# Patient Record
Sex: FEMALE | Race: WHITE | NOT HISPANIC OR LATINO | Employment: OTHER | URBAN - NONMETROPOLITAN AREA
[De-identification: names, ages, dates, MRNs, and addresses within clinical notes are randomized per-mention and may not be internally consistent; named-entity substitution may affect disease eponyms.]

---

## 2017-01-09 ENCOUNTER — HOSPITAL ENCOUNTER (OUTPATIENT)
Dept: OTHER | Facility: HOSPITAL | Age: 77
Setting detail: RADIATION/ONCOLOGY SERIES
Discharge: HOME | End: 2017-01-20
Attending: INTERNAL MEDICINE | Admitting: INTERNAL MEDICINE

## 2017-01-09 LAB
ALBUMIN SERPL-MCNC: 4 GM/DL (ref 3.4–4.8)
ALP SERPL-CCNC: 85 U/L (ref 38–126)
ALT SERPL-CCNC: 41 U/L (ref 9–52)
ANION GAP SERPL CALCULATED.3IONS-SCNC: 10 MMOL/L (ref 5–15)
AST SERPL-CCNC: 29 U/L (ref 14–36)
BASOPHILS # BLD AUTO: 0.02 X1000/UL (ref 0–0.2)
BASOPHILS NFR BLD AUTO: 0.3 % (ref 0–2)
BILIRUB SERPL-MCNC: 0.4 MG/DL (ref 0.2–1.3)
BUN SERPL-MCNC: 17 MG/DL (ref 7–21)
CALCIUM SERPL-MCNC: 9.4 MG/DL (ref 8.4–10.2)
CHLORIDE SERPL-SCNC: 99 MMOL/L (ref 95–110)
CO2 SERPL-SCNC: 26 MMOL/L (ref 22–31)
CONV AUTO IG#: 0.02 X1000/UL (ref 0.01–0.02)
CONV AUTO IG%: 0.3 % (ref 0–0.5)
CONV RETICULOCYTE PRODUCTION INDEX: 1.4 % (ref 0.63–2.13)
CREAT SERPL-MCNC: 0.8 MG/DL (ref 0.5–1)
EOSINOPHIL # BLD AUTO: 0.14 X1000/UL (ref 0–0.7)
EOSINOPHIL NFR BLD AUTO: 2.1 % (ref 0–7)
ERYTHROCYTE [DISTWIDTH] IN BLOOD: 14.3 % (ref 11.5–14.5)
FERRITIN SERPL IA-MCNC: 78.2 NG/ML (ref 11.1–264)
GLUCOSE SERPL-MCNC: 186 MG/DL (ref 60–100)
GRANULOCYTES # BLD AUTO: 4.47 X1000/UL (ref 2–8.6)
GRANULOCYTES NFR BLD AUTO: 67.4 % (ref 37–80)
HCT VFR BLD CALC: 35.7 % (ref 35–45)
HCT VFR BLD CALC: 35.7 % (ref 35–45)
HGB BLD-MCNC: 11.6 GM/DL (ref 12–15.5)
IMM RETICS NFR: 18.2 % (ref 3–15.9)
IRON SATN MFR SERPL: 13.6 % (ref 15–50)
IRON SERPL-MCNC: 43 UG/DL (ref 37–170)
LYMPHOCYTES # BLD AUTO: 1.59 X1000/UL (ref 0.6–4.2)
LYMPHOCYTES NFR BLD AUTO: 23.9 % (ref 10–50)
MCH RBC QN: 29.4 PG (ref 26–34)
MCHC RBC-ENTMCNC: 32.5 GM/DL (ref 31.4–36)
MCV RBC: 90.6 FL (ref 80–98)
MONOCYTES # BLD AUTO: 0.4 X1000/UL (ref 0–0.9)
MONOCYTES NFR BLD AUTO: 6 % (ref 0–12)
PLATELET # BLD: 292 X1000/MM3 (ref 150–450)
PMV BLD: 9.4 FL (ref 8–12)
POTASSIUM SERPL-SCNC: 4 MMOL/L (ref 3.5–5.1)
PROT SERPL-MCNC: 6.7 GM/DL (ref 6.3–8.6)
RBC # BLD: 3.94 MEGA/MM3 (ref 3.77–5.16)
RETICS/RBC NFR AUTO: 2.58 % (ref 0.63–2.13)
RETICS/RBC NFR MANUAL: 102 X1000/MM3 (ref 25–125)
SODIUM SERPL-SCNC: 135 MMOL/L (ref 137–145)
TIBC SERPL-MCNC: 317 UG/DL (ref 265–497)
WBC # BLD: 6.6 X1000/UL (ref 3.2–9.8)

## 2017-01-17 LAB — CEA SERPL-MCNC: 1.9 NG/ML (ref 0–5)

## 2017-02-22 ENCOUNTER — TELEPHONE (OUTPATIENT)
Dept: NUTRITION | Facility: HOSPITAL | Age: 77
End: 2017-02-22

## 2017-02-22 NOTE — PROGRESS NOTES
Adult Outpatient Nutrition  Assessment    Patient Name:  Mary Gutierrez  YOB: 1940  MRN: [id number]        Assessment Date:  2/22/2017                        Comments:   Received voice mail from Mr. Gutierrez stating that Mary is  out of [employer] discount coupons. Mailed x5  Coupons to pt's   Home.        Electronically signed by:  Love Black RD  02/22/17 8:18 AM

## 2017-04-11 ENCOUNTER — TELEPHONE (OUTPATIENT)
Dept: NUTRITION | Facility: HOSPITAL | Age: 77
End: 2017-04-11

## 2017-04-11 NOTE — PROGRESS NOTES
Adult Outpatient Nutrition  Assessment    Patient Name:  Mary Gutierrez  YOB: 1940  MRN: [id number]        Assessment Date:  4/11/2017                        Comments: Received phone message from Mr. Gutierrez stating that Mary needs   [employer] discount coupons--mailed several this am.        Electronically signed by:  Love Black RD  04/11/17 8:32 AM

## 2017-04-17 ENCOUNTER — APPOINTMENT (OUTPATIENT)
Dept: ONCOLOGY | Facility: HOSPITAL | Age: 77
End: 2017-04-17

## 2017-04-19 ENCOUNTER — LAB (OUTPATIENT)
Dept: ONCOLOGY | Facility: HOSPITAL | Age: 77
End: 2017-04-19

## 2017-04-19 ENCOUNTER — OFFICE VISIT (OUTPATIENT)
Dept: ONCOLOGY | Facility: CLINIC | Age: 77
End: 2017-04-19

## 2017-04-19 ENCOUNTER — INFUSION (OUTPATIENT)
Dept: ONCOLOGY | Facility: HOSPITAL | Age: 77
End: 2017-04-19

## 2017-04-19 VITALS
HEART RATE: 80 BPM | WEIGHT: 124.3 LBS | RESPIRATION RATE: 18 BRPM | TEMPERATURE: 98.6 F | SYSTOLIC BLOOD PRESSURE: 166 MMHG | DIASTOLIC BLOOD PRESSURE: 96 MMHG | BODY MASS INDEX: 21.34 KG/M2

## 2017-04-19 DIAGNOSIS — Z45.2 ENCOUNTER FOR VENOUS ACCESS DEVICE CARE: Primary | ICD-10-CM

## 2017-04-19 DIAGNOSIS — C18.9 COLON CANCER WITHOUT DISTANT METASTASIS (HCC): Primary | ICD-10-CM

## 2017-04-19 DIAGNOSIS — C18.2 MALIGNANT NEOPLASM OF ASCENDING COLON (HCC): Primary | ICD-10-CM

## 2017-04-19 DIAGNOSIS — D64.89 ANEMIA DUE TO OTHER CAUSE: ICD-10-CM

## 2017-04-19 PROBLEM — D64.9 ANEMIA: Status: ACTIVE | Noted: 2017-04-19

## 2017-04-19 LAB
ALBUMIN SERPL-MCNC: 4.4 G/DL (ref 3.4–4.8)
ALBUMIN/GLOB SERPL: 2 G/DL (ref 1.1–1.8)
ALP SERPL-CCNC: 49 U/L (ref 38–126)
ALT SERPL W P-5'-P-CCNC: 24 U/L (ref 9–52)
ANION GAP SERPL CALCULATED.3IONS-SCNC: 12 MMOL/L (ref 5–15)
AST SERPL-CCNC: 29 U/L (ref 14–36)
BASOPHILS # BLD AUTO: 0.03 10*3/MM3 (ref 0–0.2)
BASOPHILS NFR BLD AUTO: 0.5 % (ref 0–2)
BILIRUB SERPL-MCNC: 0.3 MG/DL (ref 0.2–1.3)
BUN BLD-MCNC: 18 MG/DL (ref 7–21)
BUN/CREAT SERPL: 19.4 (ref 7–25)
CALCIUM SPEC-SCNC: 9.4 MG/DL (ref 8.4–10.2)
CHLORIDE SERPL-SCNC: 103 MMOL/L (ref 95–110)
CO2 SERPL-SCNC: 23 MMOL/L (ref 22–31)
CREAT BLD-MCNC: 0.93 MG/DL (ref 0.5–1)
DEPRECATED RDW RBC AUTO: 42.8 FL (ref 36.4–46.3)
EOSINOPHIL # BLD AUTO: 0.17 10*3/MM3 (ref 0–0.7)
EOSINOPHIL NFR BLD AUTO: 2.8 % (ref 0–7)
ERYTHROCYTE [DISTWIDTH] IN BLOOD BY AUTOMATED COUNT: 14.3 % (ref 11.5–14.5)
GFR SERPL CREATININE-BSD FRML MDRD: 59 ML/MIN/1.73 (ref 39–90)
GLOBULIN UR ELPH-MCNC: 2.2 GM/DL (ref 2.3–3.5)
GLUCOSE BLD-MCNC: 160 MG/DL (ref 60–100)
HCT VFR BLD AUTO: 28.1 % (ref 35–45)
HGB BLD-MCNC: 9 G/DL (ref 12–15.5)
IMM GRANULOCYTES # BLD: 0.02 10*3/MM3 (ref 0–0.02)
IMM GRANULOCYTES NFR BLD: 0.3 % (ref 0–0.5)
LYMPHOCYTES # BLD AUTO: 1.75 10*3/MM3 (ref 0.6–4.2)
LYMPHOCYTES NFR BLD AUTO: 29.1 % (ref 10–50)
MCH RBC QN AUTO: 26.1 PG (ref 26.5–34)
MCHC RBC AUTO-ENTMCNC: 32 G/DL (ref 31.4–36)
MCV RBC AUTO: 81.4 FL (ref 80–98)
MONOCYTES # BLD AUTO: 0.33 10*3/MM3 (ref 0–0.9)
MONOCYTES NFR BLD AUTO: 5.5 % (ref 0–12)
NEUTROPHILS # BLD AUTO: 3.72 10*3/MM3 (ref 2–8.6)
NEUTROPHILS NFR BLD AUTO: 61.8 % (ref 37–80)
PLATELET # BLD AUTO: 408 10*3/MM3 (ref 150–450)
PMV BLD AUTO: 9.5 FL (ref 8–12)
POTASSIUM BLD-SCNC: 3.9 MMOL/L (ref 3.5–5.1)
PROT SERPL-MCNC: 6.6 G/DL (ref 6.3–8.6)
RBC # BLD AUTO: 3.45 10*6/MM3 (ref 3.77–5.16)
SODIUM BLD-SCNC: 138 MMOL/L (ref 137–145)
WBC NRBC COR # BLD: 6.02 10*3/MM3 (ref 3.2–9.8)

## 2017-04-19 PROCEDURE — 82378 CARCINOEMBRYONIC ANTIGEN: CPT | Performed by: INTERNAL MEDICINE

## 2017-04-19 PROCEDURE — 85025 COMPLETE CBC W/AUTO DIFF WBC: CPT | Performed by: INTERNAL MEDICINE

## 2017-04-19 PROCEDURE — 99214 OFFICE O/P EST MOD 30 MIN: CPT | Performed by: INTERNAL MEDICINE

## 2017-04-19 PROCEDURE — G0463 HOSPITAL OUTPT CLINIC VISIT: HCPCS | Performed by: INTERNAL MEDICINE

## 2017-04-19 PROCEDURE — 80053 COMPREHEN METABOLIC PANEL: CPT | Performed by: INTERNAL MEDICINE

## 2017-04-19 RX ORDER — SODIUM CHLORIDE 0.9 % (FLUSH) 0.9 %
10 SYRINGE (ML) INJECTION AS NEEDED
Status: DISCONTINUED | OUTPATIENT
Start: 2017-04-19 | End: 2017-04-19 | Stop reason: HOSPADM

## 2017-04-19 RX ORDER — MULTIVIT WITH MINERALS/LUTEIN
2 TABLET ORAL 2 TIMES DAILY
COMMUNITY
End: 2017-06-15 | Stop reason: SDUPTHER

## 2017-04-19 RX ORDER — SODIUM CHLORIDE 0.9 % (FLUSH) 0.9 %
10 SYRINGE (ML) INJECTION AS NEEDED
Status: CANCELLED | OUTPATIENT
Start: 2017-04-19

## 2017-04-19 RX ADMIN — SODIUM CHLORIDE, PRESERVATIVE FREE 500 UNITS: 5 INJECTION INTRAVENOUS at 10:30

## 2017-04-19 RX ADMIN — Medication 10 ML: at 10:30

## 2017-04-19 NOTE — PROGRESS NOTES
Subjective patient is here for follow-up of her colon cancer.         History of Present Illness This is 76year-old female who was diagnosed with colon cancer in 2015.  She underwent right hemicolectomy followed by 8 cycles of chemotherapy.  Patient could not complete the chemotherapy secondary to intolerance. She has had anemia and peripheral neuropathy. Patient is still anemic.  She has no new complaints.    Past Medical History, Past Surgical History, Social History, Family History have been reviewed and are without significant changes except as mentioned.    Review of Systems   Constitutional: Negative.    HENT: Negative.    Eyes: Negative.    Respiratory: Negative.    Cardiovascular: Negative.    Gastrointestinal: Negative.    Endocrine: Negative.    Musculoskeletal: Negative.    Skin: Negative.    Allergic/Immunologic: Negative.    Neurological: Negative.    Hematological: Negative.    Psychiatric/Behavioral: Negative.        A comprehensive 14 point review of systems was performed and was negative except as mentioned.    Medications:  The current medication list was reviewed in the EMR    ALLERGIES:    Allergies   Allergen Reactions   • Other Rash     Oral Chemo Meds       Objective      Vitals:    04/19/17 1052   BP: 166/96   Pulse: 80   Resp: 18   Temp: 98.6 °F (37 °C)   Weight: 124 lb 4.8 oz (56.4 kg)   PainSc: 0-No pain     Current Status 4/19/2017   ECOG score 1       Physical Exam   Constitutional: She is oriented to person, place, and time. She appears well-developed and well-nourished.   HENT:   Head: Normocephalic and atraumatic.   Eyes: Conjunctivae and EOM are normal. Pupils are equal, round, and reactive to light.   Neck: Normal range of motion. Neck supple.   Cardiovascular: Normal rate, regular rhythm, normal heart sounds and intact distal pulses.    Pulmonary/Chest: Effort normal and breath sounds normal.   Abdominal: Soft. Bowel sounds are normal.   Musculoskeletal: Normal range of motion.    Neurological: She is alert and oriented to person, place, and time. She has normal reflexes.   Psychiatric: She has a normal mood and affect. Her behavior is normal. Thought content normal.       RECENT LABS:  Hematology WBC   Date Value Ref Range Status   04/19/2017 6.02 3.20 - 9.80 10*3/mm3 Final     RBC   Date Value Ref Range Status   04/19/2017 3.45 (L) 3.77 - 5.16 10*6/mm3 Final     Hemoglobin   Date Value Ref Range Status   04/19/2017 9.0 (L) 12.0 - 15.5 g/dL Final     Hematocrit   Date Value Ref Range Status   04/19/2017 28.1 (L) 35.0 - 45.0 % Final     Platelets   Date Value Ref Range Status   04/19/2017 408 150 - 450 10*3/mm3 Final              Assessment/Plan     1.  Colon cancer.  2. Anemia    Patient with stage III colon cancer October 2015  Currently no evidence of recurrence. Her colonoscopy in 2016 was normal. CEA levels are still pending. Currently observation is indicated.  Patient will be followed up periodically.    The etiology of anemia is unclear. She will need a complete workup.  We will proceed with testing including iron studies and thyroid functions and other parameters.  Patient may need a full workupifshe continues to be anemic.    Her neuropathy is stable.    Thanks for allowing us to participate in the care of this patient.  She will be seen in the office in 3 months time.                  4/19/2017      CC:

## 2017-04-21 LAB — CEA SERPL-MCNC: 1.7 NG/ML (ref 0–5)

## 2017-05-24 DIAGNOSIS — D64.89 ANEMIA DUE TO OTHER CAUSE: Primary | ICD-10-CM

## 2017-05-24 DIAGNOSIS — C18.2 MALIGNANT NEOPLASM OF ASCENDING COLON (HCC): ICD-10-CM

## 2017-05-25 ENCOUNTER — INFUSION (OUTPATIENT)
Dept: ONCOLOGY | Facility: HOSPITAL | Age: 77
End: 2017-05-25

## 2017-05-25 ENCOUNTER — OFFICE VISIT (OUTPATIENT)
Dept: ONCOLOGY | Facility: CLINIC | Age: 77
End: 2017-05-25

## 2017-05-25 VITALS
RESPIRATION RATE: 18 BRPM | BODY MASS INDEX: 21.13 KG/M2 | DIASTOLIC BLOOD PRESSURE: 79 MMHG | SYSTOLIC BLOOD PRESSURE: 151 MMHG | WEIGHT: 123.1 LBS | TEMPERATURE: 98.4 F | HEART RATE: 72 BPM

## 2017-05-25 DIAGNOSIS — D64.89 ANEMIA DUE TO OTHER CAUSE: Primary | ICD-10-CM

## 2017-05-25 DIAGNOSIS — C18.9 COLON CANCER WITHOUT DISTANT METASTASIS (HCC): ICD-10-CM

## 2017-05-25 DIAGNOSIS — C18.2 MALIGNANT NEOPLASM OF ASCENDING COLON (HCC): ICD-10-CM

## 2017-05-25 DIAGNOSIS — Z45.2 ENCOUNTER FOR VENOUS ACCESS DEVICE CARE: ICD-10-CM

## 2017-05-25 DIAGNOSIS — D50.9 IRON DEFICIENCY ANEMIA, UNSPECIFIED IRON DEFICIENCY ANEMIA TYPE: ICD-10-CM

## 2017-05-25 LAB
ALBUMIN SERPL-MCNC: 4.5 G/DL (ref 3.4–4.8)
ALBUMIN/GLOB SERPL: 1.8 G/DL (ref 1.1–1.8)
ALP SERPL-CCNC: 44 U/L (ref 38–126)
ALT SERPL W P-5'-P-CCNC: 37 U/L (ref 9–52)
ANION GAP SERPL CALCULATED.3IONS-SCNC: 13 MMOL/L (ref 5–15)
AST SERPL-CCNC: 35 U/L (ref 14–36)
BASOPHILS # BLD AUTO: 0.02 10*3/MM3 (ref 0–0.2)
BASOPHILS NFR BLD AUTO: 0.4 % (ref 0–2)
BILIRUB SERPL-MCNC: 0.3 MG/DL (ref 0.2–1.3)
BUN BLD-MCNC: 16 MG/DL (ref 7–21)
BUN/CREAT SERPL: 15.8 (ref 7–25)
CALCIUM SPEC-SCNC: 9.4 MG/DL (ref 8.4–10.2)
CHLORIDE SERPL-SCNC: 101 MMOL/L (ref 95–110)
CO2 SERPL-SCNC: 23 MMOL/L (ref 22–31)
CREAT BLD-MCNC: 1.01 MG/DL (ref 0.5–1)
DAT POLY-SP REAG RBC QL: NEGATIVE
DEPRECATED RDW RBC AUTO: 50.7 FL (ref 36.4–46.3)
EOSINOPHIL # BLD AUTO: 0.21 10*3/MM3 (ref 0–0.7)
EOSINOPHIL NFR BLD AUTO: 4.6 % (ref 0–7)
ERYTHROCYTE [DISTWIDTH] IN BLOOD BY AUTOMATED COUNT: 16.7 % (ref 11.5–14.5)
FERRITIN SERPL-MCNC: 11.1 NG/ML (ref 11.1–264)
FOLATE SERPL-MCNC: >20 NG/ML (ref 2.76–21)
GFR SERPL CREATININE-BSD FRML MDRD: 53 ML/MIN/1.73 (ref 39–90)
GLOBULIN UR ELPH-MCNC: 2.5 GM/DL (ref 2.3–3.5)
GLUCOSE BLD-MCNC: 171 MG/DL (ref 60–100)
HCT VFR BLD AUTO: 32.9 % (ref 35–45)
HGB BLD-MCNC: 10.1 G/DL (ref 12–15.5)
IMM GRANULOCYTES # BLD: 0.01 10*3/MM3 (ref 0–0.02)
IMM GRANULOCYTES NFR BLD: 0.2 % (ref 0–0.5)
IRON 24H UR-MRATE: 10 MCG/DL (ref 37–170)
IRON SATN MFR SERPL: 2 % (ref 15–50)
LDH SERPL-CCNC: 319 U/L (ref 313–618)
LYMPHOCYTES # BLD AUTO: 1.5 10*3/MM3 (ref 0.6–4.2)
LYMPHOCYTES NFR BLD AUTO: 32.7 % (ref 10–50)
MCH RBC QN AUTO: 25.8 PG (ref 26.5–34)
MCHC RBC AUTO-ENTMCNC: 30.7 G/DL (ref 31.4–36)
MCV RBC AUTO: 84.1 FL (ref 80–98)
MONOCYTES # BLD AUTO: 0.36 10*3/MM3 (ref 0–0.9)
MONOCYTES NFR BLD AUTO: 7.8 % (ref 0–12)
NEUTROPHILS # BLD AUTO: 2.49 10*3/MM3 (ref 2–8.6)
NEUTROPHILS NFR BLD AUTO: 54.3 % (ref 37–80)
NRBC BLD MANUAL-RTO: 0 /100 WBC (ref 0–0)
PLATELET # BLD AUTO: 348 10*3/MM3 (ref 150–450)
PMV BLD AUTO: 10 FL (ref 8–12)
POTASSIUM BLD-SCNC: 4 MMOL/L (ref 3.5–5.1)
PROT SERPL-MCNC: 7 G/DL (ref 6.3–8.6)
RBC # BLD AUTO: 3.91 10*6/MM3 (ref 3.77–5.16)
SODIUM BLD-SCNC: 137 MMOL/L (ref 137–145)
T4 FREE SERPL-MCNC: 0.82 NG/DL (ref 0.78–2.19)
TIBC SERPL-MCNC: 416 MCG/DL (ref 265–497)
TSH SERPL DL<=0.05 MIU/L-ACNC: 3.75 MIU/ML (ref 0.46–4.68)
VIT B12 BLD-MCNC: >1000 PG/ML (ref 239–931)
WBC NRBC COR # BLD: 4.59 10*3/MM3 (ref 3.2–9.8)

## 2017-05-25 PROCEDURE — 83615 LACTATE (LD) (LDH) ENZYME: CPT | Performed by: INTERNAL MEDICINE

## 2017-05-25 PROCEDURE — 83540 ASSAY OF IRON: CPT | Performed by: INTERNAL MEDICINE

## 2017-05-25 PROCEDURE — 80053 COMPREHEN METABOLIC PANEL: CPT | Performed by: INTERNAL MEDICINE

## 2017-05-25 PROCEDURE — G0463 HOSPITAL OUTPT CLINIC VISIT: HCPCS | Performed by: INTERNAL MEDICINE

## 2017-05-25 PROCEDURE — 82728 ASSAY OF FERRITIN: CPT | Performed by: INTERNAL MEDICINE

## 2017-05-25 PROCEDURE — 83550 IRON BINDING TEST: CPT | Performed by: INTERNAL MEDICINE

## 2017-05-25 PROCEDURE — 99214 OFFICE O/P EST MOD 30 MIN: CPT | Performed by: INTERNAL MEDICINE

## 2017-05-25 PROCEDURE — 82607 VITAMIN B-12: CPT | Performed by: INTERNAL MEDICINE

## 2017-05-25 PROCEDURE — 36415 COLL VENOUS BLD VENIPUNCTURE: CPT | Performed by: INTERNAL MEDICINE

## 2017-05-25 PROCEDURE — 82746 ASSAY OF FOLIC ACID SERUM: CPT | Performed by: INTERNAL MEDICINE

## 2017-05-25 PROCEDURE — 25010000002 HEPARIN FLUSH (PORCINE) 100 UNIT/ML SOLUTION: Performed by: INTERNAL MEDICINE

## 2017-05-25 PROCEDURE — 84443 ASSAY THYROID STIM HORMONE: CPT | Performed by: INTERNAL MEDICINE

## 2017-05-25 PROCEDURE — 36591 DRAW BLOOD OFF VENOUS DEVICE: CPT | Performed by: INTERNAL MEDICINE

## 2017-05-25 PROCEDURE — 82668 ASSAY OF ERYTHROPOIETIN: CPT | Performed by: INTERNAL MEDICINE

## 2017-05-25 PROCEDURE — 84439 ASSAY OF FREE THYROXINE: CPT | Performed by: INTERNAL MEDICINE

## 2017-05-25 PROCEDURE — 86880 COOMBS TEST DIRECT: CPT | Performed by: INTERNAL MEDICINE

## 2017-05-25 PROCEDURE — 85025 COMPLETE CBC W/AUTO DIFF WBC: CPT | Performed by: INTERNAL MEDICINE

## 2017-05-25 RX ORDER — SODIUM CHLORIDE 9 MG/ML
250 INJECTION, SOLUTION INTRAVENOUS ONCE
Status: CANCELLED | OUTPATIENT
Start: 2017-06-01

## 2017-05-25 RX ORDER — SODIUM CHLORIDE 0.9 % (FLUSH) 0.9 %
10 SYRINGE (ML) INJECTION AS NEEDED
Status: DISCONTINUED | OUTPATIENT
Start: 2017-05-25 | End: 2017-05-25 | Stop reason: HOSPADM

## 2017-05-25 RX ORDER — FAMOTIDINE 20 MG/1
20 TABLET, FILM COATED ORAL ONCE
Status: CANCELLED | OUTPATIENT
Start: 2017-06-01

## 2017-05-25 RX ORDER — SODIUM CHLORIDE 0.9 % (FLUSH) 0.9 %
10 SYRINGE (ML) INJECTION AS NEEDED
Status: CANCELLED | OUTPATIENT
Start: 2017-05-25

## 2017-05-25 RX ORDER — DORZOLAMIDE HYDROCHLORIDE AND TIMOLOL MALEATE 20; 5 MG/ML; MG/ML
SOLUTION/ DROPS OPHTHALMIC
Refills: 2 | COMMUNITY
Start: 2017-03-27 | End: 2017-06-15 | Stop reason: SDUPTHER

## 2017-05-25 RX ORDER — DIPHENHYDRAMINE HCL 25 MG
25 CAPSULE ORAL ONCE
Status: CANCELLED | OUTPATIENT
Start: 2017-06-01

## 2017-05-25 RX ORDER — FLUOROURACIL 50 MG/G
1 CREAM TOPICAL AS NEEDED
COMMUNITY
End: 2021-04-16

## 2017-05-25 RX ORDER — OMEGA-3-ACID ETHYL ESTERS 1 G/1
CAPSULE, LIQUID FILLED ORAL
Refills: 0 | COMMUNITY
Start: 2017-05-03 | End: 2017-06-15 | Stop reason: SDUPTHER

## 2017-05-25 RX ADMIN — SODIUM CHLORIDE, PRESERVATIVE FREE 500 UNITS: 5 INJECTION INTRAVENOUS at 08:50

## 2017-05-25 RX ADMIN — Medication 10 ML: at 08:50

## 2017-05-26 LAB — ETHNIC BACKGROUND STATED: 17.9 MIU/ML (ref 2.6–18.5)

## 2017-06-01 ENCOUNTER — APPOINTMENT (OUTPATIENT)
Dept: ONCOLOGY | Facility: HOSPITAL | Age: 77
End: 2017-06-01

## 2017-06-02 ENCOUNTER — INFUSION (OUTPATIENT)
Dept: ONCOLOGY | Facility: HOSPITAL | Age: 77
End: 2017-06-02

## 2017-06-02 VITALS
DIASTOLIC BLOOD PRESSURE: 57 MMHG | TEMPERATURE: 97.7 F | SYSTOLIC BLOOD PRESSURE: 163 MMHG | HEART RATE: 71 BPM | RESPIRATION RATE: 18 BRPM

## 2017-06-02 DIAGNOSIS — D50.0 IRON DEFICIENCY ANEMIA DUE TO CHRONIC BLOOD LOSS: ICD-10-CM

## 2017-06-02 DIAGNOSIS — Z45.2 ENCOUNTER FOR VENOUS ACCESS DEVICE CARE: Primary | ICD-10-CM

## 2017-06-02 PROCEDURE — 63710000001 DIPHENHYDRAMINE PER 50 MG: Performed by: INTERNAL MEDICINE

## 2017-06-02 PROCEDURE — 96365 THER/PROPH/DIAG IV INF INIT: CPT | Performed by: INTERNAL MEDICINE

## 2017-06-02 PROCEDURE — 25010000002 IRON SUCROSE PER 1 MG: Performed by: INTERNAL MEDICINE

## 2017-06-02 PROCEDURE — 25010000002 HEPARIN FLUSH (PORCINE) 100 UNIT/ML SOLUTION: Performed by: INTERNAL MEDICINE

## 2017-06-02 RX ORDER — SODIUM CHLORIDE 9 MG/ML
250 INJECTION, SOLUTION INTRAVENOUS ONCE
Status: COMPLETED | OUTPATIENT
Start: 2017-06-02 | End: 2017-06-02

## 2017-06-02 RX ORDER — SODIUM CHLORIDE 0.9 % (FLUSH) 0.9 %
10 SYRINGE (ML) INJECTION AS NEEDED
Status: DISCONTINUED | OUTPATIENT
Start: 2017-06-02 | End: 2017-06-02 | Stop reason: HOSPADM

## 2017-06-02 RX ORDER — SODIUM CHLORIDE 0.9 % (FLUSH) 0.9 %
10 SYRINGE (ML) INJECTION AS NEEDED
Status: CANCELLED | OUTPATIENT
Start: 2017-06-02

## 2017-06-02 RX ORDER — FAMOTIDINE 20 MG/1
20 TABLET, FILM COATED ORAL ONCE
Status: CANCELLED | OUTPATIENT
Start: 2017-06-02

## 2017-06-02 RX ORDER — DIPHENHYDRAMINE HCL 25 MG
25 CAPSULE ORAL ONCE
Status: COMPLETED | OUTPATIENT
Start: 2017-06-02 | End: 2017-06-02

## 2017-06-02 RX ORDER — DIPHENHYDRAMINE HCL 25 MG
25 CAPSULE ORAL ONCE
Status: CANCELLED | OUTPATIENT
Start: 2017-06-02

## 2017-06-02 RX ORDER — SODIUM CHLORIDE 9 MG/ML
250 INJECTION, SOLUTION INTRAVENOUS ONCE
Status: CANCELLED | OUTPATIENT
Start: 2017-06-02

## 2017-06-02 RX ORDER — FAMOTIDINE 20 MG/1
20 TABLET, FILM COATED ORAL ONCE
Status: COMPLETED | OUTPATIENT
Start: 2017-06-02 | End: 2017-06-02

## 2017-06-02 RX ADMIN — IRON SUCROSE 200 MG: 20 INJECTION, SOLUTION INTRAVENOUS at 10:13

## 2017-06-02 RX ADMIN — DIPHENHYDRAMINE HYDROCHLORIDE 25 MG: 25 CAPSULE ORAL at 10:07

## 2017-06-02 RX ADMIN — SODIUM CHLORIDE, PRESERVATIVE FREE 500 UNITS: 5 INJECTION INTRAVENOUS at 11:46

## 2017-06-02 RX ADMIN — SODIUM CHLORIDE 250 ML: 9 INJECTION, SOLUTION INTRAVENOUS at 10:03

## 2017-06-02 RX ADMIN — FAMOTIDINE 20 MG: 20 TABLET ORAL at 10:07

## 2017-06-02 RX ADMIN — Medication 10 ML: at 11:46

## 2017-06-05 ENCOUNTER — DOCUMENTATION (OUTPATIENT)
Dept: NUTRITION | Facility: HOSPITAL | Age: 77
End: 2017-06-05

## 2017-06-05 ENCOUNTER — INFUSION (OUTPATIENT)
Dept: ONCOLOGY | Facility: HOSPITAL | Age: 77
End: 2017-06-05

## 2017-06-05 VITALS — DIASTOLIC BLOOD PRESSURE: 71 MMHG | TEMPERATURE: 97.5 F | SYSTOLIC BLOOD PRESSURE: 141 MMHG | HEART RATE: 77 BPM

## 2017-06-05 DIAGNOSIS — C18.2 MALIGNANT NEOPLASM OF ASCENDING COLON (HCC): ICD-10-CM

## 2017-06-05 DIAGNOSIS — Z45.2 ENCOUNTER FOR VENOUS ACCESS DEVICE CARE: Primary | ICD-10-CM

## 2017-06-05 DIAGNOSIS — D50.0 IRON DEFICIENCY ANEMIA DUE TO CHRONIC BLOOD LOSS: ICD-10-CM

## 2017-06-05 PROCEDURE — 63710000001 DIPHENHYDRAMINE PER 50 MG: Performed by: INTERNAL MEDICINE

## 2017-06-05 PROCEDURE — 25010000002 IRON SUCROSE PER 1 MG: Performed by: INTERNAL MEDICINE

## 2017-06-05 PROCEDURE — 25010000002 HEPARIN FLUSH (PORCINE) 100 UNIT/ML SOLUTION: Performed by: INTERNAL MEDICINE

## 2017-06-05 PROCEDURE — 96374 THER/PROPH/DIAG INJ IV PUSH: CPT | Performed by: INTERNAL MEDICINE

## 2017-06-05 RX ORDER — SODIUM CHLORIDE 0.9 % (FLUSH) 0.9 %
10 SYRINGE (ML) INJECTION AS NEEDED
Status: DISCONTINUED | OUTPATIENT
Start: 2017-06-05 | End: 2017-06-05 | Stop reason: HOSPADM

## 2017-06-05 RX ORDER — SODIUM CHLORIDE 9 MG/ML
250 INJECTION, SOLUTION INTRAVENOUS ONCE
Status: COMPLETED | OUTPATIENT
Start: 2017-06-05 | End: 2017-06-05

## 2017-06-05 RX ORDER — SODIUM CHLORIDE 0.9 % (FLUSH) 0.9 %
10 SYRINGE (ML) INJECTION AS NEEDED
Status: CANCELLED | OUTPATIENT
Start: 2017-06-05

## 2017-06-05 RX ORDER — DIPHENHYDRAMINE HCL 25 MG
25 CAPSULE ORAL ONCE
Status: COMPLETED | OUTPATIENT
Start: 2017-06-05 | End: 2017-06-05

## 2017-06-05 RX ORDER — FAMOTIDINE 20 MG/1
20 TABLET, FILM COATED ORAL ONCE
Status: COMPLETED | OUTPATIENT
Start: 2017-06-05 | End: 2017-06-05

## 2017-06-05 RX ADMIN — IRON SUCROSE 200 MG: 20 INJECTION, SOLUTION INTRAVENOUS at 14:09

## 2017-06-05 RX ADMIN — SODIUM CHLORIDE 250 ML: 9 INJECTION, SOLUTION INTRAVENOUS at 13:46

## 2017-06-05 RX ADMIN — FAMOTIDINE 20 MG: 20 TABLET ORAL at 13:46

## 2017-06-05 RX ADMIN — SODIUM CHLORIDE, PRESERVATIVE FREE 500 UNITS: 5 INJECTION INTRAVENOUS at 15:42

## 2017-06-05 RX ADMIN — DIPHENHYDRAMINE HYDROCHLORIDE 25 MG: 25 CAPSULE ORAL at 13:46

## 2017-06-05 RX ADMIN — Medication 10 ML: at 15:43

## 2017-06-05 NOTE — PROGRESS NOTES
Adult Outpatient Nutrition  Assessment    Patient Name:  Mary Gutierrez  YOB: 1940  MRN: [id number]        Assessment Date:  6/5/2017                        Comments: Provided additional supplement samples/coupons. Wt 123 lb.        Electronically signed by:  Love Black RD  06/05/17 1:32 PM

## 2017-06-07 ENCOUNTER — INFUSION (OUTPATIENT)
Dept: ONCOLOGY | Facility: HOSPITAL | Age: 77
End: 2017-06-07

## 2017-06-07 VITALS
RESPIRATION RATE: 18 BRPM | DIASTOLIC BLOOD PRESSURE: 72 MMHG | SYSTOLIC BLOOD PRESSURE: 161 MMHG | HEART RATE: 76 BPM | TEMPERATURE: 95.8 F

## 2017-06-07 DIAGNOSIS — Z45.2 ENCOUNTER FOR VENOUS ACCESS DEVICE CARE: Primary | ICD-10-CM

## 2017-06-07 DIAGNOSIS — D50.0 IRON DEFICIENCY ANEMIA DUE TO CHRONIC BLOOD LOSS: ICD-10-CM

## 2017-06-07 PROCEDURE — 96365 THER/PROPH/DIAG IV INF INIT: CPT | Performed by: INTERNAL MEDICINE

## 2017-06-07 PROCEDURE — 25010000002 HEPARIN FLUSH (PORCINE) 100 UNIT/ML SOLUTION: Performed by: INTERNAL MEDICINE

## 2017-06-07 PROCEDURE — 63710000001 DIPHENHYDRAMINE PER 50 MG: Performed by: INTERNAL MEDICINE

## 2017-06-07 PROCEDURE — 25010000002 IRON SUCROSE PER 1 MG: Performed by: INTERNAL MEDICINE

## 2017-06-07 PROCEDURE — 96375 TX/PRO/DX INJ NEW DRUG ADDON: CPT | Performed by: INTERNAL MEDICINE

## 2017-06-07 RX ORDER — SODIUM CHLORIDE 0.9 % (FLUSH) 0.9 %
10 SYRINGE (ML) INJECTION AS NEEDED
Status: DISCONTINUED | OUTPATIENT
Start: 2017-06-07 | End: 2017-06-07 | Stop reason: HOSPADM

## 2017-06-07 RX ORDER — SODIUM CHLORIDE 0.9 % (FLUSH) 0.9 %
10 SYRINGE (ML) INJECTION AS NEEDED
Status: CANCELLED | OUTPATIENT
Start: 2017-06-07

## 2017-06-07 RX ORDER — SODIUM CHLORIDE 9 MG/ML
250 INJECTION, SOLUTION INTRAVENOUS ONCE
Status: COMPLETED | OUTPATIENT
Start: 2017-06-07 | End: 2017-06-07

## 2017-06-07 RX ORDER — FAMOTIDINE 20 MG/1
20 TABLET, FILM COATED ORAL ONCE
Status: COMPLETED | OUTPATIENT
Start: 2017-06-07 | End: 2017-06-07

## 2017-06-07 RX ORDER — DIPHENHYDRAMINE HCL 25 MG
25 CAPSULE ORAL ONCE
Status: COMPLETED | OUTPATIENT
Start: 2017-06-07 | End: 2017-06-07

## 2017-06-07 RX ADMIN — IRON SUCROSE 200 MG: 20 INJECTION, SOLUTION INTRAVENOUS at 15:23

## 2017-06-07 RX ADMIN — SODIUM CHLORIDE 250 ML: 9 INJECTION, SOLUTION INTRAVENOUS at 15:01

## 2017-06-07 RX ADMIN — FAMOTIDINE 20 MG: 20 TABLET ORAL at 15:02

## 2017-06-07 RX ADMIN — DIPHENHYDRAMINE HYDROCHLORIDE 25 MG: 25 CAPSULE ORAL at 15:02

## 2017-06-07 RX ADMIN — Medication 10 ML: at 16:35

## 2017-06-07 RX ADMIN — SODIUM CHLORIDE, PRESERVATIVE FREE 500 UNITS: 5 INJECTION INTRAVENOUS at 16:35

## 2017-06-09 ENCOUNTER — INFUSION (OUTPATIENT)
Dept: ONCOLOGY | Facility: HOSPITAL | Age: 77
End: 2017-06-09

## 2017-06-09 VITALS — SYSTOLIC BLOOD PRESSURE: 170 MMHG | HEART RATE: 77 BPM | TEMPERATURE: 97.8 F | DIASTOLIC BLOOD PRESSURE: 88 MMHG

## 2017-06-09 DIAGNOSIS — D50.0 IRON DEFICIENCY ANEMIA DUE TO CHRONIC BLOOD LOSS: Primary | ICD-10-CM

## 2017-06-09 DIAGNOSIS — Z45.2 ENCOUNTER FOR VENOUS ACCESS DEVICE CARE: ICD-10-CM

## 2017-06-09 PROCEDURE — 63710000001 DIPHENHYDRAMINE PER 50 MG: Performed by: INTERNAL MEDICINE

## 2017-06-09 PROCEDURE — 25010000002 HEPARIN FLUSH (PORCINE) 100 UNIT/ML SOLUTION: Performed by: INTERNAL MEDICINE

## 2017-06-09 PROCEDURE — 25010000002 IRON SUCROSE PER 1 MG: Performed by: INTERNAL MEDICINE

## 2017-06-09 PROCEDURE — 96365 THER/PROPH/DIAG IV INF INIT: CPT | Performed by: INTERNAL MEDICINE

## 2017-06-09 RX ORDER — DIPHENHYDRAMINE HCL 25 MG
25 CAPSULE ORAL ONCE
Status: COMPLETED | OUTPATIENT
Start: 2017-06-09 | End: 2017-06-09

## 2017-06-09 RX ORDER — SODIUM CHLORIDE 0.9 % (FLUSH) 0.9 %
10 SYRINGE (ML) INJECTION AS NEEDED
Status: DISCONTINUED | OUTPATIENT
Start: 2017-06-09 | End: 2017-06-09 | Stop reason: HOSPADM

## 2017-06-09 RX ORDER — SODIUM CHLORIDE 9 MG/ML
250 INJECTION, SOLUTION INTRAVENOUS ONCE
Status: COMPLETED | OUTPATIENT
Start: 2017-06-09 | End: 2017-06-09

## 2017-06-09 RX ORDER — FAMOTIDINE 20 MG/1
20 TABLET, FILM COATED ORAL ONCE
Status: COMPLETED | OUTPATIENT
Start: 2017-06-09 | End: 2017-06-09

## 2017-06-09 RX ORDER — SODIUM CHLORIDE 0.9 % (FLUSH) 0.9 %
10 SYRINGE (ML) INJECTION AS NEEDED
Status: CANCELLED | OUTPATIENT
Start: 2017-06-09

## 2017-06-09 RX ADMIN — DIPHENHYDRAMINE HYDROCHLORIDE 25 MG: 25 CAPSULE ORAL at 11:20

## 2017-06-09 RX ADMIN — Medication 10 ML: at 13:35

## 2017-06-09 RX ADMIN — SODIUM CHLORIDE 250 ML: 9 INJECTION, SOLUTION INTRAVENOUS at 11:21

## 2017-06-09 RX ADMIN — FAMOTIDINE 20 MG: 20 TABLET ORAL at 11:20

## 2017-06-09 RX ADMIN — SODIUM CHLORIDE, PRESERVATIVE FREE 500 UNITS: 5 INJECTION INTRAVENOUS at 13:35

## 2017-06-09 RX ADMIN — IRON SUCROSE 200 MG: 20 INJECTION, SOLUTION INTRAVENOUS at 11:49

## 2017-06-12 ENCOUNTER — INFUSION (OUTPATIENT)
Dept: ONCOLOGY | Facility: HOSPITAL | Age: 77
End: 2017-06-12

## 2017-06-12 VITALS
SYSTOLIC BLOOD PRESSURE: 154 MMHG | HEART RATE: 83 BPM | TEMPERATURE: 97.3 F | DIASTOLIC BLOOD PRESSURE: 75 MMHG | RESPIRATION RATE: 16 BRPM

## 2017-06-12 DIAGNOSIS — Z45.2 ENCOUNTER FOR VENOUS ACCESS DEVICE CARE: Primary | ICD-10-CM

## 2017-06-12 DIAGNOSIS — D50.0 IRON DEFICIENCY ANEMIA DUE TO CHRONIC BLOOD LOSS: ICD-10-CM

## 2017-06-12 PROCEDURE — 25010000002 IRON SUCROSE PER 1 MG: Performed by: INTERNAL MEDICINE

## 2017-06-12 PROCEDURE — 63710000001 DIPHENHYDRAMINE PER 50 MG: Performed by: INTERNAL MEDICINE

## 2017-06-12 PROCEDURE — 96374 THER/PROPH/DIAG INJ IV PUSH: CPT | Performed by: INTERNAL MEDICINE

## 2017-06-12 RX ORDER — SODIUM CHLORIDE 0.9 % (FLUSH) 0.9 %
10 SYRINGE (ML) INJECTION AS NEEDED
Status: CANCELLED | OUTPATIENT
Start: 2017-06-12

## 2017-06-12 RX ORDER — SODIUM CHLORIDE 0.9 % (FLUSH) 0.9 %
10 SYRINGE (ML) INJECTION AS NEEDED
Status: DISCONTINUED | OUTPATIENT
Start: 2017-06-12 | End: 2017-06-12 | Stop reason: HOSPADM

## 2017-06-12 RX ORDER — SODIUM CHLORIDE 9 MG/ML
250 INJECTION, SOLUTION INTRAVENOUS ONCE
Status: COMPLETED | OUTPATIENT
Start: 2017-06-12 | End: 2017-06-12

## 2017-06-12 RX ORDER — DIPHENHYDRAMINE HCL 25 MG
25 CAPSULE ORAL ONCE
Status: COMPLETED | OUTPATIENT
Start: 2017-06-12 | End: 2017-06-12

## 2017-06-12 RX ORDER — FAMOTIDINE 20 MG/1
20 TABLET, FILM COATED ORAL ONCE
Status: COMPLETED | OUTPATIENT
Start: 2017-06-12 | End: 2017-06-12

## 2017-06-12 RX ADMIN — IRON SUCROSE 200 MG: 20 INJECTION, SOLUTION INTRAVENOUS at 14:09

## 2017-06-12 RX ADMIN — FAMOTIDINE 20 MG: 20 TABLET ORAL at 13:53

## 2017-06-12 RX ADMIN — SODIUM CHLORIDE 250 ML: 9 INJECTION, SOLUTION INTRAVENOUS at 14:09

## 2017-06-12 RX ADMIN — Medication 10 ML: at 15:38

## 2017-06-12 RX ADMIN — DIPHENHYDRAMINE HYDROCHLORIDE 25 MG: 25 CAPSULE ORAL at 13:53

## 2017-06-15 RX ORDER — OMEGA-3-ACID ETHYL ESTERS 1 G/1
2 CAPSULE, LIQUID FILLED ORAL DAILY
COMMUNITY

## 2017-06-15 RX ORDER — LANOLIN ALCOHOL/MO/W.PET/CERES
1000 CREAM (GRAM) TOPICAL DAILY
COMMUNITY
End: 2019-06-05 | Stop reason: SDUPTHER

## 2017-06-22 ENCOUNTER — HOSPITAL ENCOUNTER (OUTPATIENT)
Facility: HOSPITAL | Age: 77
Setting detail: HOSPITAL OUTPATIENT SURGERY
Discharge: HOME | End: 2017-06-22
Attending: INTERNAL MEDICINE | Admitting: INTERNAL MEDICINE

## 2017-06-22 ENCOUNTER — ANESTHESIA (OUTPATIENT)
Dept: GASTROENTEROLOGY | Facility: HOSPITAL | Age: 77
End: 2017-06-22

## 2017-06-22 ENCOUNTER — ANESTHESIA EVENT (OUTPATIENT)
Dept: GASTROENTEROLOGY | Facility: HOSPITAL | Age: 77
End: 2017-06-22

## 2017-06-22 VITALS
HEART RATE: 70 BPM | HEIGHT: 64 IN | SYSTOLIC BLOOD PRESSURE: 113 MMHG | RESPIRATION RATE: 18 BRPM | BODY MASS INDEX: 20.66 KG/M2 | DIASTOLIC BLOOD PRESSURE: 53 MMHG | OXYGEN SATURATION: 96 % | WEIGHT: 121.03 LBS | TEMPERATURE: 97.7 F

## 2017-06-22 DIAGNOSIS — D50.9 IDA (IRON DEFICIENCY ANEMIA): ICD-10-CM

## 2017-06-22 DIAGNOSIS — K92.2 LOWER GASTROINTESTINAL BLEEDING: ICD-10-CM

## 2017-06-22 LAB — GLUCOSE BLDC GLUCOMTR-MCNC: 147 MG/DL (ref 70–130)

## 2017-06-22 PROCEDURE — 91110 GI TRC IMG INTRAL ESOPH-ILE: CPT | Performed by: INTERNAL MEDICINE

## 2017-06-22 PROCEDURE — 25010000002 HEPARIN (PORCINE) PER 1000 UNITS: Performed by: INTERNAL MEDICINE

## 2017-06-22 PROCEDURE — 88305 TISSUE EXAM BY PATHOLOGIST: CPT | Performed by: PATHOLOGY

## 2017-06-22 PROCEDURE — 82962 GLUCOSE BLOOD TEST: CPT

## 2017-06-22 PROCEDURE — 88305 TISSUE EXAM BY PATHOLOGIST: CPT | Performed by: INTERNAL MEDICINE

## 2017-06-22 PROCEDURE — 25010000002 PROPOFOL 10 MG/ML EMULSION: Performed by: NURSE ANESTHETIST, CERTIFIED REGISTERED

## 2017-06-22 RX ORDER — PROMETHAZINE HYDROCHLORIDE 25 MG/ML
12.5 INJECTION, SOLUTION INTRAMUSCULAR; INTRAVENOUS ONCE AS NEEDED
Status: DISCONTINUED | OUTPATIENT
Start: 2017-06-22 | End: 2017-06-22 | Stop reason: HOSPADM

## 2017-06-22 RX ORDER — PROPOFOL 10 MG/ML
VIAL (ML) INTRAVENOUS AS NEEDED
Status: DISCONTINUED | OUTPATIENT
Start: 2017-06-22 | End: 2017-06-22 | Stop reason: SURG

## 2017-06-22 RX ORDER — PROMETHAZINE HYDROCHLORIDE 25 MG/1
25 TABLET ORAL ONCE AS NEEDED
Status: DISCONTINUED | OUTPATIENT
Start: 2017-06-22 | End: 2017-06-22 | Stop reason: HOSPADM

## 2017-06-22 RX ORDER — LIDOCAINE HYDROCHLORIDE 10 MG/ML
INJECTION, SOLUTION INFILTRATION; PERINEURAL AS NEEDED
Status: DISCONTINUED | OUTPATIENT
Start: 2017-06-22 | End: 2017-06-22 | Stop reason: SURG

## 2017-06-22 RX ORDER — ONDANSETRON 2 MG/ML
4 INJECTION INTRAMUSCULAR; INTRAVENOUS ONCE AS NEEDED
Status: DISCONTINUED | OUTPATIENT
Start: 2017-06-22 | End: 2017-06-22 | Stop reason: HOSPADM

## 2017-06-22 RX ORDER — DEXTROSE AND SODIUM CHLORIDE 5; .45 G/100ML; G/100ML
20 INJECTION, SOLUTION INTRAVENOUS CONTINUOUS
Status: DISCONTINUED | OUTPATIENT
Start: 2017-06-22 | End: 2017-06-22 | Stop reason: HOSPADM

## 2017-06-22 RX ORDER — HEPARIN SODIUM 1000 [USP'U]/ML
500 INJECTION, SOLUTION INTRAVENOUS; SUBCUTANEOUS ONCE
Status: DISCONTINUED | OUTPATIENT
Start: 2017-06-22 | End: 2017-06-22 | Stop reason: HOSPADM

## 2017-06-22 RX ORDER — PROMETHAZINE HYDROCHLORIDE 25 MG/1
25 SUPPOSITORY RECTAL ONCE AS NEEDED
Status: DISCONTINUED | OUTPATIENT
Start: 2017-06-22 | End: 2017-06-22 | Stop reason: HOSPADM

## 2017-06-22 RX ORDER — 0.9 % SODIUM CHLORIDE 0.9 %
10 VIAL (ML) INJECTION EVERY 12 HOURS SCHEDULED
Status: DISCONTINUED | OUTPATIENT
Start: 2017-06-22 | End: 2017-06-22 | Stop reason: HOSPADM

## 2017-06-22 RX ADMIN — PROPOFOL 50 MG: 10 INJECTION, EMULSION INTRAVENOUS at 08:03

## 2017-06-22 RX ADMIN — DEXTROSE AND SODIUM CHLORIDE 20 ML/HR: 5; 450 INJECTION, SOLUTION INTRAVENOUS at 07:52

## 2017-06-22 RX ADMIN — HEPARIN SODIUM 500 UNITS: 1000 INJECTION, SOLUTION INTRAVENOUS; SUBCUTANEOUS at 08:15

## 2017-06-22 RX ADMIN — LIDOCAINE HYDROCHLORIDE 50 MG: 10 INJECTION, SOLUTION INFILTRATION; PERINEURAL at 08:02

## 2017-06-22 RX ADMIN — SODIUM CHLORIDE 10 ML: 9 INJECTION, SOLUTION INTRAMUSCULAR; INTRAVENOUS; SUBCUTANEOUS at 08:15

## 2017-06-22 NOTE — NURSING NOTE
B/P: see EGD vitals    Heart Rate:    O2 Sat %:    Temp:      Pain:            If yes, location and VAS _____________________________    Allergies:  NPO status: NPO for EGD  Pillcam Lot# : [id number]  Pillcam expiration date:09/2017      Patient arrived to Endoscopy department ambulatory, alert and oriented.  Procedure explained to patient, patient verbalized understanding.  Consent obtained.  Patient swallowed capsule without difficulty.  Dietary instructions given and patient instructed on time to return to the Endoscopy department.  Pt here for EGD. Post EGD SB capsule swallowed. Pt garrick well.

## 2017-06-22 NOTE — ANESTHESIA PREPROCEDURE EVALUATION
Anesthesia Evaluation     Patient summary reviewed and Nursing notes reviewed   NPO Solid Status: > 8 hours  NPO Liquid Status: > 8 hours     Airway   Mallampati: II  TM distance: >3 FB  Neck ROM: full  no difficulty expected  Dental - normal exam     Pulmonary - normal exam   Cardiovascular - normal exam    (+) hypertension well controlled,       Neuro/Psych  GI/Hepatic/Renal/Endo    (+)  GERD well controlled, diabetes mellitus,     ROS Comment: Hx colon cancer    Musculoskeletal     Abdominal  - normal exam   Substance History      OB/GYN          Other          Other Comment: anemia                                  Anesthesia Plan    ASA 3     MAC     Anesthetic plan and risks discussed with patient.

## 2017-06-22 NOTE — PLAN OF CARE
Problem: Patient Care Overview (Adult)  Goal: Plan of Care Review    06/22/17 0810   Coping/Psychosocial Response Interventions   Plan Of Care Reviewed With patient   Patient Care Overview   Progress no change   Outcome Evaluation   Outcome Summary/Follow up Plan vss         Problem: GI Endoscopy (Adult)  Goal: Signs and Symptoms of Listed Potential Problems Will be Absent or Manageable (GI Endoscopy)    06/22/17 0810   GI Endoscopy   Problems Assessed (GI Endoscopy) all   Problems Present (GI Endoscopy) none

## 2017-06-22 NOTE — PLAN OF CARE
Problem: Patient Care Overview (Adult)  Goal: Plan of Care Review  Outcome: Outcome(s) achieved Date Met:  06/22/17 06/22/17 0847   Coping/Psychosocial Response Interventions   Plan Of Care Reviewed With patient   Patient Care Overview   Progress no change         Problem: GI Endoscopy (Adult)  Goal: Signs and Symptoms of Listed Potential Problems Will be Absent or Manageable (GI Endoscopy)  Outcome: Outcome(s) achieved Date Met:  06/22/17 06/22/17 0847   GI Endoscopy   Problems Assessed (GI Endoscopy) all   Problems Present (GI Endoscopy) none

## 2017-06-22 NOTE — ANESTHESIA POSTPROCEDURE EVALUATION
Patient: Mary Gutierrez    Procedure Summary     Date Anesthesia Start Anesthesia Stop Room / Location    06/22/17 0801 0810 [facility] ENDOSCOPY 2 / [facility] ENDOSCOPY       Procedure Diagnosis Surgeon Provider    ESOPHAGOGASTRODUODENOSCOPY (N/A Esophagus); CAPSULE ENDOSCOPY M2A (N/A ) Lower gastrointestinal bleeding; ANNIKA (iron deficiency anemia)  (Lower gastrointestinal bleeding [K92.2]) DO Roseline Alvarado, RODOLFO          Anesthesia Type: MAC  Last vitals  BP (!) 182/94 (06/22/17 0735)    Temp 97.8 °F (36.6 °C) (06/22/17 0735)    Pulse 78 (06/22/17 0735)   Resp 18 (06/22/17 0735)    SpO2 100 % (06/22/17 0735)      Post Anesthesia Care and Evaluation    Patient location during evaluation: bedside  Patient participation: complete - patient participated  Level of consciousness: awake  Pain management: adequate  Airway patency: patent  Anesthetic complications: No anesthetic complications  PONV Status: none  Cardiovascular status: acceptable  Respiratory status: acceptable  Hydration status: acceptable

## 2017-06-22 NOTE — H&P
Quan Montalvo DO,[facility]  Gastroenterology  Hepatology  Endoscopy  Board Certified in Internal Medicine and gastroenterology  [address], [address]  [place]. [address]  - [phone]   F - [phone]     GASTROENTEROLOGY HISTORY AND PHYSICAL  NOTE   QUNA MONTALVO DO.         SUBJECTIVE:   6/22/2017    Name: Mary Gutierrez  DOD: 1940        Chief Complaint:       Subjective : Occult blood within the stools with a personal history of colon cancer with recent normal colonoscopy.  Evaluate small bowel and upper intestinal system    Patient is 76 y.o. female presents with occult blood within the stools with anemia.      ROS/HISTORY/ CURRENT MEDICATIONS/OBJECTIVE/VS/PE:   Review of Systems:   Review of Systems    History:     Past Medical History:   Diagnosis Date   • Acid reflux    • Diabetes mellitus    • Hypertension    • Malignant neoplasm of ascending colon 11/1/2016   • Malignant tumor of cecum      Past Surgical History:   Procedure Laterality Date   • CENTRAL VENOUS LINE INSERTION  10/30/2015    Ultrasound localization, left basilic vein.Placement of left upper extremity PICC line   • FRACTURE SURGERY      right femur    • HYSTERECTOMY     • LAPAROSCOPIC ASSISSTED TOTAL COLECTOMY W/ J-POUCH  10/15/2015    Laparoscopic right hemicolectomy with ileotransverse colostomy   • VENOUS ACCESS DEVICE (PORT) INSERTION  01/12/2016    Right internal jugular Mediport     Family History   Problem Relation Age of Onset   • Family history unknown: Yes     Social History   Substance Use Topics   • Smoking status: Never Smoker   • Smokeless tobacco: None   • Alcohol use No     Prescriptions Prior to Admission   Medication Sig Dispense Refill Last Dose   • Acetaminophen (TYLENOL EXTRA STRENGTH PO) Take 1 tablet by mouth 4 (Four) Times a Day As Needed.   Past Week at Unknown time   • Dorzolamide HCl-Timolol Mal PF 22.3-6.8 MG/ML solution Apply 1 drop to eye 2 (Two) Times a Day.   6/21/2017 at Unknown  time   • FENOFIBRATE PO Take 1 tablet by mouth Daily.   6/21/2017 at Unknown time   • fluorouracil (EFUDEX) 5 % cream Apply 1 application topically Daily As Needed.   6/21/2017 at Unknown time   • FREESTYLE LITE test strip USE TO TEST BLOOD SUGAR ONCE DAILY AS DIRECTED  2 Past Week at Unknown time   • metFORMIN (GLUCOPHAGE) 1000 MG tablet Take 1,000 mg by mouth 2 (Two) Times a Day With Meals.   6/21/2017 at Unknown time   • omega-3 acid ethyl esters (LOVAZA) 1 G capsule Take 2 g by mouth Daily.   6/19/2017   • vitamin B-12 (CYANOCOBALAMIN) 1000 MCG tablet Take 1,000 mcg by mouth Daily.   6/20/2017   • lisinopril (PRINIVIL,ZESTRIL) 20 MG tablet Take 20 mg by mouth Daily.   6/20/2017   • Omeprazole (PRILOSEC PO) Take 1 tablet by mouth Daily As Needed.   6/20/2017   • simvastatin (ZOCOR) 20 MG tablet Take 20 mg by mouth Every Night.   6/20/2017     Allergies:  Other    I have reviewed the patients medical history, surgical history and family history in the available medical record system.     Current Medications:     Current Facility-Administered Medications   Medication Dose Route Frequency Provider Last Rate Last Dose   • dexamethasone sodium phosphate 8 mg in sodium chloride 0.9 % 50 mL IVPB  8 mg Intravenous Once PRN Roseline Salazar CRNA       • dextrose 5 % and sodium chloride 0.45 % infusion  20 mL/hr Intravenous Continuous Stuart Rico DO 20 mL/hr at 06/22/17 0752 20 mL/hr at 06/22/17 0752   • heparin (porcine) injection 500 Units  500 Units Intravenous Once Stuart Rico DO       • meperidine (DEMEROL) injection 12.5 mg  12.5 mg Intravenous Q5 Min PRN Roseline Salazar CRNA       • ondansetron (ZOFRAN) injection 4 mg  4 mg Intravenous Once PRN Roseline Salazar CRNA       • promethazine (PHENERGAN) injection 12.5 mg  12.5 mg Intravenous Once PRN Roseline Salazar CRNA        Or   • promethazine (PHENERGAN) injection 12.5 mg  12.5 mg Intramuscular Once PRN Roseline Salazar CRNA        Or    • promethazine (PHENERGAN) suppository 25 mg  25 mg Rectal Once PRN Roseline Salazar CRNA        Or   • promethazine (PHENERGAN) tablet 25 mg  25 mg Oral Once PRN Roseline Salazar CRNA       • sodium chloride flush 10 mL  10 mL Intravenous Q12H Stuart Rico DO           Objective     Physical Exam:   Temp:  [97.8 °F (36.6 °C)] 97.8 °F (36.6 °C)  Heart Rate:  [78] 78  Resp:  [18] 18  BP: (182)/(94) 182/94    Physical Exam:  General Appearance:    Alert, cooperative, in no acute distress   Head:    Normocephalic, without obvious abnormality, atraumatic   Eyes:            Lids and lashes normal, conjunctivae and sclerae normal, no   icterus, no pallor, corneas clear, PERRLA   Ears:    Ears appear intact with no abnormalities noted   Throat:   No oral lesions, no thrush, oral mucosa moist   Neck:   No adenopathy, supple, trachea midline, no thyromegaly, no     carotid bruit, no JVD   Back:     No kyphosis present, no scoliosis present, no skin lesions,       erythema or scars, no tenderness to percussion or                   palpation,   range of motion normal   Lungs:     Clear to auscultation,respirations regular, even and                   unlabored    Heart:    Regular rhythm and normal rate, normal S1 and S2, no            murmur, no gallop, no rub, no click   Breast Exam:    Deferred   Abdomen:     Normal bowel sounds, no masses, no organomegaly, soft        non-tender, non-distended, no guarding, no rebound                 tenderness   Genitalia:    Deferred   Extremities:   Moves all extremities well, no edema, no cyanosis, no              redness   Pulses:   Pulses palpable and equal bilaterally   Skin:   No bleeding, bruising or rash   Lymph nodes:   No palpable adenopathy   Neurologic:   Cranial nerves 2 - 12 grossly intact, sensation intact, DTR        present and equal bilaterally      Results Review:     Lab Results   Component Value Date    WBC 4.59 05/25/2017    WBC 6.02 04/19/2017    WBC  6.6 01/09/2017    HGB 10.1 (L) 05/25/2017    HGB 9.0 (L) 04/19/2017    HGB 11.6 (L) 01/09/2017    HCT 32.9 (L) 05/25/2017    HCT 28.1 (L) 04/19/2017    HCT 35.7 01/09/2017    HCT 35.7 01/09/2017     05/25/2017     04/19/2017     01/09/2017             No results found for: LIPASE  Lab Results   Component Value Date    INR 0.9 10/12/2015          Radiology Review:  Imaging Results (last 72 hours)     ** No results found for the last 72 hours. **           I reviewed the patient's new clinical results.  I reviewed the patient's new imaging results and agree with the interpretation.     ASSESSMENT/PLAN:   ASSESSMENT:   1.  Anemia of chronic blood loss    PLAN:   1.  Esophagogastroduodenoscopy and small bowel capsule    Risk and benefits associated with the procedure are reviewed with the patient.  She wishes to proceed      Stuart Rico DO  06/22/17  7:54 AM

## 2017-06-23 LAB
LAB AP CASE REPORT: NORMAL
Lab: NORMAL
PATH REPORT.FINAL DX SPEC: NORMAL
PATH REPORT.GROSS SPEC: NORMAL

## 2017-07-11 ENCOUNTER — TELEPHONE (OUTPATIENT)
Dept: NUTRITION | Facility: HOSPITAL | Age: 77
End: 2017-07-11

## 2017-07-11 NOTE — PROGRESS NOTES
Adult Outpatient Nutrition  Assessment    Patient Name:  Mary Gutierrez  YOB: 1940  MRN: [id number]        Assessment Date:  7/11/2017                        Comments:  Mailed additional [employer] discount coupons to home per husbands   phone request.        Electronically signed by:  Love Black RD  07/11/17 2:55 PM

## 2017-07-24 ENCOUNTER — INFUSION (OUTPATIENT)
Dept: ONCOLOGY | Facility: HOSPITAL | Age: 77
End: 2017-07-24

## 2017-07-24 DIAGNOSIS — Z45.2 ENCOUNTER FOR VENOUS ACCESS DEVICE CARE: Primary | ICD-10-CM

## 2017-07-24 PROCEDURE — 25010000002 HEPARIN FLUSH (PORCINE) 100 UNIT/ML SOLUTION: Performed by: INTERNAL MEDICINE

## 2017-07-24 PROCEDURE — 96523 IRRIG DRUG DELIVERY DEVICE: CPT | Performed by: INTERNAL MEDICINE

## 2017-07-24 RX ORDER — SODIUM CHLORIDE 0.9 % (FLUSH) 0.9 %
10 SYRINGE (ML) INJECTION AS NEEDED
Status: DISCONTINUED | OUTPATIENT
Start: 2017-07-24 | End: 2017-07-24 | Stop reason: HOSPADM

## 2017-07-24 RX ORDER — SODIUM CHLORIDE 0.9 % (FLUSH) 0.9 %
10 SYRINGE (ML) INJECTION AS NEEDED
Status: CANCELLED | OUTPATIENT
Start: 2017-07-24

## 2017-07-24 RX ADMIN — SODIUM CHLORIDE, PRESERVATIVE FREE 500 UNITS: 5 INJECTION INTRAVENOUS at 09:51

## 2017-07-24 RX ADMIN — Medication 10 ML: at 09:51

## 2017-08-21 ENCOUNTER — APPOINTMENT (OUTPATIENT)
Dept: ONCOLOGY | Facility: HOSPITAL | Age: 77
End: 2017-08-21

## 2017-08-23 ENCOUNTER — APPOINTMENT (OUTPATIENT)
Dept: ONCOLOGY | Facility: HOSPITAL | Age: 77
End: 2017-08-23

## 2017-08-25 ENCOUNTER — DOCUMENTATION (OUTPATIENT)
Dept: NUTRITION | Facility: HOSPITAL | Age: 77
End: 2017-08-25

## 2017-08-25 ENCOUNTER — INFUSION (OUTPATIENT)
Dept: ONCOLOGY | Facility: HOSPITAL | Age: 77
End: 2017-08-25

## 2017-08-25 ENCOUNTER — OFFICE VISIT (OUTPATIENT)
Dept: ONCOLOGY | Facility: CLINIC | Age: 77
End: 2017-08-25

## 2017-08-25 VITALS
RESPIRATION RATE: 16 BRPM | TEMPERATURE: 98 F | WEIGHT: 120.3 LBS | SYSTOLIC BLOOD PRESSURE: 155 MMHG | HEIGHT: 64 IN | DIASTOLIC BLOOD PRESSURE: 71 MMHG | BODY MASS INDEX: 20.54 KG/M2 | HEART RATE: 75 BPM

## 2017-08-25 DIAGNOSIS — Z23 FLU VACCINE NEED: ICD-10-CM

## 2017-08-25 DIAGNOSIS — Z45.2 ENCOUNTER FOR VENOUS ACCESS DEVICE CARE: Primary | ICD-10-CM

## 2017-08-25 DIAGNOSIS — D50.0 IRON DEFICIENCY ANEMIA DUE TO CHRONIC BLOOD LOSS: ICD-10-CM

## 2017-08-25 DIAGNOSIS — K90.9 MALABSORPTION OF IRON: ICD-10-CM

## 2017-08-25 DIAGNOSIS — C18.2 MALIGNANT NEOPLASM OF ASCENDING COLON (HCC): Primary | ICD-10-CM

## 2017-08-25 DIAGNOSIS — D64.89 ANEMIA DUE TO OTHER CAUSE: ICD-10-CM

## 2017-08-25 DIAGNOSIS — C18.2 MALIGNANT NEOPLASM OF ASCENDING COLON (HCC): ICD-10-CM

## 2017-08-25 LAB
ALBUMIN SERPL-MCNC: 4.1 G/DL (ref 3.4–4.8)
ALBUMIN/GLOB SERPL: 2 G/DL (ref 1.1–1.8)
ALP SERPL-CCNC: 38 U/L (ref 38–126)
ALT SERPL W P-5'-P-CCNC: 30 U/L (ref 9–52)
ANION GAP SERPL CALCULATED.3IONS-SCNC: 12 MMOL/L (ref 5–15)
AST SERPL-CCNC: 22 U/L (ref 14–36)
BASOPHILS # BLD AUTO: 0.02 10*3/MM3 (ref 0–0.2)
BASOPHILS NFR BLD AUTO: 0.4 % (ref 0–2)
BILIRUB SERPL-MCNC: 0.4 MG/DL (ref 0.2–1.3)
BUN BLD-MCNC: 17 MG/DL (ref 7–21)
BUN/CREAT SERPL: 18.5 (ref 7–25)
CALCIUM SPEC-SCNC: 9.2 MG/DL (ref 8.4–10.2)
CHLORIDE SERPL-SCNC: 102 MMOL/L (ref 95–110)
CO2 SERPL-SCNC: 22 MMOL/L (ref 22–31)
CREAT BLD-MCNC: 0.92 MG/DL (ref 0.5–1)
DEPRECATED RDW RBC AUTO: 52.4 FL (ref 36.4–46.3)
EOSINOPHIL # BLD AUTO: 0.19 10*3/MM3 (ref 0–0.7)
EOSINOPHIL NFR BLD AUTO: 4.3 % (ref 0–7)
ERYTHROCYTE [DISTWIDTH] IN BLOOD BY AUTOMATED COUNT: 15.8 % (ref 11.5–14.5)
FERRITIN SERPL-MCNC: 37.8 NG/ML (ref 11.1–264)
FOLATE SERPL-MCNC: >20 NG/ML (ref 2.76–21)
GFR SERPL CREATININE-BSD FRML MDRD: 60 ML/MIN/1.73 (ref 39–90)
GLOBULIN UR ELPH-MCNC: 2.1 GM/DL (ref 2.3–3.5)
GLUCOSE BLD-MCNC: 183 MG/DL (ref 60–100)
HCT VFR BLD AUTO: 27.7 % (ref 35–45)
HGB BLD-MCNC: 9 G/DL (ref 12–15.5)
IMM GRANULOCYTES # BLD: 0.01 10*3/MM3 (ref 0–0.02)
IMM GRANULOCYTES NFR BLD: 0.2 % (ref 0–0.5)
IRON 24H UR-MRATE: 24 MCG/DL (ref 37–170)
IRON SATN MFR SERPL: 6 % (ref 15–50)
LYMPHOCYTES # BLD AUTO: 1.27 10*3/MM3 (ref 0.6–4.2)
LYMPHOCYTES NFR BLD AUTO: 28.5 % (ref 10–50)
MCH RBC QN AUTO: 29.5 PG (ref 26.5–34)
MCHC RBC AUTO-ENTMCNC: 32.5 G/DL (ref 31.4–36)
MCV RBC AUTO: 90.8 FL (ref 80–98)
MONOCYTES # BLD AUTO: 0.35 10*3/MM3 (ref 0–0.9)
MONOCYTES NFR BLD AUTO: 7.8 % (ref 0–12)
NEUTROPHILS # BLD AUTO: 2.62 10*3/MM3 (ref 2–8.6)
NEUTROPHILS NFR BLD AUTO: 58.8 % (ref 37–80)
PLATELET # BLD AUTO: 386 10*3/MM3 (ref 150–450)
PMV BLD AUTO: 9.1 FL (ref 8–12)
POTASSIUM BLD-SCNC: 4.1 MMOL/L (ref 3.5–5.1)
PROT SERPL-MCNC: 6.2 G/DL (ref 6.3–8.6)
RBC # BLD AUTO: 3.05 10*6/MM3 (ref 3.77–5.16)
SODIUM BLD-SCNC: 136 MMOL/L (ref 137–145)
TIBC SERPL-MCNC: 382 MCG/DL (ref 265–497)
VIT B12 BLD-MCNC: >1000 PG/ML (ref 239–931)
WBC NRBC COR # BLD: 4.46 10*3/MM3 (ref 3.2–9.8)

## 2017-08-25 PROCEDURE — 82607 VITAMIN B-12: CPT

## 2017-08-25 PROCEDURE — G0009 ADMIN PNEUMOCOCCAL VACCINE: HCPCS | Performed by: INTERNAL MEDICINE

## 2017-08-25 PROCEDURE — 25010000002 HEPARIN FLUSH (PORCINE) 100 UNIT/ML SOLUTION: Performed by: INTERNAL MEDICINE

## 2017-08-25 PROCEDURE — 96372 THER/PROPH/DIAG INJ SC/IM: CPT | Performed by: INTERNAL MEDICINE

## 2017-08-25 PROCEDURE — 99214 OFFICE O/P EST MOD 30 MIN: CPT | Performed by: INTERNAL MEDICINE

## 2017-08-25 PROCEDURE — 82728 ASSAY OF FERRITIN: CPT

## 2017-08-25 PROCEDURE — 83540 ASSAY OF IRON: CPT

## 2017-08-25 PROCEDURE — 82746 ASSAY OF FOLIC ACID SERUM: CPT

## 2017-08-25 PROCEDURE — 90732 PPSV23 VACC 2 YRS+ SUBQ/IM: CPT | Performed by: INTERNAL MEDICINE

## 2017-08-25 PROCEDURE — 36591 DRAW BLOOD OFF VENOUS DEVICE: CPT | Performed by: INTERNAL MEDICINE

## 2017-08-25 PROCEDURE — 80053 COMPREHEN METABOLIC PANEL: CPT

## 2017-08-25 PROCEDURE — 83550 IRON BINDING TEST: CPT

## 2017-08-25 PROCEDURE — 25010000002 PNEUMOCOCCAL VAC POLYVALENT PER 0.5 ML: Performed by: INTERNAL MEDICINE

## 2017-08-25 PROCEDURE — 85025 COMPLETE CBC W/AUTO DIFF WBC: CPT

## 2017-08-25 RX ORDER — ACETAMINOPHEN 325 MG/1
650 TABLET ORAL ONCE
Status: CANCELLED | OUTPATIENT
Start: 2017-08-31

## 2017-08-25 RX ORDER — SODIUM CHLORIDE 0.9 % (FLUSH) 0.9 %
10 SYRINGE (ML) INJECTION AS NEEDED
Status: DISCONTINUED | OUTPATIENT
Start: 2017-08-25 | End: 2017-08-25 | Stop reason: HOSPADM

## 2017-08-25 RX ORDER — SODIUM CHLORIDE 0.9 % (FLUSH) 0.9 %
10 SYRINGE (ML) INJECTION AS NEEDED
Status: CANCELLED | OUTPATIENT
Start: 2017-08-25

## 2017-08-25 RX ORDER — FAMOTIDINE 20 MG/1
20 TABLET, FILM COATED ORAL ONCE
Status: CANCELLED | OUTPATIENT
Start: 2017-08-31

## 2017-08-25 RX ORDER — DIPHENHYDRAMINE HCL 25 MG
25 CAPSULE ORAL ONCE
Status: CANCELLED | OUTPATIENT
Start: 2017-08-31

## 2017-08-25 RX ORDER — SODIUM CHLORIDE 9 MG/ML
250 INJECTION, SOLUTION INTRAVENOUS ONCE
Status: CANCELLED | OUTPATIENT
Start: 2017-08-31

## 2017-08-25 RX ADMIN — PNEUMOCOCCAL VACCINE POLYVALENT 0.5 ML
25; 25; 25; 25; 25; 25; 25; 25; 25; 25; 25; 25; 25; 25; 25; 25; 25; 25; 25; 25; 25; 25; 25 INJECTION, SOLUTION INTRAMUSCULAR; SUBCUTANEOUS at 10:04

## 2017-08-25 RX ADMIN — SODIUM CHLORIDE, PRESERVATIVE FREE 500 UNITS: 5 INJECTION INTRAVENOUS at 08:48

## 2017-08-25 RX ADMIN — Medication 10 ML: at 08:48

## 2017-08-25 NOTE — PROGRESS NOTES
DATE OF VISIT: 8/25/2017    REASON FOR VISIT:  History of colon cancer and recurrent iron deficiency anemia    HISTORY OF PRESENT ILLNESS:    76-year-old female with a past medical history significant for stage III colon cancer, status post surgery followed by chemotherapy last of which was in July 2016.  In view of recurrent iron deficiency since October 2016 patient has required intravenous when 04.  His last round of intravenous when 04 was in June 2017.  Patient is here for follow-up visit today.  Denies any excessive fatigue.  Denies any blood in the stool or urine.  He underwent a EGD as well as capsule endoscopy in June 2017 by Dr. Rico which did not find any source of bleeding.      PAST MEDICAL HISTORY:    Past Medical History:   Diagnosis Date   • Acid reflux    • Diabetes mellitus    • Hypertension    • Malignant neoplasm of ascending colon 11/1/2016   • Malignant tumor of cecum        SOCIAL HISTORY:    Social History   Substance Use Topics   • Smoking status: Never Smoker   • Smokeless tobacco: None   • Alcohol use No       Surgical History :  Past Surgical History:   Procedure Laterality Date   • CAPSULE ENDOSCOPY N/A 6/22/2017    Procedure: CAPSULE ENDOSCOPY M2A;  Surgeon: Stuart Rico DO;  Location: [facility] ENDOSCOPY;  Service:    • CENTRAL VENOUS LINE INSERTION  10/30/2015    Ultrasound localization, left basilic vein.Placement of left upper extremity PICC line   • ENDOSCOPY N/A 6/22/2017    Procedure: ESOPHAGOGASTRODUODENOSCOPY;  Surgeon: Stuart Rico DO;  Location: [facility] ENDOSCOPY;  Service:    • FRACTURE SURGERY      right femur    • HYSTERECTOMY     • LAPAROSCOPIC ASSISSTED TOTAL COLECTOMY W/ J-POUCH  10/15/2015    Laparoscopic right hemicolectomy with ileotransverse colostomy   • VENOUS ACCESS DEVICE (PORT) INSERTION  01/12/2016    Right internal jugular Mediport       ALLERGIES:    Allergies   Allergen Reactions   • Other Rash     Oral Chemo Meds       REVIEW OF SYSTEMS:   "    CONSTITUTIONAL: Denies any excessive fatigue.  No fever, chills, or night sweats.     HEENT:  No epistaxis, mouth sores, or difficulty swallowing.    RESPIRATORY:  No new shortness of breath or cough at present.    CARDIOVASCULAR:  No chest pain or palpitations.    GASTROINTESTINAL:  No abdominal pain, nausea, vomiting, or blood in the stool.    GENITOURINARY:  No dysuria or hematuria.    MUSCULOSKELETAL:  No any new back pain or arthralgias.     NEUROLOGICAL:  Neuropathy in upper and lower extremities resolved completely.. No new headache or dizziness.     LYMPHATICS:  Denies any abnormal swollen and anywhere in the body.    SKIN:  Denies any new skin rash.          PHYSICAL EXAMINATION:      VITAL SIGNS:  /71  Pulse 75  Temp 98 °F (36.7 °C) (Temporal Artery )   Resp 16  Ht 64\" (162.6 cm)  Wt 120 lb 4.8 oz (54.6 kg)  BMI 20.65 kg/m2    GENERAL:  Not in any distress.    HEENT:  Normocephalic, Atraumatic.Mild Conjunctival pallor. No icterus. Extraocular Movements Intact. No Facial Asymmetry noted.    NECK:  No adenopathy. No JVD.    RESPIRATORY:  Fair air entry bilateral. No rhonchi or wheezing.    CARDIOVASCULAR:  S1, S2. Regular rate and rhythm. No murmur or gallop appreciated.    ABDOMEN:  Soft,  nontender. Bowel sounds present in all four quadrants.  No organomegaly appreciated.    EXTREMITIES:  No edema.No Calf Tenderness.    NEUROLOGIC:  Alert, awake and oriented ×3.  No  Motor or sensory deficit appreciated. Cranial Nerves 2-12 grossly intact.        DIAGNOSTIC DATA:    Glucose   Date Value Ref Range Status   08/25/2017 183 (H) 60 - 100 mg/dL Final     Sodium   Date Value Ref Range Status   08/25/2017 136 (L) 137 - 145 mmol/L Final     Potassium   Date Value Ref Range Status   08/25/2017 4.1 3.5 - 5.1 mmol/L Final     CO2   Date Value Ref Range Status   08/25/2017 22.0 22.0 - 31.0 mmol/L Final     Chloride   Date Value Ref Range Status   08/25/2017 102 95 - 110 mmol/L Final     Anion Gap   Date " Value Ref Range Status   08/25/2017 12.0 5.0 - 15.0 mmol/L Final     Creatinine   Date Value Ref Range Status   08/25/2017 0.92 0.50 - 1.00 mg/dL Final     BUN   Date Value Ref Range Status   08/25/2017 17 7 - 21 mg/dL Final     BUN/Creatinine Ratio   Date Value Ref Range Status   08/25/2017 18.5 7.0 - 25.0 Final     Calcium   Date Value Ref Range Status   08/25/2017 9.2 8.4 - 10.2 mg/dL Final     eGFR Non  Amer   Date Value Ref Range Status   08/25/2017 60 39 - 90 mL/min/1.73 Final     Alkaline Phosphatase   Date Value Ref Range Status   08/25/2017 38 38 - 126 U/L Final     Total Protein   Date Value Ref Range Status   08/25/2017 6.2 (L) 6.3 - 8.6 g/dL Final     ALT (SGPT)   Date Value Ref Range Status   08/25/2017 30 9 - 52 U/L Final     AST (SGOT)   Date Value Ref Range Status   08/25/2017 22 14 - 36 U/L Final     Total Bilirubin   Date Value Ref Range Status   08/25/2017 0.4 0.2 - 1.3 mg/dL Final     Albumin   Date Value Ref Range Status   08/25/2017 4.10 3.40 - 4.80 g/dL Final     Globulin   Date Value Ref Range Status   08/25/2017 2.1 (L) 2.3 - 3.5 gm/dL Final     A/G Ratio   Date Value Ref Range Status   08/25/2017 2.0 (H) 1.1 - 1.8 g/dL Final     Lab Results   Component Value Date    WBC 4.46 08/25/2017    HGB 9.0 (L) 08/25/2017    HCT 27.7 (L) 08/25/2017    MCV 90.8 08/25/2017     08/25/2017     Lab Results   Component Value Date    NEUTROABS 2.62 08/25/2017    IRON 24 (L) 08/25/2017    TIBC 382 08/25/2017    LABIRON 6 (L) 08/25/2017    FERRITIN 37.80 08/25/2017    KAWLXSYC04 >1000 (H) 08/25/2017    FOLATE >20.00 08/25/2017     Lab Results   Component Value Date    CEA 1.70 04/19/2017    REFLABREPO SEE NOTE: 12/22/2015     Component      Latest Ref Rng & Units 5/25/2017 8/25/2017   Iron      37 - 170 mcg/dL 10 (L) 24 (L)   TIBC      265 - 497 mcg/dL 416 382   Iron Saturation      15 - 50 % 2 (L) 6 (L)           RADIOLOGY DATA :  CT of abdomen and pelvis done on November 9, 2016 showed:  LOWER  CHEST- Scarring and calcification noted in the anterior right  middle lobe, possibly due to old granulomatous disease.      HEPATOBILIARY- Unremarkable.  SPLEEN- Unremarkable.  PANCREAS- Unremarkable  ADRENAL GLANDS- Unremarkable.  KIDNEYS/URETERS- No evidence of hydronephrosis or suspicious mass.  There is a stable right renal cyst.      GASTROINTESTINAL- There is a moderate amount of stool in the colon.  REPRODUCTIVE ORGANS- The uterus has been resected  URINARY BLADDER- Unremarkable      VASCULAR- Unremarkable  LYMPH NODES- No pathologically enlarged nodes by size criteria.  PERITONEUM/RETROPERITONEUM- Fat stranding in the right lower quadrant  anterior abdominal wall has decreased in size, possibly postsurgical  or due to previous inflammation. There is a linear focus of nodularity  in the right lower quadrant mesentery, which appears unchanged,  possibly due to a thrombosed vessel or posttreatment changes,  unchanged in comparison to the exam dated July 7, 2016.      OSSEOUS STRUCTURES- There are degenerative changes of the right  greater trochanter.  There is a levoconvex curvature of the spine.      CONCLUSION-   Stable exam in comparison to the previous study of July 7, 2016.  Postsurgical/posttreatment changes in the right lower abdomen as  described above.            ASSESSMENT AND PLAN:      1.  Recurrent iron deficiency: Positive recurrence of iron deficiency since October 2016.  Patient had a side effect with Feraheme so was started on Venofer.  Last dose of Venofer was given on June 12, 2017.  Anemia workup done earlier today again shows iron deficiency with iron saturation of only 6%.  Her ferritin is also on the lower end of normal at 37.  Patient has been taking one tablet every day but.  Most likely she is not absorbing iron.  In view of anemia and recurrence of iron deficiency will put her back on intravenous venofer starting next week.  We will see her back in about 2 months with a repeat CBC and  CMP and iron studies to be done prior to that.    2.  Colon cancer, stage III, diagnosed in October 2015.  Patient had a hemicolectomy followed by chemotherapy.  Patient had a very complicated course with chemotherapy.  Initially patient was started on Xelox but Xeloda gave her skin rash.  Subsequently patient was changed over to FOLFOX for which she took 3 cycles and had a significant neuropathy.  Subsequently patient was changed to 5-FU and leucovorin which patient could not tolerate either subsequently after 8 cycles of chemotherapy in total chemotherapy was discontinued in July 2016.  Last colonoscopy done in November 2016 did not show any evidence of recurrence.  Her last CT scan was done in November 2016 which was negative for recurrence either.  We will order a surveillance CT scan upon next clinic visit in 2 months.    3.  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  History of dementia    5.  Health maintenance: Patient does not smoke.  Had a colonoscopy done in November 2016.  She remains full code.  Patient is requesting her pneumonia and flu vaccine today.  Which we will provide today.           Duong Hanley MD  8/25/2017  11:46 AM

## 2017-08-25 NOTE — PROGRESS NOTES
Adult Outpatient Nutrition  Assessment    Patient Name:  Mary Gutierrez  YOB: 1940  MRN: [id number]        Assessment Date:  8/25/2017                        Comments: Wt 120.5 lb--trending down. Pt picked up 6 pack of Boost Plus and discount coupons today.        Electronically signed by:  Love Black RD  08/25/17 3:00 PM

## 2017-09-01 ENCOUNTER — APPOINTMENT (OUTPATIENT)
Dept: ONCOLOGY | Facility: HOSPITAL | Age: 77
End: 2017-09-01

## 2017-09-05 ENCOUNTER — APPOINTMENT (OUTPATIENT)
Dept: ONCOLOGY | Facility: HOSPITAL | Age: 77
End: 2017-09-05

## 2017-09-06 ENCOUNTER — INFUSION (OUTPATIENT)
Dept: ONCOLOGY | Facility: HOSPITAL | Age: 77
End: 2017-09-06

## 2017-09-06 VITALS
SYSTOLIC BLOOD PRESSURE: 143 MMHG | RESPIRATION RATE: 18 BRPM | HEART RATE: 79 BPM | DIASTOLIC BLOOD PRESSURE: 71 MMHG | TEMPERATURE: 96.9 F

## 2017-09-06 DIAGNOSIS — K90.9 MALABSORPTION OF IRON: ICD-10-CM

## 2017-09-06 DIAGNOSIS — Z45.2 ENCOUNTER FOR VENOUS ACCESS DEVICE CARE: Primary | ICD-10-CM

## 2017-09-06 DIAGNOSIS — D50.0 IRON DEFICIENCY ANEMIA DUE TO CHRONIC BLOOD LOSS: ICD-10-CM

## 2017-09-06 PROCEDURE — 96365 THER/PROPH/DIAG IV INF INIT: CPT | Performed by: INTERNAL MEDICINE

## 2017-09-06 PROCEDURE — 63710000001 DIPHENHYDRAMINE PER 50 MG: Performed by: INTERNAL MEDICINE

## 2017-09-06 PROCEDURE — 25010000002 IRON SUCROSE PER 1 MG: Performed by: INTERNAL MEDICINE

## 2017-09-06 PROCEDURE — 96375 TX/PRO/DX INJ NEW DRUG ADDON: CPT | Performed by: INTERNAL MEDICINE

## 2017-09-06 PROCEDURE — 25010000002 HEPARIN FLUSH (PORCINE) 100 UNIT/ML SOLUTION: Performed by: INTERNAL MEDICINE

## 2017-09-06 RX ORDER — ACETAMINOPHEN 325 MG/1
650 TABLET ORAL ONCE
Status: COMPLETED | OUTPATIENT
Start: 2017-09-06 | End: 2017-09-06

## 2017-09-06 RX ORDER — DIPHENHYDRAMINE HCL 25 MG
25 CAPSULE ORAL ONCE
Status: COMPLETED | OUTPATIENT
Start: 2017-09-06 | End: 2017-09-06

## 2017-09-06 RX ORDER — FAMOTIDINE 20 MG/1
20 TABLET, FILM COATED ORAL ONCE
Status: CANCELLED | OUTPATIENT
Start: 2017-09-06

## 2017-09-06 RX ORDER — SODIUM CHLORIDE 0.9 % (FLUSH) 0.9 %
10 SYRINGE (ML) INJECTION AS NEEDED
Status: CANCELLED | OUTPATIENT
Start: 2017-09-06

## 2017-09-06 RX ORDER — ACETAMINOPHEN 325 MG/1
650 TABLET ORAL ONCE
Status: CANCELLED | OUTPATIENT
Start: 2017-09-06

## 2017-09-06 RX ORDER — FAMOTIDINE 20 MG/1
20 TABLET, FILM COATED ORAL ONCE
Status: COMPLETED | OUTPATIENT
Start: 2017-09-06 | End: 2017-09-06

## 2017-09-06 RX ORDER — SODIUM CHLORIDE 0.9 % (FLUSH) 0.9 %
10 SYRINGE (ML) INJECTION AS NEEDED
Status: DISCONTINUED | OUTPATIENT
Start: 2017-09-06 | End: 2017-09-06 | Stop reason: HOSPADM

## 2017-09-06 RX ORDER — SODIUM CHLORIDE 9 MG/ML
250 INJECTION, SOLUTION INTRAVENOUS ONCE
Status: COMPLETED | OUTPATIENT
Start: 2017-09-06 | End: 2017-09-06

## 2017-09-06 RX ORDER — SODIUM CHLORIDE 9 MG/ML
250 INJECTION, SOLUTION INTRAVENOUS ONCE
Status: CANCELLED | OUTPATIENT
Start: 2017-09-06

## 2017-09-06 RX ORDER — DIPHENHYDRAMINE HCL 25 MG
25 CAPSULE ORAL ONCE
Status: CANCELLED | OUTPATIENT
Start: 2017-09-06

## 2017-09-06 RX ADMIN — SODIUM CHLORIDE, PRESERVATIVE FREE 500 UNITS: 5 INJECTION INTRAVENOUS at 14:26

## 2017-09-06 RX ADMIN — Medication 10 ML: at 14:26

## 2017-09-06 RX ADMIN — ACETAMINOPHEN 650 MG: 325 TABLET ORAL at 13:29

## 2017-09-06 RX ADMIN — FAMOTIDINE 20 MG: 20 TABLET ORAL at 13:29

## 2017-09-06 RX ADMIN — SODIUM CHLORIDE 250 ML: 9 INJECTION, SOLUTION INTRAVENOUS at 13:24

## 2017-09-06 RX ADMIN — IRON SUCROSE 200 MG: 20 INJECTION, SOLUTION INTRAVENOUS at 13:42

## 2017-09-06 RX ADMIN — DIPHENHYDRAMINE HYDROCHLORIDE 25 MG: 25 CAPSULE ORAL at 13:29

## 2017-09-08 ENCOUNTER — INFUSION (OUTPATIENT)
Dept: ONCOLOGY | Facility: HOSPITAL | Age: 77
End: 2017-09-08

## 2017-09-08 VITALS — SYSTOLIC BLOOD PRESSURE: 155 MMHG | HEART RATE: 68 BPM | TEMPERATURE: 96.6 F | DIASTOLIC BLOOD PRESSURE: 75 MMHG

## 2017-09-08 DIAGNOSIS — Z45.2 ENCOUNTER FOR VENOUS ACCESS DEVICE CARE: Primary | ICD-10-CM

## 2017-09-08 DIAGNOSIS — D50.0 IRON DEFICIENCY ANEMIA DUE TO CHRONIC BLOOD LOSS: ICD-10-CM

## 2017-09-08 DIAGNOSIS — K90.9 MALABSORPTION OF IRON: ICD-10-CM

## 2017-09-08 PROCEDURE — 63710000001 DIPHENHYDRAMINE PER 50 MG: Performed by: INTERNAL MEDICINE

## 2017-09-08 PROCEDURE — 96365 THER/PROPH/DIAG IV INF INIT: CPT | Performed by: INTERNAL MEDICINE

## 2017-09-08 PROCEDURE — 25010000002 HEPARIN FLUSH (PORCINE) 100 UNIT/ML SOLUTION: Performed by: INTERNAL MEDICINE

## 2017-09-08 PROCEDURE — 25010000002 IRON SUCROSE PER 1 MG: Performed by: INTERNAL MEDICINE

## 2017-09-08 RX ORDER — SODIUM CHLORIDE 0.9 % (FLUSH) 0.9 %
10 SYRINGE (ML) INJECTION AS NEEDED
Status: CANCELLED | OUTPATIENT
Start: 2017-09-08

## 2017-09-08 RX ORDER — ACETAMINOPHEN 325 MG/1
650 TABLET ORAL ONCE
Status: COMPLETED | OUTPATIENT
Start: 2017-09-08 | End: 2017-09-08

## 2017-09-08 RX ORDER — SODIUM CHLORIDE 9 MG/ML
250 INJECTION, SOLUTION INTRAVENOUS ONCE
Status: CANCELLED | OUTPATIENT
Start: 2017-09-08

## 2017-09-08 RX ORDER — SODIUM CHLORIDE 0.9 % (FLUSH) 0.9 %
10 SYRINGE (ML) INJECTION AS NEEDED
Status: DISCONTINUED | OUTPATIENT
Start: 2017-09-08 | End: 2017-09-08 | Stop reason: HOSPADM

## 2017-09-08 RX ORDER — SODIUM CHLORIDE 9 MG/ML
250 INJECTION, SOLUTION INTRAVENOUS ONCE
Status: COMPLETED | OUTPATIENT
Start: 2017-09-08 | End: 2017-09-08

## 2017-09-08 RX ORDER — ACETAMINOPHEN 325 MG/1
650 TABLET ORAL ONCE
Status: CANCELLED | OUTPATIENT
Start: 2017-09-08

## 2017-09-08 RX ORDER — FAMOTIDINE 20 MG/1
20 TABLET, FILM COATED ORAL ONCE
Status: CANCELLED | OUTPATIENT
Start: 2017-09-08

## 2017-09-08 RX ORDER — DIPHENHYDRAMINE HCL 25 MG
25 CAPSULE ORAL ONCE
Status: COMPLETED | OUTPATIENT
Start: 2017-09-08 | End: 2017-09-08

## 2017-09-08 RX ORDER — DIPHENHYDRAMINE HCL 25 MG
25 CAPSULE ORAL ONCE
Status: CANCELLED | OUTPATIENT
Start: 2017-09-08

## 2017-09-08 RX ORDER — FAMOTIDINE 20 MG/1
20 TABLET, FILM COATED ORAL ONCE
Status: COMPLETED | OUTPATIENT
Start: 2017-09-08 | End: 2017-09-08

## 2017-09-08 RX ADMIN — IRON SUCROSE 200 MG: 20 INJECTION, SOLUTION INTRAVENOUS at 10:12

## 2017-09-08 RX ADMIN — SODIUM CHLORIDE, PRESERVATIVE FREE 500 UNITS: 5 INJECTION INTRAVENOUS at 10:55

## 2017-09-08 RX ADMIN — FAMOTIDINE 20 MG: 20 TABLET ORAL at 09:33

## 2017-09-08 RX ADMIN — ACETAMINOPHEN 650 MG: 325 TABLET ORAL at 09:33

## 2017-09-08 RX ADMIN — DIPHENHYDRAMINE HYDROCHLORIDE 25 MG: 25 CAPSULE ORAL at 09:33

## 2017-09-08 RX ADMIN — Medication 10 ML: at 10:55

## 2017-09-08 RX ADMIN — SODIUM CHLORIDE 250 ML: 9 INJECTION, SOLUTION INTRAVENOUS at 09:32

## 2017-09-12 ENCOUNTER — INFUSION (OUTPATIENT)
Dept: ONCOLOGY | Facility: HOSPITAL | Age: 77
End: 2017-09-12

## 2017-09-12 VITALS
DIASTOLIC BLOOD PRESSURE: 82 MMHG | RESPIRATION RATE: 16 BRPM | SYSTOLIC BLOOD PRESSURE: 157 MMHG | HEART RATE: 74 BPM | TEMPERATURE: 97.6 F

## 2017-09-12 DIAGNOSIS — K90.9 MALABSORPTION OF IRON: ICD-10-CM

## 2017-09-12 DIAGNOSIS — Z45.2 ENCOUNTER FOR VENOUS ACCESS DEVICE CARE: Primary | ICD-10-CM

## 2017-09-12 DIAGNOSIS — D50.0 IRON DEFICIENCY ANEMIA DUE TO CHRONIC BLOOD LOSS: ICD-10-CM

## 2017-09-12 PROCEDURE — 63710000001 DIPHENHYDRAMINE PER 50 MG: Performed by: INTERNAL MEDICINE

## 2017-09-12 PROCEDURE — 25010000002 HEPARIN FLUSH (PORCINE) 100 UNIT/ML SOLUTION: Performed by: INTERNAL MEDICINE

## 2017-09-12 PROCEDURE — 96365 THER/PROPH/DIAG IV INF INIT: CPT | Performed by: INTERNAL MEDICINE

## 2017-09-12 PROCEDURE — 25010000002 IRON SUCROSE PER 1 MG: Performed by: INTERNAL MEDICINE

## 2017-09-12 RX ORDER — FAMOTIDINE 20 MG/1
20 TABLET, FILM COATED ORAL ONCE
Status: CANCELLED | OUTPATIENT
Start: 2017-09-12

## 2017-09-12 RX ORDER — ACETAMINOPHEN 325 MG/1
650 TABLET ORAL ONCE
Status: COMPLETED | OUTPATIENT
Start: 2017-09-12 | End: 2017-09-12

## 2017-09-12 RX ORDER — SODIUM CHLORIDE 0.9 % (FLUSH) 0.9 %
10 SYRINGE (ML) INJECTION AS NEEDED
Status: CANCELLED | OUTPATIENT
Start: 2017-09-12

## 2017-09-12 RX ORDER — FAMOTIDINE 20 MG/1
20 TABLET, FILM COATED ORAL ONCE
Status: COMPLETED | OUTPATIENT
Start: 2017-09-12 | End: 2017-09-12

## 2017-09-12 RX ORDER — SODIUM CHLORIDE 9 MG/ML
250 INJECTION, SOLUTION INTRAVENOUS ONCE
Status: COMPLETED | OUTPATIENT
Start: 2017-09-12 | End: 2017-09-12

## 2017-09-12 RX ORDER — DIPHENHYDRAMINE HCL 25 MG
25 CAPSULE ORAL ONCE
Status: CANCELLED | OUTPATIENT
Start: 2017-09-12

## 2017-09-12 RX ORDER — ACETAMINOPHEN 325 MG/1
650 TABLET ORAL ONCE
Status: CANCELLED | OUTPATIENT
Start: 2017-09-12

## 2017-09-12 RX ORDER — SODIUM CHLORIDE 0.9 % (FLUSH) 0.9 %
10 SYRINGE (ML) INJECTION AS NEEDED
Status: DISCONTINUED | OUTPATIENT
Start: 2017-09-12 | End: 2017-09-12 | Stop reason: HOSPADM

## 2017-09-12 RX ORDER — DIPHENHYDRAMINE HCL 25 MG
25 CAPSULE ORAL ONCE
Status: COMPLETED | OUTPATIENT
Start: 2017-09-12 | End: 2017-09-12

## 2017-09-12 RX ORDER — SODIUM CHLORIDE 9 MG/ML
250 INJECTION, SOLUTION INTRAVENOUS ONCE
Status: CANCELLED | OUTPATIENT
Start: 2017-09-12

## 2017-09-12 RX ADMIN — Medication 10 ML: at 10:45

## 2017-09-12 RX ADMIN — ACETAMINOPHEN 650 MG: 325 TABLET ORAL at 09:31

## 2017-09-12 RX ADMIN — SODIUM CHLORIDE, PRESERVATIVE FREE 500 UNITS: 5 INJECTION INTRAVENOUS at 10:45

## 2017-09-12 RX ADMIN — IRON SUCROSE 200 MG: 20 INJECTION, SOLUTION INTRAVENOUS at 09:56

## 2017-09-12 RX ADMIN — DIPHENHYDRAMINE HYDROCHLORIDE 25 MG: 25 CAPSULE ORAL at 09:31

## 2017-09-12 RX ADMIN — FAMOTIDINE 20 MG: 20 TABLET ORAL at 09:41

## 2017-09-12 RX ADMIN — SODIUM CHLORIDE 250 ML: 9 INJECTION, SOLUTION INTRAVENOUS at 09:30

## 2017-09-15 ENCOUNTER — INFUSION (OUTPATIENT)
Dept: ONCOLOGY | Facility: HOSPITAL | Age: 77
End: 2017-09-15

## 2017-09-15 VITALS
HEART RATE: 76 BPM | TEMPERATURE: 97.5 F | SYSTOLIC BLOOD PRESSURE: 151 MMHG | DIASTOLIC BLOOD PRESSURE: 74 MMHG | RESPIRATION RATE: 16 BRPM

## 2017-09-15 DIAGNOSIS — K90.9 MALABSORPTION OF IRON: ICD-10-CM

## 2017-09-15 DIAGNOSIS — D50.0 IRON DEFICIENCY ANEMIA DUE TO CHRONIC BLOOD LOSS: ICD-10-CM

## 2017-09-15 DIAGNOSIS — Z45.2 ENCOUNTER FOR VENOUS ACCESS DEVICE CARE: Primary | ICD-10-CM

## 2017-09-15 PROCEDURE — 96365 THER/PROPH/DIAG IV INF INIT: CPT | Performed by: INTERNAL MEDICINE

## 2017-09-15 PROCEDURE — 63710000001 DIPHENHYDRAMINE PER 50 MG: Performed by: INTERNAL MEDICINE

## 2017-09-15 PROCEDURE — 25010000002 IRON SUCROSE PER 1 MG: Performed by: INTERNAL MEDICINE

## 2017-09-15 PROCEDURE — 25010000002 HEPARIN FLUSH (PORCINE) 100 UNIT/ML SOLUTION: Performed by: INTERNAL MEDICINE

## 2017-09-15 RX ORDER — ACETAMINOPHEN 325 MG/1
650 TABLET ORAL ONCE
Status: COMPLETED | OUTPATIENT
Start: 2017-09-15 | End: 2017-09-15

## 2017-09-15 RX ORDER — DIPHENHYDRAMINE HCL 25 MG
25 CAPSULE ORAL ONCE
Status: COMPLETED | OUTPATIENT
Start: 2017-09-15 | End: 2017-09-15

## 2017-09-15 RX ORDER — SODIUM CHLORIDE 9 MG/ML
250 INJECTION, SOLUTION INTRAVENOUS ONCE
Status: CANCELLED | OUTPATIENT
Start: 2017-09-15

## 2017-09-15 RX ORDER — SODIUM CHLORIDE 0.9 % (FLUSH) 0.9 %
10 SYRINGE (ML) INJECTION AS NEEDED
Status: CANCELLED | OUTPATIENT
Start: 2017-09-15

## 2017-09-15 RX ORDER — DIPHENHYDRAMINE HCL 25 MG
25 CAPSULE ORAL ONCE
Status: CANCELLED | OUTPATIENT
Start: 2017-09-15

## 2017-09-15 RX ORDER — FAMOTIDINE 20 MG/1
20 TABLET, FILM COATED ORAL ONCE
Status: CANCELLED | OUTPATIENT
Start: 2017-09-15

## 2017-09-15 RX ORDER — FAMOTIDINE 20 MG/1
20 TABLET, FILM COATED ORAL ONCE
Status: COMPLETED | OUTPATIENT
Start: 2017-09-15 | End: 2017-09-15

## 2017-09-15 RX ORDER — SODIUM CHLORIDE 9 MG/ML
250 INJECTION, SOLUTION INTRAVENOUS ONCE
Status: COMPLETED | OUTPATIENT
Start: 2017-09-15 | End: 2017-09-15

## 2017-09-15 RX ORDER — SODIUM CHLORIDE 0.9 % (FLUSH) 0.9 %
10 SYRINGE (ML) INJECTION AS NEEDED
Status: DISCONTINUED | OUTPATIENT
Start: 2017-09-15 | End: 2017-09-15 | Stop reason: HOSPADM

## 2017-09-15 RX ORDER — ACETAMINOPHEN 325 MG/1
650 TABLET ORAL ONCE
Status: CANCELLED | OUTPATIENT
Start: 2017-09-15

## 2017-09-15 RX ADMIN — SODIUM CHLORIDE, PRESERVATIVE FREE 500 UNITS: 5 INJECTION INTRAVENOUS at 10:27

## 2017-09-15 RX ADMIN — ACETAMINOPHEN 650 MG: 325 TABLET ORAL at 08:59

## 2017-09-15 RX ADMIN — Medication 10 ML: at 10:27

## 2017-09-15 RX ADMIN — FAMOTIDINE 20 MG: 20 TABLET ORAL at 09:20

## 2017-09-15 RX ADMIN — IRON SUCROSE 200 MG: 20 INJECTION, SOLUTION INTRAVENOUS at 09:39

## 2017-09-15 RX ADMIN — DIPHENHYDRAMINE HYDROCHLORIDE 25 MG: 25 CAPSULE ORAL at 09:00

## 2017-09-15 RX ADMIN — SODIUM CHLORIDE 250 ML: 9 INJECTION, SOLUTION INTRAVENOUS at 08:59

## 2017-09-18 ENCOUNTER — INFUSION (OUTPATIENT)
Dept: ONCOLOGY | Facility: HOSPITAL | Age: 77
End: 2017-09-18

## 2017-09-18 VITALS — HEART RATE: 73 BPM | DIASTOLIC BLOOD PRESSURE: 80 MMHG | TEMPERATURE: 96.7 F | SYSTOLIC BLOOD PRESSURE: 144 MMHG

## 2017-09-18 DIAGNOSIS — Z45.2 ENCOUNTER FOR VENOUS ACCESS DEVICE CARE: ICD-10-CM

## 2017-09-18 DIAGNOSIS — D50.0 IRON DEFICIENCY ANEMIA DUE TO CHRONIC BLOOD LOSS: ICD-10-CM

## 2017-09-18 DIAGNOSIS — K90.9 MALABSORPTION OF IRON: Primary | ICD-10-CM

## 2017-09-18 PROCEDURE — 63710000001 DIPHENHYDRAMINE PER 50 MG: Performed by: INTERNAL MEDICINE

## 2017-09-18 PROCEDURE — 96365 THER/PROPH/DIAG IV INF INIT: CPT | Performed by: INTERNAL MEDICINE

## 2017-09-18 PROCEDURE — 25010000002 IRON SUCROSE PER 1 MG: Performed by: INTERNAL MEDICINE

## 2017-09-18 PROCEDURE — 25010000002 HEPARIN FLUSH (PORCINE) 100 UNIT/ML SOLUTION: Performed by: INTERNAL MEDICINE

## 2017-09-18 RX ORDER — SODIUM CHLORIDE 9 MG/ML
250 INJECTION, SOLUTION INTRAVENOUS ONCE
Status: COMPLETED | OUTPATIENT
Start: 2017-09-18 | End: 2017-09-18

## 2017-09-18 RX ORDER — SODIUM CHLORIDE 0.9 % (FLUSH) 0.9 %
10 SYRINGE (ML) INJECTION AS NEEDED
Status: CANCELLED | OUTPATIENT
Start: 2017-09-18

## 2017-09-18 RX ORDER — ACETAMINOPHEN 325 MG/1
650 TABLET ORAL ONCE
Status: CANCELLED | OUTPATIENT
Start: 2017-09-18

## 2017-09-18 RX ORDER — DIPHENHYDRAMINE HCL 25 MG
25 CAPSULE ORAL ONCE
Status: COMPLETED | OUTPATIENT
Start: 2017-09-18 | End: 2017-09-18

## 2017-09-18 RX ORDER — ACETAMINOPHEN 325 MG/1
650 TABLET ORAL ONCE
Status: COMPLETED | OUTPATIENT
Start: 2017-09-18 | End: 2017-09-18

## 2017-09-18 RX ORDER — DIPHENHYDRAMINE HCL 25 MG
25 CAPSULE ORAL ONCE
Status: CANCELLED | OUTPATIENT
Start: 2017-09-18

## 2017-09-18 RX ORDER — FAMOTIDINE 20 MG/1
20 TABLET, FILM COATED ORAL ONCE
Status: CANCELLED | OUTPATIENT
Start: 2017-09-18

## 2017-09-18 RX ORDER — SODIUM CHLORIDE 0.9 % (FLUSH) 0.9 %
10 SYRINGE (ML) INJECTION AS NEEDED
Status: DISCONTINUED | OUTPATIENT
Start: 2017-09-18 | End: 2017-09-18 | Stop reason: HOSPADM

## 2017-09-18 RX ORDER — SODIUM CHLORIDE 9 MG/ML
250 INJECTION, SOLUTION INTRAVENOUS ONCE
Status: CANCELLED | OUTPATIENT
Start: 2017-09-18

## 2017-09-18 RX ORDER — FAMOTIDINE 20 MG/1
20 TABLET, FILM COATED ORAL ONCE
Status: COMPLETED | OUTPATIENT
Start: 2017-09-18 | End: 2017-09-18

## 2017-09-18 RX ADMIN — FAMOTIDINE 20 MG: 20 TABLET ORAL at 08:24

## 2017-09-18 RX ADMIN — SODIUM CHLORIDE, PRESERVATIVE FREE 500 UNITS: 5 INJECTION INTRAVENOUS at 10:15

## 2017-09-18 RX ADMIN — Medication 10 ML: at 10:15

## 2017-09-18 RX ADMIN — SODIUM CHLORIDE 250 ML: 9 INJECTION, SOLUTION INTRAVENOUS at 08:24

## 2017-09-18 RX ADMIN — IRON SUCROSE 200 MG: 20 INJECTION, SOLUTION INTRAVENOUS at 08:59

## 2017-09-18 RX ADMIN — DIPHENHYDRAMINE HYDROCHLORIDE 25 MG: 25 CAPSULE ORAL at 08:24

## 2017-09-18 RX ADMIN — ACETAMINOPHEN 650 MG: 325 TABLET ORAL at 08:24

## 2017-09-27 ENCOUNTER — TELEPHONE (OUTPATIENT)
Dept: NUTRITION | Facility: HOSPITAL | Age: 77
End: 2017-09-27

## 2017-10-17 ENCOUNTER — INFUSION (OUTPATIENT)
Dept: ONCOLOGY | Facility: HOSPITAL | Age: 77
End: 2017-10-17

## 2017-10-17 DIAGNOSIS — Z45.2 ENCOUNTER FOR VENOUS ACCESS DEVICE CARE: ICD-10-CM

## 2017-10-17 DIAGNOSIS — D50.0 IRON DEFICIENCY ANEMIA DUE TO CHRONIC BLOOD LOSS: ICD-10-CM

## 2017-10-17 DIAGNOSIS — C18.2 MALIGNANT NEOPLASM OF ASCENDING COLON (HCC): Primary | ICD-10-CM

## 2017-10-17 LAB
BASOPHILS # BLD AUTO: 0.02 10*3/MM3 (ref 0–0.2)
BASOPHILS NFR BLD AUTO: 0.4 % (ref 0–2)
DEPRECATED RDW RBC AUTO: 46.2 FL (ref 36.4–46.3)
EOSINOPHIL # BLD AUTO: 0.17 10*3/MM3 (ref 0–0.7)
EOSINOPHIL NFR BLD AUTO: 3.3 % (ref 0–7)
ERYTHROCYTE [DISTWIDTH] IN BLOOD BY AUTOMATED COUNT: 13.8 % (ref 11.5–14.5)
FERRITIN SERPL-MCNC: 115 NG/ML (ref 11.1–264)
FOLATE SERPL-MCNC: >20 NG/ML (ref 2.76–21)
HCT VFR BLD AUTO: 31.4 % (ref 35–45)
HGB BLD-MCNC: 10.1 G/DL (ref 12–15.5)
IMM GRANULOCYTES # BLD: 0.02 10*3/MM3 (ref 0–0.02)
IMM GRANULOCYTES NFR BLD: 0.4 % (ref 0–0.5)
IRON 24H UR-MRATE: 36 MCG/DL (ref 37–170)
IRON SATN MFR SERPL: 11 % (ref 15–50)
LYMPHOCYTES # BLD AUTO: 1.18 10*3/MM3 (ref 0.6–4.2)
LYMPHOCYTES NFR BLD AUTO: 22.7 % (ref 10–50)
MCH RBC QN AUTO: 29.5 PG (ref 26.5–34)
MCHC RBC AUTO-ENTMCNC: 32.2 G/DL (ref 31.4–36)
MCV RBC AUTO: 91.8 FL (ref 80–98)
MONOCYTES # BLD AUTO: 0.3 10*3/MM3 (ref 0–0.9)
MONOCYTES NFR BLD AUTO: 5.8 % (ref 0–12)
NEUTROPHILS # BLD AUTO: 3.51 10*3/MM3 (ref 2–8.6)
NEUTROPHILS NFR BLD AUTO: 67.4 % (ref 37–80)
PLATELET # BLD AUTO: 339 10*3/MM3 (ref 150–450)
PMV BLD AUTO: 9.6 FL (ref 8–12)
RBC # BLD AUTO: 3.42 10*6/MM3 (ref 3.77–5.16)
TIBC SERPL-MCNC: 334 MCG/DL (ref 265–497)
VIT B12 BLD-MCNC: 883 PG/ML (ref 239–931)
WBC NRBC COR # BLD: 5.2 10*3/MM3 (ref 3.2–9.8)

## 2017-10-17 PROCEDURE — 82728 ASSAY OF FERRITIN: CPT

## 2017-10-17 PROCEDURE — 85025 COMPLETE CBC W/AUTO DIFF WBC: CPT

## 2017-10-17 PROCEDURE — 36591 DRAW BLOOD OFF VENOUS DEVICE: CPT | Performed by: INTERNAL MEDICINE

## 2017-10-17 PROCEDURE — 82746 ASSAY OF FOLIC ACID SERUM: CPT

## 2017-10-17 PROCEDURE — 25010000002 HEPARIN FLUSH (PORCINE) 100 UNIT/ML SOLUTION: Performed by: INTERNAL MEDICINE

## 2017-10-17 PROCEDURE — 82607 VITAMIN B-12: CPT

## 2017-10-17 PROCEDURE — 83550 IRON BINDING TEST: CPT

## 2017-10-17 PROCEDURE — 83540 ASSAY OF IRON: CPT

## 2017-10-17 RX ORDER — SODIUM CHLORIDE 0.9 % (FLUSH) 0.9 %
10 SYRINGE (ML) INJECTION AS NEEDED
Status: CANCELLED | OUTPATIENT
Start: 2017-10-17

## 2017-10-17 RX ORDER — SODIUM CHLORIDE 0.9 % (FLUSH) 0.9 %
10 SYRINGE (ML) INJECTION AS NEEDED
Status: DISCONTINUED | OUTPATIENT
Start: 2017-10-17 | End: 2017-10-17 | Stop reason: HOSPADM

## 2017-10-17 RX ADMIN — SODIUM CHLORIDE, PRESERVATIVE FREE 500 UNITS: 5 INJECTION INTRAVENOUS at 07:47

## 2017-10-17 RX ADMIN — Medication 10 ML: at 07:47

## 2017-10-19 ENCOUNTER — OFFICE VISIT (OUTPATIENT)
Dept: ONCOLOGY | Facility: CLINIC | Age: 77
End: 2017-10-19

## 2017-10-19 VITALS
RESPIRATION RATE: 16 BRPM | HEART RATE: 84 BPM | DIASTOLIC BLOOD PRESSURE: 72 MMHG | HEIGHT: 64 IN | SYSTOLIC BLOOD PRESSURE: 141 MMHG | BODY MASS INDEX: 20.32 KG/M2 | WEIGHT: 119 LBS | TEMPERATURE: 97.9 F

## 2017-10-19 DIAGNOSIS — C18.2 MALIGNANT NEOPLASM OF ASCENDING COLON (HCC): Primary | ICD-10-CM

## 2017-10-19 DIAGNOSIS — K90.9 MALABSORPTION OF IRON: ICD-10-CM

## 2017-10-19 DIAGNOSIS — D50.0 IRON DEFICIENCY ANEMIA DUE TO CHRONIC BLOOD LOSS: ICD-10-CM

## 2017-10-19 PROCEDURE — 99214 OFFICE O/P EST MOD 30 MIN: CPT | Performed by: INTERNAL MEDICINE

## 2017-10-19 PROCEDURE — G0463 HOSPITAL OUTPT CLINIC VISIT: HCPCS | Performed by: INTERNAL MEDICINE

## 2017-10-19 RX ORDER — SODIUM CHLORIDE 9 MG/ML
250 INJECTION, SOLUTION INTRAVENOUS ONCE
Status: CANCELLED | OUTPATIENT
Start: 2017-10-24

## 2017-10-19 RX ORDER — ACETAMINOPHEN 325 MG/1
650 TABLET ORAL ONCE
Status: CANCELLED | OUTPATIENT
Start: 2017-10-24

## 2017-10-19 RX ORDER — FAMOTIDINE 20 MG/1
20 TABLET, FILM COATED ORAL ONCE
Status: CANCELLED | OUTPATIENT
Start: 2017-10-24

## 2017-10-19 RX ORDER — DIPHENHYDRAMINE HCL 25 MG
25 CAPSULE ORAL ONCE
Status: CANCELLED | OUTPATIENT
Start: 2017-10-24

## 2017-10-19 NOTE — PATIENT INSTRUCTIONS
Iron Sucrose injection  What is this medicine?  IRON SUCROSE (SOFIA cote) is an iron complex. Iron is used to make healthy red blood cells, which carry oxygen and nutrients throughout the body. This medicine is used to treat iron deficiency anemia in people with chronic kidney disease.  This medicine may be used for other purposes; ask your health care provider or pharmacist if you have questions.  COMMON BRAND NAME(S): Venofer  What should I tell my health care provider before I take this medicine?  They need to know if you have any of these conditions:  -anemia not caused by low iron levels  -heart disease  -high levels of iron in the blood  -kidney disease  -liver disease  -an unusual or allergic reaction to iron, other medicines, foods, dyes, or preservatives  -pregnant or trying to get pregnant  -breast-feeding  How should I use this medicine?  This medicine is for infusion into a vein. It is given by a health care professional in a hospital or clinic setting.  Talk to your pediatrician regarding the use of this medicine in children. While this drug may be prescribed for children as young as 2 years for selected conditions, precautions do apply.  Overdosage: If you think you have taken too much of this medicine contact a poison control center or emergency room at once.  NOTE: This medicine is only for you. Do not share this medicine with others.  What if I miss a dose?  It is important not to miss your dose. Call your doctor or health care professional if you are unable to keep an appointment.  What may interact with this medicine?  Do not take this medicine with any of the following medications:  -deferoxamine  -dimercaprol  -other iron products  This medicine may also interact with the following medications:  -chloramphenicol  -deferasirox  This list may not describe all possible interactions. Give your health care provider a list of all the medicines, herbs, non-prescription drugs, or dietary  supplements you use. Also tell them if you smoke, drink alcohol, or use illegal drugs. Some items may interact with your medicine.  What should I watch for while using this medicine?  Visit your doctor or healthcare professional regularly. Tell your doctor or healthcare professional if your symptoms do not start to get better or if they get worse. You may need blood work done while you are taking this medicine.  You may need to follow a special diet. Talk to your doctor. Foods that contain iron include: whole grains/cereals, dried fruits, beans, or peas, leafy green vegetables, and organ meats (liver, kidney).  What side effects may I notice from receiving this medicine?  Side effects that you should report to your doctor or health care professional as soon as possible:  -allergic reactions like skin rash, itching or hives, swelling of the face, lips, or tongue  -breathing problems  -changes in blood pressure  -cough  -fast, irregular heartbeat  -feeling faint or lightheaded, falls  -fever or chills  -flushing, sweating, or hot feelings  -joint or muscle aches/pains  -seizures  -swelling of the ankles or feet  -unusually weak or tired  Side effects that usually do not require medical attention (report to your doctor or health care professional if they continue or are bothersome):  -diarrhea  -feeling achy  -headache  -irritation at site where injected  -nausea, vomiting  -stomach upset  -tiredness  This list may not describe all possible side effects. Call your doctor for medical advice about side effects. You may report side effects to FDA at 9-611-FDA-7658.  Where should I keep my medicine?  This drug is given in a hospital or clinic and will not be stored at home.  NOTE: This sheet is a summary. It may not cover all possible information. If you have questions about this medicine, talk to your doctor, pharmacist, or health care provider.     © 2017, [employer]vier/Gold Standard. (2012-09-27 17:14:35)

## 2017-10-23 ENCOUNTER — INFUSION (OUTPATIENT)
Dept: ONCOLOGY | Facility: HOSPITAL | Age: 77
End: 2017-10-23

## 2017-10-23 VITALS — TEMPERATURE: 98.9 F | SYSTOLIC BLOOD PRESSURE: 139 MMHG | DIASTOLIC BLOOD PRESSURE: 69 MMHG | HEART RATE: 77 BPM

## 2017-10-23 DIAGNOSIS — Z23 FLU VACCINE NEED: ICD-10-CM

## 2017-10-23 DIAGNOSIS — K90.9 MALABSORPTION OF IRON: Primary | ICD-10-CM

## 2017-10-23 DIAGNOSIS — D50.0 IRON DEFICIENCY ANEMIA DUE TO CHRONIC BLOOD LOSS: ICD-10-CM

## 2017-10-23 PROCEDURE — 25010000002 INFLUENZA VAC SPLIT QUAD SUSPENSION: Performed by: INTERNAL MEDICINE

## 2017-10-23 PROCEDURE — 25010000002 HEPARIN FLUSH (PORCINE) 100 UNIT/ML SOLUTION

## 2017-10-23 PROCEDURE — G0008 ADMIN INFLUENZA VIRUS VAC: HCPCS | Performed by: INTERNAL MEDICINE

## 2017-10-23 PROCEDURE — 96365 THER/PROPH/DIAG IV INF INIT: CPT | Performed by: INTERNAL MEDICINE

## 2017-10-23 PROCEDURE — 90682 RIV4 VACC RECOMBINANT DNA IM: CPT | Performed by: INTERNAL MEDICINE

## 2017-10-23 PROCEDURE — 25010000002 IRON SUCROSE PER 1 MG: Performed by: INTERNAL MEDICINE

## 2017-10-23 PROCEDURE — 63710000001 DIPHENHYDRAMINE PER 50 MG: Performed by: INTERNAL MEDICINE

## 2017-10-23 RX ORDER — FAMOTIDINE 20 MG/1
20 TABLET, FILM COATED ORAL ONCE
Status: COMPLETED | OUTPATIENT
Start: 2017-10-23 | End: 2017-10-23

## 2017-10-23 RX ORDER — ACETAMINOPHEN 325 MG/1
650 TABLET ORAL ONCE
Status: CANCELLED | OUTPATIENT
Start: 2017-10-24

## 2017-10-23 RX ORDER — DIPHENHYDRAMINE HCL 25 MG
25 CAPSULE ORAL ONCE
Status: CANCELLED | OUTPATIENT
Start: 2017-10-24

## 2017-10-23 RX ORDER — SODIUM CHLORIDE 9 MG/ML
250 INJECTION, SOLUTION INTRAVENOUS ONCE
Status: COMPLETED | OUTPATIENT
Start: 2017-10-23 | End: 2017-10-23

## 2017-10-23 RX ORDER — DIPHENHYDRAMINE HCL 25 MG
25 CAPSULE ORAL ONCE
Status: COMPLETED | OUTPATIENT
Start: 2017-10-23 | End: 2017-10-23

## 2017-10-23 RX ORDER — ACETAMINOPHEN 325 MG/1
650 TABLET ORAL ONCE
Status: COMPLETED | OUTPATIENT
Start: 2017-10-23 | End: 2017-10-23

## 2017-10-23 RX ORDER — FAMOTIDINE 20 MG/1
20 TABLET, FILM COATED ORAL ONCE
Status: CANCELLED | OUTPATIENT
Start: 2017-10-24

## 2017-10-23 RX ORDER — SODIUM CHLORIDE 0.9 % (FLUSH) 0.9 %
SYRINGE (ML) INJECTION
Status: COMPLETED
Start: 2017-10-23 | End: 2017-10-23

## 2017-10-23 RX ORDER — SODIUM CHLORIDE 9 MG/ML
250 INJECTION, SOLUTION INTRAVENOUS ONCE
Status: CANCELLED | OUTPATIENT
Start: 2017-10-24

## 2017-10-23 RX ADMIN — IRON SUCROSE 200 MG: 20 INJECTION, SOLUTION INTRAVENOUS at 09:00

## 2017-10-23 RX ADMIN — SODIUM CHLORIDE, PRESERVATIVE FREE 500 UNITS: 5 INJECTION INTRAVENOUS at 09:57

## 2017-10-23 RX ADMIN — FAMOTIDINE 20 MG: 20 TABLET ORAL at 08:29

## 2017-10-23 RX ADMIN — SODIUM CHLORIDE 250 ML: 9 INJECTION, SOLUTION INTRAVENOUS at 08:28

## 2017-10-23 RX ADMIN — Medication 10 ML: at 09:57

## 2017-10-23 RX ADMIN — INFLUENZA A VIRUS A/MICHIGAN/45/2015 X-275 (H1N1) ANTIGEN (FORMALDEHYDE INACTIVATED), INFLUENZA A VIRUS A/HONG KONG/4801/2014 X-263B (H3N2) ANTIGEN (FORMALDEHYDE INACTIVATED), INFLUENZA B VIRUS B/PHUKET/3073/2013 ANTIGEN (FORMALDEHYDE INACTIVATED), AND INFLUENZA B VIRUS B/BRISBANE/60/2008 ANTIGEN (FORMALDEHYDE INACTIVATED) 0.5 ML: 15; 15; 15; 15 INJECTION, SUSPENSION INTRAMUSCULAR at 09:02

## 2017-10-23 RX ADMIN — DIPHENHYDRAMINE HYDROCHLORIDE 25 MG: 25 CAPSULE ORAL at 08:28

## 2017-10-23 RX ADMIN — ACETAMINOPHEN 650 MG: 325 TABLET ORAL at 08:29

## 2017-10-24 ENCOUNTER — INFUSION (OUTPATIENT)
Dept: ONCOLOGY | Facility: HOSPITAL | Age: 77
End: 2017-10-24

## 2017-10-24 VITALS
HEART RATE: 74 BPM | DIASTOLIC BLOOD PRESSURE: 72 MMHG | SYSTOLIC BLOOD PRESSURE: 144 MMHG | RESPIRATION RATE: 16 BRPM | TEMPERATURE: 98 F

## 2017-10-24 DIAGNOSIS — D50.0 IRON DEFICIENCY ANEMIA DUE TO CHRONIC BLOOD LOSS: ICD-10-CM

## 2017-10-24 DIAGNOSIS — K90.9 MALABSORPTION OF IRON: Primary | ICD-10-CM

## 2017-10-24 DIAGNOSIS — Z45.2 ENCOUNTER FOR CARE RELATED TO PORT-A-CATH: ICD-10-CM

## 2017-10-24 PROCEDURE — 96365 THER/PROPH/DIAG IV INF INIT: CPT | Performed by: INTERNAL MEDICINE

## 2017-10-24 PROCEDURE — 25010000002 HEPARIN FLUSH (PORCINE) 100 UNIT/ML SOLUTION: Performed by: INTERNAL MEDICINE

## 2017-10-24 PROCEDURE — 63710000001 DIPHENHYDRAMINE PER 50 MG: Performed by: INTERNAL MEDICINE

## 2017-10-24 PROCEDURE — 25010000002 IRON SUCROSE PER 1 MG: Performed by: INTERNAL MEDICINE

## 2017-10-24 RX ORDER — SODIUM CHLORIDE 9 MG/ML
250 INJECTION, SOLUTION INTRAVENOUS ONCE
Status: CANCELLED | OUTPATIENT
Start: 2017-10-24

## 2017-10-24 RX ORDER — SODIUM CHLORIDE 0.9 % (FLUSH) 0.9 %
10 SYRINGE (ML) INJECTION AS NEEDED
Status: CANCELLED | OUTPATIENT
Start: 2017-10-24

## 2017-10-24 RX ORDER — ACETAMINOPHEN 325 MG/1
650 TABLET ORAL ONCE
Status: CANCELLED | OUTPATIENT
Start: 2017-10-24

## 2017-10-24 RX ORDER — SODIUM CHLORIDE 9 MG/ML
250 INJECTION, SOLUTION INTRAVENOUS ONCE
Status: COMPLETED | OUTPATIENT
Start: 2017-10-24 | End: 2017-10-24

## 2017-10-24 RX ORDER — DIPHENHYDRAMINE HCL 25 MG
25 CAPSULE ORAL ONCE
Status: CANCELLED | OUTPATIENT
Start: 2017-10-24

## 2017-10-24 RX ORDER — DIPHENHYDRAMINE HCL 25 MG
25 CAPSULE ORAL ONCE
Status: COMPLETED | OUTPATIENT
Start: 2017-10-24 | End: 2017-10-24

## 2017-10-24 RX ORDER — FAMOTIDINE 20 MG/1
20 TABLET, FILM COATED ORAL ONCE
Status: COMPLETED | OUTPATIENT
Start: 2017-10-24 | End: 2017-10-24

## 2017-10-24 RX ORDER — SODIUM CHLORIDE 0.9 % (FLUSH) 0.9 %
10 SYRINGE (ML) INJECTION AS NEEDED
Status: DISCONTINUED | OUTPATIENT
Start: 2017-10-24 | End: 2017-10-24 | Stop reason: HOSPADM

## 2017-10-24 RX ORDER — FAMOTIDINE 20 MG/1
20 TABLET, FILM COATED ORAL ONCE
Status: CANCELLED | OUTPATIENT
Start: 2017-10-24

## 2017-10-24 RX ORDER — ACETAMINOPHEN 325 MG/1
650 TABLET ORAL ONCE
Status: COMPLETED | OUTPATIENT
Start: 2017-10-24 | End: 2017-10-24

## 2017-10-24 RX ADMIN — SODIUM CHLORIDE 250 ML: 9 INJECTION, SOLUTION INTRAVENOUS at 08:41

## 2017-10-24 RX ADMIN — DIPHENHYDRAMINE HYDROCHLORIDE 25 MG: 25 CAPSULE ORAL at 08:40

## 2017-10-24 RX ADMIN — Medication 10 ML: at 09:58

## 2017-10-24 RX ADMIN — SODIUM CHLORIDE, PRESERVATIVE FREE 500 UNITS: 5 INJECTION INTRAVENOUS at 09:58

## 2017-10-24 RX ADMIN — ACETAMINOPHEN 650 MG: 325 TABLET ORAL at 08:40

## 2017-10-24 RX ADMIN — IRON SUCROSE 200 MG: 20 INJECTION, SOLUTION INTRAVENOUS at 09:13

## 2017-10-24 RX ADMIN — FAMOTIDINE 20 MG: 20 TABLET ORAL at 08:41

## 2017-10-25 ENCOUNTER — INFUSION (OUTPATIENT)
Dept: ONCOLOGY | Facility: HOSPITAL | Age: 77
End: 2017-10-25

## 2017-10-25 VITALS
RESPIRATION RATE: 18 BRPM | DIASTOLIC BLOOD PRESSURE: 71 MMHG | SYSTOLIC BLOOD PRESSURE: 140 MMHG | HEART RATE: 76 BPM | TEMPERATURE: 97.9 F

## 2017-10-25 DIAGNOSIS — D50.0 IRON DEFICIENCY ANEMIA DUE TO CHRONIC BLOOD LOSS: ICD-10-CM

## 2017-10-25 DIAGNOSIS — K90.9 MALABSORPTION OF IRON: Primary | ICD-10-CM

## 2017-10-25 DIAGNOSIS — Z45.2 ENCOUNTER FOR CARE RELATED TO PORT-A-CATH: ICD-10-CM

## 2017-10-25 PROCEDURE — 25010000002 HEPARIN FLUSH (PORCINE) 100 UNIT/ML SOLUTION: Performed by: INTERNAL MEDICINE

## 2017-10-25 PROCEDURE — 25010000002 IRON SUCROSE PER 1 MG: Performed by: INTERNAL MEDICINE

## 2017-10-25 PROCEDURE — 63710000001 DIPHENHYDRAMINE PER 50 MG: Performed by: INTERNAL MEDICINE

## 2017-10-25 PROCEDURE — 96365 THER/PROPH/DIAG IV INF INIT: CPT | Performed by: INTERNAL MEDICINE

## 2017-10-25 RX ORDER — FAMOTIDINE 20 MG/1
20 TABLET, FILM COATED ORAL ONCE
Status: COMPLETED | OUTPATIENT
Start: 2017-10-25 | End: 2017-10-25

## 2017-10-25 RX ORDER — FAMOTIDINE 20 MG/1
20 TABLET, FILM COATED ORAL ONCE
Status: CANCELLED | OUTPATIENT
Start: 2017-10-25

## 2017-10-25 RX ORDER — DIPHENHYDRAMINE HCL 25 MG
25 CAPSULE ORAL ONCE
Status: COMPLETED | OUTPATIENT
Start: 2017-10-25 | End: 2017-10-25

## 2017-10-25 RX ORDER — SODIUM CHLORIDE 9 MG/ML
250 INJECTION, SOLUTION INTRAVENOUS ONCE
Status: COMPLETED | OUTPATIENT
Start: 2017-10-25 | End: 2017-10-25

## 2017-10-25 RX ORDER — ACETAMINOPHEN 325 MG/1
650 TABLET ORAL ONCE
Status: COMPLETED | OUTPATIENT
Start: 2017-10-25 | End: 2017-10-25

## 2017-10-25 RX ORDER — SODIUM CHLORIDE 0.9 % (FLUSH) 0.9 %
10 SYRINGE (ML) INJECTION AS NEEDED
Status: CANCELLED | OUTPATIENT
Start: 2017-10-25

## 2017-10-25 RX ORDER — ACETAMINOPHEN 325 MG/1
650 TABLET ORAL ONCE
Status: CANCELLED | OUTPATIENT
Start: 2017-10-25

## 2017-10-25 RX ORDER — SODIUM CHLORIDE 0.9 % (FLUSH) 0.9 %
10 SYRINGE (ML) INJECTION AS NEEDED
Status: DISCONTINUED | OUTPATIENT
Start: 2017-10-25 | End: 2017-10-25 | Stop reason: HOSPADM

## 2017-10-25 RX ORDER — DIPHENHYDRAMINE HCL 25 MG
25 CAPSULE ORAL ONCE
Status: CANCELLED | OUTPATIENT
Start: 2017-10-25

## 2017-10-25 RX ORDER — SODIUM CHLORIDE 9 MG/ML
250 INJECTION, SOLUTION INTRAVENOUS ONCE
Status: CANCELLED | OUTPATIENT
Start: 2017-10-25

## 2017-10-25 RX ADMIN — SODIUM CHLORIDE 250 ML: 9 INJECTION, SOLUTION INTRAVENOUS at 08:19

## 2017-10-25 RX ADMIN — ACETAMINOPHEN 650 MG: 325 TABLET ORAL at 08:19

## 2017-10-25 RX ADMIN — DIPHENHYDRAMINE HYDROCHLORIDE 25 MG: 25 CAPSULE ORAL at 08:19

## 2017-10-25 RX ADMIN — FAMOTIDINE 20 MG: 20 TABLET ORAL at 08:19

## 2017-10-25 RX ADMIN — Medication 10 ML: at 09:29

## 2017-10-25 RX ADMIN — IRON SUCROSE 200 MG: 20 INJECTION, SOLUTION INTRAVENOUS at 08:45

## 2017-10-25 RX ADMIN — SODIUM CHLORIDE, PRESERVATIVE FREE 500 UNITS: 5 INJECTION INTRAVENOUS at 09:29

## 2017-10-26 ENCOUNTER — APPOINTMENT (OUTPATIENT)
Dept: ONCOLOGY | Facility: HOSPITAL | Age: 77
End: 2017-10-26

## 2017-10-26 ENCOUNTER — INFUSION (OUTPATIENT)
Dept: ONCOLOGY | Facility: HOSPITAL | Age: 77
End: 2017-10-26

## 2017-10-26 VITALS
HEART RATE: 72 BPM | RESPIRATION RATE: 18 BRPM | DIASTOLIC BLOOD PRESSURE: 72 MMHG | SYSTOLIC BLOOD PRESSURE: 150 MMHG | TEMPERATURE: 97.9 F

## 2017-10-26 DIAGNOSIS — C18.2 MALIGNANT NEOPLASM OF ASCENDING COLON (HCC): ICD-10-CM

## 2017-10-26 DIAGNOSIS — K90.9 MALABSORPTION OF IRON: Primary | ICD-10-CM

## 2017-10-26 DIAGNOSIS — Z45.2 ENCOUNTER FOR CARE RELATED TO PORT-A-CATH: ICD-10-CM

## 2017-10-26 DIAGNOSIS — D50.0 IRON DEFICIENCY ANEMIA DUE TO CHRONIC BLOOD LOSS: ICD-10-CM

## 2017-10-26 PROCEDURE — 96365 THER/PROPH/DIAG IV INF INIT: CPT | Performed by: INTERNAL MEDICINE

## 2017-10-26 PROCEDURE — 25010000002 HEPARIN FLUSH (PORCINE) 100 UNIT/ML SOLUTION: Performed by: INTERNAL MEDICINE

## 2017-10-26 PROCEDURE — 63710000001 DIPHENHYDRAMINE PER 50 MG: Performed by: INTERNAL MEDICINE

## 2017-10-26 PROCEDURE — 25010000002 IRON SUCROSE PER 1 MG: Performed by: INTERNAL MEDICINE

## 2017-10-26 RX ORDER — SODIUM CHLORIDE 0.9 % (FLUSH) 0.9 %
10 SYRINGE (ML) INJECTION AS NEEDED
Status: CANCELLED | OUTPATIENT
Start: 2017-10-26

## 2017-10-26 RX ORDER — FAMOTIDINE 20 MG/1
20 TABLET, FILM COATED ORAL ONCE
Status: COMPLETED | OUTPATIENT
Start: 2017-10-26 | End: 2017-10-26

## 2017-10-26 RX ORDER — SODIUM CHLORIDE 9 MG/ML
250 INJECTION, SOLUTION INTRAVENOUS ONCE
Status: CANCELLED | OUTPATIENT
Start: 2017-10-26

## 2017-10-26 RX ORDER — FAMOTIDINE 20 MG/1
20 TABLET, FILM COATED ORAL ONCE
Status: CANCELLED | OUTPATIENT
Start: 2017-10-26

## 2017-10-26 RX ORDER — ACETAMINOPHEN 325 MG/1
650 TABLET ORAL ONCE
Status: COMPLETED | OUTPATIENT
Start: 2017-10-26 | End: 2017-10-26

## 2017-10-26 RX ORDER — DIPHENHYDRAMINE HCL 25 MG
25 CAPSULE ORAL ONCE
Status: COMPLETED | OUTPATIENT
Start: 2017-10-26 | End: 2017-10-26

## 2017-10-26 RX ORDER — ACETAMINOPHEN 325 MG/1
650 TABLET ORAL ONCE
Status: CANCELLED | OUTPATIENT
Start: 2017-10-26

## 2017-10-26 RX ORDER — DIPHENHYDRAMINE HCL 25 MG
25 CAPSULE ORAL ONCE
Status: CANCELLED | OUTPATIENT
Start: 2017-10-26

## 2017-10-26 RX ORDER — SODIUM CHLORIDE 9 MG/ML
250 INJECTION, SOLUTION INTRAVENOUS ONCE
Status: COMPLETED | OUTPATIENT
Start: 2017-10-26 | End: 2017-10-26

## 2017-10-26 RX ORDER — SODIUM CHLORIDE 0.9 % (FLUSH) 0.9 %
10 SYRINGE (ML) INJECTION AS NEEDED
Status: DISCONTINUED | OUTPATIENT
Start: 2017-10-26 | End: 2017-10-26 | Stop reason: HOSPADM

## 2017-10-26 RX ADMIN — IRON SUCROSE 200 MG: 20 INJECTION, SOLUTION INTRAVENOUS at 08:28

## 2017-10-26 RX ADMIN — SODIUM CHLORIDE 250 ML: 9 INJECTION, SOLUTION INTRAVENOUS at 08:01

## 2017-10-26 RX ADMIN — Medication 10 ML: at 09:13

## 2017-10-26 RX ADMIN — SODIUM CHLORIDE, PRESERVATIVE FREE 500 UNITS: 5 INJECTION INTRAVENOUS at 09:13

## 2017-10-26 RX ADMIN — DIPHENHYDRAMINE HYDROCHLORIDE 25 MG: 25 CAPSULE ORAL at 08:03

## 2017-10-26 RX ADMIN — FAMOTIDINE 20 MG: 20 TABLET ORAL at 08:03

## 2017-10-26 RX ADMIN — ACETAMINOPHEN 650 MG: 325 TABLET ORAL at 08:03

## 2017-10-27 ENCOUNTER — INFUSION (OUTPATIENT)
Dept: ONCOLOGY | Facility: HOSPITAL | Age: 77
End: 2017-10-27

## 2017-10-27 VITALS — DIASTOLIC BLOOD PRESSURE: 72 MMHG | TEMPERATURE: 97.8 F | SYSTOLIC BLOOD PRESSURE: 146 MMHG | HEART RATE: 68 BPM

## 2017-10-27 DIAGNOSIS — Z45.2 ENCOUNTER FOR CARE RELATED TO PORT-A-CATH: ICD-10-CM

## 2017-10-27 DIAGNOSIS — K90.9 MALABSORPTION OF IRON: Primary | ICD-10-CM

## 2017-10-27 DIAGNOSIS — D50.0 IRON DEFICIENCY ANEMIA DUE TO CHRONIC BLOOD LOSS: ICD-10-CM

## 2017-10-27 PROCEDURE — 96365 THER/PROPH/DIAG IV INF INIT: CPT | Performed by: INTERNAL MEDICINE

## 2017-10-27 PROCEDURE — 25010000002 IRON SUCROSE PER 1 MG: Performed by: INTERNAL MEDICINE

## 2017-10-27 PROCEDURE — 25010000002 HEPARIN FLUSH (PORCINE) 100 UNIT/ML SOLUTION: Performed by: INTERNAL MEDICINE

## 2017-10-27 PROCEDURE — 63710000001 DIPHENHYDRAMINE PER 50 MG: Performed by: INTERNAL MEDICINE

## 2017-10-27 RX ORDER — ACETAMINOPHEN 325 MG/1
650 TABLET ORAL ONCE
Status: CANCELLED | OUTPATIENT
Start: 2017-10-27

## 2017-10-27 RX ORDER — SODIUM CHLORIDE 9 MG/ML
250 INJECTION, SOLUTION INTRAVENOUS ONCE
Status: COMPLETED | OUTPATIENT
Start: 2017-10-27 | End: 2017-10-27

## 2017-10-27 RX ORDER — FAMOTIDINE 20 MG/1
20 TABLET, FILM COATED ORAL ONCE
Status: COMPLETED | OUTPATIENT
Start: 2017-10-27 | End: 2017-10-27

## 2017-10-27 RX ORDER — SODIUM CHLORIDE 9 MG/ML
250 INJECTION, SOLUTION INTRAVENOUS ONCE
Status: CANCELLED | OUTPATIENT
Start: 2017-10-27

## 2017-10-27 RX ORDER — DIPHENHYDRAMINE HCL 25 MG
25 CAPSULE ORAL ONCE
Status: CANCELLED | OUTPATIENT
Start: 2017-10-27

## 2017-10-27 RX ORDER — ACETAMINOPHEN 325 MG/1
650 TABLET ORAL ONCE
Status: COMPLETED | OUTPATIENT
Start: 2017-10-27 | End: 2017-10-27

## 2017-10-27 RX ORDER — FAMOTIDINE 20 MG/1
20 TABLET, FILM COATED ORAL ONCE
Status: CANCELLED | OUTPATIENT
Start: 2017-10-27

## 2017-10-27 RX ORDER — DIPHENHYDRAMINE HCL 25 MG
25 CAPSULE ORAL ONCE
Status: COMPLETED | OUTPATIENT
Start: 2017-10-27 | End: 2017-10-27

## 2017-10-27 RX ORDER — SODIUM CHLORIDE 0.9 % (FLUSH) 0.9 %
10 SYRINGE (ML) INJECTION AS NEEDED
Status: DISCONTINUED | OUTPATIENT
Start: 2017-10-27 | End: 2017-10-27 | Stop reason: HOSPADM

## 2017-10-27 RX ORDER — SODIUM CHLORIDE 0.9 % (FLUSH) 0.9 %
10 SYRINGE (ML) INJECTION AS NEEDED
Status: CANCELLED | OUTPATIENT
Start: 2017-10-27

## 2017-10-27 RX ADMIN — SODIUM CHLORIDE, PRESERVATIVE FREE 500 UNITS: 5 INJECTION INTRAVENOUS at 09:25

## 2017-10-27 RX ADMIN — IRON SUCROSE 200 MG: 20 INJECTION, SOLUTION INTRAVENOUS at 08:28

## 2017-10-27 RX ADMIN — DIPHENHYDRAMINE HYDROCHLORIDE 25 MG: 25 CAPSULE ORAL at 08:07

## 2017-10-27 RX ADMIN — SODIUM CHLORIDE 250 ML: 9 INJECTION, SOLUTION INTRAVENOUS at 08:06

## 2017-10-27 RX ADMIN — FAMOTIDINE 20 MG: 20 TABLET ORAL at 08:07

## 2017-10-27 RX ADMIN — Medication 10 ML: at 09:25

## 2017-10-27 RX ADMIN — ACETAMINOPHEN 650 MG: 325 TABLET ORAL at 08:07

## 2017-11-06 ENCOUNTER — TELEPHONE (OUTPATIENT)
Dept: NUTRITION | Facility: HOSPITAL | Age: 77
End: 2017-11-06

## 2017-11-06 NOTE — PROGRESS NOTES
Adult Outpatient Nutrition  Assessment    Patient Name:  Mary Gutierrez  YOB: 1940  MRN: [id number]    Assessment Date:  11/6/2017    Comments:  Received phone message from from  requesting additional Boost discount coupons. Mailed x6 discount coupons.                        Electronically signed by:  Love Black RD  11/06/17 1:16 PM

## 2017-11-27 ENCOUNTER — RESEARCH ENCOUNTER (OUTPATIENT)
Dept: ONCOLOGY | Facility: HOSPITAL | Age: 77
End: 2017-11-27

## 2017-11-30 ENCOUNTER — LAB (OUTPATIENT)
Dept: LAB | Facility: HOSPITAL | Age: 77
End: 2017-11-30
Attending: INTERNAL MEDICINE

## 2017-11-30 ENCOUNTER — HOSPITAL ENCOUNTER (OUTPATIENT)
Dept: CT IMAGING | Facility: HOSPITAL | Age: 77
Discharge: HOME | End: 2017-11-30
Attending: INTERNAL MEDICINE | Admitting: INTERNAL MEDICINE

## 2017-11-30 DIAGNOSIS — K90.9 MALABSORPTION OF IRON: ICD-10-CM

## 2017-11-30 DIAGNOSIS — C18.2 MALIGNANT NEOPLASM OF ASCENDING COLON (HCC): ICD-10-CM

## 2017-11-30 DIAGNOSIS — D50.0 IRON DEFICIENCY ANEMIA DUE TO CHRONIC BLOOD LOSS: ICD-10-CM

## 2017-11-30 LAB
ALBUMIN SERPL-MCNC: 5 G/DL (ref 3.4–4.8)
ALBUMIN/GLOB SERPL: 1.9 G/DL (ref 1.1–1.8)
ALP SERPL-CCNC: 68 U/L (ref 38–126)
ALT SERPL W P-5'-P-CCNC: 25 U/L (ref 9–52)
ANION GAP SERPL CALCULATED.3IONS-SCNC: 12 MMOL/L (ref 5–15)
AST SERPL-CCNC: 34 U/L (ref 14–36)
BASOPHILS # BLD AUTO: 0.02 10*3/MM3 (ref 0–0.2)
BASOPHILS NFR BLD AUTO: 0.4 % (ref 0–2)
BILIRUB SERPL-MCNC: 0.2 MG/DL (ref 0.2–1.3)
BUN BLD-MCNC: 21 MG/DL (ref 7–21)
BUN/CREAT SERPL: 22.1 (ref 7–25)
CALCIUM SPEC-SCNC: 10 MG/DL (ref 8.4–10.2)
CHLORIDE SERPL-SCNC: 98 MMOL/L (ref 95–110)
CO2 SERPL-SCNC: 27 MMOL/L (ref 22–31)
CREAT BLD-MCNC: 0.95 MG/DL (ref 0.5–1)
DEPRECATED RDW RBC AUTO: 50.5 FL (ref 36.4–46.3)
EOSINOPHIL # BLD AUTO: 0.17 10*3/MM3 (ref 0–0.7)
EOSINOPHIL NFR BLD AUTO: 3.4 % (ref 0–7)
ERYTHROCYTE [DISTWIDTH] IN BLOOD BY AUTOMATED COUNT: 14.5 % (ref 11.5–14.5)
GFR SERPL CREATININE-BSD FRML MDRD: 57 ML/MIN/1.73 (ref 39–90)
GLOBULIN UR ELPH-MCNC: 2.7 GM/DL (ref 2.3–3.5)
GLUCOSE BLD-MCNC: 142 MG/DL (ref 60–100)
HCT VFR BLD AUTO: 38.2 % (ref 35–45)
HGB BLD-MCNC: 12 G/DL (ref 12–15.5)
IMM GRANULOCYTES # BLD: 0.02 10*3/MM3 (ref 0–0.02)
IMM GRANULOCYTES NFR BLD: 0.4 % (ref 0–0.5)
IRON 24H UR-MRATE: 52 MCG/DL (ref 37–170)
IRON SATN MFR SERPL: 14 % (ref 15–50)
LYMPHOCYTES # BLD AUTO: 1.44 10*3/MM3 (ref 0.6–4.2)
LYMPHOCYTES NFR BLD AUTO: 28.7 % (ref 10–50)
MCH RBC QN AUTO: 29.9 PG (ref 26.5–34)
MCHC RBC AUTO-ENTMCNC: 31.4 G/DL (ref 31.4–36)
MCV RBC AUTO: 95.3 FL (ref 80–98)
MONOCYTES # BLD AUTO: 0.37 10*3/MM3 (ref 0–0.9)
MONOCYTES NFR BLD AUTO: 7.4 % (ref 0–12)
NEUTROPHILS # BLD AUTO: 2.99 10*3/MM3 (ref 2–8.6)
NEUTROPHILS NFR BLD AUTO: 59.7 % (ref 37–80)
PLATELET # BLD AUTO: 362 10*3/MM3 (ref 150–450)
PMV BLD AUTO: 9.9 FL (ref 8–12)
POTASSIUM BLD-SCNC: 3.6 MMOL/L (ref 3.5–5.1)
PROT SERPL-MCNC: 7.7 G/DL (ref 6.3–8.6)
RBC # BLD AUTO: 4.01 10*6/MM3 (ref 3.77–5.16)
SODIUM BLD-SCNC: 137 MMOL/L (ref 137–145)
TIBC SERPL-MCNC: 372 MCG/DL (ref 265–497)
WBC NRBC COR # BLD: 5.01 10*3/MM3 (ref 3.2–9.8)

## 2017-11-30 PROCEDURE — 80053 COMPREHEN METABOLIC PANEL: CPT

## 2017-11-30 PROCEDURE — 83550 IRON BINDING TEST: CPT

## 2017-11-30 PROCEDURE — 85025 COMPLETE CBC W/AUTO DIFF WBC: CPT

## 2017-11-30 PROCEDURE — 0 IOPAMIDOL 61 % SOLUTION: Performed by: INTERNAL MEDICINE

## 2017-11-30 PROCEDURE — 74177 CT ABD & PELVIS W/CONTRAST: CPT

## 2017-11-30 PROCEDURE — 83540 ASSAY OF IRON: CPT

## 2017-11-30 RX ADMIN — IOPAMIDOL 80 ML: 612 INJECTION, SOLUTION INTRAVENOUS at 16:30

## 2017-12-11 ENCOUNTER — APPOINTMENT (OUTPATIENT)
Dept: ONCOLOGY | Facility: HOSPITAL | Age: 77
End: 2017-12-11

## 2017-12-14 ENCOUNTER — LAB (OUTPATIENT)
Dept: ONCOLOGY | Facility: HOSPITAL | Age: 77
End: 2017-12-14

## 2017-12-14 ENCOUNTER — OFFICE VISIT (OUTPATIENT)
Dept: ONCOLOGY | Facility: CLINIC | Age: 77
End: 2017-12-14

## 2017-12-14 VITALS
SYSTOLIC BLOOD PRESSURE: 141 MMHG | TEMPERATURE: 98.6 F | RESPIRATION RATE: 16 BRPM | HEIGHT: 64 IN | BODY MASS INDEX: 20.06 KG/M2 | WEIGHT: 117.5 LBS | DIASTOLIC BLOOD PRESSURE: 76 MMHG | HEART RATE: 74 BPM

## 2017-12-14 DIAGNOSIS — K90.9 MALABSORPTION OF IRON: ICD-10-CM

## 2017-12-14 DIAGNOSIS — D50.0 IRON DEFICIENCY ANEMIA DUE TO CHRONIC BLOOD LOSS: ICD-10-CM

## 2017-12-14 DIAGNOSIS — D50.0 IRON DEFICIENCY ANEMIA DUE TO CHRONIC BLOOD LOSS: Primary | ICD-10-CM

## 2017-12-14 DIAGNOSIS — C18.2 MALIGNANT NEOPLASM OF ASCENDING COLON (HCC): ICD-10-CM

## 2017-12-14 DIAGNOSIS — Z45.2 ENCOUNTER FOR CARE RELATED TO PORT-A-CATH: Primary | ICD-10-CM

## 2017-12-14 LAB — FERRITIN SERPL-MCNC: 160 NG/ML (ref 11.1–264)

## 2017-12-14 PROCEDURE — 36591 DRAW BLOOD OFF VENOUS DEVICE: CPT | Performed by: INTERNAL MEDICINE

## 2017-12-14 PROCEDURE — 99214 OFFICE O/P EST MOD 30 MIN: CPT | Performed by: INTERNAL MEDICINE

## 2017-12-14 PROCEDURE — 25010000002 HEPARIN FLUSH (PORCINE) 100 UNIT/ML SOLUTION: Performed by: INTERNAL MEDICINE

## 2017-12-14 PROCEDURE — 82728 ASSAY OF FERRITIN: CPT

## 2017-12-14 PROCEDURE — G0463 HOSPITAL OUTPT CLINIC VISIT: HCPCS | Performed by: INTERNAL MEDICINE

## 2017-12-14 RX ORDER — SODIUM CHLORIDE 0.9 % (FLUSH) 0.9 %
10 SYRINGE (ML) INJECTION AS NEEDED
Status: DISCONTINUED | OUTPATIENT
Start: 2017-12-14 | End: 2017-12-14 | Stop reason: HOSPADM

## 2017-12-14 RX ORDER — SODIUM CHLORIDE 0.9 % (FLUSH) 0.9 %
10 SYRINGE (ML) INJECTION AS NEEDED
Status: CANCELLED | OUTPATIENT
Start: 2018-02-12

## 2017-12-14 RX ADMIN — Medication 10 ML: at 07:59

## 2017-12-14 RX ADMIN — SODIUM CHLORIDE, PRESERVATIVE FREE 500 UNITS: 5 INJECTION INTRAVENOUS at 08:36

## 2017-12-14 NOTE — PROGRESS NOTES
DATE OF VISIT: 12/14/2017    REASON FOR VISIT:  History of colon cancer and recurrent iron deficiency anemia    HISTORY OF PRESENT ILLNESS:    77-year-old female with a past medical history significant for stage III colon cancer, status post surgery followed by chemotherapy last of which was in July 2016.  In view of recurrent iron deficiency since October 2016 patient has required intravenous venofer.  His last round of intravenous venofer was in 10/27/17.  Patient is here for follow-up visit today.  Denies any excessive fatigue.  Complains of chronic diarrhea.  Denies any blood in the stool or urine.  He underwent a EGD as well as capsule endoscopy in June 2017 by Dr. Rico which did not find any source of bleeding.      PAST MEDICAL HISTORY:    Past Medical History:   Diagnosis Date   • Acid reflux    • Diabetes mellitus    • Hypertension    • Malignant neoplasm of ascending colon 11/1/2016   • Malignant tumor of cecum        SOCIAL HISTORY:    Social History   Substance Use Topics   • Smoking status: Never Smoker   • Smokeless tobacco: None   • Alcohol use No       Surgical History :  Past Surgical History:   Procedure Laterality Date   • CAPSULE ENDOSCOPY N/A 6/22/2017    Procedure: CAPSULE ENDOSCOPY M2A;  Surgeon: Stuart Rico DO;  Location: [facility] ENDOSCOPY;  Service:    • CENTRAL VENOUS LINE INSERTION  10/30/2015    Ultrasound localization, left basilic vein.Placement of left upper extremity PICC line   • ENDOSCOPY N/A 6/22/2017    Procedure: ESOPHAGOGASTRODUODENOSCOPY;  Surgeon: Stuart Rico DO;  Location: [facility] ENDOSCOPY;  Service:    • FRACTURE SURGERY      right femur    • HYSTERECTOMY     • LAPAROSCOPIC ASSISSTED TOTAL COLECTOMY W/ J-POUCH  10/15/2015    Laparoscopic right hemicolectomy with ileotransverse colostomy   • VENOUS ACCESS DEVICE (PORT) INSERTION  01/12/2016    Right internal jugular Mediport       ALLERGIES:    Allergies   Allergen Reactions   • Other Rash     Oral Chemo Meds  "      REVIEW OF SYSTEMS:      CONSTITUTIONAL: Denies any excessive fatigue.  No fever, chills, or night sweats.     HEENT:  No epistaxis, mouth sores, or difficulty swallowing.    RESPIRATORY:  No new shortness of breath or cough at present.    CARDIOVASCULAR:  No chest pain or palpitations.    GASTROINTESTINAL: Complains of chronic diarrhea secondary to iron.  No abdominal pain, nausea, vomiting, or blood in the stool.    GENITOURINARY:  No dysuria or hematuria.    MUSCULOSKELETAL:  No any new back pain or arthralgias.     NEUROLOGICAL:  Neuropathy in upper and lower extremities resolved completely.. No new headache or dizziness.     LYMPHATICS:  Denies any abnormal swollen and anywhere in the body.    SKIN:  Denies any new skin rash.          PHYSICAL EXAMINATION:      VITAL SIGNS:  /76  Pulse 74  Temp 98.6 °F (37 °C) (Temporal Artery )   Resp 16  Ht 162.6 cm (64\")  Wt 53.3 kg (117 lb 8.1 oz)  BMI 20.17 kg/m2    GENERAL:  Not in any distress.    HEENT:  Normocephalic, Atraumatic.Mild Conjunctival pallor. No icterus. Extraocular Movements Intact. No Facial Asymmetry noted.    NECK:  No adenopathy. No JVD.    RESPIRATORY:  Fair air entry bilateral. No rhonchi or wheezing.    CARDIOVASCULAR:  S1, S2. Regular rate and rhythm. No murmur or gallop appreciated.    ABDOMEN:  Soft,  nontender. Bowel sounds present in all four quadrants.  No organomegaly appreciated.    EXTREMITIES:  No edema.No Calf Tenderness.    NEUROLOGIC:  Alert, awake and oriented ×3.  No  Motor or sensory deficit appreciated. Cranial Nerves 2-12 grossly intact.        DIAGNOSTIC DATA:    Glucose   Date Value Ref Range Status   11/30/2017 142 (H) 60 - 100 mg/dL Final     Sodium   Date Value Ref Range Status   11/30/2017 137 137 - 145 mmol/L Final     Potassium   Date Value Ref Range Status   11/30/2017 3.6 3.5 - 5.1 mmol/L Final     CO2   Date Value Ref Range Status   11/30/2017 27.0 22.0 - 31.0 mmol/L Final     Chloride   Date Value Ref " Range Status   11/30/2017 98 95 - 110 mmol/L Final     Anion Gap   Date Value Ref Range Status   11/30/2017 12.0 5.0 - 15.0 mmol/L Final     Creatinine   Date Value Ref Range Status   11/30/2017 0.95 0.50 - 1.00 mg/dL Final     BUN   Date Value Ref Range Status   11/30/2017 21 7 - 21 mg/dL Final     BUN/Creatinine Ratio   Date Value Ref Range Status   11/30/2017 22.1 7.0 - 25.0 Final     Calcium   Date Value Ref Range Status   11/30/2017 10.0 8.4 - 10.2 mg/dL Final     eGFR Non  Amer   Date Value Ref Range Status   11/30/2017 57 39 - 90 mL/min/1.73 Final     Alkaline Phosphatase   Date Value Ref Range Status   11/30/2017 68 38 - 126 U/L Final     Total Protein   Date Value Ref Range Status   11/30/2017 7.7 6.3 - 8.6 g/dL Final     ALT (SGPT)   Date Value Ref Range Status   11/30/2017 25 9 - 52 U/L Final     AST (SGOT)   Date Value Ref Range Status   11/30/2017 34 14 - 36 U/L Final     Total Bilirubin   Date Value Ref Range Status   11/30/2017 0.2 0.2 - 1.3 mg/dL Final     Albumin   Date Value Ref Range Status   11/30/2017 5.00 (H) 3.40 - 4.80 g/dL Final     Globulin   Date Value Ref Range Status   11/30/2017 2.7 2.3 - 3.5 gm/dL Final     A/G Ratio   Date Value Ref Range Status   11/30/2017 1.9 (H) 1.1 - 1.8 g/dL Final     Lab Results   Component Value Date    WBC 5.01 11/30/2017    HGB 12.0 11/30/2017    HCT 38.2 11/30/2017    MCV 95.3 11/30/2017     11/30/2017     Lab Results   Component Value Date    NEUTROABS 2.99 11/30/2017    IRON 52 11/30/2017    TIBC 372 11/30/2017    LABIRON 14 (L) 11/30/2017    FERRITIN 160.00 12/14/2017    [id number] 883 10/17/2017    FOLATE >20.00 10/17/2017     Lab Results   Component Value Date    CEA 1.70 04/19/2017    REFLABREPO SEE NOTE: 12/22/2015             RADIOLOGY DATA :  Ct of abdomen and Pelvis with contrast done on November 30, 2017 showed:  ABDOMEN:     - LIVER:    normal size / contour, no ductal dilatation , no  focal lesion   - GB:      grossly negative     - CBD:      grossly negative   - SPLEEN:    normal size and contour   - PANCREAS:    normal in size, contour, no focal mass    - VISCERA:    normal caliber, no wall thickening     - MESENTERY:  no mesenteric mass   - CAVITY:    no free abdominal fluid, no free intraperitoneal  air   - BODY WALL:  wnl   - OSSEOUS:  Mild leftward deviating lumbar scoliosis, unchanged.  - MISC:                                       RETROPERITONEUM:   - KIDNEYS:    normal size / contour, no collecting system  dilation        no evidence of an enhancing mass   - URETERS:    normal course, caliber   - ADRENALS:    normal size, contour   - MISC:      no sig retroperitoneal adenopathy or mass   - VASCULAR:    aorta / iliacs: wnl for age      - - - CT PELVIS - - -       - VISCERA:    normal caliber small/large bowel, no focal  thickening/mass - MESENTERY:  no mass   - VASCULAR:    wnl for age   - CAVITY:    no free fluid / air   - BLADDER:    unremarkable   - OSSEOUS:    wnl    - MISC:   - UTERUS/OVARY:  Status post hysterectomy   .     IMPRESSION:   CONCLUSION:  1. Stable examination.              ASSESSMENT AND PLAN:      1.  Recurrent iron deficiency: Positive recurrence of iron deficiency since October 2016.  Patient had a side effect with Feraheme so was started on Venofer.  Last dose of Venofer was given on October 27 , 2017.  Anemia workup done on November 30, 2017 shows her iron saturation is improved to 14% iron level is normalized to 52.  Ferritin is still pending.  Hemoglobin is improved to 12.  In view of recurrent iron deficiency we will refer patient back to Dr. Gutierrez to get evaluated again.  Patient had extensive workup in June 2017 include capsule endoscopy which did not reveal an etiology.  But patient develops recurrent iron deficiency with anemia which is concerning.  If she drops her hemoglobin again upon next clinic visit in 2 months we'll also do bone marrow biopsy and GI workup is negative.  This recommendation were  discussed with patient and her .    2.  Colon cancer, stage III, diagnosed in October 2015.  Patient had a hemicolectomy followed by chemotherapy.  Patient had a very complicated course with chemotherapy.  Initially patient was started on Xelox but Xeloda gave her skin rash.  Subsequently patient was changed over to FOLFOX for which she took 3 cycles and had a significant neuropathy.  Subsequently patient was changed to 5-FU and leucovorin which patient could not tolerate either subsequently after 8 cycles of chemotherapy in total chemotherapy was discontinued in July 2016.  Last colonoscopy done in November 2016 did not show any evidence of recurrence.  CT scan done in November 2017 does not show any evidence of recurrence.  Her next surveillance CT scan will be due in November 2018.    3.  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    4.  History of dementia    5.  Health maintenance: Patient does not smoke.  Had a colonoscopy done in November 2016.  She remains full code.             Duong Hanley MD  12/14/2017  9:14 AM

## 2017-12-15 ENCOUNTER — TELEPHONE (OUTPATIENT)
Dept: NUTRITION | Facility: HOSPITAL | Age: 77
End: 2017-12-15

## 2017-12-15 NOTE — PROGRESS NOTES
Adult Outpatient Nutrition  Assessment    Patient Name:  Mary Gutierrez  YOB: 1940  MRN: [id number]    Assessment Date:  12/15/2017    Comments: Received voice mail from  requesting additional [employer] discount coupons. Mailed coupons today.                        Electronically signed by:  Love Black RD  12/15/17 2:14 PM

## 2018-01-23 ENCOUNTER — ANESTHESIA (OUTPATIENT)
Dept: GASTROENTEROLOGY | Facility: HOSPITAL | Age: 78
End: 2018-01-23

## 2018-01-23 ENCOUNTER — ANESTHESIA EVENT (OUTPATIENT)
Dept: GASTROENTEROLOGY | Facility: HOSPITAL | Age: 78
End: 2018-01-23

## 2018-01-23 ENCOUNTER — HOSPITAL ENCOUNTER (OUTPATIENT)
Facility: HOSPITAL | Age: 78
Setting detail: HOSPITAL OUTPATIENT SURGERY
Discharge: HOME | End: 2018-01-23
Attending: INTERNAL MEDICINE | Admitting: INTERNAL MEDICINE

## 2018-01-23 ENCOUNTER — APPOINTMENT (OUTPATIENT)
Dept: GENERAL RADIOLOGY | Facility: HOSPITAL | Age: 78
End: 2018-01-23

## 2018-01-23 VITALS
SYSTOLIC BLOOD PRESSURE: 126 MMHG | RESPIRATION RATE: 18 BRPM | DIASTOLIC BLOOD PRESSURE: 74 MMHG | HEART RATE: 76 BPM | BODY MASS INDEX: 19.84 KG/M2 | HEIGHT: 64 IN | OXYGEN SATURATION: 99 % | WEIGHT: 116.18 LBS | TEMPERATURE: 97.3 F

## 2018-01-23 DIAGNOSIS — R19.5 ABNORMAL STOOLS: ICD-10-CM

## 2018-01-23 DIAGNOSIS — D64.9 ANEMIA: ICD-10-CM

## 2018-01-23 DIAGNOSIS — Z85.038 HISTORY OF COLON CANCER: ICD-10-CM

## 2018-01-23 LAB — GLUCOSE BLDC GLUCOMTR-MCNC: 160 MG/DL (ref 70–130)

## 2018-01-23 PROCEDURE — C1726 CATH, BAL DIL, NON-VASCULAR: HCPCS | Performed by: INTERNAL MEDICINE

## 2018-01-23 PROCEDURE — 74018 RADEX ABDOMEN 1 VIEW: CPT

## 2018-01-23 PROCEDURE — 82962 GLUCOSE BLOOD TEST: CPT

## 2018-01-23 PROCEDURE — 88305 TISSUE EXAM BY PATHOLOGIST: CPT | Performed by: INTERNAL MEDICINE

## 2018-01-23 PROCEDURE — 25010000002 HEPARIN FLUSH (PORCINE) 100 UNIT/ML SOLUTION: Performed by: INTERNAL MEDICINE

## 2018-01-23 PROCEDURE — 88305 TISSUE EXAM BY PATHOLOGIST: CPT | Performed by: PATHOLOGY

## 2018-01-23 PROCEDURE — 25010000002 PROPOFOL 10 MG/ML EMULSION: Performed by: NURSE ANESTHETIST, CERTIFIED REGISTERED

## 2018-01-23 PROCEDURE — 91110 GI TRC IMG INTRAL ESOPH-ILE: CPT | Performed by: INTERNAL MEDICINE

## 2018-01-23 RX ORDER — 0.9 % SODIUM CHLORIDE 0.9 %
10 VIAL (ML) INJECTION ONCE
Status: COMPLETED | OUTPATIENT
Start: 2018-01-23 | End: 2018-01-23

## 2018-01-23 RX ORDER — LIDOCAINE HYDROCHLORIDE 10 MG/ML
INJECTION, SOLUTION INFILTRATION; PERINEURAL AS NEEDED
Status: DISCONTINUED | OUTPATIENT
Start: 2018-01-23 | End: 2018-01-23 | Stop reason: SURG

## 2018-01-23 RX ORDER — PROMETHAZINE HYDROCHLORIDE 25 MG/ML
12.5 INJECTION, SOLUTION INTRAMUSCULAR; INTRAVENOUS ONCE AS NEEDED
Status: DISCONTINUED | OUTPATIENT
Start: 2018-01-23 | End: 2018-01-23 | Stop reason: HOSPADM

## 2018-01-23 RX ORDER — ONDANSETRON 2 MG/ML
4 INJECTION INTRAMUSCULAR; INTRAVENOUS ONCE AS NEEDED
Status: DISCONTINUED | OUTPATIENT
Start: 2018-01-23 | End: 2018-01-23 | Stop reason: HOSPADM

## 2018-01-23 RX ORDER — PROPOFOL 10 MG/ML
VIAL (ML) INTRAVENOUS AS NEEDED
Status: DISCONTINUED | OUTPATIENT
Start: 2018-01-23 | End: 2018-01-23 | Stop reason: SURG

## 2018-01-23 RX ORDER — PROMETHAZINE HYDROCHLORIDE 25 MG/1
25 TABLET ORAL ONCE AS NEEDED
Status: DISCONTINUED | OUTPATIENT
Start: 2018-01-23 | End: 2018-01-23 | Stop reason: HOSPADM

## 2018-01-23 RX ORDER — DEXTROSE AND SODIUM CHLORIDE 5; .45 G/100ML; G/100ML
20 INJECTION, SOLUTION INTRAVENOUS CONTINUOUS
Status: DISCONTINUED | OUTPATIENT
Start: 2018-01-23 | End: 2018-01-23 | Stop reason: HOSPADM

## 2018-01-23 RX ORDER — PROMETHAZINE HYDROCHLORIDE 25 MG/1
25 SUPPOSITORY RECTAL ONCE AS NEEDED
Status: DISCONTINUED | OUTPATIENT
Start: 2018-01-23 | End: 2018-01-23 | Stop reason: HOSPADM

## 2018-01-23 RX ORDER — DEXAMETHASONE SODIUM PHOSPHATE 4 MG/ML
8 INJECTION, SOLUTION INTRA-ARTICULAR; INTRALESIONAL; INTRAMUSCULAR; INTRAVENOUS; SOFT TISSUE ONCE AS NEEDED
Status: DISCONTINUED | OUTPATIENT
Start: 2018-01-23 | End: 2018-01-23 | Stop reason: HOSPADM

## 2018-01-23 RX ADMIN — PROPOFOL 40 MG: 10 INJECTION, EMULSION INTRAVENOUS at 08:38

## 2018-01-23 RX ADMIN — PROPOFOL 20 MG: 10 INJECTION, EMULSION INTRAVENOUS at 08:22

## 2018-01-23 RX ADMIN — PROPOFOL 20 MG: 10 INJECTION, EMULSION INTRAVENOUS at 08:16

## 2018-01-23 RX ADMIN — PROPOFOL 40 MG: 10 INJECTION, EMULSION INTRAVENOUS at 08:42

## 2018-01-23 RX ADMIN — PROPOFOL 20 MG: 10 INJECTION, EMULSION INTRAVENOUS at 08:10

## 2018-01-23 RX ADMIN — PROPOFOL 60 MG: 10 INJECTION, EMULSION INTRAVENOUS at 08:19

## 2018-01-23 RX ADMIN — LIDOCAINE HYDROCHLORIDE 60 MG: 10 INJECTION, SOLUTION INFILTRATION; PERINEURAL at 08:06

## 2018-01-23 RX ADMIN — PROPOFOL 40 MG: 10 INJECTION, EMULSION INTRAVENOUS at 09:04

## 2018-01-23 RX ADMIN — PROPOFOL 20 MG: 10 INJECTION, EMULSION INTRAVENOUS at 08:14

## 2018-01-23 RX ADMIN — PROPOFOL 60 MG: 10 INJECTION, EMULSION INTRAVENOUS at 08:06

## 2018-01-23 RX ADMIN — SODIUM CHLORIDE, PRESERVATIVE FREE 500 UNITS: 5 INJECTION INTRAVENOUS at 10:55

## 2018-01-23 RX ADMIN — PROPOFOL 40 MG: 10 INJECTION, EMULSION INTRAVENOUS at 08:34

## 2018-01-23 RX ADMIN — PROPOFOL 40 MG: 10 INJECTION, EMULSION INTRAVENOUS at 08:25

## 2018-01-23 RX ADMIN — PROPOFOL 40 MG: 10 INJECTION, EMULSION INTRAVENOUS at 08:46

## 2018-01-23 RX ADMIN — DEXTROSE AND SODIUM CHLORIDE: 5; 450 INJECTION, SOLUTION INTRAVENOUS at 07:48

## 2018-01-23 RX ADMIN — SODIUM CHLORIDE 10 ML: 9 INJECTION, SOLUTION INTRAMUSCULAR; INTRAVENOUS; SUBCUTANEOUS at 10:55

## 2018-01-23 RX ADMIN — DEXTROSE AND SODIUM CHLORIDE 20 ML/HR: 5; 450 INJECTION, SOLUTION INTRAVENOUS at 07:37

## 2018-01-23 RX ADMIN — DEXTROSE AND SODIUM CHLORIDE 20 ML/HR: 5; 450 INJECTION, SOLUTION INTRAVENOUS at 09:35

## 2018-01-23 NOTE — ANESTHESIA PREPROCEDURE EVALUATION
Anesthesia Evaluation     Patient summary reviewed and Nursing notes reviewed     NPO Liquid Status: > 8 hours     Airway   Mallampati: II  TM distance: >3 FB  Neck ROM: full  no difficulty expected  Dental - normal exam     Pulmonary - normal exam   Cardiovascular - normal exam    (+) hypertension well controlled,       Neuro/Psych  GI/Hepatic/Renal/Endo    (+)  GERD well controlled, diabetes mellitus,     Musculoskeletal     Abdominal  - normal exam   Substance History      OB/GYN          Other      history of cancer remission      Other Comment: Anemia secondary to colon cancer                                        Anesthesia Plan    ASA 3     MAC     Anesthetic plan and risks discussed with patient.

## 2018-01-23 NOTE — PLAN OF CARE
Problem: Patient Care Overview (Adult)  Goal: Plan of Care Review  Outcome: Ongoing (interventions implemented as appropriate)   01/23/18 0911   Coping/Psychosocial Response Interventions   Plan Of Care Reviewed With patient   Patient Care Overview   Progress no change       Problem: GI Endoscopy (Adult)  Goal: Signs and Symptoms of Listed Potential Problems Will be Absent or Manageable (GI Endoscopy)  Outcome: Ongoing (interventions implemented as appropriate)   01/23/18 0911   GI Endoscopy   Problems Assessed (GI Endoscopy) all   Problems Present (GI Endoscopy) none

## 2018-01-23 NOTE — H&P
Quan Montalvo DO,[facility]  Gastroenterology  Hepatology  Endoscopy  Board Certified in Internal Medicine and gastroenterology  [address], [address]  [place]. [address]  - [phone]   F - [phone]     GASTROENTEROLOGY HISTORY AND PHYSICAL  NOTE   QUAN MONTALVO DO.         SUBJECTIVE:   1/23/2018    Name: Mary Gutierrez  DOD: 1940        Chief Complaint:       Subjective : Anemia.  Incomplete small bowel capsule.  This is being done to place a small bowel capsule into the small intestines by endoscopy     Patient is 77 y.o. female presents with desire for elective EGD with small bowel capsule.      ROS/HISTORY/ CURRENT MEDICATIONS/OBJECTIVE/VS/PE:   Review of Systems:   Review of Systems    History:     Past Medical History:   Diagnosis Date   • Acid reflux    • Diabetes mellitus    • Hypertension    • Malignant neoplasm of ascending colon 11/1/2016   • Malignant tumor of cecum      Past Surgical History:   Procedure Laterality Date   • CAPSULE ENDOSCOPY N/A 6/22/2017    Procedure: CAPSULE ENDOSCOPY M2A;  Surgeon: Quan Montalvo DO;  Location: [facility] ENDOSCOPY;  Service:    • CENTRAL VENOUS LINE INSERTION  10/30/2015    Ultrasound localization, left basilic vein.Placement of left upper extremity PICC line   • ENDOSCOPY N/A 6/22/2017    Procedure: ESOPHAGOGASTRODUODENOSCOPY;  Surgeon: Quan Montalvo DO;  Location: [facility] ENDOSCOPY;  Service:    • FRACTURE SURGERY      right femur    • HYSTERECTOMY     • LAPAROSCOPIC ASSISSTED TOTAL COLECTOMY W/ J-POUCH  10/15/2015    Laparoscopic right hemicolectomy with ileotransverse colostomy   • VENOUS ACCESS DEVICE (PORT) INSERTION  01/12/2016    Right internal jugular Mediport     Family History   Problem Relation Age of Onset   • Family history unknown: Yes     Social History   Substance Use Topics   • Smoking status: Never Smoker   • Smokeless tobacco: None   • Alcohol use No     Prescriptions Prior to Admission   Medication  Sig Dispense Refill Last Dose   • Dorzolamide HCl-Timolol Mal PF 22.3-6.8 MG/ML solution Apply 1 drop to eye 2 (Two) Times a Day.   1/22/2018 at Unknown time   • FENOFIBRATE PO Take 1 tablet by mouth Daily.   1/22/2018 at Unknown time   • fluorouracil (EFUDEX) 5 % cream Apply 1 application topically As Needed.   1/22/2018 at Unknown time   • FREESTYLE LITE test strip USE TO TEST BLOOD SUGAR ONCE DAILY AS DIRECTED  2 1/22/2018 at Unknown time   • lisinopril (PRINIVIL,ZESTRIL) 20 MG tablet Take 20 mg by mouth Daily.   1/22/2018 at Unknown time   • metFORMIN (GLUCOPHAGE) 1000 MG tablet Take 1,000 mg by mouth 2 (Two) Times a Day With Meals.   1/22/2018 at Unknown time   • omega-3 acid ethyl esters (LOVAZA) 1 G capsule Take 2 g by mouth Daily.   1/22/2018 at Unknown time   • Omeprazole (PRILOSEC PO) Take 1 tablet by mouth As Needed.   1/22/2018 at Unknown time   • simvastatin (ZOCOR) 20 MG tablet Take 20 mg by mouth Every Night.   1/22/2018 at Unknown time   • vitamin B-12 (CYANOCOBALAMIN) 1000 MCG tablet Take 1,000 mcg by mouth Daily.   1/22/2018 at Unknown time     Allergies:  Other    I have reviewed the patients medical history, surgical history and family history in the available medical record system.     Current Medications:     Current Facility-Administered Medications   Medication Dose Route Frequency Provider Last Rate Last Dose   • dexamethasone sodium phosphate 8 mg in sodium chloride 0.9 % 50 mL IVPB  8 mg Intravenous Once PRN Roseline Salazar CRNA       • dextrose 5 % and sodium chloride 0.45 % infusion  20 mL/hr Intravenous Continuous Stuart Rico DO 20 mL/hr at 01/23/18 0737 20 mL/hr at 01/23/18 0737   • heparin flush (porcine) 100 UNIT/ML injection 500 Units  500 Units Intracatheter Once Stuart Rico DO       • meperidine (DEMEROL) injection 12.5 mg  12.5 mg Intravenous Q5 Min PRN Roseline Salazar CRNA       • ondansetron (ZOFRAN) injection 4 mg  4 mg Intravenous Once PRN Roseline Fernandez  RODOLFO Salazar       • promethazine (PHENERGAN) injection 12.5 mg  12.5 mg Intravenous Once PRN Roseline Salazar CRNA        Or   • promethazine (PHENERGAN) injection 12.5 mg  12.5 mg Intramuscular Once PRN Roseline Salazar CRNA        Or   • promethazine (PHENERGAN) suppository 25 mg  25 mg Rectal Once PRN Roseline Salazar CRNA        Or   • promethazine (PHENERGAN) tablet 25 mg  25 mg Oral Once PRN Roseline Salazar CRNA       • sodium chloride 0.9 % flush 10 mL  10 mL Intravenous Once Stuart Rico DO           Objective     Physical Exam:   Temp:  [97.5 °F (36.4 °C)] 97.5 °F (36.4 °C)  Heart Rate:  [84] 84  Resp:  [18] 18  BP: (185)/(96) 185/96    Physical Exam:  General Appearance:    Alert, cooperative, in no acute distress   Head:    Normocephalic, without obvious abnormality, atraumatic   Eyes:            Lids and lashes normal, conjunctivae and sclerae normal, no   icterus, no pallor, corneas clear, PERRLA   Ears:    Ears appear intact with no abnormalities noted   Throat:   No oral lesions, no thrush, oral mucosa moist   Neck:   No adenopathy, supple, trachea midline, no thyromegaly, no     carotid bruit, no JVD   Back:     No kyphosis present, no scoliosis present, no skin lesions,       erythema or scars, no tenderness to percussion or                   palpation,   range of motion normal   Lungs:     Clear to auscultation,respirations regular, even and                   unlabored    Heart:    Regular rhythm and normal rate, normal S1 and S2, no            murmur, no gallop, no rub, no click   Breast Exam:    Deferred   Abdomen:     Normal bowel sounds, no masses, no organomegaly, soft        non-tender, non-distended, no guarding, no rebound                 tenderness   Genitalia:    Deferred   Extremities:   Moves all extremities well, no edema, no cyanosis, no              redness   Pulses:   Pulses palpable and equal bilaterally   Skin:   No bleeding, bruising or rash   Lymph nodes:    No palpable adenopathy   Neurologic:   Cranial nerves 2 - 12 grossly intact, sensation intact, DTR        present and equal bilaterally      Results Review:     Lab Results   Component Value Date    WBC 5.01 11/30/2017    WBC 5.20 10/17/2017    WBC 4.46 08/25/2017    HGB 12.0 11/30/2017    HGB 10.1 (L) 10/17/2017    HGB 9.0 (L) 08/25/2017    HCT 38.2 11/30/2017    HCT 31.4 (L) 10/17/2017    HCT 27.7 (L) 08/25/2017     11/30/2017     10/17/2017     08/25/2017             No results found for: LIPASE  Lab Results   Component Value Date    INR 0.9 10/12/2015          Radiology Review:  Imaging Results (last 72 hours)     ** No results found for the last 72 hours. **           I reviewed the patient's new clinical results.  I reviewed the patient's new imaging results and agree with the interpretation.     ASSESSMENT/PLAN:   ASSESSMENT:   1.  Anemia of chronic blood loss    PLAN:   1.  Esophagogastroduodenoscopy with placement of small bowel capsule    Risk and benefits associated with the procedure are reviewed with the patient.  The patient wishes to proceed      Stuart Rico DO  01/23/18  7:56 AM

## 2018-01-23 NOTE — PLAN OF CARE
Problem: Patient Care Overview (Adult)  Goal: Plan of Care Review  Outcome: Outcome(s) achieved Date Met: 01/23/18 01/23/18 1057   Coping/Psychosocial Response Interventions   Plan Of Care Reviewed With patient   Patient Care Overview   Progress no change   Outcome Evaluation   Outcome Summary/Follow up Plan ready for d/c       Problem: GI Endoscopy (Adult)  Goal: Signs and Symptoms of Listed Potential Problems Will be Absent or Manageable (GI Endoscopy)  Outcome: Outcome(s) achieved Date Met: 01/23/18 01/23/18 1057   GI Endoscopy   Problems Assessed (GI Endoscopy) all   Problems Present (GI Endoscopy) none

## 2018-01-23 NOTE — ANESTHESIA POSTPROCEDURE EVALUATION
Patient: Mary Gutierrez    Procedure Summary     Date Anesthesia Start Anesthesia Stop Room / Location    01/23/18 0803 0912 [facility] ENDOSCOPY 2 / [facility] ENDOSCOPY       Procedure Diagnosis Surgeon Provider    ESOPHAGOGASTRODUODENOSCOPY (N/A Esophagus); CAPSULE ENDOSCOPY M2A (N/A ); COLONOSCOPY (N/A ) Anemia; Abnormal stools; History of colon cancer  (Anemia [D64.9]; Abnormal stools [R19.5]; History of colon cancer [Z85.038]) DO Roseline Alvarado CRNA          Anesthesia Type: MAC  Last vitals  BP   (!) 185/96 (01/23/18 0722)   Temp   97.5 °F (36.4 °C) (01/23/18 0722)   Pulse   84 (01/23/18 0722)   Resp   18 (01/23/18 0722)     SpO2   100 % (01/23/18 0722)     Post Anesthesia Care and Evaluation    Patient location during evaluation: bedside  Patient participation: complete - patient participated  Level of consciousness: sleepy but conscious  Pain score: 0  Pain management: adequate  Airway patency: patent  Anesthetic complications: No anesthetic complications  PONV Status: none  Cardiovascular status: acceptable  Respiratory status: acceptable  Hydration status: acceptable

## 2018-01-26 ENCOUNTER — HOSPITAL ENCOUNTER (OUTPATIENT)
Dept: GENERAL RADIOLOGY | Facility: HOSPITAL | Age: 78
Discharge: HOME | End: 2018-01-26
Admitting: INTERNAL MEDICINE

## 2018-01-26 ENCOUNTER — HOSPITAL ENCOUNTER (OUTPATIENT)
Dept: GENERAL RADIOLOGY | Facility: HOSPITAL | Age: 78
Discharge: HOME | End: 2018-01-26

## 2018-01-26 ENCOUNTER — TELEPHONE (OUTPATIENT)
Dept: GASTROENTEROLOGY | Facility: HOSPITAL | Age: 78
End: 2018-01-26

## 2018-01-26 DIAGNOSIS — K52.9: ICD-10-CM

## 2018-01-26 DIAGNOSIS — T18.3XXA: ICD-10-CM

## 2018-01-26 PROCEDURE — 74249: CPT

## 2018-01-26 RX ADMIN — BARIUM SULFATE 183 ML: 960 POWDER, FOR SUSPENSION ORAL at 09:30

## 2018-02-12 ENCOUNTER — INFUSION (OUTPATIENT)
Dept: ONCOLOGY | Facility: HOSPITAL | Age: 78
End: 2018-02-12

## 2018-02-12 DIAGNOSIS — D50.0 IRON DEFICIENCY ANEMIA DUE TO CHRONIC BLOOD LOSS: ICD-10-CM

## 2018-02-12 DIAGNOSIS — C18.2 MALIGNANT NEOPLASM OF ASCENDING COLON (HCC): ICD-10-CM

## 2018-02-12 DIAGNOSIS — Z45.2 ENCOUNTER FOR CARE RELATED TO PORT-A-CATH: Primary | ICD-10-CM

## 2018-02-12 LAB
ALBUMIN SERPL-MCNC: 4.2 G/DL (ref 3.4–4.8)
ALBUMIN/GLOB SERPL: 1.6 G/DL (ref 1.1–1.8)
ALP SERPL-CCNC: 46 U/L (ref 38–126)
ALT SERPL W P-5'-P-CCNC: 27 U/L (ref 9–52)
ANION GAP SERPL CALCULATED.3IONS-SCNC: 12 MMOL/L (ref 5–15)
AST SERPL-CCNC: 33 U/L (ref 14–36)
BASOPHILS # BLD AUTO: 0.03 10*3/MM3 (ref 0–0.2)
BASOPHILS NFR BLD AUTO: 0.7 % (ref 0–2)
BILIRUB SERPL-MCNC: 0.2 MG/DL (ref 0.2–1.3)
BUN BLD-MCNC: 22 MG/DL (ref 7–21)
BUN/CREAT SERPL: 23.9 (ref 7–25)
CALCIUM SPEC-SCNC: 9.6 MG/DL (ref 8.4–10.2)
CHLORIDE SERPL-SCNC: 102 MMOL/L (ref 95–110)
CO2 SERPL-SCNC: 24 MMOL/L (ref 22–31)
CREAT BLD-MCNC: 0.92 MG/DL (ref 0.5–1)
DEPRECATED RDW RBC AUTO: 45 FL (ref 36.4–46.3)
EOSINOPHIL # BLD AUTO: 0.08 10*3/MM3 (ref 0–0.7)
EOSINOPHIL NFR BLD AUTO: 1.8 % (ref 0–7)
ERYTHROCYTE [DISTWIDTH] IN BLOOD BY AUTOMATED COUNT: 13.7 % (ref 11.5–14.5)
FERRITIN SERPL-MCNC: 41.9 NG/ML (ref 11.1–264)
FOLATE SERPL-MCNC: >20 NG/ML (ref 2.76–21)
GFR SERPL CREATININE-BSD FRML MDRD: 59 ML/MIN/1.73 (ref 39–90)
GLOBULIN UR ELPH-MCNC: 2.6 GM/DL (ref 2.3–3.5)
GLUCOSE BLD-MCNC: 189 MG/DL (ref 60–100)
HCT VFR BLD AUTO: 35.5 % (ref 35–45)
HCT VFR BLD AUTO: 35.5 % (ref 35–45)
HGB BLD-MCNC: 11.3 G/DL (ref 12–15.5)
HGB RETIC QN: 29.8 PG (ref 30–38)
IMM GRANULOCYTES # BLD: 0.01 10*3/MM3 (ref 0–0.02)
IMM GRANULOCYTES NFR BLD: 0.2 % (ref 0–0.5)
IMM RETICS NFR: 11.1 % (ref 3–15.9)
IRON 24H UR-MRATE: 42 MCG/DL (ref 37–170)
IRON SATN MFR SERPL: 10 % (ref 15–50)
LYMPHOCYTES # BLD AUTO: 1.18 10*3/MM3 (ref 0.6–4.2)
LYMPHOCYTES NFR BLD AUTO: 26.7 % (ref 10–50)
MCH RBC QN AUTO: 28.3 PG (ref 26.5–34)
MCHC RBC AUTO-ENTMCNC: 31.8 G/DL (ref 31.4–36)
MCV RBC AUTO: 88.8 FL (ref 80–98)
MONOCYTES # BLD AUTO: 0.36 10*3/MM3 (ref 0–0.9)
MONOCYTES NFR BLD AUTO: 8.1 % (ref 0–12)
NEUTROPHILS # BLD AUTO: 2.76 10*3/MM3 (ref 2–8.6)
NEUTROPHILS NFR BLD AUTO: 62.5 % (ref 37–80)
PLATELET # BLD AUTO: 358 10*3/MM3 (ref 150–450)
PMV BLD AUTO: 9.7 FL (ref 8–12)
POTASSIUM BLD-SCNC: 3.9 MMOL/L (ref 3.5–5.1)
PROT SERPL-MCNC: 6.8 G/DL (ref 6.3–8.6)
RBC # BLD AUTO: 4 10*6/MM3 (ref 3.77–5.16)
RETICS #: 0.08 10*6/MM3 (ref 0.03–0.12)
RETICS/RBC NFR AUTO: 1.92 % (ref 0.63–2.13)
RETICULOCYTE PRODUCTION INDEX: 1.03 % (ref 0.63–2.13)
SODIUM BLD-SCNC: 138 MMOL/L (ref 137–145)
TIBC SERPL-MCNC: 405 MCG/DL (ref 265–497)
VIT B12 BLD-MCNC: >1000 PG/ML (ref 239–931)
WBC NRBC COR # BLD: 4.42 10*3/MM3 (ref 3.2–9.8)

## 2018-02-12 PROCEDURE — 82728 ASSAY OF FERRITIN: CPT

## 2018-02-12 PROCEDURE — 82607 VITAMIN B-12: CPT

## 2018-02-12 PROCEDURE — 85025 COMPLETE CBC W/AUTO DIFF WBC: CPT

## 2018-02-12 PROCEDURE — 83540 ASSAY OF IRON: CPT

## 2018-02-12 PROCEDURE — 85046 RETICYTE/HGB CONCENTRATE: CPT

## 2018-02-12 PROCEDURE — 80053 COMPREHEN METABOLIC PANEL: CPT

## 2018-02-12 PROCEDURE — 83550 IRON BINDING TEST: CPT

## 2018-02-12 PROCEDURE — 25010000002 HEPARIN FLUSH (PORCINE) 100 UNIT/ML SOLUTION: Performed by: INTERNAL MEDICINE

## 2018-02-12 PROCEDURE — 36591 DRAW BLOOD OFF VENOUS DEVICE: CPT | Performed by: INTERNAL MEDICINE

## 2018-02-12 PROCEDURE — 82746 ASSAY OF FOLIC ACID SERUM: CPT

## 2018-02-12 RX ORDER — SODIUM CHLORIDE 0.9 % (FLUSH) 0.9 %
10 SYRINGE (ML) INJECTION AS NEEDED
Status: DISCONTINUED | OUTPATIENT
Start: 2018-02-12 | End: 2018-02-12 | Stop reason: HOSPADM

## 2018-02-12 RX ORDER — SODIUM CHLORIDE 0.9 % (FLUSH) 0.9 %
10 SYRINGE (ML) INJECTION AS NEEDED
Status: CANCELLED | OUTPATIENT
Start: 2018-02-19

## 2018-02-12 RX ADMIN — SODIUM CHLORIDE, PRESERVATIVE FREE 500 UNITS: 5 INJECTION INTRAVENOUS at 08:06

## 2018-02-12 RX ADMIN — Medication 10 ML: at 08:06

## 2018-02-14 ENCOUNTER — OFFICE VISIT (OUTPATIENT)
Dept: ONCOLOGY | Facility: CLINIC | Age: 78
End: 2018-02-14

## 2018-02-14 VITALS
DIASTOLIC BLOOD PRESSURE: 80 MMHG | SYSTOLIC BLOOD PRESSURE: 166 MMHG | TEMPERATURE: 98.5 F | RESPIRATION RATE: 16 BRPM | WEIGHT: 115.96 LBS | BODY MASS INDEX: 19.8 KG/M2 | HEIGHT: 64 IN | HEART RATE: 83 BPM

## 2018-02-14 DIAGNOSIS — K90.9 MALABSORPTION OF IRON: Primary | ICD-10-CM

## 2018-02-14 DIAGNOSIS — C18.2 MALIGNANT NEOPLASM OF ASCENDING COLON (HCC): ICD-10-CM

## 2018-02-14 DIAGNOSIS — D50.0 IRON DEFICIENCY ANEMIA DUE TO CHRONIC BLOOD LOSS: ICD-10-CM

## 2018-02-14 PROCEDURE — G0463 HOSPITAL OUTPT CLINIC VISIT: HCPCS | Performed by: INTERNAL MEDICINE

## 2018-02-14 PROCEDURE — 99214 OFFICE O/P EST MOD 30 MIN: CPT | Performed by: INTERNAL MEDICINE

## 2018-02-14 RX ORDER — FAMOTIDINE 20 MG/1
20 TABLET, FILM COATED ORAL ONCE
Status: CANCELLED | OUTPATIENT
Start: 2018-02-19 | End: 2018-02-19

## 2018-02-14 RX ORDER — SODIUM CHLORIDE 9 MG/ML
250 INJECTION, SOLUTION INTRAVENOUS ONCE
Status: CANCELLED | OUTPATIENT
Start: 2018-02-19 | End: 2018-02-19

## 2018-02-14 RX ORDER — ACETAMINOPHEN 325 MG/1
650 TABLET ORAL ONCE
Status: CANCELLED | OUTPATIENT
Start: 2018-02-19 | End: 2018-02-19

## 2018-02-14 RX ORDER — DIPHENHYDRAMINE HCL 25 MG
25 CAPSULE ORAL ONCE
Status: CANCELLED | OUTPATIENT
Start: 2018-02-19 | End: 2018-02-19

## 2018-02-14 NOTE — PROGRESS NOTES
DATE OF VISIT: 2/14/2018    REASON FOR VISIT:  History of colon cancer and recurrent iron deficiency anemia    HISTORY OF PRESENT ILLNESS:    77-year-old female with a past medical history significant for stage III colon cancer, status post surgery followed by chemotherapy last of which was in July 2016.  In view of recurrent iron deficiency since October 2016 patient has required intravenous venofer.  His last round of intravenous venofer was in 10/27/17.  Patient is here for follow-up visit today.  EGD, colonoscopy and capsule endoscopy done by Dr. Gutierrez on January 23, 2018.  Patient is here for follow-up visit today.  Denies any excessive fatigue.   Denies any blood in the stool or urine.        PAST MEDICAL HISTORY:    Past Medical History:   Diagnosis Date   • Acid reflux    • Diabetes mellitus    • Hypertension    • Malignant neoplasm of ascending colon 11/1/2016   • Malignant tumor of cecum        SOCIAL HISTORY:    Social History   Substance Use Topics   • Smoking status: Never Smoker   • Smokeless tobacco: None   • Alcohol use No       Surgical History :  Past Surgical History:   Procedure Laterality Date   • CAPSULE ENDOSCOPY N/A 6/22/2017    Procedure: CAPSULE ENDOSCOPY M2A;  Surgeon: Stuart Rico DO;  Location: [facility];  Service:    • CAPSULE ENDOSCOPY N/A 1/23/2018    Procedure: CAPSULE ENDOSCOPY M2A;  Surgeon: Stuart Rico DO;  Location: [facility] ENDOSCOPY;  Service:    • CENTRAL VENOUS LINE INSERTION  10/30/2015    Ultrasound localization, left basilic vein.Placement of left upper extremity PICC line   • COLONOSCOPY N/A 1/23/2018    Procedure: COLONOSCOPY;  Surgeon: Stuart Rico DO;  Location: [facility] ENDOSCOPY;  Service:    • ENDOSCOPY N/A 6/22/2017    Procedure: ESOPHAGOGASTRODUODENOSCOPY;  Surgeon: Stuart Rico DO;  Location: [facility] ENDOSCOPY;  Service:    • ENDOSCOPY N/A 1/23/2018    Procedure: ESOPHAGOGASTRODUODENOSCOPY;  Surgeon: Stuart Rico DO;  Location:   "[facility] ENDOSCOPY;  Service:    • FRACTURE SURGERY      right femur    • HYSTERECTOMY     • LAPAROSCOPIC ASSISSTED TOTAL COLECTOMY W/ J-POUCH  10/15/2015    Laparoscopic right hemicolectomy with ileotransverse colostomy   • VENOUS ACCESS DEVICE (PORT) INSERTION  01/12/2016    Right internal jugular Mediport       ALLERGIES:    Allergies   Allergen Reactions   • Other Rash     Oral Chemo Meds       REVIEW OF SYSTEMS:      CONSTITUTIONAL: Denies any excessive fatigue.  No fever, chills, or night sweats.     HEENT:  No epistaxis, mouth sores, or difficulty swallowing.    RESPIRATORY:  No new shortness of breath or cough at present.    CARDIOVASCULAR:  No chest pain or palpitations.    GASTROINTESTINAL:   No abdominal pain, nausea, vomiting, or blood in the stool.    GENITOURINARY:  No dysuria or hematuria.    MUSCULOSKELETAL:  No any new back pain or arthralgias.     NEUROLOGICAL:  Neuropathy in upper and lower extremities resolved completely.. No new headache or dizziness.     LYMPHATICS:  Denies any abnormal swollen and anywhere in the body.    SKIN:  Denies any new skin rash.          PHYSICAL EXAMINATION:      VITAL SIGNS:  /80  Pulse 83  Temp 98.5 °F (36.9 °C) (Temporal Artery )   Resp 16  Ht 162.6 cm (64\")  Wt 52.6 kg (115 lb 15.4 oz)  BMI 19.9 kg/m2    GENERAL:  Not in any distress.    HEENT:  Normocephalic, Atraumatic.Mild Conjunctival pallor. No icterus. Extraocular Movements Intact. No Facial Asymmetry noted.    NECK:  No adenopathy. No JVD.    RESPIRATORY:  Fair air entry bilateral. No rhonchi or wheezing.    CARDIOVASCULAR:  S1, S2. Regular rate and rhythm. No murmur or gallop appreciated.    ABDOMEN:  Soft,  nontender. Bowel sounds present in all four quadrants.  No organomegaly appreciated.    EXTREMITIES:  No edema.No Calf Tenderness.    NEUROLOGIC:  Alert, awake and oriented ×3.  No  Motor or sensory deficit appreciated. Cranial Nerves 2-12 grossly intact.        DIAGNOSTIC DATA:    Glucose "   Date Value Ref Range Status   02/12/2018 189 (H) 60 - 100 mg/dL Final     Sodium   Date Value Ref Range Status   02/12/2018 138 137 - 145 mmol/L Final     Potassium   Date Value Ref Range Status   02/12/2018 3.9 3.5 - 5.1 mmol/L Final     CO2   Date Value Ref Range Status   02/12/2018 24.0 22.0 - 31.0 mmol/L Final     Chloride   Date Value Ref Range Status   02/12/2018 102 95 - 110 mmol/L Final     Anion Gap   Date Value Ref Range Status   02/12/2018 12.0 5.0 - 15.0 mmol/L Final     Creatinine   Date Value Ref Range Status   02/12/2018 0.92 0.50 - 1.00 mg/dL Final     BUN   Date Value Ref Range Status   02/12/2018 22 (H) 7 - 21 mg/dL Final     BUN/Creatinine Ratio   Date Value Ref Range Status   02/12/2018 23.9 7.0 - 25.0 Final     Calcium   Date Value Ref Range Status   02/12/2018 9.6 8.4 - 10.2 mg/dL Final     eGFR Non  Amer   Date Value Ref Range Status   02/12/2018 59 39 - 90 mL/min/1.73 Final     Alkaline Phosphatase   Date Value Ref Range Status   02/12/2018 46 38 - 126 U/L Final     Total Protein   Date Value Ref Range Status   02/12/2018 6.8 6.3 - 8.6 g/dL Final     ALT (SGPT)   Date Value Ref Range Status   02/12/2018 27 9 - 52 U/L Final     AST (SGOT)   Date Value Ref Range Status   02/12/2018 33 14 - 36 U/L Final     Total Bilirubin   Date Value Ref Range Status   02/12/2018 0.2 0.2 - 1.3 mg/dL Final     Albumin   Date Value Ref Range Status   02/12/2018 4.20 3.40 - 4.80 g/dL Final     Globulin   Date Value Ref Range Status   02/12/2018 2.6 2.3 - 3.5 gm/dL Final     A/G Ratio   Date Value Ref Range Status   02/12/2018 1.6 1.1 - 1.8 g/dL Final     Lab Results   Component Value Date    WBC 4.42 02/12/2018    HGB 11.3 (L) 02/12/2018    HCT 35.5 02/12/2018    HCT 35.5 02/12/2018    MCV 88.8 02/12/2018     02/12/2018     Lab Results   Component Value Date    NEUTROABS 2.76 02/12/2018    IRON 42 02/12/2018    TIBC 405 02/12/2018    LABIRON 10 (L) 02/12/2018    FERRITIN 41.90 02/12/2018     EMELFJFK30 >1000 (H) 02/12/2018    FOLATE >20.00 02/12/2018     Lab Results   Component Value Date    CEA 1.70 04/19/2017    REFLABREPO SEE NOTE: 12/22/2015             RADIOLOGY DATA :  Ct of abdomen and Pelvis with contrast done on November 30, 2017 showed:  ABDOMEN:     - LIVER:    normal size / contour, no ductal dilatation , no  focal lesion   - GB:      grossly negative    - CBD:      grossly negative   - SPLEEN:    normal size and contour   - PANCREAS:    normal in size, contour, no focal mass    - VISCERA:    normal caliber, no wall thickening     - MESENTERY:  no mesenteric mass   - CAVITY:    no free abdominal fluid, no free intraperitoneal  air   - BODY WALL:  wnl   - OSSEOUS:  Mild leftward deviating lumbar scoliosis, unchanged.  - MISC:                                       RETROPERITONEUM:   - KIDNEYS:    normal size / contour, no collecting system  dilation        no evidence of an enhancing mass   - URETERS:    normal course, caliber   - ADRENALS:    normal size, contour   - MISC:      no sig retroperitoneal adenopathy or mass   - VASCULAR:    aorta / iliacs: wnl for age      - - - CT PELVIS - - -       - VISCERA:    normal caliber small/large bowel, no focal  thickening/mass - MESENTERY:  no mass   - VASCULAR:    wnl for age   - CAVITY:    no free fluid / air   - BLADDER:    unremarkable   - OSSEOUS:    wnl    - MISC:   - UTERUS/OVARY:  Status post hysterectomy   .     IMPRESSION:   CONCLUSION:  1. Stable examination.              ASSESSMENT AND PLAN:      1.  Recurrent iron deficiency: Positive recurrence of iron deficiency since October 2016.  Patient had a side effect with Feraheme so was started on Venofer.  Last dose of Venofer was given on October 27 , 2017.  Patient had a EGD, colonoscopy and capsule endoscopy done on January 23, 2018.  EGD showed some gastritis, colonoscopy showed a single tubular adenoma without any cancer.  Patient had a very narrow pylorus after dilating pylorus by   Jacky, capsule was passed into the small intestine but Endoscopy report is not helpful since there was so much food as per report in the small intestine.  Patient has again develop iron deficiency and ferritin has dropped from 160 241 in last 6 months, iron saturation is again dropped down to 10% and TIBC has gone up to 405.  We will start patient back on intravenous Venofer next week.  Patient has an appointment with Dr. Rico on March 3, 2018, encouraged to keep that appointment.  We will also discuss with Dr. Rico if any other intervention can be done.  Encouraged to consume iron rich food.      2.  Colon cancer, stage III, diagnosed in October 2015.  Patient had a hemicolectomy followed by chemotherapy.  Patient had a very complicated course with chemotherapy.  Initially patient was started on Xelox but Xeloda gave her skin rash.  Subsequently patient was changed over to FOLFOX for which she took 3 cycles and had a significant neuropathy.  Subsequently patient was changed to 5-FU and leucovorin which patient could not tolerate either subsequently after 8 cycles of chemotherapy in total chemotherapy was discontinued in July 2016.  Last colonoscopy done in January 2018 did not show any evidence of recurrence.  CT scan done in November 2017 does not show any evidence of recurrence.  Her next surveillance CT scan will be due in November 2018.    3.  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    4.  History of dementia    5.  Health maintenance: Patient does not smoke.  Had a colonoscopy done in January 2018.  She remains full code.             Duong Hanley MD  2/14/2018  8:20 AM

## 2018-02-14 NOTE — PATIENT INSTRUCTIONS
Iron Sucrose injection  What is this medicine?  IRON SUCROSE (SOFIA cote) is an iron complex. Iron is used to make healthy red blood cells, which carry oxygen and nutrients throughout the body. This medicine is used to treat iron deficiency anemia in people with chronic kidney disease.  This medicine may be used for other purposes; ask your health care provider or pharmacist if you have questions.  COMMON BRAND NAME(S): Venofer  What should I tell my health care provider before I take this medicine?  They need to know if you have any of these conditions:  -anemia not caused by low iron levels  -heart disease  -high levels of iron in the blood  -kidney disease  -liver disease  -an unusual or allergic reaction to iron, other medicines, foods, dyes, or preservatives  -pregnant or trying to get pregnant  -breast-feeding  How should I use this medicine?  This medicine is for infusion into a vein. It is given by a health care professional in a hospital or clinic setting.  Talk to your pediatrician regarding the use of this medicine in children. While this drug may be prescribed for children as young as 2 years for selected conditions, precautions do apply.  Overdosage: If you think you have taken too much of this medicine contact a poison control center or emergency room at once.  NOTE: This medicine is only for you. Do not share this medicine with others.  What if I miss a dose?  It is important not to miss your dose. Call your doctor or health care professional if you are unable to keep an appointment.  What may interact with this medicine?  Do not take this medicine with any of the following medications:  -deferoxamine  -dimercaprol  -other iron products  This medicine may also interact with the following medications:  -chloramphenicol  -deferasirox  This list may not describe all possible interactions. Give your health care provider a list of all the medicines, herbs, non-prescription drugs, or dietary  supplements you use. Also tell them if you smoke, drink alcohol, or use illegal drugs. Some items may interact with your medicine.  What should I watch for while using this medicine?  Visit your doctor or healthcare professional regularly. Tell your doctor or healthcare professional if your symptoms do not start to get better or if they get worse. You may need blood work done while you are taking this medicine.  You may need to follow a special diet. Talk to your doctor. Foods that contain iron include: whole grains/cereals, dried fruits, beans, or peas, leafy green vegetables, and organ meats (liver, kidney).  What side effects may I notice from receiving this medicine?  Side effects that you should report to your doctor or health care professional as soon as possible:  -allergic reactions like skin rash, itching or hives, swelling of the face, lips, or tongue  -breathing problems  -changes in blood pressure  -cough  -fast, irregular heartbeat  -feeling faint or lightheaded, falls  -fever or chills  -flushing, sweating, or hot feelings  -joint or muscle aches/pains  -seizures  -swelling of the ankles or feet  -unusually weak or tired  Side effects that usually do not require medical attention (report to your doctor or health care professional if they continue or are bothersome):  -diarrhea  -feeling achy  -headache  -irritation at site where injected  -nausea, vomiting  -stomach upset  -tiredness  This list may not describe all possible side effects. Call your doctor for medical advice about side effects. You may report side effects to FDA at 8-819-FDA-9145.  Where should I keep my medicine?  This drug is given in a hospital or clinic and will not be stored at home.  NOTE: This sheet is a summary. It may not cover all possible information. If you have questions about this medicine, talk to your doctor, pharmacist, or health care provider.  © 2018 [employer]/Gold Standard (2012-09-27 17:14:35)

## 2018-02-19 ENCOUNTER — INFUSION (OUTPATIENT)
Dept: ONCOLOGY | Facility: HOSPITAL | Age: 78
End: 2018-02-19

## 2018-02-19 VITALS — SYSTOLIC BLOOD PRESSURE: 173 MMHG | TEMPERATURE: 97.4 F | HEART RATE: 74 BPM | DIASTOLIC BLOOD PRESSURE: 77 MMHG

## 2018-02-19 DIAGNOSIS — Z45.2 ENCOUNTER FOR CARE RELATED TO PORT-A-CATH: Primary | ICD-10-CM

## 2018-02-19 DIAGNOSIS — K90.9 MALABSORPTION OF IRON: ICD-10-CM

## 2018-02-19 DIAGNOSIS — D50.0 IRON DEFICIENCY ANEMIA DUE TO CHRONIC BLOOD LOSS: ICD-10-CM

## 2018-02-19 PROCEDURE — 25010000002 IRON SUCROSE PER 1 MG: Performed by: INTERNAL MEDICINE

## 2018-02-19 PROCEDURE — 63710000001 DIPHENHYDRAMINE PER 50 MG: Performed by: INTERNAL MEDICINE

## 2018-02-19 PROCEDURE — 96365 THER/PROPH/DIAG IV INF INIT: CPT | Performed by: INTERNAL MEDICINE

## 2018-02-19 PROCEDURE — 25010000002 HEPARIN FLUSH (PORCINE) 100 UNIT/ML SOLUTION: Performed by: INTERNAL MEDICINE

## 2018-02-19 RX ORDER — SODIUM CHLORIDE 9 MG/ML
250 INJECTION, SOLUTION INTRAVENOUS ONCE
Status: COMPLETED | OUTPATIENT
Start: 2018-02-19 | End: 2018-02-19

## 2018-02-19 RX ORDER — DIPHENHYDRAMINE HCL 25 MG
25 CAPSULE ORAL ONCE
Status: COMPLETED | OUTPATIENT
Start: 2018-02-19 | End: 2018-02-19

## 2018-02-19 RX ORDER — FAMOTIDINE 20 MG/1
20 TABLET, FILM COATED ORAL ONCE
Status: COMPLETED | OUTPATIENT
Start: 2018-02-19 | End: 2018-02-19

## 2018-02-19 RX ORDER — FAMOTIDINE 20 MG/1
20 TABLET, FILM COATED ORAL ONCE
Status: CANCELLED | OUTPATIENT
Start: 2018-02-20 | End: 2018-02-20

## 2018-02-19 RX ORDER — SODIUM CHLORIDE 9 MG/ML
250 INJECTION, SOLUTION INTRAVENOUS ONCE
Status: CANCELLED | OUTPATIENT
Start: 2018-02-20 | End: 2018-02-20

## 2018-02-19 RX ORDER — DIPHENHYDRAMINE HCL 25 MG
25 CAPSULE ORAL ONCE
Status: CANCELLED | OUTPATIENT
Start: 2018-02-20 | End: 2018-02-20

## 2018-02-19 RX ORDER — SODIUM CHLORIDE 0.9 % (FLUSH) 0.9 %
10 SYRINGE (ML) INJECTION AS NEEDED
Status: CANCELLED | OUTPATIENT
Start: 2018-02-20

## 2018-02-19 RX ORDER — ACETAMINOPHEN 325 MG/1
650 TABLET ORAL ONCE
Status: COMPLETED | OUTPATIENT
Start: 2018-02-19 | End: 2018-02-19

## 2018-02-19 RX ORDER — SODIUM CHLORIDE 0.9 % (FLUSH) 0.9 %
10 SYRINGE (ML) INJECTION AS NEEDED
Status: DISCONTINUED | OUTPATIENT
Start: 2018-02-19 | End: 2018-02-19 | Stop reason: HOSPADM

## 2018-02-19 RX ORDER — ACETAMINOPHEN 325 MG/1
650 TABLET ORAL ONCE
Status: CANCELLED | OUTPATIENT
Start: 2018-02-20 | End: 2018-02-20

## 2018-02-19 RX ADMIN — Medication 10 ML: at 10:18

## 2018-02-19 RX ADMIN — IRON SUCROSE 200 MG: 20 INJECTION, SOLUTION INTRAVENOUS at 09:03

## 2018-02-19 RX ADMIN — SODIUM CHLORIDE, PRESERVATIVE FREE 500 UNITS: 5 INJECTION INTRAVENOUS at 10:18

## 2018-02-19 RX ADMIN — SODIUM CHLORIDE 250 ML: 9 INJECTION, SOLUTION INTRAVENOUS at 08:26

## 2018-02-19 RX ADMIN — ACETAMINOPHEN 650 MG: 325 TABLET ORAL at 08:28

## 2018-02-19 RX ADMIN — FAMOTIDINE 20 MG: 20 TABLET, FILM COATED ORAL at 08:28

## 2018-02-19 RX ADMIN — DIPHENHYDRAMINE HYDROCHLORIDE 25 MG: 25 CAPSULE ORAL at 08:28

## 2018-02-20 ENCOUNTER — INFUSION (OUTPATIENT)
Dept: ONCOLOGY | Facility: HOSPITAL | Age: 78
End: 2018-02-20

## 2018-02-20 VITALS
RESPIRATION RATE: 18 BRPM | HEART RATE: 84 BPM | SYSTOLIC BLOOD PRESSURE: 151 MMHG | DIASTOLIC BLOOD PRESSURE: 83 MMHG | TEMPERATURE: 97.9 F

## 2018-02-20 DIAGNOSIS — K90.9 MALABSORPTION OF IRON: ICD-10-CM

## 2018-02-20 DIAGNOSIS — D50.0 IRON DEFICIENCY ANEMIA DUE TO CHRONIC BLOOD LOSS: ICD-10-CM

## 2018-02-20 DIAGNOSIS — Z45.2 ENCOUNTER FOR CARE RELATED TO PORT-A-CATH: Primary | ICD-10-CM

## 2018-02-20 PROCEDURE — 63710000001 DIPHENHYDRAMINE PER 50 MG: Performed by: INTERNAL MEDICINE

## 2018-02-20 PROCEDURE — 25010000002 IRON SUCROSE PER 1 MG: Performed by: INTERNAL MEDICINE

## 2018-02-20 PROCEDURE — 25010000002 HEPARIN FLUSH (PORCINE) 100 UNIT/ML SOLUTION: Performed by: INTERNAL MEDICINE

## 2018-02-20 PROCEDURE — 96365 THER/PROPH/DIAG IV INF INIT: CPT | Performed by: INTERNAL MEDICINE

## 2018-02-20 RX ORDER — DIPHENHYDRAMINE HCL 25 MG
25 CAPSULE ORAL ONCE
Status: COMPLETED | OUTPATIENT
Start: 2018-02-20 | End: 2018-02-20

## 2018-02-20 RX ORDER — SODIUM CHLORIDE 0.9 % (FLUSH) 0.9 %
10 SYRINGE (ML) INJECTION AS NEEDED
Status: DISCONTINUED | OUTPATIENT
Start: 2018-02-20 | End: 2018-02-20 | Stop reason: HOSPADM

## 2018-02-20 RX ORDER — DIPHENHYDRAMINE HCL 25 MG
25 CAPSULE ORAL ONCE
Status: CANCELLED | OUTPATIENT
Start: 2018-02-21 | End: 2018-02-21

## 2018-02-20 RX ORDER — SODIUM CHLORIDE 9 MG/ML
250 INJECTION, SOLUTION INTRAVENOUS ONCE
Status: CANCELLED | OUTPATIENT
Start: 2018-02-21 | End: 2018-02-21

## 2018-02-20 RX ORDER — SODIUM CHLORIDE 9 MG/ML
250 INJECTION, SOLUTION INTRAVENOUS ONCE
Status: COMPLETED | OUTPATIENT
Start: 2018-02-20 | End: 2018-02-20

## 2018-02-20 RX ORDER — FAMOTIDINE 20 MG/1
20 TABLET, FILM COATED ORAL ONCE
Status: COMPLETED | OUTPATIENT
Start: 2018-02-20 | End: 2018-02-20

## 2018-02-20 RX ORDER — ACETAMINOPHEN 325 MG/1
650 TABLET ORAL ONCE
Status: COMPLETED | OUTPATIENT
Start: 2018-02-20 | End: 2018-02-20

## 2018-02-20 RX ORDER — ACETAMINOPHEN 325 MG/1
650 TABLET ORAL ONCE
Status: CANCELLED | OUTPATIENT
Start: 2018-02-21 | End: 2018-02-21

## 2018-02-20 RX ORDER — FAMOTIDINE 20 MG/1
20 TABLET, FILM COATED ORAL ONCE
Status: CANCELLED | OUTPATIENT
Start: 2018-02-21 | End: 2018-02-21

## 2018-02-20 RX ORDER — SODIUM CHLORIDE 0.9 % (FLUSH) 0.9 %
10 SYRINGE (ML) INJECTION AS NEEDED
Status: CANCELLED | OUTPATIENT
Start: 2018-02-21

## 2018-02-20 RX ADMIN — FAMOTIDINE 20 MG: 20 TABLET, FILM COATED ORAL at 09:08

## 2018-02-20 RX ADMIN — ACETAMINOPHEN 650 MG: 325 TABLET ORAL at 09:03

## 2018-02-20 RX ADMIN — IRON SUCROSE 200 MG: 20 INJECTION, SOLUTION INTRAVENOUS at 09:25

## 2018-02-20 RX ADMIN — SODIUM CHLORIDE 250 ML: 9 INJECTION, SOLUTION INTRAVENOUS at 09:01

## 2018-02-20 RX ADMIN — Medication 10 ML: at 10:07

## 2018-02-20 RX ADMIN — DIPHENHYDRAMINE HYDROCHLORIDE 25 MG: 25 CAPSULE ORAL at 09:04

## 2018-02-20 RX ADMIN — SODIUM CHLORIDE, PRESERVATIVE FREE 500 UNITS: 5 INJECTION INTRAVENOUS at 10:07

## 2018-02-21 ENCOUNTER — INFUSION (OUTPATIENT)
Dept: ONCOLOGY | Facility: HOSPITAL | Age: 78
End: 2018-02-21

## 2018-02-21 VITALS — HEART RATE: 70 BPM | DIASTOLIC BLOOD PRESSURE: 83 MMHG | SYSTOLIC BLOOD PRESSURE: 159 MMHG | TEMPERATURE: 97.6 F

## 2018-02-21 DIAGNOSIS — D50.0 IRON DEFICIENCY ANEMIA DUE TO CHRONIC BLOOD LOSS: ICD-10-CM

## 2018-02-21 DIAGNOSIS — K90.9 MALABSORPTION OF IRON: Primary | ICD-10-CM

## 2018-02-21 DIAGNOSIS — Z45.2 ENCOUNTER FOR CARE RELATED TO PORT-A-CATH: ICD-10-CM

## 2018-02-21 PROCEDURE — 25010000002 IRON SUCROSE PER 1 MG: Performed by: INTERNAL MEDICINE

## 2018-02-21 PROCEDURE — 25010000002 HEPARIN FLUSH (PORCINE) 100 UNIT/ML SOLUTION: Performed by: INTERNAL MEDICINE

## 2018-02-21 PROCEDURE — 96365 THER/PROPH/DIAG IV INF INIT: CPT | Performed by: INTERNAL MEDICINE

## 2018-02-21 PROCEDURE — 63710000001 DIPHENHYDRAMINE PER 50 MG: Performed by: INTERNAL MEDICINE

## 2018-02-21 RX ORDER — DIPHENHYDRAMINE HCL 25 MG
25 CAPSULE ORAL ONCE
Status: COMPLETED | OUTPATIENT
Start: 2018-02-21 | End: 2018-02-21

## 2018-02-21 RX ORDER — FAMOTIDINE 20 MG/1
20 TABLET, FILM COATED ORAL ONCE
Status: CANCELLED | OUTPATIENT
Start: 2018-02-21

## 2018-02-21 RX ORDER — FAMOTIDINE 20 MG/1
20 TABLET, FILM COATED ORAL ONCE
Status: COMPLETED | OUTPATIENT
Start: 2018-02-21 | End: 2018-02-21

## 2018-02-21 RX ORDER — ACETAMINOPHEN 325 MG/1
650 TABLET ORAL ONCE
Status: COMPLETED | OUTPATIENT
Start: 2018-02-21 | End: 2018-02-21

## 2018-02-21 RX ORDER — SODIUM CHLORIDE 0.9 % (FLUSH) 0.9 %
10 SYRINGE (ML) INJECTION AS NEEDED
Status: DISCONTINUED | OUTPATIENT
Start: 2018-02-21 | End: 2018-02-21 | Stop reason: HOSPADM

## 2018-02-21 RX ORDER — SODIUM CHLORIDE 9 MG/ML
250 INJECTION, SOLUTION INTRAVENOUS ONCE
Status: COMPLETED | OUTPATIENT
Start: 2018-02-21 | End: 2018-02-21

## 2018-02-21 RX ORDER — ACETAMINOPHEN 325 MG/1
650 TABLET ORAL ONCE
Status: CANCELLED | OUTPATIENT
Start: 2018-02-21

## 2018-02-21 RX ORDER — SODIUM CHLORIDE 9 MG/ML
250 INJECTION, SOLUTION INTRAVENOUS ONCE
Status: CANCELLED | OUTPATIENT
Start: 2018-02-21

## 2018-02-21 RX ORDER — SODIUM CHLORIDE 0.9 % (FLUSH) 0.9 %
10 SYRINGE (ML) INJECTION AS NEEDED
Status: CANCELLED | OUTPATIENT
Start: 2018-02-22

## 2018-02-21 RX ORDER — DIPHENHYDRAMINE HCL 25 MG
25 CAPSULE ORAL ONCE
Status: CANCELLED | OUTPATIENT
Start: 2018-02-21

## 2018-02-21 RX ADMIN — DIPHENHYDRAMINE HYDROCHLORIDE 25 MG: 25 CAPSULE ORAL at 08:32

## 2018-02-21 RX ADMIN — Medication 10 ML: at 09:36

## 2018-02-21 RX ADMIN — IRON SUCROSE 200 MG: 20 INJECTION, SOLUTION INTRAVENOUS at 08:54

## 2018-02-21 RX ADMIN — ACETAMINOPHEN 650 MG: 325 TABLET ORAL at 08:32

## 2018-02-21 RX ADMIN — SODIUM CHLORIDE 250 ML: 9 INJECTION, SOLUTION INTRAVENOUS at 08:32

## 2018-02-21 RX ADMIN — FAMOTIDINE 20 MG: 20 TABLET, FILM COATED ORAL at 08:32

## 2018-02-21 RX ADMIN — SODIUM CHLORIDE, PRESERVATIVE FREE 500 UNITS: 5 INJECTION INTRAVENOUS at 09:36

## 2018-02-22 ENCOUNTER — INFUSION (OUTPATIENT)
Dept: ONCOLOGY | Facility: HOSPITAL | Age: 78
End: 2018-02-22

## 2018-02-22 VITALS
DIASTOLIC BLOOD PRESSURE: 73 MMHG | SYSTOLIC BLOOD PRESSURE: 160 MMHG | HEART RATE: 72 BPM | RESPIRATION RATE: 18 BRPM | TEMPERATURE: 97.4 F

## 2018-02-22 DIAGNOSIS — D50.0 IRON DEFICIENCY ANEMIA DUE TO CHRONIC BLOOD LOSS: ICD-10-CM

## 2018-02-22 DIAGNOSIS — K90.9 MALABSORPTION OF IRON: Primary | ICD-10-CM

## 2018-02-22 DIAGNOSIS — Z45.2 ENCOUNTER FOR CARE RELATED TO PORT-A-CATH: ICD-10-CM

## 2018-02-22 PROCEDURE — 25010000002 IRON SUCROSE PER 1 MG: Performed by: INTERNAL MEDICINE

## 2018-02-22 PROCEDURE — 96365 THER/PROPH/DIAG IV INF INIT: CPT | Performed by: INTERNAL MEDICINE

## 2018-02-22 PROCEDURE — 25010000002 HEPARIN FLUSH (PORCINE) 100 UNIT/ML SOLUTION: Performed by: INTERNAL MEDICINE

## 2018-02-22 PROCEDURE — 63710000001 DIPHENHYDRAMINE PER 50 MG: Performed by: INTERNAL MEDICINE

## 2018-02-22 RX ORDER — SODIUM CHLORIDE 9 MG/ML
250 INJECTION, SOLUTION INTRAVENOUS ONCE
Status: CANCELLED | OUTPATIENT
Start: 2018-02-23 | End: 2018-02-23

## 2018-02-22 RX ORDER — SODIUM CHLORIDE 0.9 % (FLUSH) 0.9 %
10 SYRINGE (ML) INJECTION AS NEEDED
Status: DISCONTINUED | OUTPATIENT
Start: 2018-02-22 | End: 2018-02-22 | Stop reason: HOSPADM

## 2018-02-22 RX ORDER — DIPHENHYDRAMINE HCL 25 MG
25 CAPSULE ORAL ONCE
Status: CANCELLED | OUTPATIENT
Start: 2018-02-23 | End: 2018-02-23

## 2018-02-22 RX ORDER — DIPHENHYDRAMINE HCL 25 MG
25 CAPSULE ORAL ONCE
Status: COMPLETED | OUTPATIENT
Start: 2018-02-22 | End: 2018-02-22

## 2018-02-22 RX ORDER — FAMOTIDINE 20 MG/1
20 TABLET, FILM COATED ORAL ONCE
Status: CANCELLED | OUTPATIENT
Start: 2018-02-23 | End: 2018-02-23

## 2018-02-22 RX ORDER — ACETAMINOPHEN 325 MG/1
650 TABLET ORAL ONCE
Status: CANCELLED | OUTPATIENT
Start: 2018-02-23 | End: 2018-02-23

## 2018-02-22 RX ORDER — ACETAMINOPHEN 325 MG/1
650 TABLET ORAL ONCE
Status: COMPLETED | OUTPATIENT
Start: 2018-02-22 | End: 2018-02-22

## 2018-02-22 RX ORDER — SODIUM CHLORIDE 0.9 % (FLUSH) 0.9 %
10 SYRINGE (ML) INJECTION AS NEEDED
Status: CANCELLED | OUTPATIENT
Start: 2018-02-23

## 2018-02-22 RX ORDER — FAMOTIDINE 20 MG/1
20 TABLET, FILM COATED ORAL ONCE
Status: COMPLETED | OUTPATIENT
Start: 2018-02-22 | End: 2018-02-22

## 2018-02-22 RX ORDER — SODIUM CHLORIDE 9 MG/ML
250 INJECTION, SOLUTION INTRAVENOUS ONCE
Status: COMPLETED | OUTPATIENT
Start: 2018-02-22 | End: 2018-02-22

## 2018-02-22 RX ADMIN — ACETAMINOPHEN 650 MG: 325 TABLET ORAL at 08:22

## 2018-02-22 RX ADMIN — Medication 10 ML: at 09:30

## 2018-02-22 RX ADMIN — SODIUM CHLORIDE, PRESERVATIVE FREE 500 UNITS: 5 INJECTION INTRAVENOUS at 09:30

## 2018-02-22 RX ADMIN — DIPHENHYDRAMINE HYDROCHLORIDE 25 MG: 25 CAPSULE ORAL at 08:22

## 2018-02-22 RX ADMIN — FAMOTIDINE 20 MG: 20 TABLET ORAL at 08:32

## 2018-02-22 RX ADMIN — SODIUM CHLORIDE 250 ML: 9 INJECTION, SOLUTION INTRAVENOUS at 08:20

## 2018-02-22 RX ADMIN — IRON SUCROSE 200 MG: 20 INJECTION, SOLUTION INTRAVENOUS at 08:47

## 2018-02-23 ENCOUNTER — INFUSION (OUTPATIENT)
Dept: ONCOLOGY | Facility: HOSPITAL | Age: 78
End: 2018-02-23

## 2018-02-23 VITALS
RESPIRATION RATE: 18 BRPM | SYSTOLIC BLOOD PRESSURE: 132 MMHG | TEMPERATURE: 97.6 F | DIASTOLIC BLOOD PRESSURE: 76 MMHG | HEART RATE: 74 BPM

## 2018-02-23 DIAGNOSIS — Z45.2 ENCOUNTER FOR CARE RELATED TO PORT-A-CATH: ICD-10-CM

## 2018-02-23 DIAGNOSIS — K90.9 MALABSORPTION OF IRON: Primary | ICD-10-CM

## 2018-02-23 DIAGNOSIS — D50.0 IRON DEFICIENCY ANEMIA DUE TO CHRONIC BLOOD LOSS: ICD-10-CM

## 2018-02-23 PROCEDURE — 96365 THER/PROPH/DIAG IV INF INIT: CPT | Performed by: INTERNAL MEDICINE

## 2018-02-23 PROCEDURE — 63710000001 DIPHENHYDRAMINE PER 50 MG: Performed by: INTERNAL MEDICINE

## 2018-02-23 PROCEDURE — 25010000002 HEPARIN FLUSH (PORCINE) 100 UNIT/ML SOLUTION: Performed by: INTERNAL MEDICINE

## 2018-02-23 PROCEDURE — 25010000002 IRON SUCROSE PER 1 MG: Performed by: INTERNAL MEDICINE

## 2018-02-23 RX ORDER — ACETAMINOPHEN 325 MG/1
650 TABLET ORAL ONCE
Status: CANCELLED | OUTPATIENT
Start: 2018-02-23

## 2018-02-23 RX ORDER — ACETAMINOPHEN 325 MG/1
650 TABLET ORAL ONCE
Status: COMPLETED | OUTPATIENT
Start: 2018-02-23 | End: 2018-02-23

## 2018-02-23 RX ORDER — SODIUM CHLORIDE 9 MG/ML
250 INJECTION, SOLUTION INTRAVENOUS ONCE
Status: CANCELLED | OUTPATIENT
Start: 2018-02-23

## 2018-02-23 RX ORDER — SODIUM CHLORIDE 0.9 % (FLUSH) 0.9 %
10 SYRINGE (ML) INJECTION AS NEEDED
Status: CANCELLED | OUTPATIENT
Start: 2018-02-23

## 2018-02-23 RX ORDER — FAMOTIDINE 20 MG/1
20 TABLET, FILM COATED ORAL ONCE
Status: COMPLETED | OUTPATIENT
Start: 2018-02-23 | End: 2018-02-23

## 2018-02-23 RX ORDER — SODIUM CHLORIDE 9 MG/ML
250 INJECTION, SOLUTION INTRAVENOUS ONCE
Status: COMPLETED | OUTPATIENT
Start: 2018-02-23 | End: 2018-02-23

## 2018-02-23 RX ORDER — DIPHENHYDRAMINE HCL 25 MG
25 CAPSULE ORAL ONCE
Status: COMPLETED | OUTPATIENT
Start: 2018-02-23 | End: 2018-02-23

## 2018-02-23 RX ORDER — SODIUM CHLORIDE 0.9 % (FLUSH) 0.9 %
10 SYRINGE (ML) INJECTION AS NEEDED
Status: DISCONTINUED | OUTPATIENT
Start: 2018-02-23 | End: 2018-02-23 | Stop reason: HOSPADM

## 2018-02-23 RX ORDER — FAMOTIDINE 20 MG/1
20 TABLET, FILM COATED ORAL ONCE
Status: CANCELLED | OUTPATIENT
Start: 2018-02-23

## 2018-02-23 RX ORDER — DIPHENHYDRAMINE HCL 25 MG
25 CAPSULE ORAL ONCE
Status: CANCELLED | OUTPATIENT
Start: 2018-02-23

## 2018-02-23 RX ADMIN — FAMOTIDINE 20 MG: 20 TABLET, FILM COATED ORAL at 08:18

## 2018-02-23 RX ADMIN — Medication 10 ML: at 09:12

## 2018-02-23 RX ADMIN — SODIUM CHLORIDE 250 ML: 9 INJECTION, SOLUTION INTRAVENOUS at 08:18

## 2018-02-23 RX ADMIN — IRON SUCROSE 200 MG: 20 INJECTION, SOLUTION INTRAVENOUS at 08:35

## 2018-02-23 RX ADMIN — SODIUM CHLORIDE, PRESERVATIVE FREE 500 UNITS: 5 INJECTION INTRAVENOUS at 09:12

## 2018-02-23 RX ADMIN — ACETAMINOPHEN 650 MG: 325 TABLET ORAL at 08:18

## 2018-02-23 RX ADMIN — DIPHENHYDRAMINE HYDROCHLORIDE 25 MG: 25 CAPSULE ORAL at 08:18

## 2018-02-26 ENCOUNTER — APPOINTMENT (OUTPATIENT)
Dept: ONCOLOGY | Facility: HOSPITAL | Age: 78
End: 2018-02-26

## 2018-03-02 ENCOUNTER — DOCUMENTATION (OUTPATIENT)
Dept: NUTRITION | Facility: HOSPITAL | Age: 78
End: 2018-03-02

## 2018-03-02 NOTE — PROGRESS NOTES
Adult Outpatient Nutrition  Assessment    Patient Name:  Mary Gutierrez  YOB: 1940  MRN: [id number]    Assessment Date:  Late Entry from 2/19/18    Comments: [employer] discount and samples provided. Wt 116 lb (trending down). Reinforced nutrition needs.                        Electronically signed by:  Love Black RD  03/02/18 4:23 PM

## 2018-03-23 ENCOUNTER — TELEPHONE (OUTPATIENT)
Dept: NUTRITION | Facility: HOSPITAL | Age: 78
End: 2018-03-23

## 2018-03-23 NOTE — PROGRESS NOTES
Adult Outpatient Nutrition  Assessment    Patient Name:  Mary Gutierrez  YOB: 1940  MRN: [id number]    Assessment Date:  3/23/2018    Comments:   called this am requesting addition [employer] discount coupons--gladly mailed 5 coupons, which will save this family $15.        Electronically signed by:  Love Black RD  03/23/18 8:41 AM

## 2018-04-18 ENCOUNTER — INFUSION (OUTPATIENT)
Dept: ONCOLOGY | Facility: HOSPITAL | Age: 78
End: 2018-04-18

## 2018-04-18 ENCOUNTER — OFFICE VISIT (OUTPATIENT)
Dept: ONCOLOGY | Facility: CLINIC | Age: 78
End: 2018-04-18

## 2018-04-18 ENCOUNTER — TELEPHONE (OUTPATIENT)
Dept: ONCOLOGY | Facility: HOSPITAL | Age: 78
End: 2018-04-18

## 2018-04-18 VITALS
HEIGHT: 64 IN | TEMPERATURE: 98.3 F | HEART RATE: 70 BPM | BODY MASS INDEX: 20.35 KG/M2 | WEIGHT: 119.2 LBS | RESPIRATION RATE: 16 BRPM | DIASTOLIC BLOOD PRESSURE: 80 MMHG | SYSTOLIC BLOOD PRESSURE: 153 MMHG

## 2018-04-18 DIAGNOSIS — D50.0 IRON DEFICIENCY ANEMIA DUE TO CHRONIC BLOOD LOSS: Primary | ICD-10-CM

## 2018-04-18 DIAGNOSIS — K90.9 MALABSORPTION OF IRON: ICD-10-CM

## 2018-04-18 DIAGNOSIS — Z45.2 ENCOUNTER FOR CARE RELATED TO PORT-A-CATH: ICD-10-CM

## 2018-04-18 DIAGNOSIS — D50.0 IRON DEFICIENCY ANEMIA DUE TO CHRONIC BLOOD LOSS: ICD-10-CM

## 2018-04-18 DIAGNOSIS — Z45.2 ENCOUNTER FOR VENOUS ACCESS DEVICE CARE: ICD-10-CM

## 2018-04-18 DIAGNOSIS — C18.2 MALIGNANT NEOPLASM OF ASCENDING COLON (HCC): Primary | ICD-10-CM

## 2018-04-18 DIAGNOSIS — C18.2 MALIGNANT NEOPLASM OF ASCENDING COLON (HCC): ICD-10-CM

## 2018-04-18 LAB
ALBUMIN SERPL-MCNC: 4.3 G/DL (ref 3.4–4.8)
ALBUMIN/GLOB SERPL: 1.9 G/DL (ref 1.1–1.8)
ALP SERPL-CCNC: 53 U/L (ref 38–126)
ALT SERPL W P-5'-P-CCNC: 27 U/L (ref 9–52)
ANION GAP SERPL CALCULATED.3IONS-SCNC: 14 MMOL/L (ref 5–15)
AST SERPL-CCNC: 23 U/L (ref 14–36)
BASOPHILS # BLD AUTO: 0.02 10*3/MM3 (ref 0–0.2)
BASOPHILS NFR BLD AUTO: 0.4 % (ref 0–2)
BILIRUB SERPL-MCNC: 0.1 MG/DL (ref 0.2–1.3)
BUN BLD-MCNC: 23 MG/DL (ref 7–21)
BUN/CREAT SERPL: 28.8 (ref 7–25)
CALCIUM SPEC-SCNC: 9.2 MG/DL (ref 8.4–10.2)
CEA SERPL-MCNC: 1.8 NG/ML (ref 0–5)
CHLORIDE SERPL-SCNC: 100 MMOL/L (ref 95–110)
CO2 SERPL-SCNC: 23 MMOL/L (ref 22–31)
CREAT BLD-MCNC: 0.8 MG/DL (ref 0.5–1)
DEPRECATED RDW RBC AUTO: 46.9 FL (ref 36.4–46.3)
EOSINOPHIL # BLD AUTO: 0.15 10*3/MM3 (ref 0–0.7)
EOSINOPHIL NFR BLD AUTO: 3 % (ref 0–7)
ERYTHROCYTE [DISTWIDTH] IN BLOOD BY AUTOMATED COUNT: 14.3 % (ref 11.5–14.5)
FERRITIN SERPL-MCNC: 160 NG/ML (ref 11.1–264)
GFR SERPL CREATININE-BSD FRML MDRD: 70 ML/MIN/1.73 (ref 39–90)
GLOBULIN UR ELPH-MCNC: 2.3 GM/DL (ref 2.3–3.5)
GLUCOSE BLD-MCNC: 222 MG/DL (ref 60–100)
HCT VFR BLD AUTO: 35.7 % (ref 35–45)
HGB BLD-MCNC: 11.9 G/DL (ref 12–15.5)
IMM GRANULOCYTES # BLD: 0.02 10*3/MM3 (ref 0–0.02)
IMM GRANULOCYTES NFR BLD: 0.4 % (ref 0–0.5)
IRON 24H UR-MRATE: 56 MCG/DL (ref 37–170)
IRON SATN MFR SERPL: 16 % (ref 15–50)
LYMPHOCYTES # BLD AUTO: 1.13 10*3/MM3 (ref 0.6–4.2)
LYMPHOCYTES NFR BLD AUTO: 22.7 % (ref 10–50)
MCH RBC QN AUTO: 29.8 PG (ref 26.5–34)
MCHC RBC AUTO-ENTMCNC: 33.3 G/DL (ref 31.4–36)
MCV RBC AUTO: 89.3 FL (ref 80–98)
MONOCYTES # BLD AUTO: 0.43 10*3/MM3 (ref 0–0.9)
MONOCYTES NFR BLD AUTO: 8.7 % (ref 0–12)
NEUTROPHILS # BLD AUTO: 3.22 10*3/MM3 (ref 2–8.6)
NEUTROPHILS NFR BLD AUTO: 64.8 % (ref 37–80)
PLATELET # BLD AUTO: 316 10*3/MM3 (ref 150–450)
PMV BLD AUTO: 9.8 FL (ref 8–12)
POTASSIUM BLD-SCNC: 4.2 MMOL/L (ref 3.5–5.1)
PROT SERPL-MCNC: 6.6 G/DL (ref 6.3–8.6)
RBC # BLD AUTO: 4 10*6/MM3 (ref 3.77–5.16)
SODIUM BLD-SCNC: 137 MMOL/L (ref 137–145)
TIBC SERPL-MCNC: 359 MCG/DL (ref 265–497)
WBC NRBC COR # BLD: 4.97 10*3/MM3 (ref 3.2–9.8)

## 2018-04-18 PROCEDURE — 36591 DRAW BLOOD OFF VENOUS DEVICE: CPT | Performed by: INTERNAL MEDICINE

## 2018-04-18 PROCEDURE — 82378 CARCINOEMBRYONIC ANTIGEN: CPT | Performed by: INTERNAL MEDICINE

## 2018-04-18 PROCEDURE — 83540 ASSAY OF IRON: CPT | Performed by: INTERNAL MEDICINE

## 2018-04-18 PROCEDURE — 80053 COMPREHEN METABOLIC PANEL: CPT | Performed by: INTERNAL MEDICINE

## 2018-04-18 PROCEDURE — 85025 COMPLETE CBC W/AUTO DIFF WBC: CPT | Performed by: INTERNAL MEDICINE

## 2018-04-18 PROCEDURE — 99214 OFFICE O/P EST MOD 30 MIN: CPT | Performed by: INTERNAL MEDICINE

## 2018-04-18 PROCEDURE — 82728 ASSAY OF FERRITIN: CPT | Performed by: INTERNAL MEDICINE

## 2018-04-18 PROCEDURE — 25010000002 HEPARIN FLUSH (PORCINE) 100 UNIT/ML SOLUTION: Performed by: INTERNAL MEDICINE

## 2018-04-18 PROCEDURE — 83550 IRON BINDING TEST: CPT | Performed by: INTERNAL MEDICINE

## 2018-04-18 RX ORDER — SODIUM CHLORIDE 0.9 % (FLUSH) 0.9 %
10 SYRINGE (ML) INJECTION AS NEEDED
Status: DISCONTINUED | OUTPATIENT
Start: 2018-04-18 | End: 2018-04-18 | Stop reason: HOSPADM

## 2018-04-18 RX ORDER — SODIUM CHLORIDE 0.9 % (FLUSH) 0.9 %
10 SYRINGE (ML) INJECTION AS NEEDED
Status: CANCELLED | OUTPATIENT
Start: 2018-06-20

## 2018-04-18 RX ADMIN — Medication 10 ML: at 07:47

## 2018-04-18 RX ADMIN — SODIUM CHLORIDE, PRESERVATIVE FREE 500 UNITS: 5 INJECTION INTRAVENOUS at 08:10

## 2018-04-18 NOTE — TELEPHONE ENCOUNTER
----- Message from Duong Hanley MD sent at 4/18/2018  8:57 AM CDT -----  Please let patient know, her iron level is okay and no need for Iv venofer at this point. Thanks

## 2018-04-18 NOTE — PROGRESS NOTES
DATE OF VISIT: 4/18/2018      REASON FOR VISIT:  History of colon cancer and recurrent iron deficiency anemia      HISTORY OF PRESENT ILLNESS:    77-year-old female with a past medical history significant for stage III colon cancer, status post surgery followed by chemotherapy last of which was in July 2016.  In view of recurrent iron deficiency since October 2016 patient has required intravenous venofer.  His last round of intravenous venofer was in 02/2018.  Patient is here for follow-up visit today.  EGD, colonoscopy and capsule endoscopy done by Dr. Rico on January 23, 2018.   Denies any excessive fatigue.   Denies any blood in the stool or urine.  Denies any new lymph node enlargement.      PAST MEDICAL HISTORY:    Past Medical History:   Diagnosis Date   • Acid reflux    • Diabetes mellitus    • Hypertension    • Malignant neoplasm of ascending colon 11/1/2016   • Malignant tumor of cecum        SOCIAL HISTORY:    Social History   Substance Use Topics   • Smoking status: Never Smoker   • Smokeless tobacco: Not on file   • Alcohol use No       Surgical History :  Past Surgical History:   Procedure Laterality Date   • CAPSULE ENDOSCOPY N/A 6/22/2017    Procedure: CAPSULE ENDOSCOPY M2A;  Surgeon: Stuart Rico DO;  Location: [facility] ENDOSCOPY;  Service:    • CAPSULE ENDOSCOPY N/A 1/23/2018    Procedure: CAPSULE ENDOSCOPY M2A;  Surgeon: Stuart Rico DO;  Location: [facility] ENDOSCOPY;  Service:    • CENTRAL VENOUS LINE INSERTION  10/30/2015    Ultrasound localization, left basilic vein.Placement of left upper extremity PICC line   • COLONOSCOPY N/A 1/23/2018    Procedure: COLONOSCOPY;  Surgeon: Stuart Rico DO;  Location: [facility] ENDOSCOPY;  Service:    • ENDOSCOPY N/A 6/22/2017    Procedure: ESOPHAGOGASTRODUODENOSCOPY;  Surgeon: Stuart Rico DO;  Location: [facility] ENDOSCOPY;  Service:    • ENDOSCOPY N/A 1/23/2018    Procedure: ESOPHAGOGASTRODUODENOSCOPY;  Surgeon: Stuart Rico DO;  Location:  "[facility] ENDOSCOPY;  Service:    • FRACTURE SURGERY      right femur    • HYSTERECTOMY     • LAPAROSCOPIC ASSISSTED TOTAL COLECTOMY W/ J-POUCH  10/15/2015    Laparoscopic right hemicolectomy with ileotransverse colostomy   • VENOUS ACCESS DEVICE (PORT) INSERTION  01/12/2016    Right internal jugular Mediport       ALLERGIES:    Allergies   Allergen Reactions   • Other Rash     Oral Chemo Meds       REVIEW OF SYSTEMS:      CONSTITUTIONAL: Denies any excessive fatigue.  No fever, chills, or night sweats.     HEENT:  No epistaxis, mouth sores, or difficulty swallowing.    RESPIRATORY:  No new shortness of breath or cough at present.    CARDIOVASCULAR:  No chest pain or palpitations.    GASTROINTESTINAL:   No abdominal pain, nausea, vomiting, or blood in the stool.    GENITOURINARY:  No dysuria or hematuria.    MUSCULOSKELETAL:  No any new back pain or arthralgias.     NEUROLOGICAL:  Neuropathy in upper and lower extremities resolved completely.. No new headache or dizziness.     LYMPHATICS:  Denies any abnormal swollen and anywhere in the body.    SKIN:  Denies any new skin rash.          PHYSICAL EXAMINATION:      VITAL SIGNS:  /80   Pulse 70   Temp 98.3 °F (36.8 °C) (Temporal Artery )   Resp 16   Ht 162.6 cm (64\")   Wt 54.1 kg (119 lb 3.2 oz)   BMI 20.46 kg/m²     GENERAL:  Not in any distress.    HEENT:  Normocephalic, Atraumatic.Mild Conjunctival pallor. No icterus. Extraocular Movements Intact. No Facial Asymmetry noted.    NECK:  No adenopathy. No JVD.    RESPIRATORY:  Fair air entry bilateral. No rhonchi or wheezing.    CARDIOVASCULAR:  S1, S2. Regular rate and rhythm. No murmur or gallop appreciated.    ABDOMEN:  Soft,  nontender. Bowel sounds present in all four quadrants.  No organomegaly appreciated.    MUSCULOSKELETAL:  No edema.No Calf Tenderness.  Normal range of motion.    NEUROLOGIC:  Alert, awake and oriented ×3.  No  Motor or sensory deficit appreciated. Cranial Nerves 2-12 grossly " intact.    SKIN: No new skin lesions.    LYMPHATICS: No new lymph node enlargement in neck or supraclavicular area.        DIAGNOSTIC DATA:    Glucose   Date Value Ref Range Status   04/18/2018 222 (H) 60 - 100 mg/dL Final     Sodium   Date Value Ref Range Status   04/18/2018 137 137 - 145 mmol/L Final     Potassium   Date Value Ref Range Status   04/18/2018 4.2 3.5 - 5.1 mmol/L Final     CO2   Date Value Ref Range Status   04/18/2018 23.0 22.0 - 31.0 mmol/L Final     Chloride   Date Value Ref Range Status   04/18/2018 100 95 - 110 mmol/L Final     Anion Gap   Date Value Ref Range Status   04/18/2018 14.0 5.0 - 15.0 mmol/L Final     Creatinine   Date Value Ref Range Status   04/18/2018 0.80 0.50 - 1.00 mg/dL Final     BUN   Date Value Ref Range Status   04/18/2018 23 (H) 7 - 21 mg/dL Final     BUN/Creatinine Ratio   Date Value Ref Range Status   04/18/2018 28.8 (H) 7.0 - 25.0 Final     Calcium   Date Value Ref Range Status   04/18/2018 9.2 8.4 - 10.2 mg/dL Final     eGFR Non  Amer   Date Value Ref Range Status   04/18/2018 70 39 - 90 mL/min/1.73 Final     Alkaline Phosphatase   Date Value Ref Range Status   04/18/2018 53 38 - 126 U/L Final     Total Protein   Date Value Ref Range Status   04/18/2018 6.6 6.3 - 8.6 g/dL Final     ALT (SGPT)   Date Value Ref Range Status   04/18/2018 27 9 - 52 U/L Final     AST (SGOT)   Date Value Ref Range Status   04/18/2018 23 14 - 36 U/L Final     Total Bilirubin   Date Value Ref Range Status   04/18/2018 0.1 (L) 0.2 - 1.3 mg/dL Final     Albumin   Date Value Ref Range Status   04/18/2018 4.30 3.40 - 4.80 g/dL Final     Globulin   Date Value Ref Range Status   04/18/2018 2.3 2.3 - 3.5 gm/dL Final     A/G Ratio   Date Value Ref Range Status   04/18/2018 1.9 (H) 1.1 - 1.8 g/dL Final     Lab Results   Component Value Date    WBC 4.97 04/18/2018    HGB 11.9 (L) 04/18/2018    HCT 35.7 04/18/2018    MCV 89.3 04/18/2018     04/18/2018     Lab Results   Component Value Date     NEUTROABS 3.22 04/18/2018    IRON 56 04/18/2018    TIBC 359 04/18/2018    LABIRON 16 04/18/2018    FERRITIN 160.00 04/18/2018    [id number] >1,000 (H) 02/12/2018    FOLATE >20.00 02/12/2018     Lab Results   Component Value Date    CEA 1.80 04/18/2018    REFLABREPO SEE NOTE: 12/22/2015             RADIOLOGY DATA :  Ct of abdomen and Pelvis with contrast done on November 30, 2017 showed:  ABDOMEN:     - LIVER:    normal size / contour, no ductal dilatation , no  focal lesion   - GB:      grossly negative    - CBD:      grossly negative   - SPLEEN:    normal size and contour   - PANCREAS:    normal in size, contour, no focal mass    - VISCERA:    normal caliber, no wall thickening     - MESENTERY:  no mesenteric mass   - CAVITY:    no free abdominal fluid, no free intraperitoneal  air   - BODY WALL:  wnl   - OSSEOUS:  Mild leftward deviating lumbar scoliosis, unchanged.  - MISC:                                       RETROPERITONEUM:   - KIDNEYS:    normal size / contour, no collecting system  dilation        no evidence of an enhancing mass   - URETERS:    normal course, caliber   - ADRENALS:    normal size, contour   - MISC:      no sig retroperitoneal adenopathy or mass   - VASCULAR:    aorta / iliacs: wnl for age      - - - CT PELVIS - - -       - VISCERA:    normal caliber small/large bowel, no focal  thickening/mass - MESENTERY:  no mass   - VASCULAR:    wnl for age   - CAVITY:    no free fluid / air   - BLADDER:    unremarkable   - OSSEOUS:    wnl    - MISC:   - UTERUS/OVARY:  Status post hysterectomy   .     IMPRESSION:   CONCLUSION:  1. Stable examination.              ASSESSMENT AND PLAN:      1.  Recurrent iron deficiency: Positive recurrence of iron deficiency since October 2016.  Patient had a side effect with Feraheme so was started on Venofer.  Last dose of Venofer was given on October 27 , 2017.  Patient had a EGD, colonoscopy and capsule endoscopy done on January 23, 2018.  EGD showed some  gastritis, colonoscopy showed a single tubular adenoma without any cancer.  Patient had a very narrow pylorus after dilating pylorus by Dr. Rico, capsule was passed into the small intestine but Endoscopy report is not helpful since there was so much food as per report in the small intestine.  Developing iron deficiency, patient receive another round of intravenous Venofer from February 19, 2018 until February 23, 2018.  Case was discussed with Dr. Rico, he does not think any much intervention can be done regarding her recurrent iron deficiency at this point.  He recommends rechecking iron level frequently and supplementing it is required.  This recommendation were discussed with patient.  Iron studies done earlier today shows adequate amount of iron.  No need to restart intravenous Venofer at this point.  We will see her back in about 2 months with a repeat anemia workup on that day.      2.  Colon cancer, stage III, diagnosed in October 2015.  Patient had a hemicolectomy followed by chemotherapy.  Patient had a very complicated course with chemotherapy.  Initially patient was started on Xelox but Xeloda gave her skin rash.  Subsequently patient was changed over to FOLFOX for which she took 3 cycles and had a significant neuropathy.  Subsequently patient was changed to 5-FU and leucovorin which patient could not tolerate either subsequently after 8 cycles of chemotherapy in total chemotherapy was discontinued in July 2016.  Last colonoscopy done in January 2018 did not show any evidence of recurrence.  CT scan done in November 2017 does not show any evidence of recurrence.  Her next surveillance CT scan will be due in November 2018.    3.  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    4.  History of dementia    5.  Health maintenance: Patient does not smoke.  Had a colonoscopy done in January 2018.  Patient did not have mammogram for many years now.  She was  counseled about the importance of screening mammogram she wants to think about whether she wants to do it or not.  She remains full code.             Duong Hanley MD  4/18/2018  9:21 AM

## 2018-05-08 ENCOUNTER — TELEPHONE (OUTPATIENT)
Dept: NUTRITION | Facility: HOSPITAL | Age: 78
End: 2018-05-08

## 2018-05-08 NOTE — PROGRESS NOTES
Adult Outpatient Nutrition  Assessment    Patient Name:  Mary Gutierrez  YOB: 1940  MRN: [id number]    Assessment Date:  5/8/2018    Comments:  Received call from  requesting additional [employer] discount coupons--mailed to home.        Electronically signed by:  Love Black RD  05/08/18 8:24 AM

## 2018-06-20 ENCOUNTER — OFFICE VISIT (OUTPATIENT)
Dept: ONCOLOGY | Facility: CLINIC | Age: 78
End: 2018-06-20

## 2018-06-20 ENCOUNTER — INFUSION (OUTPATIENT)
Dept: ONCOLOGY | Facility: HOSPITAL | Age: 78
End: 2018-06-20

## 2018-06-20 ENCOUNTER — TELEPHONE (OUTPATIENT)
Dept: ONCOLOGY | Facility: HOSPITAL | Age: 78
End: 2018-06-20

## 2018-06-20 VITALS
HEIGHT: 64 IN | TEMPERATURE: 97.9 F | HEART RATE: 65 BPM | RESPIRATION RATE: 16 BRPM | WEIGHT: 118.8 LBS | BODY MASS INDEX: 20.28 KG/M2 | SYSTOLIC BLOOD PRESSURE: 167 MMHG | DIASTOLIC BLOOD PRESSURE: 87 MMHG

## 2018-06-20 DIAGNOSIS — Z45.2 ENCOUNTER FOR VENOUS ACCESS DEVICE CARE: ICD-10-CM

## 2018-06-20 DIAGNOSIS — D50.0 IRON DEFICIENCY ANEMIA DUE TO CHRONIC BLOOD LOSS: ICD-10-CM

## 2018-06-20 DIAGNOSIS — Z45.2 ENCOUNTER FOR CARE RELATED TO PORT-A-CATH: ICD-10-CM

## 2018-06-20 DIAGNOSIS — C18.2 MALIGNANT NEOPLASM OF ASCENDING COLON (HCC): Primary | ICD-10-CM

## 2018-06-20 DIAGNOSIS — K90.9 MALABSORPTION OF IRON: ICD-10-CM

## 2018-06-20 LAB
BASOPHILS # BLD AUTO: 0.03 10*3/MM3 (ref 0–0.2)
BASOPHILS NFR BLD AUTO: 0.6 % (ref 0–2)
CEA SERPL-MCNC: 1.9 NG/ML (ref 0–5)
DEPRECATED RDW RBC AUTO: 43.4 FL (ref 36.4–46.3)
EOSINOPHIL # BLD AUTO: 0.14 10*3/MM3 (ref 0–0.7)
EOSINOPHIL NFR BLD AUTO: 3 % (ref 0–7)
ERYTHROCYTE [DISTWIDTH] IN BLOOD BY AUTOMATED COUNT: 13.3 % (ref 11.5–14.5)
FERRITIN SERPL-MCNC: 56 NG/ML (ref 11.1–264)
FOLATE SERPL-MCNC: 17.1 NG/ML (ref 2.76–21)
HCT VFR BLD AUTO: 36.9 % (ref 35–45)
HGB BLD-MCNC: 11.9 G/DL (ref 12–15.5)
IMM GRANULOCYTES # BLD: 0.02 10*3/MM3 (ref 0–0.02)
IMM GRANULOCYTES NFR BLD: 0.4 % (ref 0–0.5)
IRON 24H UR-MRATE: 64 MCG/DL (ref 37–170)
IRON SATN MFR SERPL: 16 % (ref 15–50)
LYMPHOCYTES # BLD AUTO: 1.17 10*3/MM3 (ref 0.6–4.2)
LYMPHOCYTES NFR BLD AUTO: 25.2 % (ref 10–50)
MCH RBC QN AUTO: 28.6 PG (ref 26.5–34)
MCHC RBC AUTO-ENTMCNC: 32.2 G/DL (ref 31.4–36)
MCV RBC AUTO: 88.7 FL (ref 80–98)
MONOCYTES # BLD AUTO: 0.34 10*3/MM3 (ref 0–0.9)
MONOCYTES NFR BLD AUTO: 7.3 % (ref 0–12)
NEUTROPHILS # BLD AUTO: 2.94 10*3/MM3 (ref 2–8.6)
NEUTROPHILS NFR BLD AUTO: 63.5 % (ref 37–80)
PLATELET # BLD AUTO: 314 10*3/MM3 (ref 150–450)
PMV BLD AUTO: 10 FL (ref 8–12)
RBC # BLD AUTO: 4.16 10*6/MM3 (ref 3.77–5.16)
TIBC SERPL-MCNC: 395 MCG/DL (ref 265–497)
VIT B12 BLD-MCNC: >1000 PG/ML (ref 239–931)
WBC NRBC COR # BLD: 4.64 10*3/MM3 (ref 3.2–9.8)

## 2018-06-20 PROCEDURE — 99214 OFFICE O/P EST MOD 30 MIN: CPT | Performed by: INTERNAL MEDICINE

## 2018-06-20 PROCEDURE — 1123F ACP DISCUSS/DSCN MKR DOCD: CPT | Performed by: INTERNAL MEDICINE

## 2018-06-20 PROCEDURE — G8420 CALC BMI NORM PARAMETERS: HCPCS | Performed by: INTERNAL MEDICINE

## 2018-06-20 PROCEDURE — 82728 ASSAY OF FERRITIN: CPT | Performed by: INTERNAL MEDICINE

## 2018-06-20 PROCEDURE — 83540 ASSAY OF IRON: CPT | Performed by: INTERNAL MEDICINE

## 2018-06-20 PROCEDURE — 25010000002 HEPARIN FLUSH (PORCINE) 100 UNIT/ML SOLUTION: Performed by: INTERNAL MEDICINE

## 2018-06-20 PROCEDURE — G0463 HOSPITAL OUTPT CLINIC VISIT: HCPCS | Performed by: INTERNAL MEDICINE

## 2018-06-20 PROCEDURE — 83550 IRON BINDING TEST: CPT | Performed by: INTERNAL MEDICINE

## 2018-06-20 PROCEDURE — 82607 VITAMIN B-12: CPT | Performed by: INTERNAL MEDICINE

## 2018-06-20 PROCEDURE — 85025 COMPLETE CBC W/AUTO DIFF WBC: CPT | Performed by: INTERNAL MEDICINE

## 2018-06-20 PROCEDURE — 82378 CARCINOEMBRYONIC ANTIGEN: CPT | Performed by: INTERNAL MEDICINE

## 2018-06-20 PROCEDURE — 82746 ASSAY OF FOLIC ACID SERUM: CPT | Performed by: INTERNAL MEDICINE

## 2018-06-20 PROCEDURE — 36591 DRAW BLOOD OFF VENOUS DEVICE: CPT | Performed by: INTERNAL MEDICINE

## 2018-06-20 RX ORDER — SODIUM CHLORIDE 0.9 % (FLUSH) 0.9 %
10 SYRINGE (ML) INJECTION AS NEEDED
Status: CANCELLED | OUTPATIENT
Start: 2018-06-20

## 2018-06-20 RX ORDER — SODIUM CHLORIDE 0.9 % (FLUSH) 0.9 %
10 SYRINGE (ML) INJECTION AS NEEDED
Status: DISCONTINUED | OUTPATIENT
Start: 2018-06-20 | End: 2018-06-20 | Stop reason: HOSPADM

## 2018-06-20 RX ADMIN — Medication 10 ML: at 08:00

## 2018-06-20 RX ADMIN — SODIUM CHLORIDE, PRESERVATIVE FREE 500 UNITS: 5 INJECTION INTRAVENOUS at 08:00

## 2018-06-20 NOTE — TELEPHONE ENCOUNTER
----- Message from Duong Hanley MD sent at 6/20/2018 11:22 AM CDT -----  Please let patient know, that her iron level is adequate.  No need to start intravenous Venofer at this point.  Thank you

## 2018-06-20 NOTE — PROGRESS NOTES
DATE OF VISIT: 6/20/2018      REASON FOR VISIT:  History of colon cancer and recurrent iron deficiency anemia      HISTORY OF PRESENT ILLNESS:    77-year-old female with a past medical history significant for stage III colon cancer, status post surgery followed by chemotherapy last of which was in July 2016.  In view of recurrent iron deficiency since October 2016 patient has required intravenous venofer.  His last round of intravenous venofer was in 02/2018.  Patient is here for follow-up visit today. Most recent EGD, colonoscopy and capsule endoscopy done by Dr. Rico on January 23, 2018.   Denies any excessive fatigue.   Denies any blood in the stool or urine.  Denies any new lymph node enlargement.      PAST MEDICAL HISTORY:    Past Medical History:   Diagnosis Date   • Acid reflux    • Diabetes mellitus    • Hypertension    • Malignant neoplasm of ascending colon 11/1/2016   • Malignant tumor of cecum        SOCIAL HISTORY:    Social History   Substance Use Topics   • Smoking status: Never Smoker   • Smokeless tobacco: Not on file   • Alcohol use No       Surgical History :  Past Surgical History:   Procedure Laterality Date   • CAPSULE ENDOSCOPY N/A 6/22/2017    Procedure: CAPSULE ENDOSCOPY M2A;  Surgeon: Stuart Rico DO;  Location: [facility] ENDOSCOPY;  Service:    • CAPSULE ENDOSCOPY N/A 1/23/2018    Procedure: CAPSULE ENDOSCOPY M2A;  Surgeon: Stuart Rico DO;  Location: [facility] ENDOSCOPY;  Service:    • CENTRAL VENOUS LINE INSERTION  10/30/2015    Ultrasound localization, left basilic vein.Placement of left upper extremity PICC line   • COLONOSCOPY N/A 1/23/2018    Procedure: COLONOSCOPY;  Surgeon: Stuart Rico DO;  Location: [facility] ENDOSCOPY;  Service:    • ENDOSCOPY N/A 6/22/2017    Procedure: ESOPHAGOGASTRODUODENOSCOPY;  Surgeon: Stuart Rico DO;  Location: [facility] ENDOSCOPY;  Service:    • ENDOSCOPY N/A 1/23/2018    Procedure: ESOPHAGOGASTRODUODENOSCOPY;  Surgeon: Stuart Rico DO;   "Location: [facility] ENDOSCOPY;  Service:    • FRACTURE SURGERY      right femur    • HYSTERECTOMY     • LAPAROSCOPIC ASSISSTED TOTAL COLECTOMY W/ J-POUCH  10/15/2015    Laparoscopic right hemicolectomy with ileotransverse colostomy   • VENOUS ACCESS DEVICE (PORT) INSERTION  01/12/2016    Right internal jugular Mediport       ALLERGIES:    Allergies   Allergen Reactions   • Other Rash     Oral Chemo Meds       REVIEW OF SYSTEMS:      CONSTITUTIONAL: Denies any excessive fatigue.  No fever, chills, or night sweats.     HEENT:  No epistaxis, mouth sores, or difficulty swallowing.    RESPIRATORY:  No new shortness of breath or cough at present.    CARDIOVASCULAR:  No chest pain or palpitations.    GASTROINTESTINAL:   No abdominal pain, nausea, vomiting, or blood in the stool.    GENITOURINARY:  No dysuria or hematuria.    MUSCULOSKELETAL:  No any new back pain or arthralgias.     NEUROLOGICAL:  Neuropathy in upper and lower extremities resolved completely.. No new headache or dizziness.     LYMPHATICS:  Denies any abnormal swollen and anywhere in the body.    SKIN:  Denies any new skin rash.          PHYSICAL EXAMINATION:      VITAL SIGNS:  /87   Pulse 65   Temp 97.9 °F (36.6 °C) (Temporal Artery )   Resp 16   Ht 162.6 cm (64.02\")   Wt 53.9 kg (118 lb 12.8 oz)   BMI 20.38 kg/m²      ECOG performance status: 2    GENERAL:  Not in any distress.    HEENT:  Normocephalic, Atraumatic.Mild Conjunctival pallor. No icterus. Extraocular Movements Intact. No Facial Asymmetry noted.    NECK:  No adenopathy. No JVD.    RESPIRATORY:  Fair air entry bilateral. No rhonchi or wheezing.    CARDIOVASCULAR:  S1, S2. Regular rate and rhythm. No murmur or gallop appreciated.    ABDOMEN:  Soft,  nontender. Bowel sounds present in all four quadrants.  No organomegaly appreciated.    MUSCULOSKELETAL:  No edema.No Calf Tenderness.  Normal range of motion.    NEUROLOGIC:  Alert, awake and oriented ×3.  No  Motor or sensory deficit " appreciated. Cranial Nerves 2-12 grossly intact.    SKIN: No new skin lesions.    LYMPHATICS: No new lymph node enlargement in neck or supraclavicular area.        DIAGNOSTIC DATA:    Glucose   Date Value Ref Range Status   04/18/2018 222 (H) 60 - 100 mg/dL Final     Sodium   Date Value Ref Range Status   04/18/2018 137 137 - 145 mmol/L Final     Potassium   Date Value Ref Range Status   04/18/2018 4.2 3.5 - 5.1 mmol/L Final     CO2   Date Value Ref Range Status   04/18/2018 23.0 22.0 - 31.0 mmol/L Final     Chloride   Date Value Ref Range Status   04/18/2018 100 95 - 110 mmol/L Final     Anion Gap   Date Value Ref Range Status   04/18/2018 14.0 5.0 - 15.0 mmol/L Final     Creatinine   Date Value Ref Range Status   04/18/2018 0.80 0.50 - 1.00 mg/dL Final     BUN   Date Value Ref Range Status   04/18/2018 23 (H) 7 - 21 mg/dL Final     BUN/Creatinine Ratio   Date Value Ref Range Status   04/18/2018 28.8 (H) 7.0 - 25.0 Final     Calcium   Date Value Ref Range Status   04/18/2018 9.2 8.4 - 10.2 mg/dL Final     eGFR Non  Amer   Date Value Ref Range Status   04/18/2018 70 39 - 90 mL/min/1.73 Final     Alkaline Phosphatase   Date Value Ref Range Status   04/18/2018 53 38 - 126 U/L Final     Total Protein   Date Value Ref Range Status   04/18/2018 6.6 6.3 - 8.6 g/dL Final     ALT (SGPT)   Date Value Ref Range Status   04/18/2018 27 9 - 52 U/L Final     AST (SGOT)   Date Value Ref Range Status   04/18/2018 23 14 - 36 U/L Final     Total Bilirubin   Date Value Ref Range Status   04/18/2018 0.1 (L) 0.2 - 1.3 mg/dL Final     Albumin   Date Value Ref Range Status   04/18/2018 4.30 3.40 - 4.80 g/dL Final     Globulin   Date Value Ref Range Status   04/18/2018 2.3 2.3 - 3.5 gm/dL Final     A/G Ratio   Date Value Ref Range Status   04/18/2018 1.9 (H) 1.1 - 1.8 g/dL Final     Lab Results   Component Value Date    WBC 4.64 06/20/2018    HGB 11.9 (L) 06/20/2018    HCT 36.9 06/20/2018    MCV 88.7 06/20/2018     06/20/2018      Lab Results   Component Value Date    NEUTROABS 2.94 06/20/2018    IRON 64 06/20/2018    TIBC 395 06/20/2018    LABIRON 16 06/20/2018    FERRITIN 56.00 06/20/2018    [id number] >1,000 (H) 06/20/2018    FOLATE 17.10 06/20/2018     Lab Results   Component Value Date    CEA 1.90 06/20/2018    REFLABREPO SEE NOTE: 12/22/2015             RADIOLOGY DATA :  Ct of abdomen and Pelvis with contrast done on November 30, 2017 showed:  ABDOMEN:     - LIVER:    normal size / contour, no ductal dilatation , no  focal lesion   - GB:      grossly negative    - CBD:      grossly negative   - SPLEEN:    normal size and contour   - PANCREAS:    normal in size, contour, no focal mass    - VISCERA:    normal caliber, no wall thickening     - MESENTERY:  no mesenteric mass   - CAVITY:    no free abdominal fluid, no free intraperitoneal  air   - BODY WALL:  wnl   - OSSEOUS:  Mild leftward deviating lumbar scoliosis, unchanged.  - MISC:                                       RETROPERITONEUM:   - KIDNEYS:    normal size / contour, no collecting system  dilation        no evidence of an enhancing mass   - URETERS:    normal course, caliber   - ADRENALS:    normal size, contour   - MISC:      no sig retroperitoneal adenopathy or mass   - VASCULAR:    aorta / iliacs: wnl for age      - - - CT PELVIS - - -       - VISCERA:    normal caliber small/large bowel, no focal  thickening/mass - MESENTERY:  no mass   - VASCULAR:    wnl for age   - CAVITY:    no free fluid / air   - BLADDER:    unremarkable   - OSSEOUS:    wnl    - MISC:   - UTERUS/OVARY:  Status post hysterectomy   .     IMPRESSION:   CONCLUSION:  1. Stable examination.              ASSESSMENT AND PLAN:      1.  Recurrent iron deficiency: Positive recurrence of iron deficiency since October 2016.  Patient had a side effect with Feraheme so was started on Venofer.  Last dose of Venofer was given on October 27 , 2017.  Patient had a EGD, colonoscopy and capsule endoscopy done on January  23, 2018.  EGD showed some gastritis, colonoscopy showed a single tubular adenoma without any cancer.  Patient had a very narrow pylorus after dilating pylorus by Dr. Rico, capsule was passed into the small intestine but Endoscopy report is not helpful since there was so much food as per report in the small intestine.  Developing iron deficiency, patient receive another round of intravenous Venofer from February 19, 2018 until February 23, 2018.  Case was discussed with Dr. Rico, he does not think any much intervention can be done regarding her recurrent iron deficiency  From GI point of view at this point.  He recommends rechecking iron level frequently and supplementing it is required.  This recommendation were discussed with patient.  Iron studies done earlier today shows adequate amount of iron.  No need to restart intravenous Venofer at this point.  We will see her back in about 3 months with a repeat anemia workup on that day.      2.  Colon cancer, stage III, diagnosed in October 2015.  Patient had a hemicolectomy followed by chemotherapy.  Patient had a very complicated course with chemotherapy.  Initially patient was started on Xelox but Xeloda gave her skin rash.  Subsequently patient was changed over to FOLFOX for which she took 3 cycles and had a significant neuropathy.  Subsequently patient was changed to 5-FU and leucovorin which patient could not tolerate either subsequently after 8 cycles of chemotherapy in total chemotherapy was discontinued in July 2016.  Last colonoscopy done in January 2018 did not show any evidence of recurrence.  CT scan done in November 2017 does not show any evidence of recurrence.  Her next surveillance CT scan will be due in November 2018.  CEA done earlier today is within normal limit at 1.9    3.  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    4.  History of dementia    5.  Health maintenance: Patient does not smoke.   Had a colonoscopy done in January 2018.  Patient did not have mammogram for many years now.  She was counseled about the importance of screening mammogram she wants to think about whether she wants to do it or not.      6.BMI: Patient's Body mass index is 20.38 kg/m². BMI is mild range.  No follow-up is required.    7. Advance Care Planning: For now patient remains full code and is able to make her decisions.  Patient has health care surrogate mentioned on chart.           Duong Hanley MD  6/20/2018  11:18 AM

## 2018-07-23 ENCOUNTER — TELEPHONE (OUTPATIENT)
Dept: NUTRITION | Facility: HOSPITAL | Age: 78
End: 2018-07-23

## 2018-07-23 NOTE — PROGRESS NOTES
Adult Outpatient Nutrition  Assessment    Patient Name:  Mary Gutierrez  YOB: 1940  MRN: [id number]    Assessment Date:  7/23/2018    Comments:   called requesting additional [employer] discount coupons. Mailed coupons to home. Wt 118.9 lb 6/20/18 (stable in 2018).                        Electronically signed by:  Love Black RD  07/23/18 9:38 AM

## 2018-08-01 ENCOUNTER — INFUSION (OUTPATIENT)
Dept: ONCOLOGY | Facility: HOSPITAL | Age: 78
End: 2018-08-01

## 2018-08-01 DIAGNOSIS — Z45.2 ENCOUNTER FOR CARE RELATED TO PORT-A-CATH: Primary | ICD-10-CM

## 2018-08-01 PROCEDURE — 25010000002 HEPARIN FLUSH (PORCINE) 100 UNIT/ML SOLUTION: Performed by: INTERNAL MEDICINE

## 2018-08-01 PROCEDURE — 96523 IRRIG DRUG DELIVERY DEVICE: CPT | Performed by: NURSE PRACTITIONER

## 2018-08-01 RX ORDER — SODIUM CHLORIDE 0.9 % (FLUSH) 0.9 %
10 SYRINGE (ML) INJECTION AS NEEDED
Status: CANCELLED | OUTPATIENT
Start: 2018-09-12

## 2018-08-01 RX ORDER — SODIUM CHLORIDE 0.9 % (FLUSH) 0.9 %
10 SYRINGE (ML) INJECTION AS NEEDED
Status: DISCONTINUED | OUTPATIENT
Start: 2018-08-01 | End: 2018-08-01 | Stop reason: HOSPADM

## 2018-08-01 RX ADMIN — Medication 10 ML: at 08:10

## 2018-08-01 RX ADMIN — HEPARIN SODIUM (PORCINE) LOCK FLUSH IV SOLN 100 UNIT/ML 500 UNITS: 100 SOLUTION at 08:10

## 2018-09-12 ENCOUNTER — OFFICE VISIT (OUTPATIENT)
Dept: ONCOLOGY | Facility: CLINIC | Age: 78
End: 2018-09-12

## 2018-09-12 ENCOUNTER — INFUSION (OUTPATIENT)
Dept: ONCOLOGY | Facility: HOSPITAL | Age: 78
End: 2018-09-12

## 2018-09-12 VITALS
TEMPERATURE: 98.2 F | HEIGHT: 64 IN | SYSTOLIC BLOOD PRESSURE: 167 MMHG | OXYGEN SATURATION: 99 % | WEIGHT: 117.6 LBS | RESPIRATION RATE: 16 BRPM | DIASTOLIC BLOOD PRESSURE: 83 MMHG | BODY MASS INDEX: 20.08 KG/M2 | HEART RATE: 70 BPM

## 2018-09-12 DIAGNOSIS — D50.0 IRON DEFICIENCY ANEMIA DUE TO CHRONIC BLOOD LOSS: ICD-10-CM

## 2018-09-12 DIAGNOSIS — K90.9 MALABSORPTION OF IRON: ICD-10-CM

## 2018-09-12 DIAGNOSIS — Z45.2 ENCOUNTER FOR CARE RELATED TO PORT-A-CATH: ICD-10-CM

## 2018-09-12 DIAGNOSIS — C18.2 MALIGNANT NEOPLASM OF ASCENDING COLON (HCC): Primary | ICD-10-CM

## 2018-09-12 LAB
ALBUMIN SERPL-MCNC: 4.1 G/DL (ref 3.4–4.8)
ALBUMIN/GLOB SERPL: 1.5 G/DL (ref 1.1–1.8)
ALP SERPL-CCNC: 54 U/L (ref 38–126)
ALT SERPL W P-5'-P-CCNC: 21 U/L (ref 9–52)
ANION GAP SERPL CALCULATED.3IONS-SCNC: 10 MMOL/L (ref 5–15)
AST SERPL-CCNC: 27 U/L (ref 14–36)
BASOPHILS # BLD AUTO: 0.02 10*3/MM3 (ref 0–0.2)
BASOPHILS NFR BLD AUTO: 0.4 % (ref 0–2)
BILIRUB SERPL-MCNC: 0.4 MG/DL (ref 0.2–1.3)
BUN BLD-MCNC: 26 MG/DL (ref 7–21)
BUN/CREAT SERPL: 28.3 (ref 7–25)
CALCIUM SPEC-SCNC: 9.1 MG/DL (ref 8.4–10.2)
CEA SERPL-MCNC: 2.1 NG/ML (ref 0–5)
CHLORIDE SERPL-SCNC: 104 MMOL/L (ref 95–110)
CO2 SERPL-SCNC: 24 MMOL/L (ref 22–31)
CREAT BLD-MCNC: 0.92 MG/DL (ref 0.5–1)
DEPRECATED RDW RBC AUTO: 47.8 FL (ref 36.4–46.3)
EOSINOPHIL # BLD AUTO: 0.19 10*3/MM3 (ref 0–0.7)
EOSINOPHIL NFR BLD AUTO: 4 % (ref 0–7)
ERYTHROCYTE [DISTWIDTH] IN BLOOD BY AUTOMATED COUNT: 14.6 % (ref 11.5–14.5)
FERRITIN SERPL-MCNC: 32.4 NG/ML (ref 11.1–264)
FOLATE SERPL-MCNC: 18.5 NG/ML (ref 2.76–21)
GFR SERPL CREATININE-BSD FRML MDRD: 59 ML/MIN/1.73 (ref 39–90)
GLOBULIN UR ELPH-MCNC: 2.7 GM/DL (ref 2.3–3.5)
GLUCOSE BLD-MCNC: 160 MG/DL (ref 60–100)
HCT VFR BLD AUTO: 39.4 % (ref 35–45)
HGB BLD-MCNC: 12.9 G/DL (ref 12–15.5)
IMM GRANULOCYTES # BLD: 0.01 10*3/MM3 (ref 0–0.02)
IMM GRANULOCYTES NFR BLD: 0.2 % (ref 0–0.5)
IRON 24H UR-MRATE: 67 MCG/DL (ref 37–170)
IRON SATN MFR SERPL: 17 % (ref 15–50)
LYMPHOCYTES # BLD AUTO: 1.35 10*3/MM3 (ref 0.6–4.2)
LYMPHOCYTES NFR BLD AUTO: 28.5 % (ref 10–50)
MCH RBC QN AUTO: 29 PG (ref 26.5–34)
MCHC RBC AUTO-ENTMCNC: 32.7 G/DL (ref 31.4–36)
MCV RBC AUTO: 88.5 FL (ref 80–98)
MONOCYTES # BLD AUTO: 0.38 10*3/MM3 (ref 0–0.9)
MONOCYTES NFR BLD AUTO: 8 % (ref 0–12)
NEUTROPHILS # BLD AUTO: 2.79 10*3/MM3 (ref 2–8.6)
NEUTROPHILS NFR BLD AUTO: 58.9 % (ref 37–80)
PLATELET # BLD AUTO: 362 10*3/MM3 (ref 150–450)
PMV BLD AUTO: 9.6 FL (ref 8–12)
POTASSIUM BLD-SCNC: 4.2 MMOL/L (ref 3.5–5.1)
PROT SERPL-MCNC: 6.8 G/DL (ref 6.3–8.6)
RBC # BLD AUTO: 4.45 10*6/MM3 (ref 3.77–5.16)
SODIUM BLD-SCNC: 138 MMOL/L (ref 137–145)
TIBC SERPL-MCNC: 396 MCG/DL (ref 265–497)
VIT B12 BLD-MCNC: >1000 PG/ML (ref 239–931)
WBC NRBC COR # BLD: 4.74 10*3/MM3 (ref 3.2–9.8)

## 2018-09-12 PROCEDURE — 80053 COMPREHEN METABOLIC PANEL: CPT

## 2018-09-12 PROCEDURE — 83540 ASSAY OF IRON: CPT

## 2018-09-12 PROCEDURE — 82728 ASSAY OF FERRITIN: CPT

## 2018-09-12 PROCEDURE — 36591 DRAW BLOOD OFF VENOUS DEVICE: CPT | Performed by: INTERNAL MEDICINE

## 2018-09-12 PROCEDURE — 25010000002 HEPARIN FLUSH (PORCINE) 100 UNIT/ML SOLUTION: Performed by: INTERNAL MEDICINE

## 2018-09-12 PROCEDURE — 1123F ACP DISCUSS/DSCN MKR DOCD: CPT | Performed by: INTERNAL MEDICINE

## 2018-09-12 PROCEDURE — 99214 OFFICE O/P EST MOD 30 MIN: CPT | Performed by: INTERNAL MEDICINE

## 2018-09-12 PROCEDURE — 82378 CARCINOEMBRYONIC ANTIGEN: CPT

## 2018-09-12 PROCEDURE — G8420 CALC BMI NORM PARAMETERS: HCPCS | Performed by: INTERNAL MEDICINE

## 2018-09-12 PROCEDURE — 85025 COMPLETE CBC W/AUTO DIFF WBC: CPT

## 2018-09-12 PROCEDURE — G0463 HOSPITAL OUTPT CLINIC VISIT: HCPCS | Performed by: INTERNAL MEDICINE

## 2018-09-12 PROCEDURE — 82746 ASSAY OF FOLIC ACID SERUM: CPT

## 2018-09-12 PROCEDURE — 83550 IRON BINDING TEST: CPT

## 2018-09-12 PROCEDURE — 82607 VITAMIN B-12: CPT

## 2018-09-12 RX ORDER — SODIUM CHLORIDE 0.9 % (FLUSH) 0.9 %
10 SYRINGE (ML) INJECTION AS NEEDED
Status: DISCONTINUED | OUTPATIENT
Start: 2018-09-12 | End: 2018-09-12 | Stop reason: HOSPADM

## 2018-09-12 RX ORDER — SODIUM CHLORIDE 0.9 % (FLUSH) 0.9 %
10 SYRINGE (ML) INJECTION AS NEEDED
Status: CANCELLED | OUTPATIENT
Start: 2018-10-24

## 2018-09-12 RX ADMIN — HEPARIN SODIUM (PORCINE) LOCK FLUSH IV SOLN 100 UNIT/ML 500 UNITS: 100 SOLUTION at 08:30

## 2018-09-12 RX ADMIN — Medication 10 ML: at 07:53

## 2018-09-12 NOTE — PROGRESS NOTES
DATE OF VISIT: 9/12/2018      REASON FOR VISIT: colon cancer and recurrent iron deficiency anemia      HISTORY OF PRESENT ILLNESS:    77-year-old female with a past medical history significant for stage III colon cancer, status post surgery followed by chemotherapy last of which was in July 2016.  In view of recurrent iron deficiency since October 2016 patient has required intravenous venofer.  His last round of intravenous venofer was in 02/2018.  Patient is here for follow-up visit today. Most recent EGD, colonoscopy and capsule endoscopy done by Dr. Rico on January 23, 2018.   Denies any excessive fatigue.   Denies any blood in the stool or urine.  Denies any new lymph node enlargement.      PAST MEDICAL HISTORY:    Past Medical History:   Diagnosis Date   • Acid reflux    • Diabetes mellitus (CMS/HCC)    • Hypertension    • Malignant neoplasm of ascending colon (CMS/HCC) 11/1/2016   • Malignant tumor of cecum (CMS/HCC)        SOCIAL HISTORY:    Social History   Substance Use Topics   • Smoking status: Never Smoker   • Smokeless tobacco: Never Used   • Alcohol use No       Surgical History :  Past Surgical History:   Procedure Laterality Date   • CAPSULE ENDOSCOPY N/A 6/22/2017    Procedure: CAPSULE ENDOSCOPY M2A;  Surgeon: Stuart Rico DO;  Location: [facility] ENDOSCOPY;  Service:    • CAPSULE ENDOSCOPY N/A 1/23/2018    Procedure: CAPSULE ENDOSCOPY M2A;  Surgeon: Stuart Rico DO;  Location: [facility] ENDOSCOPY;  Service:    • CENTRAL VENOUS LINE INSERTION  10/30/2015    Ultrasound localization, left basilic vein.Placement of left upper extremity PICC line   • COLONOSCOPY N/A 1/23/2018    Procedure: COLONOSCOPY;  Surgeon: Stuart Rico DO;  Location: [facility] ENDOSCOPY;  Service:    • ENDOSCOPY N/A 6/22/2017    Procedure: ESOPHAGOGASTRODUODENOSCOPY;  Surgeon: Stuart Rico DO;  Location: [facility] ENDOSCOPY;  Service:    • ENDOSCOPY N/A 1/23/2018    Procedure: ESOPHAGOGASTRODUODENOSCOPY;  Surgeon:  "Stuart Rico DO;  Location: [facility] ENDOSCOPY;  Service:    • FRACTURE SURGERY      right femur    • HYSTERECTOMY     • LAPAROSCOPIC ASSISSTED TOTAL COLECTOMY W/ J-POUCH  10/15/2015    Laparoscopic right hemicolectomy with ileotransverse colostomy   • VENOUS ACCESS DEVICE (PORT) INSERTION  01/12/2016    Right internal jugular Mediport       ALLERGIES:    Allergies   Allergen Reactions   • Other Rash     Oral Chemo Meds       REVIEW OF SYSTEMS:      CONSTITUTIONAL: Denies any excessive fatigue.  No fever, chills, or night sweats.     HEENT:  No epistaxis, mouth sores, or difficulty swallowing.    RESPIRATORY:  No new shortness of breath or cough at present.    CARDIOVASCULAR:  No chest pain or palpitations.    GASTROINTESTINAL:   No abdominal pain, nausea, vomiting, or blood in the stool.    GENITOURINARY:  No dysuria or hematuria.    MUSCULOSKELETAL:  No any new back pain or arthralgias.     NEUROLOGICAL:  Neuropathy in upper and lower extremities resolved completely.. No new headache or dizziness.     LYMPHATICS:  Denies any abnormal swollen and anywhere in the body.    SKIN:  Denies any new skin rash.          PHYSICAL EXAMINATION:      VITAL SIGNS:  /83   Pulse 70   Temp 98.2 °F (36.8 °C) (Temporal Artery )   Resp 16   Ht 162.6 cm (64.02\")   Wt 53.3 kg (117 lb 9.6 oz)   SpO2 99%   BMI 20.18 kg/m²      ECOG performance status: 2    GENERAL:  Not in any distress.    HEENT:  Normocephalic, Atraumatic.Mild Conjunctival pallor. No icterus. Extraocular Movements Intact. No Facial Asymmetry noted.    NECK:  No adenopathy. No JVD.    RESPIRATORY:  Fair air entry bilateral. No rhonchi or wheezing.    CARDIOVASCULAR:  S1, S2. Regular rate and rhythm. No murmur or gallop appreciated.    ABDOMEN:  Soft,  nontender. Bowel sounds present in all four quadrants.  No organomegaly appreciated.    MUSCULOSKELETAL:  No edema.No Calf Tenderness.  Normal range of motion.    NEUROLOGIC:  Alert, awake and oriented ×3.  " No  Motor or sensory deficit appreciated. Cranial Nerves 2-12 grossly intact.    SKIN: No new skin lesions.    LYMPHATICS: No new lymph node enlargement in neck or supraclavicular area.        DIAGNOSTIC DATA:    Glucose   Date Value Ref Range Status   09/12/2018 160 (H) 60 - 100 mg/dL Final     Sodium   Date Value Ref Range Status   09/12/2018 138 137 - 145 mmol/L Final     Potassium   Date Value Ref Range Status   09/12/2018 4.2 3.5 - 5.1 mmol/L Final     CO2   Date Value Ref Range Status   09/12/2018 24.0 22.0 - 31.0 mmol/L Final     Chloride   Date Value Ref Range Status   09/12/2018 104 95 - 110 mmol/L Final     Anion Gap   Date Value Ref Range Status   09/12/2018 10.0 5.0 - 15.0 mmol/L Final     Creatinine   Date Value Ref Range Status   09/12/2018 0.92 0.50 - 1.00 mg/dL Final     BUN   Date Value Ref Range Status   09/12/2018 26 (H) 7 - 21 mg/dL Final     BUN/Creatinine Ratio   Date Value Ref Range Status   09/12/2018 28.3 (H) 7.0 - 25.0 Final     Calcium   Date Value Ref Range Status   09/12/2018 9.1 8.4 - 10.2 mg/dL Final     eGFR Non  Amer   Date Value Ref Range Status   09/12/2018 59 39 - 90 mL/min/1.73 Final     Alkaline Phosphatase   Date Value Ref Range Status   09/12/2018 54 38 - 126 U/L Final     Total Protein   Date Value Ref Range Status   09/12/2018 6.8 6.3 - 8.6 g/dL Final     ALT (SGPT)   Date Value Ref Range Status   09/12/2018 21 9 - 52 U/L Final     AST (SGOT)   Date Value Ref Range Status   09/12/2018 27 14 - 36 U/L Final     Total Bilirubin   Date Value Ref Range Status   09/12/2018 0.4 0.2 - 1.3 mg/dL Final     Albumin   Date Value Ref Range Status   09/12/2018 4.10 3.40 - 4.80 g/dL Final     Globulin   Date Value Ref Range Status   09/12/2018 2.7 2.3 - 3.5 gm/dL Final     Lab Results   Component Value Date    WBC 4.74 09/12/2018    HGB 12.9 09/12/2018    HCT 39.4 09/12/2018    MCV 88.5 09/12/2018     09/12/2018     Lab Results   Component Value Date    NEUTROABS 2.79  09/12/2018    IRON 67 09/12/2018    TIBC 396 09/12/2018    LABIRON 17 09/12/2018    FERRITIN 32.40 09/12/2018    [id number] >1,000 (H) 09/12/2018    FOLATE 18.50 09/12/2018     Lab Results   Component Value Date    CEA 2.10 09/12/2018    REFLABREPO SEE NOTE: 12/22/2015       Component Ferritin   Latest Ref Rng & Units 11.10 - 264.00 ng/mL   12/14/2017 160.00   2/12/2018 41.90   4/18/2018 160.00   6/20/2018 56.00   9/12/2018 32.40         RADIOLOGY DATA :  Ct of abdomen and Pelvis with contrast done on November 30, 2017 showed:  ABDOMEN:     - LIVER:    normal size / contour, no ductal dilatation , no  focal lesion   - GB:      grossly negative    - CBD:      grossly negative   - SPLEEN:    normal size and contour   - PANCREAS:    normal in size, contour, no focal mass    - VISCERA:    normal caliber, no wall thickening     - MESENTERY:  no mesenteric mass   - CAVITY:    no free abdominal fluid, no free intraperitoneal  air   - BODY WALL:  wnl   - OSSEOUS:  Mild leftward deviating lumbar scoliosis, unchanged.  - MISC:                                       RETROPERITONEUM:   - KIDNEYS:    normal size / contour, no collecting system  dilation        no evidence of an enhancing mass   - URETERS:    normal course, caliber   - ADRENALS:    normal size, contour   - MISC:      no sig retroperitoneal adenopathy or mass   - VASCULAR:    aorta / iliacs: wnl for age      - - - CT PELVIS - - -       - VISCERA:    normal caliber small/large bowel, no focal  thickening/mass - MESENTERY:  no mass   - VASCULAR:    wnl for age   - CAVITY:    no free fluid / air   - BLADDER:    unremarkable   - OSSEOUS:    wnl    - MISC:   - UTERUS/OVARY:  Status post hysterectomy   .     IMPRESSION:   CONCLUSION:  1. Stable examination.              ASSESSMENT AND PLAN:      1.  Recurrent iron deficiency: Positive recurrence of iron deficiency since October 2016.  Patient had a side effect with Feraheme so was started on Venofer.  Last dose of  Venofer was given on October 27 , 2017.  Patient had a EGD, colonoscopy and capsule endoscopy done on January 23, 2018.  EGD showed some gastritis, colonoscopy showed a single tubular adenoma without any cancer.  Patient had a very narrow pylorus after dilating pylorus by Dr. Rico, capsule was passed into the small intestine but Endoscopy report is not helpful since there was so much food as per report in the small intestine.  Developing iron deficiency, patient receive another round of intravenous Venofer from February 19, 2018 until February 23, 2018.  Case was discussed with Dr. Rico, he does not think any much intervention can be done regarding her recurrent iron deficiency  From GI point of view at this point.  He recommends rechecking iron level frequently and supplementing it is required.  This recommendation were discussed with patient.  Iron studies done earlier today shows adequate amount of iron.  No need to restart intravenous Venofer at this point.  We will see her back in about 3 months with a repeat anemia workup on that day.      2.  Colon cancer, stage III, diagnosed in October 2015.  Patient had a hemicolectomy followed by chemotherapy.  Patient had a very complicated course with chemotherapy.  Initially patient was started on Xelox but Xeloda gave her skin rash.  Subsequently patient was changed over to FOLFOX for which she took 3 cycles and had a significant neuropathy.  Subsequently patient was changed to 5-FU and leucovorin which patient could not tolerate either subsequently after 8 cycles of chemotherapy in total chemotherapy was discontinued in July 2016.  Last colonoscopy done in January 2018 did not show any evidence of recurrence.  CT scan done in November 2017 does not show any evidence of recurrence.  -CEA level done today on September 20, 2018 is normal at 2.1.  We will get surveillance CT of chest, abdomen and pelvis with contrast prior to next clinic visit in 3 months.      3.   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    4.  History of dementia    5.  Health maintenance: Patient does not smoke.  Had a colonoscopy done in January 2018.  Patient did not have mammogram for many years now.  She was counseled about the importance of screening mammogram she wants to think about whether she wants to do it or not.      6.BMI: Patient's Body mass index is 20.18 kg/m². BMI is mild range.  No follow-up is required.    7. Advance Care Planning: For now patient remains full code and is able to make her decisions.  Patient has health care surrogate mentioned on chart.           Duong Hanley MD  9/12/2018  4:33 PM

## 2018-09-13 ENCOUNTER — TELEPHONE (OUTPATIENT)
Dept: ONCOLOGY | Facility: HOSPITAL | Age: 78
End: 2018-09-13

## 2018-09-13 ENCOUNTER — TELEPHONE (OUTPATIENT)
Dept: NUTRITION | Facility: HOSPITAL | Age: 78
End: 2018-09-13

## 2018-09-13 NOTE — PROGRESS NOTES
Adult Outpatient Nutrition  Assessment    Patient Name:  Mary Gutierrez  YOB: 1940  MRN: [id number]    Assessment Date:  9/13/2018    Comments:  Received voice mail from  requesting addition [employer] discount coupons. Mailed coupons today. Wt stable at 118 lb.                        Electronically signed by:  Love Black RD  09/13/18 10:02 AM

## 2018-09-13 NOTE — TELEPHONE ENCOUNTER
----- Message from Duong Hanley MD sent at 9/12/2018  4:36 PM CDT -----  Please let patient know, her iron level is adequate.  No need to restart intravenous iron at this point.  Thank you

## 2018-10-24 ENCOUNTER — INFUSION (OUTPATIENT)
Dept: ONCOLOGY | Facility: HOSPITAL | Age: 78
End: 2018-10-24

## 2018-10-24 DIAGNOSIS — Z45.2 ENCOUNTER FOR VENOUS ACCESS DEVICE CARE: ICD-10-CM

## 2018-10-24 DIAGNOSIS — Z45.2 ENCOUNTER FOR CARE RELATED TO PORT-A-CATH: Primary | ICD-10-CM

## 2018-10-24 PROCEDURE — 96523 IRRIG DRUG DELIVERY DEVICE: CPT | Performed by: INTERNAL MEDICINE

## 2018-10-24 RX ORDER — SODIUM CHLORIDE 0.9 % (FLUSH) 0.9 %
10 SYRINGE (ML) INJECTION AS NEEDED
Status: DISCONTINUED | OUTPATIENT
Start: 2018-10-24 | End: 2018-10-24 | Stop reason: HOSPADM

## 2018-10-24 RX ORDER — SODIUM CHLORIDE 0.9 % (FLUSH) 0.9 %
10 SYRINGE (ML) INJECTION AS NEEDED
Status: CANCELLED | OUTPATIENT
Start: 2018-11-30

## 2018-10-24 RX ADMIN — Medication 10 ML: at 08:22

## 2018-10-24 RX ADMIN — HEPARIN SODIUM (PORCINE) LOCK FLUSH IV SOLN 100 UNIT/ML 500 UNITS: 100 SOLUTION at 08:22

## 2018-10-26 ENCOUNTER — TELEPHONE (OUTPATIENT)
Dept: NUTRITION | Facility: HOSPITAL | Age: 78
End: 2018-10-26

## 2018-10-26 NOTE — PROGRESS NOTES
Adult Outpatient Nutrition  Assessment    Patient Name:  Mary Gutierrez  YOB: 1940  MRN: [id number]    Assessment Date:  10/26/2018    Comments: Received voice mail from  requesting [employer] discount coupons--mailed several today. Wt 117.8 lb.                        Electronically signed by:  Love Black RD  10/26/18 1:41 PM

## 2018-11-30 ENCOUNTER — LAB (OUTPATIENT)
Dept: ONCOLOGY | Facility: HOSPITAL | Age: 78
End: 2018-11-30

## 2018-11-30 ENCOUNTER — HOSPITAL ENCOUNTER (OUTPATIENT)
Dept: CT IMAGING | Facility: HOSPITAL | Age: 78
Discharge: HOME | End: 2018-11-30
Admitting: INTERNAL MEDICINE

## 2018-11-30 DIAGNOSIS — K90.9 MALABSORPTION OF IRON: ICD-10-CM

## 2018-11-30 DIAGNOSIS — Z45.2 ENCOUNTER FOR CARE RELATED TO PORT-A-CATH: Primary | ICD-10-CM

## 2018-11-30 DIAGNOSIS — C18.2 MALIGNANT NEOPLASM OF ASCENDING COLON (HCC): ICD-10-CM

## 2018-11-30 DIAGNOSIS — Z45.2 ENCOUNTER FOR VENOUS ACCESS DEVICE CARE: ICD-10-CM

## 2018-11-30 DIAGNOSIS — D50.0 IRON DEFICIENCY ANEMIA DUE TO CHRONIC BLOOD LOSS: ICD-10-CM

## 2018-11-30 DIAGNOSIS — Z23 FLU VACCINE NEED: ICD-10-CM

## 2018-11-30 LAB
ALBUMIN SERPL-MCNC: 4.6 G/DL (ref 3.4–4.8)
ALBUMIN/GLOB SERPL: 1.8 G/DL (ref 1.1–1.8)
ALP SERPL-CCNC: 52 U/L (ref 38–126)
ALT SERPL W P-5'-P-CCNC: 15 U/L (ref 9–52)
ANION GAP SERPL CALCULATED.3IONS-SCNC: 12 MMOL/L (ref 5–15)
AST SERPL-CCNC: 24 U/L (ref 14–36)
BASOPHILS # BLD AUTO: 0.02 10*3/MM3 (ref 0–0.2)
BASOPHILS NFR BLD AUTO: 0.4 % (ref 0–2)
BILIRUB SERPL-MCNC: 0.3 MG/DL (ref 0.2–1.3)
BUN BLD-MCNC: 33 MG/DL (ref 7–21)
BUN/CREAT SERPL: 37.5 (ref 7–25)
CALCIUM SPEC-SCNC: 9.2 MG/DL (ref 8.4–10.2)
CEA SERPL-MCNC: 2.1 NG/ML (ref 0–5)
CHLORIDE SERPL-SCNC: 98 MMOL/L (ref 95–110)
CO2 SERPL-SCNC: 26 MMOL/L (ref 22–31)
CREAT BLD-MCNC: 0.88 MG/DL (ref 0.5–1)
DEPRECATED RDW RBC AUTO: 43.6 FL (ref 36.4–46.3)
EOSINOPHIL # BLD AUTO: 0.2 10*3/MM3 (ref 0–0.7)
EOSINOPHIL NFR BLD AUTO: 4.2 % (ref 0–7)
ERYTHROCYTE [DISTWIDTH] IN BLOOD BY AUTOMATED COUNT: 13.7 % (ref 11.5–14.5)
FERRITIN SERPL-MCNC: 29.4 NG/ML (ref 11.1–264)
FOLATE SERPL-MCNC: >20 NG/ML (ref 2.76–21)
GFR SERPL CREATININE-BSD FRML MDRD: 62 ML/MIN/1.73 (ref 39–90)
GLOBULIN UR ELPH-MCNC: 2.5 GM/DL (ref 2.3–3.5)
GLUCOSE BLD-MCNC: 217 MG/DL (ref 60–100)
HCT VFR BLD AUTO: 39.4 % (ref 35–45)
HGB BLD-MCNC: 13 G/DL (ref 12–15.5)
IMM GRANULOCYTES # BLD: 0.01 10*3/MM3 (ref 0–0.02)
IMM GRANULOCYTES NFR BLD: 0.2 % (ref 0–0.5)
IRON 24H UR-MRATE: 41 MCG/DL (ref 37–170)
IRON SATN MFR SERPL: 10 % (ref 15–50)
LYMPHOCYTES # BLD AUTO: 1.29 10*3/MM3 (ref 0.6–4.2)
LYMPHOCYTES NFR BLD AUTO: 27 % (ref 10–50)
MCH RBC QN AUTO: 28.8 PG (ref 26.5–34)
MCHC RBC AUTO-ENTMCNC: 33 G/DL (ref 31.4–36)
MCV RBC AUTO: 87.4 FL (ref 80–98)
MONOCYTES # BLD AUTO: 0.38 10*3/MM3 (ref 0–0.9)
MONOCYTES NFR BLD AUTO: 7.9 % (ref 0–12)
NEUTROPHILS # BLD AUTO: 2.88 10*3/MM3 (ref 2–8.6)
NEUTROPHILS NFR BLD AUTO: 60.3 % (ref 37–80)
PLATELET # BLD AUTO: 308 10*3/MM3 (ref 150–450)
PMV BLD AUTO: 10 FL (ref 8–12)
POTASSIUM BLD-SCNC: 4.2 MMOL/L (ref 3.5–5.1)
PROT SERPL-MCNC: 7.1 G/DL (ref 6.3–8.6)
RBC # BLD AUTO: 4.51 10*6/MM3 (ref 3.77–5.16)
SODIUM BLD-SCNC: 136 MMOL/L (ref 137–145)
TIBC SERPL-MCNC: 395 MCG/DL (ref 265–497)
VIT B12 BLD-MCNC: 858 PG/ML (ref 239–931)
WBC NRBC COR # BLD: 4.78 10*3/MM3 (ref 3.2–9.8)

## 2018-11-30 PROCEDURE — 71260 CT THORAX DX C+: CPT

## 2018-11-30 PROCEDURE — 25010000002 IOPAMIDOL 61 % SOLUTION: Performed by: INTERNAL MEDICINE

## 2018-11-30 PROCEDURE — 83540 ASSAY OF IRON: CPT | Performed by: INTERNAL MEDICINE

## 2018-11-30 PROCEDURE — 85025 COMPLETE CBC W/AUTO DIFF WBC: CPT | Performed by: INTERNAL MEDICINE

## 2018-11-30 PROCEDURE — 83550 IRON BINDING TEST: CPT | Performed by: INTERNAL MEDICINE

## 2018-11-30 PROCEDURE — 82607 VITAMIN B-12: CPT | Performed by: INTERNAL MEDICINE

## 2018-11-30 PROCEDURE — 80053 COMPREHEN METABOLIC PANEL: CPT | Performed by: INTERNAL MEDICINE

## 2018-11-30 PROCEDURE — 74177 CT ABD & PELVIS W/CONTRAST: CPT

## 2018-11-30 PROCEDURE — 82378 CARCINOEMBRYONIC ANTIGEN: CPT | Performed by: INTERNAL MEDICINE

## 2018-11-30 PROCEDURE — 82728 ASSAY OF FERRITIN: CPT | Performed by: INTERNAL MEDICINE

## 2018-11-30 PROCEDURE — 82746 ASSAY OF FOLIC ACID SERUM: CPT | Performed by: INTERNAL MEDICINE

## 2018-11-30 PROCEDURE — 90661 CCIIV3 VAC ABX FR 0.5 ML IM: CPT | Performed by: INTERNAL MEDICINE

## 2018-11-30 PROCEDURE — 36591 DRAW BLOOD OFF VENOUS DEVICE: CPT | Performed by: INTERNAL MEDICINE

## 2018-11-30 PROCEDURE — 25010000002 INFLUENZA VAC SUBUNIT QUAD 0.5 ML SUSPENSION PREFILLED SYRINGE: Performed by: INTERNAL MEDICINE

## 2018-11-30 PROCEDURE — G0008 ADMIN INFLUENZA VIRUS VAC: HCPCS | Performed by: INTERNAL MEDICINE

## 2018-11-30 RX ORDER — SODIUM CHLORIDE 0.9 % (FLUSH) 0.9 %
10 SYRINGE (ML) INJECTION AS NEEDED
Status: DISCONTINUED | OUTPATIENT
Start: 2018-11-30 | End: 2018-11-30 | Stop reason: HOSPADM

## 2018-11-30 RX ORDER — SODIUM CHLORIDE 0.9 % (FLUSH) 0.9 %
10 SYRINGE (ML) INJECTION AS NEEDED
Status: CANCELLED | OUTPATIENT
Start: 2018-12-10

## 2018-11-30 RX ADMIN — INFLUENZA A VIRUS A/SINGAPORE/GP1908/2015 IVR-180 (H1N1) ANTIGEN (MDCK CELL DERIVED, PROPIOLACTONE INACTIVATED), INFLUENZA A VIRUS A/NORTH CAROLINA/04/2016 (H3N2) HEMAGGLUTININ ANTIGEN (MDCK CELL DERIVED, PROPIOLACTONE INACTIVATED), INFLUENZA B VIRUS B/IOWA/06/2017 HEMAGGLUTININ ANTIGEN (MDCK CELL DERIVED, PROPIOLACTONE INACTIVATED), INFLUENZA B VIRUS B/SINGAPORE/INFTT-16-0610/2016 HEMAGGLUTININ ANTIGEN (MDCK CELL DERIVED, PROPIOLACTONE INACTIVATED) 0.5 ML: 15; 15; 15; 15 INJECTION, SUSPENSION INTRAMUSCULAR at 08:26

## 2018-11-30 RX ADMIN — IOPAMIDOL 80 ML: 612 INJECTION, SOLUTION INTRAVENOUS at 09:41

## 2018-11-30 RX ADMIN — SODIUM CHLORIDE, PRESERVATIVE FREE 10 ML: 5 INJECTION INTRAVENOUS at 08:27

## 2018-12-05 ENCOUNTER — OFFICE VISIT (OUTPATIENT)
Dept: ONCOLOGY | Facility: CLINIC | Age: 78
End: 2018-12-05

## 2018-12-05 VITALS
HEART RATE: 79 BPM | DIASTOLIC BLOOD PRESSURE: 88 MMHG | WEIGHT: 122.8 LBS | BODY MASS INDEX: 20.96 KG/M2 | SYSTOLIC BLOOD PRESSURE: 160 MMHG | HEIGHT: 64 IN | RESPIRATION RATE: 16 BRPM | TEMPERATURE: 98.5 F | OXYGEN SATURATION: 97 %

## 2018-12-05 DIAGNOSIS — K90.9 MALABSORPTION OF IRON: ICD-10-CM

## 2018-12-05 DIAGNOSIS — C18.2 MALIGNANT NEOPLASM OF ASCENDING COLON (HCC): Primary | ICD-10-CM

## 2018-12-05 DIAGNOSIS — K86.2 CYST OF PANCREAS: ICD-10-CM

## 2018-12-05 DIAGNOSIS — D50.0 IRON DEFICIENCY ANEMIA DUE TO CHRONIC BLOOD LOSS: ICD-10-CM

## 2018-12-05 PROCEDURE — 99214 OFFICE O/P EST MOD 30 MIN: CPT | Performed by: INTERNAL MEDICINE

## 2018-12-05 PROCEDURE — G8420 CALC BMI NORM PARAMETERS: HCPCS | Performed by: INTERNAL MEDICINE

## 2018-12-05 PROCEDURE — G0463 HOSPITAL OUTPT CLINIC VISIT: HCPCS | Performed by: INTERNAL MEDICINE

## 2018-12-05 PROCEDURE — 1123F ACP DISCUSS/DSCN MKR DOCD: CPT | Performed by: INTERNAL MEDICINE

## 2018-12-05 RX ORDER — FAMOTIDINE 20 MG/1
20 TABLET, FILM COATED ORAL ONCE
Status: CANCELLED | OUTPATIENT
Start: 2018-12-10

## 2018-12-05 RX ORDER — SODIUM CHLORIDE 9 MG/ML
250 INJECTION, SOLUTION INTRAVENOUS ONCE
Status: CANCELLED | OUTPATIENT
Start: 2018-12-10

## 2018-12-05 RX ORDER — DIPHENHYDRAMINE HCL 25 MG
25 CAPSULE ORAL ONCE
Status: CANCELLED | OUTPATIENT
Start: 2018-12-10

## 2018-12-05 RX ORDER — ACETAMINOPHEN 325 MG/1
650 TABLET ORAL ONCE
Status: CANCELLED | OUTPATIENT
Start: 2018-12-10

## 2018-12-05 NOTE — PROGRESS NOTES
DATE OF VISIT: 12/5/2018      REASON FOR VISIT: colon cancer,recurrent iron deficiency anemia, cystic lesion of pancreas      HISTORY OF PRESENT ILLNESS:    77-year-old female with a past medical history significant for stage III colon cancer, status post surgery followed by chemotherapy last of which was in July 2016.  In view of recurrent iron deficiency since October 2016 patient has required intravenous venofer.  His last round of intravenous venofer was in 02/2018.  Patient is here for follow-up visit today. Most recent EGD, colonoscopy and capsule endoscopy done by Dr. Rico on January 23, 2018.  Patient had a surveillance CT of chest, abdomen and pelvis with contrast on November 30, 2018, she is here to discuss the result of CT scan and further recommendation.  Denies any excessive fatigue.   Denies any blood in the stool or urine.  Denies any new lymph node enlargement.      PAST MEDICAL HISTORY:    Past Medical History:   Diagnosis Date   • Acid reflux    • Diabetes mellitus (CMS/HCC)    • Hypertension    • Malignant neoplasm of ascending colon (CMS/HCC) 11/1/2016   • Malignant tumor of cecum (CMS/HCC)        SOCIAL HISTORY:    Social History     Tobacco Use   • Smoking status: Never Smoker   • Smokeless tobacco: Never Used   Substance Use Topics   • Alcohol use: No   • Drug use: No       Surgical History :  Past Surgical History:   Procedure Laterality Date   • CENTRAL VENOUS LINE INSERTION  10/30/2015    Ultrasound localization, left basilic vein.Placement of left upper extremity PICC line   • FRACTURE SURGERY      right femur    • HYSTERECTOMY     • LAPAROSCOPIC ASSISSTED TOTAL COLECTOMY W/ J-POUCH  10/15/2015    Laparoscopic right hemicolectomy with ileotransverse colostomy   • VENOUS ACCESS DEVICE (PORT) INSERTION  01/12/2016    Right internal jugular Mediport       ALLERGIES:    Allergies   Allergen Reactions   • Other Rash     Oral Chemo Meds       REVIEW OF SYSTEMS:      CONSTITUTIONAL: Denies any  "excessive fatigue.Has gained 5 pounds since last clinic visit.  No fever, chills, or night sweats.     HEENT:  No epistaxis, mouth sores, or difficulty swallowing.    RESPIRATORY:  No new shortness of breath or cough at present.    CARDIOVASCULAR:  No chest pain or palpitations.    GASTROINTESTINAL:   No abdominal pain, nausea, vomiting, or blood in the stool.    GENITOURINARY:  No dysuria or hematuria.    MUSCULOSKELETAL:  No any new back pain or arthralgias.     NEUROLOGICAL:  Neuropathy in upper and lower extremities resolved completely. No new headache or dizziness.     LYMPHATICS:  Denies any abnormal swollen and anywhere in the body.    SKIN:  Denies any new skin rash.          PHYSICAL EXAMINATION:      VITAL SIGNS:  /88   Pulse 79   Temp 98.5 °F (36.9 °C) (Temporal)   Resp 16   Ht 162.6 cm (64.02\")   Wt 55.7 kg (122 lb 12.8 oz)   SpO2 97%   BMI 21.07 kg/m²      ECOG performance status: 2    GENERAL:  Not in any distress.    HEENT:  Normocephalic, Atraumatic.Mild Conjunctival pallor. No icterus. Extraocular Movements Intact. No Facial Asymmetry noted.    NECK:  No adenopathy. No JVD.    RESPIRATORY:  Fair air entry bilateral. No rhonchi or wheezing.    CARDIOVASCULAR:  S1, S2. Regular rate and rhythm. No murmur or gallop appreciated.    ABDOMEN:  Soft,  nontender. Bowel sounds present in all four quadrants.  No hepatosplenomegaly appreciated.    MUSCULOSKELETAL:  No edema.No Calf Tenderness.  Normal range of motion.    NEUROLOGIC:  Alert, awake and oriented ×3.  No  Motor or sensory deficit appreciated. Cranial Nerves 2-12 grossly intact.    SKIN: No new skin lesions.    LYMPHATICS: No new lymph node enlargement in neck or supraclavicular area.        DIAGNOSTIC DATA:    Glucose   Date Value Ref Range Status   11/30/2018 217 (H) 60 - 100 mg/dL Final     Sodium   Date Value Ref Range Status   11/30/2018 136 (L) 137 - 145 mmol/L Final     Potassium   Date Value Ref Range Status   11/30/2018 4.2 3.5 " - 5.1 mmol/L Final     CO2   Date Value Ref Range Status   11/30/2018 26.0 22.0 - 31.0 mmol/L Final     Chloride   Date Value Ref Range Status   11/30/2018 98 95 - 110 mmol/L Final     Anion Gap   Date Value Ref Range Status   11/30/2018 12.0 5.0 - 15.0 mmol/L Final     Creatinine   Date Value Ref Range Status   11/30/2018 0.88 0.50 - 1.00 mg/dL Final     BUN   Date Value Ref Range Status   11/30/2018 33 (H) 7 - 21 mg/dL Final     BUN/Creatinine Ratio   Date Value Ref Range Status   11/30/2018 37.5 (H) 7.0 - 25.0 Final     Calcium   Date Value Ref Range Status   11/30/2018 9.2 8.4 - 10.2 mg/dL Final     eGFR Non  Amer   Date Value Ref Range Status   11/30/2018 62 39 - 90 mL/min/1.73 Final     Alkaline Phosphatase   Date Value Ref Range Status   11/30/2018 52 38 - 126 U/L Final     Total Protein   Date Value Ref Range Status   11/30/2018 7.1 6.3 - 8.6 g/dL Final     ALT (SGPT)   Date Value Ref Range Status   11/30/2018 15 9 - 52 U/L Final     AST (SGOT)   Date Value Ref Range Status   11/30/2018 24 14 - 36 U/L Final     Total Bilirubin   Date Value Ref Range Status   11/30/2018 0.3 0.2 - 1.3 mg/dL Final     Albumin   Date Value Ref Range Status   11/30/2018 4.60 3.40 - 4.80 g/dL Final     Globulin   Date Value Ref Range Status   11/30/2018 2.5 2.3 - 3.5 gm/dL Final     Lab Results   Component Value Date    WBC 4.78 11/30/2018    HGB 13.0 11/30/2018    HCT 39.4 11/30/2018    MCV 87.4 11/30/2018     11/30/2018     Lab Results   Component Value Date    NEUTROABS 2.88 11/30/2018    IRON 41 11/30/2018    TIBC 395 11/30/2018    LABIRON 10 (L) 11/30/2018    FERRITIN 29.40 11/30/2018    [id number] 858 11/30/2018    FOLATE >20.00 11/30/2018     Lab Results   Component Value Date    CEA 2.10 11/30/2018    REFLABREPO SEE NOTE: 12/22/2015           RADIOLOGY DATA :  Ct of abdomen and Pelvis with contrast done on November 30, 2017 showed:  ABDOMEN:     - LIVER:    normal size / contour, no ductal dilatation ,  no  focal lesion   - GB:      grossly negative    - CBD:      grossly negative   - SPLEEN:    normal size and contour   - PANCREAS:    normal in size, contour, no focal mass    - VISCERA:    normal caliber, no wall thickening     - MESENTERY:  no mesenteric mass   - CAVITY:    no free abdominal fluid, no free intraperitoneal  air   - BODY WALL:  wnl   - OSSEOUS:  Mild leftward deviating lumbar scoliosis, unchanged.  - MISC:                                       RETROPERITONEUM:   - KIDNEYS:    normal size / contour, no collecting system  dilation        no evidence of an enhancing mass   - URETERS:    normal course, caliber   - ADRENALS:    normal size, contour   - MISC:      no sig retroperitoneal adenopathy or mass   - VASCULAR:    aorta / iliacs: wnl for age      - - - CT PELVIS - - -       - VISCERA:    normal caliber small/large bowel, no focal  thickening/mass - MESENTERY:  no mass   - VASCULAR:    wnl for age   - CAVITY:    no free fluid / air   - BLADDER:    unremarkable   - OSSEOUS:    wnl    - MISC:   - UTERUS/OVARY:  Status post hysterectomy   .     IMPRESSION:   CONCLUSION:  1. Stable examination.              ASSESSMENT AND PLAN:      1.  Recurrent iron deficiency: Positive recurrence of iron deficiency since October 2016.  Patient had a side effect with Feraheme so was started on Venofer.  Last dose of Venofer was given on October 27 , 2017.  Patient had a EGD, colonoscopy and capsule endoscopy done on January 23, 2018.  EGD showed some gastritis, colonoscopy showed a single tubular adenoma without any cancer.  Patient had a very narrow pylorus after dilating pylorus by Dr. Rico, capsule was passed into the small intestine but Endoscopy report is not helpful since there was so much food as per report in the small intestine.  Developing iron deficiency, patient receive another round of intravenous Venofer from February 19, 2018 until February 23, 2018.  Case was discussed with Dr. Rico, he does not  think any much intervention can be done regarding her recurrent iron deficiency  From GI point of view at this point.  He recommends rechecking iron level frequently and supplementing it is required.    -Anemia workup done on November 30, 2018 again shows developing iron deficiency with iron saturation of 10%.  We will start patient back on intravenous Venofer starting next week.  -We will ask patient to return to clinic in 3 months with repeat CBC and anemia workup to be done prior to that.      2.  Colon cancer, stage III, diagnosed in October 2015.  Patient had a hemicolectomy followed by chemotherapy.  Patient had a very complicated course with chemotherapy.  Initially patient was started on Xelox but Xeloda gave her skin rash.  Subsequently patient was changed over to FOLFOX for which she took 3 cycles and had a significant neuropathy.  Subsequently patient was changed to 5-FU and leucovorin which patient could not tolerate either subsequently after 8 cycles of chemotherapy in total chemotherapy was discontinued in July 2016.  Last colonoscopy done in January 2018 did not show any evidence of recurrence.    -Most recent CEA level done in November 2018 was normal at 2.1.  -CT of chest, abdomen and pelvis with contrast done on November 30, 2018 does not show any evidence of recurrence.  -Due to cystic lesion in pancreas, we will repeat CT of abdomen and pelvis with contrast in 6 month around June 2019 which was discussed with patient and her .    3.  Cystic lesion in pancreas:  -CT scan done on November 30, 2018 shows 1 cm cystic lesion in pancreas.  Differential diagnosis for cystic lesion in pancreas includes benign cyst versus cystic neoplasm of the pancreas.  -Will repeat CT of abdomen and pelvis with contrast around June 2019 to follow-up on cystic lesion of pancreas.    4.  History of dementia    5.  Health maintenance: Patient does not smoke.  Had a colonoscopy done in January 2018.  Patient did not  have mammogram for many years now.  She was counseled about the importance of screening mammogram she wants to think about whether she wants to do it or not.      6.BMI: Patient's Body mass index is 21.07 kg/m². BMI is mild range.  No follow-up is required.    7. Advance Care Planning: For now patient remains full code and is able to make her decisions.  Patient has health care surrogate mentioned on chart.           Duong Hanley MD  12/5/2018  8:08 AM

## 2018-12-10 ENCOUNTER — INFUSION (OUTPATIENT)
Dept: ONCOLOGY | Facility: HOSPITAL | Age: 78
End: 2018-12-10

## 2018-12-10 VITALS
DIASTOLIC BLOOD PRESSURE: 74 MMHG | HEART RATE: 72 BPM | TEMPERATURE: 96.9 F | SYSTOLIC BLOOD PRESSURE: 168 MMHG | RESPIRATION RATE: 18 BRPM

## 2018-12-10 DIAGNOSIS — K90.9 MALABSORPTION OF IRON: ICD-10-CM

## 2018-12-10 DIAGNOSIS — Z45.2 ENCOUNTER FOR CARE RELATED TO PORT-A-CATH: Primary | ICD-10-CM

## 2018-12-10 DIAGNOSIS — D50.0 IRON DEFICIENCY ANEMIA DUE TO CHRONIC BLOOD LOSS: ICD-10-CM

## 2018-12-10 PROCEDURE — 63710000001 DIPHENHYDRAMINE PER 50 MG: Performed by: INTERNAL MEDICINE

## 2018-12-10 PROCEDURE — 25010000002 IRON SUCROSE PER 1 MG: Performed by: INTERNAL MEDICINE

## 2018-12-10 PROCEDURE — 96374 THER/PROPH/DIAG INJ IV PUSH: CPT | Performed by: INTERNAL MEDICINE

## 2018-12-10 RX ORDER — DIPHENHYDRAMINE HCL 25 MG
25 CAPSULE ORAL ONCE
Status: COMPLETED | OUTPATIENT
Start: 2018-12-10 | End: 2018-12-10

## 2018-12-10 RX ORDER — SODIUM CHLORIDE 0.9 % (FLUSH) 0.9 %
10 SYRINGE (ML) INJECTION AS NEEDED
Status: DISCONTINUED | OUTPATIENT
Start: 2018-12-10 | End: 2018-12-10 | Stop reason: HOSPADM

## 2018-12-10 RX ORDER — FAMOTIDINE 20 MG/1
20 TABLET, FILM COATED ORAL ONCE
Status: COMPLETED | OUTPATIENT
Start: 2018-12-10 | End: 2018-12-10

## 2018-12-10 RX ORDER — SODIUM CHLORIDE 9 MG/ML
250 INJECTION, SOLUTION INTRAVENOUS ONCE
Status: COMPLETED | OUTPATIENT
Start: 2018-12-10 | End: 2018-12-10

## 2018-12-10 RX ORDER — ACETAMINOPHEN 325 MG/1
650 TABLET ORAL ONCE
Status: COMPLETED | OUTPATIENT
Start: 2018-12-10 | End: 2018-12-10

## 2018-12-10 RX ORDER — FAMOTIDINE 20 MG/1
20 TABLET, FILM COATED ORAL ONCE
Status: CANCELLED | OUTPATIENT
Start: 2018-12-11

## 2018-12-10 RX ORDER — SODIUM CHLORIDE 0.9 % (FLUSH) 0.9 %
10 SYRINGE (ML) INJECTION AS NEEDED
Status: CANCELLED | OUTPATIENT
Start: 2018-12-11

## 2018-12-10 RX ORDER — DIPHENHYDRAMINE HCL 25 MG
25 CAPSULE ORAL ONCE
Status: CANCELLED | OUTPATIENT
Start: 2018-12-11

## 2018-12-10 RX ORDER — SODIUM CHLORIDE 9 MG/ML
250 INJECTION, SOLUTION INTRAVENOUS ONCE
Status: CANCELLED | OUTPATIENT
Start: 2018-12-11

## 2018-12-10 RX ORDER — ACETAMINOPHEN 325 MG/1
650 TABLET ORAL ONCE
Status: CANCELLED | OUTPATIENT
Start: 2018-12-11

## 2018-12-10 RX ADMIN — IRON SUCROSE 200 MG: 20 INJECTION, SOLUTION INTRAVENOUS at 08:34

## 2018-12-10 RX ADMIN — DIPHENHYDRAMINE HYDROCHLORIDE 25 MG: 25 CAPSULE ORAL at 08:18

## 2018-12-10 RX ADMIN — SODIUM CHLORIDE, PRESERVATIVE FREE 500 UNITS: 5 INJECTION INTRAVENOUS at 09:20

## 2018-12-10 RX ADMIN — SODIUM CHLORIDE, PRESERVATIVE FREE 10 ML: 5 INJECTION INTRAVENOUS at 09:20

## 2018-12-10 RX ADMIN — FAMOTIDINE 20 MG: 20 TABLET ORAL at 08:18

## 2018-12-10 RX ADMIN — ACETAMINOPHEN 650 MG: 325 TABLET ORAL at 08:18

## 2018-12-10 RX ADMIN — SODIUM CHLORIDE 250 ML: 9 INJECTION, SOLUTION INTRAVENOUS at 08:18

## 2018-12-11 ENCOUNTER — INFUSION (OUTPATIENT)
Dept: ONCOLOGY | Facility: HOSPITAL | Age: 78
End: 2018-12-11

## 2018-12-11 VITALS
TEMPERATURE: 96.9 F | HEART RATE: 76 BPM | DIASTOLIC BLOOD PRESSURE: 73 MMHG | RESPIRATION RATE: 16 BRPM | SYSTOLIC BLOOD PRESSURE: 165 MMHG

## 2018-12-11 DIAGNOSIS — Z45.2 ENCOUNTER FOR CARE RELATED TO PORT-A-CATH: ICD-10-CM

## 2018-12-11 DIAGNOSIS — K90.9 MALABSORPTION OF IRON: Primary | ICD-10-CM

## 2018-12-11 DIAGNOSIS — D50.0 IRON DEFICIENCY ANEMIA DUE TO CHRONIC BLOOD LOSS: ICD-10-CM

## 2018-12-11 PROCEDURE — 25010000002 IRON SUCROSE PER 1 MG: Performed by: INTERNAL MEDICINE

## 2018-12-11 PROCEDURE — 96374 THER/PROPH/DIAG INJ IV PUSH: CPT | Performed by: INTERNAL MEDICINE

## 2018-12-11 PROCEDURE — 63710000001 DIPHENHYDRAMINE PER 50 MG: Performed by: INTERNAL MEDICINE

## 2018-12-11 RX ORDER — FAMOTIDINE 20 MG/1
20 TABLET, FILM COATED ORAL ONCE
Status: CANCELLED | OUTPATIENT
Start: 2018-12-11

## 2018-12-11 RX ORDER — DIPHENHYDRAMINE HCL 25 MG
25 CAPSULE ORAL ONCE
Status: COMPLETED | OUTPATIENT
Start: 2018-12-11 | End: 2018-12-11

## 2018-12-11 RX ORDER — SODIUM CHLORIDE 9 MG/ML
250 INJECTION, SOLUTION INTRAVENOUS ONCE
Status: COMPLETED | OUTPATIENT
Start: 2018-12-11 | End: 2018-12-11

## 2018-12-11 RX ORDER — ACETAMINOPHEN 325 MG/1
650 TABLET ORAL ONCE
Status: COMPLETED | OUTPATIENT
Start: 2018-12-11 | End: 2018-12-11

## 2018-12-11 RX ORDER — FAMOTIDINE 20 MG/1
20 TABLET, FILM COATED ORAL ONCE
Status: COMPLETED | OUTPATIENT
Start: 2018-12-11 | End: 2018-12-11

## 2018-12-11 RX ORDER — SODIUM CHLORIDE 0.9 % (FLUSH) 0.9 %
10 SYRINGE (ML) INJECTION AS NEEDED
Status: CANCELLED | OUTPATIENT
Start: 2018-12-11

## 2018-12-11 RX ORDER — SODIUM CHLORIDE 0.9 % (FLUSH) 0.9 %
10 SYRINGE (ML) INJECTION AS NEEDED
Status: DISCONTINUED | OUTPATIENT
Start: 2018-12-11 | End: 2018-12-11 | Stop reason: HOSPADM

## 2018-12-11 RX ORDER — ACETAMINOPHEN 325 MG/1
650 TABLET ORAL ONCE
Status: CANCELLED | OUTPATIENT
Start: 2018-12-11

## 2018-12-11 RX ORDER — SODIUM CHLORIDE 9 MG/ML
250 INJECTION, SOLUTION INTRAVENOUS ONCE
Status: CANCELLED | OUTPATIENT
Start: 2018-12-11

## 2018-12-11 RX ORDER — DIPHENHYDRAMINE HCL 25 MG
25 CAPSULE ORAL ONCE
Status: CANCELLED | OUTPATIENT
Start: 2018-12-11

## 2018-12-11 RX ADMIN — ACETAMINOPHEN 650 MG: 325 TABLET ORAL at 08:12

## 2018-12-11 RX ADMIN — FAMOTIDINE 20 MG: 20 TABLET ORAL at 08:12

## 2018-12-11 RX ADMIN — SODIUM CHLORIDE 250 ML: 9 INJECTION, SOLUTION INTRAVENOUS at 08:14

## 2018-12-11 RX ADMIN — SODIUM CHLORIDE, PRESERVATIVE FREE 500 UNITS: 5 INJECTION INTRAVENOUS at 09:14

## 2018-12-11 RX ADMIN — IRON SUCROSE 200 MG: 20 INJECTION, SOLUTION INTRAVENOUS at 08:27

## 2018-12-11 RX ADMIN — DIPHENHYDRAMINE HYDROCHLORIDE 25 MG: 25 CAPSULE ORAL at 08:12

## 2018-12-11 RX ADMIN — SODIUM CHLORIDE, PRESERVATIVE FREE 10 ML: 5 INJECTION INTRAVENOUS at 09:14

## 2018-12-12 ENCOUNTER — INFUSION (OUTPATIENT)
Dept: ONCOLOGY | Facility: HOSPITAL | Age: 78
End: 2018-12-12

## 2018-12-12 VITALS
RESPIRATION RATE: 16 BRPM | HEART RATE: 81 BPM | SYSTOLIC BLOOD PRESSURE: 144 MMHG | TEMPERATURE: 97.6 F | DIASTOLIC BLOOD PRESSURE: 97 MMHG

## 2018-12-12 DIAGNOSIS — Z45.2 ENCOUNTER FOR CARE RELATED TO PORT-A-CATH: ICD-10-CM

## 2018-12-12 DIAGNOSIS — K90.9 MALABSORPTION OF IRON: Primary | ICD-10-CM

## 2018-12-12 DIAGNOSIS — D50.0 IRON DEFICIENCY ANEMIA DUE TO CHRONIC BLOOD LOSS: ICD-10-CM

## 2018-12-12 PROCEDURE — 63710000001 DIPHENHYDRAMINE PER 50 MG: Performed by: INTERNAL MEDICINE

## 2018-12-12 PROCEDURE — 25010000002 IRON SUCROSE PER 1 MG: Performed by: INTERNAL MEDICINE

## 2018-12-12 PROCEDURE — 96374 THER/PROPH/DIAG INJ IV PUSH: CPT | Performed by: INTERNAL MEDICINE

## 2018-12-12 RX ORDER — SODIUM CHLORIDE 0.9 % (FLUSH) 0.9 %
10 SYRINGE (ML) INJECTION AS NEEDED
Status: DISCONTINUED | OUTPATIENT
Start: 2018-12-12 | End: 2018-12-12 | Stop reason: HOSPADM

## 2018-12-12 RX ORDER — ACETAMINOPHEN 325 MG/1
650 TABLET ORAL ONCE
Status: COMPLETED | OUTPATIENT
Start: 2018-12-12 | End: 2018-12-12

## 2018-12-12 RX ORDER — SODIUM CHLORIDE 0.9 % (FLUSH) 0.9 %
10 SYRINGE (ML) INJECTION AS NEEDED
Status: CANCELLED | OUTPATIENT
Start: 2018-12-12

## 2018-12-12 RX ORDER — FAMOTIDINE 20 MG/1
20 TABLET, FILM COATED ORAL ONCE
Status: CANCELLED | OUTPATIENT
Start: 2018-12-12

## 2018-12-12 RX ORDER — ACETAMINOPHEN 325 MG/1
650 TABLET ORAL ONCE
Status: CANCELLED | OUTPATIENT
Start: 2018-12-12

## 2018-12-12 RX ORDER — SODIUM CHLORIDE 9 MG/ML
250 INJECTION, SOLUTION INTRAVENOUS ONCE
Status: CANCELLED | OUTPATIENT
Start: 2018-12-12

## 2018-12-12 RX ORDER — FAMOTIDINE 20 MG/1
20 TABLET, FILM COATED ORAL ONCE
Status: COMPLETED | OUTPATIENT
Start: 2018-12-12 | End: 2018-12-12

## 2018-12-12 RX ORDER — DIPHENHYDRAMINE HCL 25 MG
25 CAPSULE ORAL ONCE
Status: CANCELLED | OUTPATIENT
Start: 2018-12-12

## 2018-12-12 RX ORDER — DIPHENHYDRAMINE HCL 25 MG
25 CAPSULE ORAL ONCE
Status: COMPLETED | OUTPATIENT
Start: 2018-12-12 | End: 2018-12-12

## 2018-12-12 RX ORDER — SODIUM CHLORIDE 9 MG/ML
250 INJECTION, SOLUTION INTRAVENOUS ONCE
Status: COMPLETED | OUTPATIENT
Start: 2018-12-12 | End: 2018-12-12

## 2018-12-12 RX ADMIN — IRON SUCROSE 200 MG: 20 INJECTION, SOLUTION INTRAVENOUS at 08:42

## 2018-12-12 RX ADMIN — SODIUM CHLORIDE, PRESERVATIVE FREE 500 UNITS: 5 INJECTION INTRAVENOUS at 09:25

## 2018-12-12 RX ADMIN — FAMOTIDINE 20 MG: 20 TABLET ORAL at 08:09

## 2018-12-12 RX ADMIN — SODIUM CHLORIDE 250 ML: 9 INJECTION, SOLUTION INTRAVENOUS at 08:06

## 2018-12-12 RX ADMIN — SODIUM CHLORIDE, PRESERVATIVE FREE 10 ML: 5 INJECTION INTRAVENOUS at 09:25

## 2018-12-12 RX ADMIN — DIPHENHYDRAMINE HYDROCHLORIDE 25 MG: 25 CAPSULE ORAL at 08:09

## 2018-12-12 RX ADMIN — ACETAMINOPHEN 650 MG: 325 TABLET ORAL at 08:09

## 2018-12-13 ENCOUNTER — INFUSION (OUTPATIENT)
Dept: ONCOLOGY | Facility: HOSPITAL | Age: 78
End: 2018-12-13

## 2018-12-13 VITALS
TEMPERATURE: 97.6 F | HEART RATE: 74 BPM | SYSTOLIC BLOOD PRESSURE: 170 MMHG | RESPIRATION RATE: 18 BRPM | DIASTOLIC BLOOD PRESSURE: 75 MMHG

## 2018-12-13 DIAGNOSIS — K90.9 MALABSORPTION OF IRON: Primary | ICD-10-CM

## 2018-12-13 DIAGNOSIS — Z45.2 ENCOUNTER FOR CARE RELATED TO PORT-A-CATH: ICD-10-CM

## 2018-12-13 DIAGNOSIS — D50.0 IRON DEFICIENCY ANEMIA DUE TO CHRONIC BLOOD LOSS: ICD-10-CM

## 2018-12-13 PROCEDURE — 25010000002 IRON SUCROSE PER 1 MG: Performed by: INTERNAL MEDICINE

## 2018-12-13 PROCEDURE — 63710000001 DIPHENHYDRAMINE PER 50 MG: Performed by: INTERNAL MEDICINE

## 2018-12-13 PROCEDURE — 96374 THER/PROPH/DIAG INJ IV PUSH: CPT | Performed by: INTERNAL MEDICINE

## 2018-12-13 RX ORDER — DIPHENHYDRAMINE HCL 25 MG
25 CAPSULE ORAL ONCE
Status: CANCELLED | OUTPATIENT
Start: 2018-12-13

## 2018-12-13 RX ORDER — SODIUM CHLORIDE 9 MG/ML
250 INJECTION, SOLUTION INTRAVENOUS ONCE
Status: CANCELLED | OUTPATIENT
Start: 2018-12-13

## 2018-12-13 RX ORDER — SODIUM CHLORIDE 0.9 % (FLUSH) 0.9 %
10 SYRINGE (ML) INJECTION AS NEEDED
Status: CANCELLED | OUTPATIENT
Start: 2018-12-13

## 2018-12-13 RX ORDER — FAMOTIDINE 20 MG/1
20 TABLET, FILM COATED ORAL ONCE
Status: CANCELLED | OUTPATIENT
Start: 2018-12-13

## 2018-12-13 RX ORDER — FAMOTIDINE 20 MG/1
20 TABLET, FILM COATED ORAL ONCE
Status: COMPLETED | OUTPATIENT
Start: 2018-12-13 | End: 2018-12-13

## 2018-12-13 RX ORDER — ACETAMINOPHEN 325 MG/1
650 TABLET ORAL ONCE
Status: CANCELLED | OUTPATIENT
Start: 2018-12-13

## 2018-12-13 RX ORDER — SODIUM CHLORIDE 0.9 % (FLUSH) 0.9 %
10 SYRINGE (ML) INJECTION AS NEEDED
Status: DISCONTINUED | OUTPATIENT
Start: 2018-12-13 | End: 2018-12-13 | Stop reason: HOSPADM

## 2018-12-13 RX ORDER — SODIUM CHLORIDE 9 MG/ML
250 INJECTION, SOLUTION INTRAVENOUS ONCE
Status: COMPLETED | OUTPATIENT
Start: 2018-12-13 | End: 2018-12-13

## 2018-12-13 RX ORDER — DIPHENHYDRAMINE HCL 25 MG
25 CAPSULE ORAL ONCE
Status: COMPLETED | OUTPATIENT
Start: 2018-12-13 | End: 2018-12-13

## 2018-12-13 RX ORDER — ACETAMINOPHEN 325 MG/1
650 TABLET ORAL ONCE
Status: COMPLETED | OUTPATIENT
Start: 2018-12-13 | End: 2018-12-13

## 2018-12-13 RX ADMIN — SODIUM CHLORIDE 250 ML: 9 INJECTION, SOLUTION INTRAVENOUS at 08:19

## 2018-12-13 RX ADMIN — IRON SUCROSE 200 MG: 20 INJECTION, SOLUTION INTRAVENOUS at 08:37

## 2018-12-13 RX ADMIN — SODIUM CHLORIDE, PRESERVATIVE FREE 500 UNITS: 5 INJECTION INTRAVENOUS at 09:31

## 2018-12-13 RX ADMIN — DIPHENHYDRAMINE HYDROCHLORIDE 25 MG: 25 CAPSULE ORAL at 08:19

## 2018-12-13 RX ADMIN — FAMOTIDINE 20 MG: 20 TABLET ORAL at 08:19

## 2018-12-13 RX ADMIN — ACETAMINOPHEN 650 MG: 325 TABLET ORAL at 08:19

## 2018-12-13 RX ADMIN — SODIUM CHLORIDE, PRESERVATIVE FREE 10 ML: 5 INJECTION INTRAVENOUS at 09:31

## 2018-12-14 ENCOUNTER — INFUSION (OUTPATIENT)
Dept: ONCOLOGY | Facility: HOSPITAL | Age: 78
End: 2018-12-14

## 2018-12-14 VITALS
HEART RATE: 72 BPM | TEMPERATURE: 97 F | RESPIRATION RATE: 18 BRPM | SYSTOLIC BLOOD PRESSURE: 184 MMHG | DIASTOLIC BLOOD PRESSURE: 74 MMHG

## 2018-12-14 DIAGNOSIS — D50.0 IRON DEFICIENCY ANEMIA DUE TO CHRONIC BLOOD LOSS: ICD-10-CM

## 2018-12-14 DIAGNOSIS — K90.9 MALABSORPTION OF IRON: Primary | ICD-10-CM

## 2018-12-14 DIAGNOSIS — Z45.2 ENCOUNTER FOR CARE RELATED TO PORT-A-CATH: ICD-10-CM

## 2018-12-14 DIAGNOSIS — Z45.2 ENCOUNTER FOR VENOUS ACCESS DEVICE CARE: ICD-10-CM

## 2018-12-14 PROCEDURE — 25010000002 IRON SUCROSE PER 1 MG: Performed by: INTERNAL MEDICINE

## 2018-12-14 PROCEDURE — 96375 TX/PRO/DX INJ NEW DRUG ADDON: CPT | Performed by: INTERNAL MEDICINE

## 2018-12-14 PROCEDURE — 63710000001 DIPHENHYDRAMINE PER 50 MG: Performed by: INTERNAL MEDICINE

## 2018-12-14 PROCEDURE — 96374 THER/PROPH/DIAG INJ IV PUSH: CPT | Performed by: INTERNAL MEDICINE

## 2018-12-14 RX ORDER — DIPHENHYDRAMINE HCL 25 MG
25 CAPSULE ORAL ONCE
Status: COMPLETED | OUTPATIENT
Start: 2018-12-14 | End: 2018-12-14

## 2018-12-14 RX ORDER — SODIUM CHLORIDE 9 MG/ML
250 INJECTION, SOLUTION INTRAVENOUS ONCE
Status: COMPLETED | OUTPATIENT
Start: 2018-12-14 | End: 2018-12-14

## 2018-12-14 RX ORDER — SODIUM CHLORIDE 0.9 % (FLUSH) 0.9 %
10 SYRINGE (ML) INJECTION AS NEEDED
Status: CANCELLED | OUTPATIENT
Start: 2019-01-25

## 2018-12-14 RX ORDER — ACETAMINOPHEN 325 MG/1
650 TABLET ORAL ONCE
Status: COMPLETED | OUTPATIENT
Start: 2018-12-14 | End: 2018-12-14

## 2018-12-14 RX ORDER — FAMOTIDINE 20 MG/1
20 TABLET, FILM COATED ORAL ONCE
Status: CANCELLED | OUTPATIENT
Start: 2018-12-14

## 2018-12-14 RX ORDER — SODIUM CHLORIDE 0.9 % (FLUSH) 0.9 %
10 SYRINGE (ML) INJECTION AS NEEDED
Status: DISCONTINUED | OUTPATIENT
Start: 2018-12-14 | End: 2018-12-14 | Stop reason: HOSPADM

## 2018-12-14 RX ORDER — FAMOTIDINE 20 MG/1
20 TABLET, FILM COATED ORAL ONCE
Status: COMPLETED | OUTPATIENT
Start: 2018-12-14 | End: 2018-12-14

## 2018-12-14 RX ORDER — SODIUM CHLORIDE 9 MG/ML
250 INJECTION, SOLUTION INTRAVENOUS ONCE
Status: CANCELLED | OUTPATIENT
Start: 2018-12-14

## 2018-12-14 RX ORDER — DIPHENHYDRAMINE HCL 25 MG
25 CAPSULE ORAL ONCE
Status: CANCELLED | OUTPATIENT
Start: 2018-12-14

## 2018-12-14 RX ORDER — ACETAMINOPHEN 325 MG/1
650 TABLET ORAL ONCE
Status: CANCELLED | OUTPATIENT
Start: 2018-12-14

## 2018-12-14 RX ADMIN — SODIUM CHLORIDE, PRESERVATIVE FREE 500 UNITS: 5 INJECTION INTRAVENOUS at 09:35

## 2018-12-14 RX ADMIN — DIPHENHYDRAMINE HYDROCHLORIDE 25 MG: 25 CAPSULE ORAL at 08:26

## 2018-12-14 RX ADMIN — SODIUM CHLORIDE 250 ML: 9 INJECTION, SOLUTION INTRAVENOUS at 08:24

## 2018-12-14 RX ADMIN — SODIUM CHLORIDE, PRESERVATIVE FREE 10 ML: 5 INJECTION INTRAVENOUS at 09:35

## 2018-12-14 RX ADMIN — IRON SUCROSE 200 MG: 20 INJECTION, SOLUTION INTRAVENOUS at 08:57

## 2018-12-14 RX ADMIN — FAMOTIDINE 20 MG: 20 TABLET ORAL at 08:26

## 2018-12-14 RX ADMIN — ACETAMINOPHEN 650 MG: 325 TABLET ORAL at 08:26

## 2019-01-15 ENCOUNTER — DOCUMENTATION (OUTPATIENT)
Dept: NUTRITION | Facility: HOSPITAL | Age: 79
End: 2019-01-15

## 2019-01-15 NOTE — PROGRESS NOTES
Adult Outpatient Nutrition  Assessment    Patient Name:  Mary Gutierrez  YOB: 1940  MRN: [id number]    Assessment Date:  1/15/2019    Comments: Wt up to 122.9 lb.  called requesting additional [employer] discount coupons. Coupons mailed to home.                        Electronically signed by:  Love Black RD  01/15/19 12:12 PM

## 2019-01-25 ENCOUNTER — APPOINTMENT (OUTPATIENT)
Dept: ONCOLOGY | Facility: HOSPITAL | Age: 79
End: 2019-01-25

## 2019-01-31 ENCOUNTER — APPOINTMENT (OUTPATIENT)
Dept: ONCOLOGY | Facility: HOSPITAL | Age: 79
End: 2019-01-31

## 2019-02-04 ENCOUNTER — INFUSION (OUTPATIENT)
Dept: ONCOLOGY | Facility: HOSPITAL | Age: 79
End: 2019-02-04

## 2019-02-04 DIAGNOSIS — Z45.2 ENCOUNTER FOR CARE RELATED TO PORT-A-CATH: Primary | ICD-10-CM

## 2019-02-04 PROCEDURE — 96523 IRRIG DRUG DELIVERY DEVICE: CPT | Performed by: INTERNAL MEDICINE

## 2019-02-04 RX ORDER — SODIUM CHLORIDE 0.9 % (FLUSH) 0.9 %
10 SYRINGE (ML) INJECTION AS NEEDED
Status: CANCELLED | OUTPATIENT
Start: 2019-03-04

## 2019-02-04 RX ORDER — SODIUM CHLORIDE 0.9 % (FLUSH) 0.9 %
10 SYRINGE (ML) INJECTION AS NEEDED
Status: DISCONTINUED | OUTPATIENT
Start: 2019-02-04 | End: 2019-02-04 | Stop reason: HOSPADM

## 2019-02-04 RX ADMIN — SODIUM CHLORIDE, PRESERVATIVE FREE 500 UNITS: 5 INJECTION INTRAVENOUS at 08:05

## 2019-02-04 RX ADMIN — SODIUM CHLORIDE, PRESERVATIVE FREE 10 ML: 5 INJECTION INTRAVENOUS at 08:06

## 2019-03-04 ENCOUNTER — INFUSION (OUTPATIENT)
Dept: ONCOLOGY | Facility: HOSPITAL | Age: 79
End: 2019-03-04

## 2019-03-04 DIAGNOSIS — D50.0 IRON DEFICIENCY ANEMIA DUE TO CHRONIC BLOOD LOSS: ICD-10-CM

## 2019-03-04 DIAGNOSIS — C18.2 MALIGNANT NEOPLASM OF ASCENDING COLON (HCC): Primary | ICD-10-CM

## 2019-03-04 DIAGNOSIS — Z45.2 ENCOUNTER FOR CARE RELATED TO PORT-A-CATH: ICD-10-CM

## 2019-03-04 DIAGNOSIS — K90.9 MALABSORPTION OF IRON: ICD-10-CM

## 2019-03-04 LAB
ALBUMIN SERPL-MCNC: 4.1 G/DL (ref 3.4–4.8)
ALBUMIN/GLOB SERPL: 1.5 G/DL (ref 1.1–1.8)
ALP SERPL-CCNC: 64 U/L (ref 38–126)
ALT SERPL W P-5'-P-CCNC: 11 U/L (ref 9–52)
ANION GAP SERPL CALCULATED.3IONS-SCNC: 6 MMOL/L (ref 5–15)
AST SERPL-CCNC: 25 U/L (ref 14–36)
BASOPHILS # BLD AUTO: 0.04 10*3/MM3 (ref 0–0.2)
BASOPHILS NFR BLD AUTO: 0.7 % (ref 0–1.5)
BILIRUB SERPL-MCNC: 0.4 MG/DL (ref 0.2–1.3)
BUN BLD-MCNC: 11 MG/DL (ref 7–21)
BUN/CREAT SERPL: 13.8 (ref 7–25)
CALCIUM SPEC-SCNC: 9.1 MG/DL (ref 8.4–10.2)
CHLORIDE SERPL-SCNC: 99 MMOL/L (ref 95–110)
CO2 SERPL-SCNC: 28 MMOL/L (ref 22–31)
CREAT BLD-MCNC: 0.8 MG/DL (ref 0.5–1)
DEPRECATED RDW RBC AUTO: 46.3 FL (ref 37–54)
EOSINOPHIL # BLD AUTO: 0.35 10*3/MM3 (ref 0–0.4)
EOSINOPHIL NFR BLD AUTO: 6.4 % (ref 0.3–6.2)
ERYTHROCYTE [DISTWIDTH] IN BLOOD BY AUTOMATED COUNT: 13.6 % (ref 12.3–15.4)
FERRITIN SERPL-MCNC: 229 NG/ML (ref 11.1–264)
FOLATE SERPL-MCNC: >20 NG/ML (ref 2.76–21)
GFR SERPL CREATININE-BSD FRML MDRD: 70 ML/MIN/1.73 (ref 39–90)
GLOBULIN UR ELPH-MCNC: 2.7 GM/DL (ref 2.3–3.5)
GLUCOSE BLD-MCNC: 170 MG/DL (ref 60–100)
HCT VFR BLD AUTO: 40.1 % (ref 34–46.6)
HGB BLD-MCNC: 12.8 G/DL (ref 12–15.9)
IMM GRANULOCYTES # BLD AUTO: 0.03 10*3/MM3 (ref 0–0.05)
IMM GRANULOCYTES NFR BLD AUTO: 0.6 % (ref 0–0.5)
IRON 24H UR-MRATE: 80 MCG/DL (ref 37–170)
IRON SATN MFR SERPL: 30 % (ref 15–50)
LYMPHOCYTES # BLD AUTO: 1.11 10*3/MM3 (ref 0.7–3.1)
LYMPHOCYTES NFR BLD AUTO: 20.4 % (ref 19.6–45.3)
MCH RBC QN AUTO: 29.4 PG (ref 26.6–33)
MCHC RBC AUTO-ENTMCNC: 31.9 G/DL (ref 31.5–35.7)
MCV RBC AUTO: 92 FL (ref 79–97)
MONOCYTES # BLD AUTO: 0.45 10*3/MM3 (ref 0.1–0.9)
MONOCYTES NFR BLD AUTO: 8.3 % (ref 5–12)
NEUTROPHILS # BLD AUTO: 3.47 10*3/MM3 (ref 1.4–7)
NEUTROPHILS NFR BLD AUTO: 63.6 % (ref 42.7–76)
NRBC BLD AUTO-RTO: 0 /100 WBC (ref 0–0)
PLATELET # BLD AUTO: 329 10*3/MM3 (ref 140–450)
PMV BLD AUTO: 9.5 FL (ref 6–12)
POTASSIUM BLD-SCNC: 4.1 MMOL/L (ref 3.5–5.1)
PROT SERPL-MCNC: 6.8 G/DL (ref 6.3–8.6)
RBC # BLD AUTO: 4.36 10*6/MM3 (ref 3.77–5.28)
SODIUM BLD-SCNC: 133 MMOL/L (ref 137–145)
TIBC SERPL-MCNC: 263 MCG/DL (ref 265–497)
VIT B12 BLD-MCNC: 685 PG/ML (ref 239–931)
WBC NRBC COR # BLD: 5.45 10*3/MM3 (ref 3.4–10.8)

## 2019-03-04 PROCEDURE — 82607 VITAMIN B-12: CPT

## 2019-03-04 PROCEDURE — 83550 IRON BINDING TEST: CPT

## 2019-03-04 PROCEDURE — 80053 COMPREHEN METABOLIC PANEL: CPT

## 2019-03-04 PROCEDURE — 36591 DRAW BLOOD OFF VENOUS DEVICE: CPT | Performed by: INTERNAL MEDICINE

## 2019-03-04 PROCEDURE — 82746 ASSAY OF FOLIC ACID SERUM: CPT

## 2019-03-04 PROCEDURE — 85025 COMPLETE CBC W/AUTO DIFF WBC: CPT

## 2019-03-04 PROCEDURE — 82378 CARCINOEMBRYONIC ANTIGEN: CPT

## 2019-03-04 PROCEDURE — 83540 ASSAY OF IRON: CPT

## 2019-03-04 PROCEDURE — 82728 ASSAY OF FERRITIN: CPT

## 2019-03-04 RX ORDER — SODIUM CHLORIDE 0.9 % (FLUSH) 0.9 %
10 SYRINGE (ML) INJECTION AS NEEDED
Status: CANCELLED | OUTPATIENT
Start: 2019-04-17

## 2019-03-04 RX ORDER — SODIUM CHLORIDE 0.9 % (FLUSH) 0.9 %
10 SYRINGE (ML) INJECTION AS NEEDED
Status: DISCONTINUED | OUTPATIENT
Start: 2019-03-04 | End: 2019-03-04 | Stop reason: HOSPADM

## 2019-03-04 RX ADMIN — SODIUM CHLORIDE, PRESERVATIVE FREE 500 UNITS: 5 INJECTION INTRAVENOUS at 08:21

## 2019-03-04 RX ADMIN — SODIUM CHLORIDE, PRESERVATIVE FREE 10 ML: 5 INJECTION INTRAVENOUS at 08:21

## 2019-03-06 ENCOUNTER — OFFICE VISIT (OUTPATIENT)
Dept: ONCOLOGY | Facility: CLINIC | Age: 79
End: 2019-03-06

## 2019-03-06 ENCOUNTER — DOCUMENTATION (OUTPATIENT)
Dept: NUTRITION | Facility: HOSPITAL | Age: 79
End: 2019-03-06

## 2019-03-06 VITALS
WEIGHT: 119.2 LBS | HEIGHT: 64 IN | OXYGEN SATURATION: 98 % | DIASTOLIC BLOOD PRESSURE: 80 MMHG | RESPIRATION RATE: 16 BRPM | BODY MASS INDEX: 20.35 KG/M2 | HEART RATE: 74 BPM | SYSTOLIC BLOOD PRESSURE: 171 MMHG | TEMPERATURE: 97.4 F

## 2019-03-06 DIAGNOSIS — K90.9 MALABSORPTION OF IRON: ICD-10-CM

## 2019-03-06 DIAGNOSIS — K86.2 CYST OF PANCREAS: ICD-10-CM

## 2019-03-06 DIAGNOSIS — D50.0 IRON DEFICIENCY ANEMIA DUE TO CHRONIC BLOOD LOSS: ICD-10-CM

## 2019-03-06 DIAGNOSIS — C18.2 MALIGNANT NEOPLASM OF ASCENDING COLON (HCC): Primary | ICD-10-CM

## 2019-03-06 LAB — CEA SERPL-MCNC: 2.3 NG/ML (ref 0–5)

## 2019-03-06 PROCEDURE — 1123F ACP DISCUSS/DSCN MKR DOCD: CPT | Performed by: INTERNAL MEDICINE

## 2019-03-06 PROCEDURE — G8420 CALC BMI NORM PARAMETERS: HCPCS | Performed by: INTERNAL MEDICINE

## 2019-03-06 PROCEDURE — 99214 OFFICE O/P EST MOD 30 MIN: CPT | Performed by: INTERNAL MEDICINE

## 2019-03-06 PROCEDURE — G0463 HOSPITAL OUTPT CLINIC VISIT: HCPCS | Performed by: INTERNAL MEDICINE

## 2019-03-06 NOTE — PROGRESS NOTES
"Adult Outpatient Nutrition  Assessment    Patient Name:  Mary Gutierrez  YOB: 1940  MRN: [id number]    Assessment Date:  3/6/2019    Comments:  Additional [employer] Coupons provided. Wt 119.2 lb (down). Reinforced nutrition needs.        Anthropometrics     Row Name 03/06/19 0757          Anthropometrics    Height  162.6 cm (64\")     Weight  54.1 kg (119 lb 3.2 oz)        Ideal Body Weight (IBW)    Ideal Body Weight (IBW) (kg)  55     % Ideal Body Weight  98.3        Body Mass Index (BMI)    BMI (kg/m2)  20.5        IBW Adjustment, Para/Tetraplegia    5% Adjustment, Para (IBW)  52.25     10% Adjustment, Para (IBW)  49.5     10% Adjustment, Tetra (IBW)  49.5     15% Adjustment, Tetra (IBW)  46.75             Estimated/Assessed Needs     Row Name 03/06/19 0757          Calculation Measurements    Height  162.6 cm (64\")         Evaluation of Prescribed Nutrient/Fluid Intake     Row Name 03/06/19 0757          Calculation Measurements    Height  162.6 cm (64\")               Electronically signed by:  Love Black RD  03/06/19 1:27 PM   "

## 2019-03-06 NOTE — PROGRESS NOTES
DATE OF VISIT: 3/6/2019      REASON FOR VISIT: colon cancer,recurrent iron deficiency anemia, cystic lesion of pancreas      HISTORY OF PRESENT ILLNESS:    78-year-old female with a past medical history significant for stage III colon cancer, status post surgery followed by chemotherapy last of which was in July 2016.  In view of recurrent iron deficiency since October 2016 patient has required intravenous venofer.  His last round of intravenous venofer was in 12/2018.  Patient is here for follow-up visit today. Most recent EGD, colonoscopy and capsule endoscopy done by Dr. Rico on January 23, 2018.  Denies any excessive fatigue.  Denies any abdominal pain.  Denies any blood in the stool or urine.  Denies any new lymph node enlargement.      PAST MEDICAL HISTORY:    Past Medical History:   Diagnosis Date   • Acid reflux    • Diabetes mellitus (CMS/HCC)    • Hypertension    • Malignant neoplasm of ascending colon (CMS/HCC) 11/1/2016   • Malignant tumor of cecum (CMS/HCC)        SOCIAL HISTORY:    Social History     Tobacco Use   • Smoking status: Never Smoker   • Smokeless tobacco: Never Used   Substance Use Topics   • Alcohol use: No   • Drug use: No       Surgical History :  Past Surgical History:   Procedure Laterality Date   • CAPSULE ENDOSCOPY N/A 6/22/2017    Procedure: CAPSULE ENDOSCOPY M2A;  Surgeon: Stuart Rico DO;  Location: [facility] ENDOSCOPY;  Service:    • CAPSULE ENDOSCOPY N/A 1/23/2018    Procedure: CAPSULE ENDOSCOPY M2A;  Surgeon: Stuart Rico DO;  Location: [facility] ENDOSCOPY;  Service:    • CENTRAL VENOUS LINE INSERTION  10/30/2015    Ultrasound localization, left basilic vein.Placement of left upper extremity PICC line   • COLONOSCOPY N/A 1/23/2018    Procedure: COLONOSCOPY;  Surgeon: Stuart Rico DO;  Location: [facility] ENDOSCOPY;  Service:    • ENDOSCOPY N/A 6/22/2017    Procedure: ESOPHAGOGASTRODUODENOSCOPY;  Surgeon: Stuart Rico DO;  Location: [facility] ENDOSCOPY;  Service:    •  "ENDOSCOPY N/A 1/23/2018    Procedure: ESOPHAGOGASTRODUODENOSCOPY;  Surgeon: Stuart Rico DO;  Location: [facility] ENDOSCOPY;  Service:    • FRACTURE SURGERY      right femur    • HYSTERECTOMY     • LAPAROSCOPIC ASSISSTED TOTAL COLECTOMY W/ J-POUCH  10/15/2015    Laparoscopic right hemicolectomy with ileotransverse colostomy   • VENOUS ACCESS DEVICE (PORT) INSERTION  01/12/2016    Right internal jugular Mediport       ALLERGIES:    Allergies   Allergen Reactions   • Other Rash     Oral Chemo Meds       REVIEW OF SYSTEMS:      CONSTITUTIONAL: Denies any excessive fatigue.Has lost 3 pounds since last clinic visit.  No fever, chills, or night sweats.     HEENT:  No epistaxis, mouth sores, or difficulty swallowing.    RESPIRATORY:  No new shortness of breath or cough at present.    CARDIOVASCULAR:  No chest pain or palpitations.    GASTROINTESTINAL:   No abdominal pain, nausea, vomiting, or blood in the stool.    GENITOURINARY:  No dysuria or hematuria.    MUSCULOSKELETAL:  No any new back pain or arthralgias.     NEUROLOGICAL:  Neuropathy in upper and lower extremities resolved completely. No new headache or dizziness.     LYMPHATICS:  Denies any abnormal swollen and anywhere in the body.    SKIN:  Denies any new skin rash.          PHYSICAL EXAMINATION:      VITAL SIGNS:  /80   Pulse 74   Temp 97.4 °F (36.3 °C) (Temporal)   Resp 16   Ht 162.6 cm (64\")   Wt 54.1 kg (119 lb 3.2 oz)   SpO2 98%   BMI 20.46 kg/m²      ECOG performance status: 2    GENERAL:  Not in any distress.    HEENT:  Normocephalic, Atraumatic.Mild Conjunctival pallor. No icterus. Extraocular Movements Intact. No Facial Asymmetry noted.    NECK:  No adenopathy. No JVD.    RESPIRATORY:  Fair air entry bilateral. No rhonchi or wheezing.    CARDIOVASCULAR:  S1, S2. Regular rate and rhythm. No murmur or gallop appreciated.    ABDOMEN:  Soft,  nontender. Bowel sounds present in all four quadrants.  No hepatosplenomegaly " appreciated.    MUSCULOSKELETAL:  No edema.No Calf Tenderness.  Normal range of motion.    NEUROLOGIC:  Alert, awake and oriented ×3.  No  Motor or sensory deficit appreciated. Cranial Nerves 2-12 grossly intact.    SKIN: No new skin lesions.    LYMPHATICS: No new lymph node enlargement in neck or supraclavicular area.        DIAGNOSTIC DATA:    Glucose   Date Value Ref Range Status   03/04/2019 170 (H) 60 - 100 mg/dL Final     Sodium   Date Value Ref Range Status   03/04/2019 133 (L) 137 - 145 mmol/L Final     Potassium   Date Value Ref Range Status   03/04/2019 4.1 3.5 - 5.1 mmol/L Final     CO2   Date Value Ref Range Status   03/04/2019 28.0 22.0 - 31.0 mmol/L Final     Chloride   Date Value Ref Range Status   03/04/2019 99 95 - 110 mmol/L Final     Anion Gap   Date Value Ref Range Status   03/04/2019 6.0 5.0 - 15.0 mmol/L Final     Creatinine   Date Value Ref Range Status   03/04/2019 0.80 0.50 - 1.00 mg/dL Final     BUN   Date Value Ref Range Status   03/04/2019 11 7 - 21 mg/dL Final     BUN/Creatinine Ratio   Date Value Ref Range Status   03/04/2019 13.8 7.0 - 25.0 Final     Calcium   Date Value Ref Range Status   03/04/2019 9.1 8.4 - 10.2 mg/dL Final     eGFR Non  Amer   Date Value Ref Range Status   03/04/2019 70 39 - 90 mL/min/1.73 Final     Alkaline Phosphatase   Date Value Ref Range Status   03/04/2019 64 38 - 126 U/L Final     Total Protein   Date Value Ref Range Status   03/04/2019 6.8 6.3 - 8.6 g/dL Final     ALT (SGPT)   Date Value Ref Range Status   03/04/2019 11 9 - 52 U/L Final     AST (SGOT)   Date Value Ref Range Status   03/04/2019 25 14 - 36 U/L Final     Total Bilirubin   Date Value Ref Range Status   03/04/2019 0.4 0.2 - 1.3 mg/dL Final     Albumin   Date Value Ref Range Status   03/04/2019 4.10 3.40 - 4.80 g/dL Final     Globulin   Date Value Ref Range Status   03/04/2019 2.7 2.3 - 3.5 gm/dL Final     Lab Results   Component Value Date    WBC 5.45 03/04/2019    HGB 12.8 03/04/2019     HCT 40.1 03/04/2019    MCV 92.0 03/04/2019     03/04/2019     Lab Results   Component Value Date    NEUTROABS 3.47 03/04/2019    IRON 80 03/04/2019    TIBC 263 (L) 03/04/2019    LABIRON 30 03/04/2019    FERRITIN 229.00 03/04/2019    [id number] 685 03/04/2019    FOLATE >20.00 03/04/2019     Lab Results   Component Value Date    CEA 2.30 03/04/2019    REFLABREPO SEE NOTE: 12/22/2015           RADIOLOGY DATA :  Ct of abdomen and Pelvis with contrast done on November 30, 2017 showed:  ABDOMEN:     - LIVER:    normal size / contour, no ductal dilatation , no  focal lesion   - GB:      grossly negative    - CBD:      grossly negative   - SPLEEN:    normal size and contour   - PANCREAS:    normal in size, contour, no focal mass    - VISCERA:    normal caliber, no wall thickening     - MESENTERY:  no mesenteric mass   - CAVITY:    no free abdominal fluid, no free intraperitoneal  air   - BODY WALL:  wnl   - OSSEOUS:  Mild leftward deviating lumbar scoliosis, unchanged.  - MISC:                                       RETROPERITONEUM:   - KIDNEYS:    normal size / contour, no collecting system  dilation        no evidence of an enhancing mass   - URETERS:    normal course, caliber   - ADRENALS:    normal size, contour   - MISC:      no sig retroperitoneal adenopathy or mass   - VASCULAR:    aorta / iliacs: wnl for age      - - - CT PELVIS - - -       - VISCERA:    normal caliber small/large bowel, no focal  thickening/mass - MESENTERY:  no mass   - VASCULAR:    wnl for age   - CAVITY:    no free fluid / air   - BLADDER:    unremarkable   - OSSEOUS:    wnl    - MISC:   - UTERUS/OVARY:  Status post hysterectomy   .     IMPRESSION:   CONCLUSION:  1. Stable examination.              ASSESSMENT AND PLAN:      1.  Recurrent iron deficiency: Positive recurrence of iron deficiency since October 2016.  Patient had a side effect with Feraheme so was started on Venofer.  Last dose of Venofer was given on October 27 , 2017.   Patient had a EGD, colonoscopy and capsule endoscopy done on January 23, 2018.  EGD showed some gastritis, colonoscopy showed a single tubular adenoma without any cancer.  Patient had a very narrow pylorus after dilating pylorus by Dr. Rico, capsule was passed into the small intestine but Endoscopy report is not helpful since there was so much food as per report in the small intestine.  Developing iron deficiency, patient receive another round of intravenous Venofer from February 19, 2018 until February 23, 2018.  Case was discussed with Dr. Rico, he does not think any much intervention can be done regarding her recurrent iron deficiency  From GI point of view at this point.  He recommends rechecking iron level frequently and supplementing it is required.    -Anemia workup done on November 30, 2018 again shows developing iron deficiency with iron saturation of 10%.    -Patient received 5 days of intravenous Venofer from December 10, 2018 until December 14, 2018.  -Anemia workup done on March 4, 2019 shows adequate amount of iron.  Hemoglobin is 12.8  -We will ask patient to return to clinic in 3 months with repeat CBC and anemia workup to be done prior to that.      2.  Colon cancer, stage III, diagnosed in October 2015.  Patient had a hemicolectomy followed by chemotherapy.  Patient had a very complicated course with chemotherapy.  Initially patient was started on Xelox but Xeloda gave her skin rash.  Subsequently patient was changed over to FOLFOX for which she took 3 cycles and had a significant neuropathy.  Subsequently patient was changed to 5-FU and leucovorin which patient could not tolerate either subsequently after 8 cycles of chemotherapy in total chemotherapy was discontinued in July 2016.  Last colonoscopy done in January 2018 did not show any evidence of recurrence.    -Most recent CEA level done on March 4, 2019 is normal at 2.3.  -CT of chest, abdomen and pelvis with contrast done on November 30,  2018 does not show any evidence of recurrence.  -Due to cystic lesion in pancreas, we will repeat CT of abdomen and pelvis with contrast in 6 month around June 2019 which was discussed with patient and her .    3.  Cystic lesion in pancreas:  -CT scan done on November 30, 2018 shows 1 cm cystic lesion in pancreas.  Differential diagnosis for cystic lesion in pancreas includes benign cyst versus cystic neoplasm of the pancreas.  -Will repeat CT of abdomen  with contrast around June 2019 to follow-up on cystic lesion of pancreas, the scan has been ordered today on March 6, 2019.    4.  History of dementia    5.  Health maintenance: Patient does not smoke.  Had a colonoscopy done in January 2018.  Patient did not have mammogram for many years now.  She was counseled about the importance of screening mammogram she wants to think about whether she wants to do it or not.      6.BMI: Patient's Body mass index is 20.46 kg/m². BMI is  In normal range.  No follow-up is required.    7. Advance Care Planning: For now patient remains full code and is able to make her decisions.  Patient has health care surrogate mentioned on chart.           Duong Hanley MD  3/6/2019  8:14 AM

## 2019-03-06 NOTE — PATIENT INSTRUCTIONS
Patient Instructions for CT Scan    · Your CT scan is being done without any oral contrast.  Your CT scan may be done with IV contrast, if you have an allergy to iodine--please tell your nurse.    · Do not eat or drink 4 hours prior to scan.     · You may take your medications with sips of water, except DO NOT take your diabetic pill the morning of the test.    Arrive at the Outpatient Surgery entrance at [facility] 20 minutes prior to appointment time.    You will receive a phone call with an appointment for your CT scan.  Please call our office, if someone does not contact you with 3 days.    Batool Vital RN  March 6, 2019  8:13 AM

## 2019-04-17 ENCOUNTER — INFUSION (OUTPATIENT)
Dept: ONCOLOGY | Facility: HOSPITAL | Age: 79
End: 2019-04-17

## 2019-04-17 DIAGNOSIS — Z45.2 ENCOUNTER FOR CARE RELATED TO PORT-A-CATH: Primary | ICD-10-CM

## 2019-04-17 PROCEDURE — 96523 IRRIG DRUG DELIVERY DEVICE: CPT | Performed by: INTERNAL MEDICINE

## 2019-04-17 RX ORDER — SODIUM CHLORIDE 0.9 % (FLUSH) 0.9 %
10 SYRINGE (ML) INJECTION AS NEEDED
Status: DISCONTINUED | OUTPATIENT
Start: 2019-04-17 | End: 2019-05-03 | Stop reason: HOSPADM

## 2019-04-17 RX ORDER — SODIUM CHLORIDE 0.9 % (FLUSH) 0.9 %
10 SYRINGE (ML) INJECTION AS NEEDED
Status: CANCELLED | OUTPATIENT
Start: 2019-05-29

## 2019-04-17 RX ADMIN — Medication 500 UNITS: at 08:15

## 2019-04-17 RX ADMIN — SODIUM CHLORIDE, PRESERVATIVE FREE 10 ML: 5 INJECTION INTRAVENOUS at 08:15

## 2019-04-22 ENCOUNTER — DOCUMENTATION (OUTPATIENT)
Dept: NUTRITION | Facility: HOSPITAL | Age: 79
End: 2019-04-22

## 2019-04-22 ENCOUNTER — TELEPHONE (OUTPATIENT)
Dept: NUTRITION | Facility: HOSPITAL | Age: 79
End: 2019-04-22

## 2019-04-22 NOTE — PROGRESS NOTES
Adult Outpatient Nutrition  Assessment    Patient Name:  Mary Gutierrez  YOB: 1940  MRN: [id number]    Assessment Date:  4/22/2019    Comments: Mailed [employer] discount coupons to home per  request.                         Electronically signed by:  Love Black RD  04/22/19 8:57 AM

## 2019-06-03 ENCOUNTER — HOSPITAL ENCOUNTER (OUTPATIENT)
Dept: CT IMAGING | Facility: HOSPITAL | Age: 79
Discharge: HOME | End: 2019-06-03
Admitting: INTERNAL MEDICINE

## 2019-06-03 ENCOUNTER — INFUSION (OUTPATIENT)
Dept: ONCOLOGY | Facility: HOSPITAL | Age: 79
End: 2019-06-03

## 2019-06-03 ENCOUNTER — TELEPHONE (OUTPATIENT)
Dept: NUTRITION | Facility: HOSPITAL | Age: 79
End: 2019-06-03

## 2019-06-03 DIAGNOSIS — Z45.2 ENCOUNTER FOR CARE RELATED TO PORT-A-CATH: ICD-10-CM

## 2019-06-03 DIAGNOSIS — K90.9 MALABSORPTION OF IRON: ICD-10-CM

## 2019-06-03 DIAGNOSIS — K86.2 CYST OF PANCREAS: ICD-10-CM

## 2019-06-03 DIAGNOSIS — D50.0 IRON DEFICIENCY ANEMIA DUE TO CHRONIC BLOOD LOSS: ICD-10-CM

## 2019-06-03 DIAGNOSIS — C18.2 MALIGNANT NEOPLASM OF ASCENDING COLON (HCC): ICD-10-CM

## 2019-06-03 DIAGNOSIS — C18.2 MALIGNANT NEOPLASM OF ASCENDING COLON (HCC): Primary | ICD-10-CM

## 2019-06-03 LAB
ALBUMIN SERPL-MCNC: 4.2 G/DL (ref 3.5–5.2)
ALBUMIN/GLOB SERPL: 1.5 G/DL
ALP SERPL-CCNC: 79 U/L (ref 39–117)
ALT SERPL W P-5'-P-CCNC: 13 U/L (ref 1–33)
ANION GAP SERPL CALCULATED.3IONS-SCNC: 12 MMOL/L
AST SERPL-CCNC: 15 U/L (ref 1–32)
BASOPHILS # BLD AUTO: 0.03 10*3/MM3 (ref 0–0.2)
BASOPHILS NFR BLD AUTO: 0.4 % (ref 0–1.5)
BILIRUB SERPL-MCNC: 0.3 MG/DL (ref 0.2–1.2)
BUN BLD-MCNC: 17 MG/DL (ref 8–23)
BUN/CREAT SERPL: 19.5 (ref 7–25)
CALCIUM SPEC-SCNC: 9.8 MG/DL (ref 8.6–10.5)
CEA SERPL-MCNC: 2.64 NG/ML
CHLORIDE SERPL-SCNC: 91 MMOL/L (ref 98–107)
CO2 SERPL-SCNC: 26 MMOL/L (ref 22–29)
CREAT BLD-MCNC: 0.87 MG/DL (ref 0.57–1)
DEPRECATED RDW RBC AUTO: 39.2 FL (ref 37–54)
EOSINOPHIL # BLD AUTO: 0.07 10*3/MM3 (ref 0–0.4)
EOSINOPHIL NFR BLD AUTO: 0.9 % (ref 0.3–6.2)
ERYTHROCYTE [DISTWIDTH] IN BLOOD BY AUTOMATED COUNT: 12.1 % (ref 12.3–15.4)
FERRITIN SERPL-MCNC: 145.6 NG/ML (ref 13–150)
FOLATE SERPL-MCNC: >20 NG/ML (ref 4.78–24.2)
GFR SERPL CREATININE-BSD FRML MDRD: 63 ML/MIN/1.73
GLOBULIN UR ELPH-MCNC: 2.8 GM/DL
GLUCOSE BLD-MCNC: 251 MG/DL (ref 65–99)
HCT VFR BLD AUTO: 39.7 % (ref 34–46.6)
HGB BLD-MCNC: 13.2 G/DL (ref 12–15.9)
IMM GRANULOCYTES # BLD AUTO: 0.04 10*3/MM3 (ref 0–0.05)
IMM GRANULOCYTES NFR BLD AUTO: 0.5 % (ref 0–0.5)
IRON 24H UR-MRATE: 81 MCG/DL (ref 37–145)
IRON SATN MFR SERPL: 23 % (ref 20–50)
LYMPHOCYTES # BLD AUTO: 1.03 10*3/MM3 (ref 0.7–3.1)
LYMPHOCYTES NFR BLD AUTO: 13.7 % (ref 19.6–45.3)
MCH RBC QN AUTO: 29.6 PG (ref 26.6–33)
MCHC RBC AUTO-ENTMCNC: 33.2 G/DL (ref 31.5–35.7)
MCV RBC AUTO: 89 FL (ref 79–97)
MONOCYTES # BLD AUTO: 0.52 10*3/MM3 (ref 0.1–0.9)
MONOCYTES NFR BLD AUTO: 6.9 % (ref 5–12)
NEUTROPHILS # BLD AUTO: 5.81 10*3/MM3 (ref 1.7–7)
NEUTROPHILS NFR BLD AUTO: 77.6 % (ref 42.7–76)
NRBC BLD AUTO-RTO: 0 /100 WBC (ref 0–0.2)
PLATELET # BLD AUTO: 379 10*3/MM3 (ref 140–450)
PMV BLD AUTO: 9.2 FL (ref 6–12)
POTASSIUM BLD-SCNC: 4.3 MMOL/L (ref 3.5–5.2)
PROT SERPL-MCNC: 7 G/DL (ref 6–8.5)
RBC # BLD AUTO: 4.46 10*6/MM3 (ref 3.77–5.28)
SODIUM BLD-SCNC: 129 MMOL/L (ref 136–145)
TIBC SERPL-MCNC: 349 MCG/DL (ref 298–536)
TRANSFERRIN SERPL-MCNC: 234 MG/DL (ref 200–360)
VIT B12 BLD-MCNC: 421 PG/ML (ref 211–946)
WBC NRBC COR # BLD: 7.5 10*3/MM3 (ref 3.4–10.8)

## 2019-06-03 PROCEDURE — 82746 ASSAY OF FOLIC ACID SERUM: CPT | Performed by: INTERNAL MEDICINE

## 2019-06-03 PROCEDURE — 84466 ASSAY OF TRANSFERRIN: CPT | Performed by: INTERNAL MEDICINE

## 2019-06-03 PROCEDURE — 85025 COMPLETE CBC W/AUTO DIFF WBC: CPT | Performed by: INTERNAL MEDICINE

## 2019-06-03 PROCEDURE — 82378 CARCINOEMBRYONIC ANTIGEN: CPT | Performed by: INTERNAL MEDICINE

## 2019-06-03 PROCEDURE — 82607 VITAMIN B-12: CPT | Performed by: INTERNAL MEDICINE

## 2019-06-03 PROCEDURE — 83540 ASSAY OF IRON: CPT | Performed by: INTERNAL MEDICINE

## 2019-06-03 PROCEDURE — 74170 CT ABD WO CNTRST FLWD CNTRST: CPT

## 2019-06-03 PROCEDURE — 25010000002 IOPAMIDOL 61 % SOLUTION: Performed by: INTERNAL MEDICINE

## 2019-06-03 PROCEDURE — 82728 ASSAY OF FERRITIN: CPT | Performed by: INTERNAL MEDICINE

## 2019-06-03 PROCEDURE — 36591 DRAW BLOOD OFF VENOUS DEVICE: CPT | Performed by: INTERNAL MEDICINE

## 2019-06-03 PROCEDURE — 80053 COMPREHEN METABOLIC PANEL: CPT | Performed by: INTERNAL MEDICINE

## 2019-06-03 RX ORDER — SODIUM CHLORIDE 0.9 % (FLUSH) 0.9 %
10 SYRINGE (ML) INJECTION AS NEEDED
Status: CANCELLED | OUTPATIENT
Start: 2019-07-10

## 2019-06-03 RX ORDER — SODIUM CHLORIDE 0.9 % (FLUSH) 0.9 %
10 SYRINGE (ML) INJECTION AS NEEDED
Status: DISCONTINUED | OUTPATIENT
Start: 2019-06-03 | End: 2019-06-03 | Stop reason: HOSPADM

## 2019-06-03 RX ADMIN — SODIUM CHLORIDE, PRESERVATIVE FREE 10 ML: 5 INJECTION INTRAVENOUS at 08:04

## 2019-06-03 RX ADMIN — IOPAMIDOL 80 ML: 612 INJECTION, SOLUTION INTRAVENOUS at 09:22

## 2019-06-03 RX ADMIN — Medication 500 UNITS: at 09:34

## 2019-06-03 NOTE — PROGRESS NOTES
Adult Outpatient Nutrition  Assessment    Patient Name:  Mary Gutierrez  YOB: 1940  MRN: [id number]    Assessment Date:  6/3/2019    Comments:  Received call from  requesting [employer] discount coupons--mailed to home today.                        Electronically signed by:  Love Black RD  06/03/19 1:34 PM

## 2019-06-05 ENCOUNTER — OFFICE VISIT (OUTPATIENT)
Dept: ONCOLOGY | Facility: CLINIC | Age: 79
End: 2019-06-05

## 2019-06-05 VITALS
TEMPERATURE: 98.4 F | DIASTOLIC BLOOD PRESSURE: 76 MMHG | WEIGHT: 117.8 LBS | SYSTOLIC BLOOD PRESSURE: 173 MMHG | HEIGHT: 64 IN | OXYGEN SATURATION: 95 % | HEART RATE: 81 BPM | RESPIRATION RATE: 16 BRPM | BODY MASS INDEX: 20.11 KG/M2

## 2019-06-05 DIAGNOSIS — C18.2 MALIGNANT NEOPLASM OF ASCENDING COLON (HCC): Primary | ICD-10-CM

## 2019-06-05 DIAGNOSIS — D50.0 IRON DEFICIENCY ANEMIA DUE TO CHRONIC BLOOD LOSS: ICD-10-CM

## 2019-06-05 DIAGNOSIS — K86.2 CYST OF PANCREAS: ICD-10-CM

## 2019-06-05 DIAGNOSIS — K90.9 MALABSORPTION OF IRON: ICD-10-CM

## 2019-06-05 PROCEDURE — G9903 PT SCRN TBCO ID AS NON USER: HCPCS | Performed by: INTERNAL MEDICINE

## 2019-06-05 PROCEDURE — 1123F ACP DISCUSS/DSCN MKR DOCD: CPT | Performed by: INTERNAL MEDICINE

## 2019-06-05 PROCEDURE — 99214 OFFICE O/P EST MOD 30 MIN: CPT | Performed by: INTERNAL MEDICINE

## 2019-06-05 PROCEDURE — G8420 CALC BMI NORM PARAMETERS: HCPCS | Performed by: INTERNAL MEDICINE

## 2019-06-05 RX ORDER — LANOLIN ALCOHOL/MO/W.PET/CERES
1000 CREAM (GRAM) TOPICAL DAILY
Qty: 90 TABLET | Refills: 1 | Status: SHIPPED | OUTPATIENT
Start: 2019-06-05 | End: 2020-05-27 | Stop reason: SDUPTHER

## 2019-06-05 NOTE — PROGRESS NOTES
DATE OF VISIT: 6/5/2019      REASON FOR VISIT: colon cancer,recurrent iron deficiency anemia, cystic lesion of pancreas      HISTORY OF PRESENT ILLNESS:    78-year-old female with a past medical history significant for stage III colon cancer, status post surgery followed by chemotherapy last of which was in July 2016.  In view of recurrent iron deficiency since October 2016 patient has required intravenous venofer.  His last round of intravenous venofer was in 12/2018.  Patient is here for follow-up visit today.  Patient had a CT of abdomen and pelvis with and without without contrast done on Saba 3, 2019, she is here to discuss the results and further recommendation.  Complains of intermittent dizziness.  Denies any excessive fatigue.  Denies any abdominal pain.  Denies any blood in the stool or urine.  Denies any new lymph node enlargement.      PAST MEDICAL HISTORY:    Past Medical History:   Diagnosis Date   • Acid reflux    • Diabetes mellitus (CMS/HCC)    • Hypertension    • Malignant neoplasm of ascending colon (CMS/HCC) 11/1/2016   • Malignant tumor of cecum (CMS/HCC)        SOCIAL HISTORY:    Social History     Tobacco Use   • Smoking status: Never Smoker   • Smokeless tobacco: Never Used   Substance Use Topics   • Alcohol use: No   • Drug use: No       Surgical History :  Past Surgical History:   Procedure Laterality Date   • CAPSULE ENDOSCOPY N/A 6/22/2017    Procedure: CAPSULE ENDOSCOPY M2A;  Surgeon: Stuart Rico DO;  Location: [facility] ENDOSCOPY;  Service:    • CAPSULE ENDOSCOPY N/A 1/23/2018    Procedure: CAPSULE ENDOSCOPY M2A;  Surgeon: Stuart Rico DO;  Location: [facility] ENDOSCOPY;  Service:    • CENTRAL VENOUS LINE INSERTION  10/30/2015    Ultrasound localization, left basilic vein.Placement of left upper extremity PICC line   • COLONOSCOPY N/A 1/23/2018    Procedure: COLONOSCOPY;  Surgeon: Stuart Rico DO;  Location: [facility] ENDOSCOPY;  Service:    • ENDOSCOPY N/A 6/22/2017     "Procedure: ESOPHAGOGASTRODUODENOSCOPY;  Surgeon: Stuart Rico DO;  Location: [facility] ENDOSCOPY;  Service:    • ENDOSCOPY N/A 1/23/2018    Procedure: ESOPHAGOGASTRODUODENOSCOPY;  Surgeon: Stuart Rico DO;  Location: [facility] ENDOSCOPY;  Service:    • FRACTURE SURGERY      right femur    • HYSTERECTOMY     • LAPAROSCOPIC ASSISSTED TOTAL COLECTOMY W/ J-POUCH  10/15/2015    Laparoscopic right hemicolectomy with ileotransverse colostomy   • VENOUS ACCESS DEVICE (PORT) INSERTION  01/12/2016    Right internal jugular Mediport       ALLERGIES:    Allergies   Allergen Reactions   • Other Rash     Oral Chemo Meds       REVIEW OF SYSTEMS:      CONSTITUTIONAL: Denies any excessive fatigue.Has lost 2 pounds since last clinic visit.  No fever, chills, or night sweats.     HEENT:  No epistaxis, mouth sores, or difficulty swallowing.    RESPIRATORY:  No new shortness of breath or cough at present.    CARDIOVASCULAR:  No chest pain or palpitations.    GASTROINTESTINAL:   No abdominal pain, nausea, vomiting, or blood in the stool.    GENITOURINARY:  No dysuria or hematuria.    MUSCULOSKELETAL:  No any new back pain or arthralgias.     NEUROLOGICAL:  Neuropathy in upper and lower extremities resolved completely.  Complains of intermittent dizziness.  No new headache .     LYMPHATICS:  Denies any abnormal swollen and anywhere in the body.    SKIN:  Denies any new skin rash.          PHYSICAL EXAMINATION:      VITAL SIGNS:  /76   Pulse 81   Temp 98.4 °F (36.9 °C) (Temporal)   Resp 16   Ht 162.6 cm (64.02\")   Wt 53.4 kg (117 lb 12.8 oz)   SpO2 95%   BMI 20.21 kg/m²      ECOG performance status: 2    GENERAL:  Not in any distress.    HEENT:  Normocephalic, Atraumatic.Mild Conjunctival pallor. No icterus. Extraocular Movements Intact. No Facial Asymmetry noted.    NECK:  No adenopathy. No JVD.Trachea in midline.    RESPIRATORY:  Fair air entry bilateral. No rhonchi or wheezing.    CARDIOVASCULAR:  S1, S2. Regular rate " and rhythm. No murmur or gallop appreciated.    ABDOMEN:  Soft,  nontender. Bowel sounds present in all four quadrants.  No hepatosplenomegaly appreciated.    MUSCULOSKELETAL:  No edema.No Calf Tenderness.  Normal range of motion.    NEUROLOGIC:  Alert, awake and oriented ×3.  No  Motor or sensory deficit appreciated. Cranial Nerves 2-12 grossly intact.    SKIN: No new skin lesions.    LYMPHATICS: No new lymph node enlargement in neck or supraclavicular area.        DIAGNOSTIC DATA:    Glucose   Date Value Ref Range Status   06/03/2019 251 (H) 65 - 99 mg/dL Final     Sodium   Date Value Ref Range Status   06/03/2019 128 (L) 136 - 145 mmol/L Final     Potassium   Date Value Ref Range Status   06/03/2019 4.3 3.5 - 5.1 mmol/L Final     CO2   Date Value Ref Range Status   06/03/2019 26.0 22.0 - 29.0 mmol/L Final     Chloride   Date Value Ref Range Status   06/03/2019 91 (L) 98 - 107 mmol/L Final     Anion Gap   Date Value Ref Range Status   06/03/2019 12.0 mmol/L Final     Creatinine   Date Value Ref Range Status   06/03/2019 0.9 0.6 - 1.0 mg/dL Final     Comment:     **The MDRD GFR formula is valid only for ages 18-70.    GFR will not be reported for individuals aging <18 or >70.     BUN   Date Value Ref Range Status   06/03/2019 17 7 - 18 mg/dL Final     BUN/Creatinine Ratio   Date Value Ref Range Status   06/03/2019 19.5 7.0 - 25.0 Final     Calcium   Date Value Ref Range Status   06/03/2019 9.8 8.5 - 10.1 mg/dL Final     eGFR Non  Amer   Date Value Ref Range Status   06/03/2019 63 >60 mL/min/1.73 Final     Alkaline Phosphatase   Date Value Ref Range Status   06/03/2019 90 46 - 116 U/L Final     Total Protein   Date Value Ref Range Status   06/03/2019 8.2 6.4 - 8.2 g/dL Final     ALT (SGPT)   Date Value Ref Range Status   06/03/2019 21 14 - 59 U/L Final     AST (SGOT)   Date Value Ref Range Status   06/03/2019 18 15 - 37 U/L Final     Total Bilirubin   Date Value Ref Range Status   06/03/2019 0.3 0.2 - 1.0  mg/dL Final     Albumin   Date Value Ref Range Status   06/03/2019 4.6 3.4 - 5.0 g/dL Final     Globulin   Date Value Ref Range Status   06/03/2019 2.8 gm/dL Final     Lab Results   Component Value Date    WBC 7.50 06/03/2019    HGB 13.2 06/03/2019    HCT 39.7 06/03/2019    MCV 89.0 06/03/2019     06/03/2019     Lab Results   Component Value Date    NEUTROABS 5.81 06/03/2019    IRON 81 06/03/2019    TIBC 349 06/03/2019    LABIRON 23 06/03/2019    FERRITIN 145.60 06/03/2019    OXJFADSS32 421 06/03/2019    FOLATE >20.00 06/03/2019     Lab Results   Component Value Date    CEA 2.64 06/03/2019    REFLABREPO SEE NOTE: 12/22/2015           RADIOLOGY DATA :  CT Of abdomen and pelvis with and without contrast done on Saba 3, 2019 was reviewed, discussed with the patient, it showed:  FINDINGS: Comparison study dated  November 30, 2017. Axial  computer tomography sequential imaging was performed from the  diaphragms through the symphysis pubis without and with IV  contrast administration. Sagittal and coronal reformates was  performed. This exam was performed according to our departmental  dose optimization program, which includes automated exposure  control, adjustment of the mA and/or KV according to patient size  and/or use of iterative reconstruction technique.      Imaging through lung bases reveals stable right middle lobe  inferior small calcified lung parenchymal granuloma consistent  with old granulomatous disease. Lung bases are otherwise  unremarkable.     The liver is normal. The gallbladder is normal. The biliary  system is normal. The pancreas is normal. The spleen is normal.  Bilateral adrenal glands are normal. Right kidney mid zone stable  simple renal cortical cyst which measures 3.38 cm in greatest  diameter. The right kidney and ureter are otherwise unremarkable.  The left kidney and ureter are normal. The bladder is normal. The  uterus is surgically absent. Postsurgical changes consistent  with  partial right colon resection. Otherwise hollow viscera is  unremarkable. No lymphadenopathy in the abdomen or pelvis. No  acute osseous abnormality.     IMPRESSION:  1. Stable evidence of old granulomatous disease..  2. Stable right kidney mid zone simple renal cortical cyst..  3. Otherwise unremarkable CT of the abdomen and pelvis with and  without contrast..        Ct of abdomen and Pelvis with contrast done on November 30, 2017 showed:  ABDOMEN:     - LIVER:    normal size / contour, no ductal dilatation , no  focal lesion   - GB:      grossly negative    - CBD:      grossly negative   - SPLEEN:    normal size and contour   - PANCREAS:    normal in size, contour, no focal mass    - VISCERA:    normal caliber, no wall thickening     - MESENTERY:  no mesenteric mass   - CAVITY:    no free abdominal fluid, no free intraperitoneal  air   - BODY WALL:  wnl   - OSSEOUS:  Mild leftward deviating lumbar scoliosis, unchanged.  - MISC:                                       RETROPERITONEUM:   - KIDNEYS:    normal size / contour, no collecting system  dilation        no evidence of an enhancing mass   - URETERS:    normal course, caliber   - ADRENALS:    normal size, contour   - MISC:      no sig retroperitoneal adenopathy or mass   - VASCULAR:    aorta / iliacs: wnl for age      - - - CT PELVIS - - -       - VISCERA:    normal caliber small/large bowel, no focal  thickening/mass - MESENTERY:  no mass   - VASCULAR:    wnl for age   - CAVITY:    no free fluid / air   - BLADDER:    unremarkable   - OSSEOUS:    wnl    - MISC:   - UTERUS/OVARY:  Status post hysterectomy   .     IMPRESSION:   CONCLUSION:  1. Stable examination.              ASSESSMENT AND PLAN:      1.  Recurrent iron deficiency: Positive recurrence of iron deficiency since October 2016.  Patient had a side effect with Feraheme so was started on Venofer.  Last dose of Venofer was given on October 27 , 2017.  Patient had a EGD, colonoscopy and capsule  endoscopy done on January 23, 2018.  EGD showed some gastritis, colonoscopy showed a single tubular adenoma without any cancer.  Patient had a very narrow pylorus after dilating pylorus by Dr. Rico, capsule was passed into the small intestine but Endoscopy report is not helpful since there was so much food as per report in the small intestine.  Developing iron deficiency, patient receive another round of intravenous Venofer from February 19, 2018 until February 23, 2018.  Case was discussed with Dr. Rico, he does not think any much intervention can be done regarding her recurrent iron deficiency  From GI point of view at this point.  He recommends rechecking iron level frequently and supplementing it is required.    -Anemia workup done on November 30, 2018 again shows developing iron deficiency with iron saturation of 10%.    -Patient received 5 days of intravenous Venofer from December 10, 2018 until December 14, 2018.  -Anemia workup done on Saba 3, 2019 shows adequate amount of iron.  Hemoglobin is 13.2 iron studies were adequate.  No need to restart intravenous Venofer at this point.  -Vitamin B12 level is 421.  Recommend starting B12 thousand micrograms p.o. daily.  Prescription for B12 has been sent to her pharmacy today on June 5, 2019.  -We will ask patient to return to clinic in 3 months with repeat CBC and anemia workup to be done prior to that.      2.  Colon cancer, stage III, diagnosed in October 2015.  Patient had a hemicolectomy followed by chemotherapy.  Patient had a very complicated course with chemotherapy.  Initially patient was started on Xelox but Xeloda gave her skin rash.  Subsequently patient was changed over to FOLFOX for which she took 3 cycles and had a significant neuropathy.  Subsequently patient was changed to 5-FU and leucovorin which patient could not tolerate either subsequently after 8 cycles of chemotherapy in total chemotherapy was discontinued in July 2016.  Last colonoscopy  done in January 2018 did not show any evidence of recurrence.    -Most recent CEA level done on Saba 3, 2019 is normal at 2.6.  -CT of chest, abdomen and pelvis with contrast done on Saba 3 does not show any evidence of recurrence.  -We will recheck CEA with next clinic visit in 3 months.  -Next surveillance CT of chest, abdomen and pelvis with contrast will be done around June 2020.    3.  Cystic lesion in pancreas:  -CT scan done on November 30, 2018 shows 1 cm cystic lesion in pancreas.  Differential diagnosis for cystic lesion in pancreas includes benign cyst versus cystic neoplasm of the pancreas.  -CT of abdomen and pelvis with and without contrast done on Saba 3, 2019 does not show any evidence of cystic lesion in the pancreas.  CT scan was reviewed with Dr. Roberts.  Results of CT scan were discussed with patient and her family.    4.  History of dementia    5.  Health maintenance: Patient does not smoke.  Had a colonoscopy done in January 2018.  Patient did not have mammogram for many years now.  She was counseled about the importance of screening mammogram she wants to think about whether she wants to do it or not.      6.BMI: Patient's Body mass index is 20.21 kg/m². BMI is  In normal range.  No follow-up is required.    7. Advance Care Planning: For now patient remains full code and is able to make her decisions.  Patient has health care surrogate mentioned on chart.    8. Pain assessment:  -Patient denies any pain today.           Duong Hanley MD  6/5/2019  8:30 AM

## 2019-07-08 ENCOUNTER — TELEPHONE (OUTPATIENT)
Dept: NUTRITION | Facility: HOSPITAL | Age: 79
End: 2019-07-08

## 2019-07-08 NOTE — PROGRESS NOTES
Adult Outpatient Nutrition  Assessment    Patient Name:  Mary Gutierrez  YOB: 1940  MRN: [id number]    Assessment Date:  7/8/2019    Comments:  Received phone call for  requesting additional [employer] discount coupons for Mary--mailed several to home.                        Electronically signed by:  Love Black RD  07/08/19 2:04 PM

## 2019-07-10 ENCOUNTER — INFUSION (OUTPATIENT)
Dept: ONCOLOGY | Facility: HOSPITAL | Age: 79
End: 2019-07-10

## 2019-07-10 DIAGNOSIS — Z45.2 ENCOUNTER FOR CARE RELATED TO PORT-A-CATH: Primary | ICD-10-CM

## 2019-07-10 PROCEDURE — 96523 IRRIG DRUG DELIVERY DEVICE: CPT | Performed by: INTERNAL MEDICINE

## 2019-07-10 RX ORDER — SODIUM CHLORIDE 0.9 % (FLUSH) 0.9 %
10 SYRINGE (ML) INJECTION AS NEEDED
Status: DISCONTINUED | OUTPATIENT
Start: 2019-07-10 | End: 2019-07-10 | Stop reason: HOSPADM

## 2019-07-10 RX ORDER — SODIUM CHLORIDE 0.9 % (FLUSH) 0.9 %
10 SYRINGE (ML) INJECTION AS NEEDED
Status: CANCELLED | OUTPATIENT
Start: 2019-08-14

## 2019-07-10 RX ADMIN — SODIUM CHLORIDE, PRESERVATIVE FREE 10 ML: 5 INJECTION INTRAVENOUS at 08:11

## 2019-07-10 RX ADMIN — Medication 500 UNITS: at 08:11

## 2019-08-14 ENCOUNTER — INFUSION (OUTPATIENT)
Dept: ONCOLOGY | Facility: HOSPITAL | Age: 79
End: 2019-08-14

## 2019-08-14 DIAGNOSIS — Z45.2 ENCOUNTER FOR CARE RELATED TO PORT-A-CATH: Primary | ICD-10-CM

## 2019-08-14 PROCEDURE — 96523 IRRIG DRUG DELIVERY DEVICE: CPT | Performed by: INTERNAL MEDICINE

## 2019-08-14 RX ORDER — SODIUM CHLORIDE 0.9 % (FLUSH) 0.9 %
10 SYRINGE (ML) INJECTION AS NEEDED
Status: DISCONTINUED | OUTPATIENT
Start: 2019-08-14 | End: 2019-08-14 | Stop reason: HOSPADM

## 2019-08-14 RX ORDER — SODIUM CHLORIDE 0.9 % (FLUSH) 0.9 %
10 SYRINGE (ML) INJECTION AS NEEDED
Status: CANCELLED | OUTPATIENT
Start: 2019-09-09

## 2019-08-14 RX ADMIN — Medication 500 UNITS: at 08:10

## 2019-08-14 RX ADMIN — SODIUM CHLORIDE, PRESERVATIVE FREE 10 ML: 5 INJECTION INTRAVENOUS at 08:10

## 2019-09-09 ENCOUNTER — INFUSION (OUTPATIENT)
Dept: ONCOLOGY | Facility: HOSPITAL | Age: 79
End: 2019-09-09

## 2019-09-09 DIAGNOSIS — K86.2 CYST OF PANCREAS: ICD-10-CM

## 2019-09-09 DIAGNOSIS — K90.9 MALABSORPTION OF IRON: ICD-10-CM

## 2019-09-09 DIAGNOSIS — Z45.2 ENCOUNTER FOR CARE RELATED TO PORT-A-CATH: ICD-10-CM

## 2019-09-09 DIAGNOSIS — C18.2 MALIGNANT NEOPLASM OF ASCENDING COLON (HCC): Primary | ICD-10-CM

## 2019-09-09 DIAGNOSIS — D50.0 IRON DEFICIENCY ANEMIA DUE TO CHRONIC BLOOD LOSS: ICD-10-CM

## 2019-09-09 LAB
ALBUMIN SERPL-MCNC: 4 G/DL (ref 3.5–5.2)
ALBUMIN/GLOB SERPL: 1.4 G/DL
ALP SERPL-CCNC: 91 U/L (ref 39–117)
ALT SERPL W P-5'-P-CCNC: 20 U/L (ref 1–33)
ANION GAP SERPL CALCULATED.3IONS-SCNC: 13 MMOL/L (ref 5–15)
AST SERPL-CCNC: 23 U/L (ref 1–32)
BASOPHILS # BLD AUTO: 0.04 10*3/MM3 (ref 0–0.2)
BASOPHILS NFR BLD AUTO: 0.7 % (ref 0–1.5)
BILIRUB SERPL-MCNC: 0.4 MG/DL (ref 0.2–1.2)
BUN BLD-MCNC: 13 MG/DL (ref 8–23)
BUN/CREAT SERPL: 17.8 (ref 7–25)
CALCIUM SPEC-SCNC: 9.1 MG/DL (ref 8.6–10.5)
CEA SERPL-MCNC: 2.69 NG/ML
CHLORIDE SERPL-SCNC: 99 MMOL/L (ref 98–107)
CO2 SERPL-SCNC: 24 MMOL/L (ref 22–29)
CREAT BLD-MCNC: 0.73 MG/DL (ref 0.57–1)
DEPRECATED RDW RBC AUTO: 45.5 FL (ref 37–54)
EOSINOPHIL # BLD AUTO: 0.17 10*3/MM3 (ref 0–0.4)
EOSINOPHIL NFR BLD AUTO: 2.9 % (ref 0.3–6.2)
ERYTHROCYTE [DISTWIDTH] IN BLOOD BY AUTOMATED COUNT: 13.6 % (ref 12.3–15.4)
FERRITIN SERPL-MCNC: 89.43 NG/ML (ref 13–150)
FOLATE SERPL-MCNC: >20 NG/ML (ref 4.78–24.2)
GFR SERPL CREATININE-BSD FRML MDRD: 77 ML/MIN/1.73
GLOBULIN UR ELPH-MCNC: 2.8 GM/DL
GLUCOSE BLD-MCNC: 259 MG/DL (ref 65–99)
HCT VFR BLD AUTO: 39.4 % (ref 34–46.6)
HGB BLD-MCNC: 12.8 G/DL (ref 12–15.9)
IMM GRANULOCYTES # BLD AUTO: 0.05 10*3/MM3 (ref 0–0.05)
IMM GRANULOCYTES NFR BLD AUTO: 0.8 % (ref 0–0.5)
IRON 24H UR-MRATE: 70 MCG/DL (ref 37–145)
IRON SATN MFR SERPL: 21 % (ref 20–50)
LYMPHOCYTES # BLD AUTO: 1.16 10*3/MM3 (ref 0.7–3.1)
LYMPHOCYTES NFR BLD AUTO: 19.5 % (ref 19.6–45.3)
MCH RBC QN AUTO: 29.6 PG (ref 26.6–33)
MCHC RBC AUTO-ENTMCNC: 32.5 G/DL (ref 31.5–35.7)
MCV RBC AUTO: 91 FL (ref 79–97)
MONOCYTES # BLD AUTO: 0.4 10*3/MM3 (ref 0.1–0.9)
MONOCYTES NFR BLD AUTO: 6.7 % (ref 5–12)
NEUTROPHILS # BLD AUTO: 4.14 10*3/MM3 (ref 1.7–7)
NEUTROPHILS NFR BLD AUTO: 69.4 % (ref 42.7–76)
NRBC BLD AUTO-RTO: 0 /100 WBC (ref 0–0.2)
PLATELET # BLD AUTO: 324 10*3/MM3 (ref 140–450)
PMV BLD AUTO: 9.6 FL (ref 6–12)
POTASSIUM BLD-SCNC: 4.1 MMOL/L (ref 3.5–5.2)
PROT SERPL-MCNC: 6.8 G/DL (ref 6–8.5)
RBC # BLD AUTO: 4.33 10*6/MM3 (ref 3.77–5.28)
SODIUM BLD-SCNC: 136 MMOL/L (ref 136–145)
TIBC SERPL-MCNC: 338 MCG/DL (ref 298–536)
TRANSFERRIN SERPL-MCNC: 227 MG/DL (ref 200–360)
VIT B12 BLD-MCNC: 476 PG/ML (ref 211–946)
WBC NRBC COR # BLD: 5.96 10*3/MM3 (ref 3.4–10.8)

## 2019-09-09 PROCEDURE — 82607 VITAMIN B-12: CPT | Performed by: INTERNAL MEDICINE

## 2019-09-09 PROCEDURE — 82378 CARCINOEMBRYONIC ANTIGEN: CPT | Performed by: INTERNAL MEDICINE

## 2019-09-09 PROCEDURE — 82728 ASSAY OF FERRITIN: CPT | Performed by: INTERNAL MEDICINE

## 2019-09-09 PROCEDURE — 82746 ASSAY OF FOLIC ACID SERUM: CPT | Performed by: INTERNAL MEDICINE

## 2019-09-09 PROCEDURE — 80053 COMPREHEN METABOLIC PANEL: CPT | Performed by: INTERNAL MEDICINE

## 2019-09-09 PROCEDURE — 36591 DRAW BLOOD OFF VENOUS DEVICE: CPT | Performed by: NURSE PRACTITIONER

## 2019-09-09 PROCEDURE — 85025 COMPLETE CBC W/AUTO DIFF WBC: CPT | Performed by: INTERNAL MEDICINE

## 2019-09-09 PROCEDURE — 84466 ASSAY OF TRANSFERRIN: CPT | Performed by: INTERNAL MEDICINE

## 2019-09-09 PROCEDURE — 83540 ASSAY OF IRON: CPT | Performed by: INTERNAL MEDICINE

## 2019-09-09 RX ORDER — SODIUM CHLORIDE 0.9 % (FLUSH) 0.9 %
10 SYRINGE (ML) INJECTION AS NEEDED
Status: CANCELLED | OUTPATIENT
Start: 2019-10-22

## 2019-09-09 RX ORDER — SODIUM CHLORIDE 0.9 % (FLUSH) 0.9 %
10 SYRINGE (ML) INJECTION AS NEEDED
Status: DISCONTINUED | OUTPATIENT
Start: 2019-09-09 | End: 2019-09-09 | Stop reason: HOSPADM

## 2019-09-09 RX ADMIN — SODIUM CHLORIDE, PRESERVATIVE FREE 10 ML: 5 INJECTION INTRAVENOUS at 08:07

## 2019-09-09 RX ADMIN — Medication 500 UNITS: at 08:07

## 2019-09-09 RX ADMIN — SODIUM CHLORIDE, PRESERVATIVE FREE 10 ML: 5 INJECTION INTRAVENOUS at 08:06

## 2019-09-11 ENCOUNTER — APPOINTMENT (OUTPATIENT)
Dept: ONCOLOGY | Facility: CLINIC | Age: 79
End: 2019-09-11

## 2019-09-12 ENCOUNTER — OFFICE VISIT (OUTPATIENT)
Dept: ONCOLOGY | Facility: CLINIC | Age: 79
End: 2019-09-12

## 2019-09-12 VITALS
SYSTOLIC BLOOD PRESSURE: 170 MMHG | TEMPERATURE: 98.7 F | BODY MASS INDEX: 21.31 KG/M2 | HEIGHT: 64 IN | RESPIRATION RATE: 16 BRPM | OXYGEN SATURATION: 97 % | HEART RATE: 95 BPM | WEIGHT: 124.8 LBS | DIASTOLIC BLOOD PRESSURE: 77 MMHG

## 2019-09-12 DIAGNOSIS — D50.8 OTHER IRON DEFICIENCY ANEMIA: Primary | ICD-10-CM

## 2019-09-12 PROCEDURE — G0463 HOSPITAL OUTPT CLINIC VISIT: HCPCS | Performed by: NURSE PRACTITIONER

## 2019-09-12 PROCEDURE — 99213 OFFICE O/P EST LOW 20 MIN: CPT | Performed by: NURSE PRACTITIONER

## 2019-09-12 NOTE — PROGRESS NOTES
DATE OF VISIT: 9/12/2019    REASON FOR VISIT:  colon cancer,recurrent iron deficiency anemia, cystic lesion of pancreas        HISTORY OF PRESENT ILLNESS:    78-year-old female with a past medical history significant for stage III colon cancer, status post surgery followed by chemotherapy last of which was in July 2016.  In view of recurrent iron deficiency since October 2016 patient has required intravenous venofer.  His last round of intravenous venofer was in 12/2018.  Patient is here for follow-up visit today.  Patient had a CT of abdomen and pelvis with and without without contrast done on Saba 3, 2019, she is here to discuss the results and further recommendation.  Complains of intermittent dizziness.  Denies any excessive fatigue.  Denies any abdominal pain.  Denies any blood in the stool or urine.  Denies any new lymph node enlargement.      PAST MEDICAL HISTORY:    Past Medical History:   Diagnosis Date   • Acid reflux    • Diabetes mellitus (CMS/HCC)    • Hypertension    • Malignant neoplasm of ascending colon (CMS/HCC) 11/1/2016   • Malignant tumor of cecum (CMS/HCC)        SOCIAL HISTORY:    Social History     Tobacco Use   • Smoking status: Never Smoker   • Smokeless tobacco: Never Used   Substance Use Topics   • Alcohol use: No   • Drug use: No       Surgical History :  Past Surgical History:   Procedure Laterality Date   • CAPSULE ENDOSCOPY N/A 6/22/2017    Procedure: CAPSULE ENDOSCOPY M2A;  Surgeon: Stuart Rico DO;  Location: [facility] ENDOSCOPY;  Service:    • CAPSULE ENDOSCOPY N/A 1/23/2018    Procedure: CAPSULE ENDOSCOPY M2A;  Surgeon: Stuart Rico DO;  Location: [facility] ENDOSCOPY;  Service:    • CENTRAL VENOUS LINE INSERTION  10/30/2015    Ultrasound localization, left basilic vein.Placement of left upper extremity PICC line   • COLONOSCOPY N/A 1/23/2018    Procedure: COLONOSCOPY;  Surgeon: Stuart Rico DO;  Location: [facility] ENDOSCOPY;  Service:    • ENDOSCOPY N/A 6/22/2017     "Procedure: ESOPHAGOGASTRODUODENOSCOPY;  Surgeon: Stuart Rico DO;  Location: [facility] ENDOSCOPY;  Service:    • ENDOSCOPY N/A 1/23/2018    Procedure: ESOPHAGOGASTRODUODENOSCOPY;  Surgeon: Stuart Rico DO;  Location: [facility] ENDOSCOPY;  Service:    • FRACTURE SURGERY      right femur    • HYSTERECTOMY     • LAPAROSCOPIC ASSISSTED TOTAL COLECTOMY W/ J-POUCH  10/15/2015    Laparoscopic right hemicolectomy with ileotransverse colostomy   • VENOUS ACCESS DEVICE (PORT) INSERTION  01/12/2016    Right internal jugular Mediport       ALLERGIES:    Allergies   Allergen Reactions   • Other Rash     Oral Chemo Meds       REVIEW OF SYSTEMS:      CONSTITUTIONAL:  No fever, chills, or night sweats.     HEENT:  No epistaxis, mouth sores, or difficulty swallowing.    RESPIRATORY:  No new shortness of breath or cough at present.    CARDIOVASCULAR:  No chest pain or palpitations.    GASTROINTESTINAL:  No abdominal pain, nausea, vomiting, or blood in the stool.    GENITOURINARY:  No dysuria or hematuria.    MUSCULOSKELETAL:  No any new back pain or arthralgias.     NEUROLOGICAL:  No tingling or numbness. No new headache or dizziness.     LYMPHATICS:  Denies any abnormal swollen and anywhere in the body.    SKIN:  Denies any new skin rash.    PHYSICAL EXAMINATION:      VITAL SIGNS:  /77   Pulse 95   Temp 98.7 °F (37.1 °C) (Temporal)   Resp 16   Ht 162.6 cm (64.02\")   Wt 56.6 kg (124 lb 12.8 oz)   SpO2 97%   BMI 21.41 kg/m²     GENERAL:  Not in any distress.    HEENT:  Normocephalic, Atraumatic.Mild Conjunctival pallor. No icterus. Extraocular Movements Intact. No Facial Asymmetry noted.    NECK:  No adenopathy. No JVD.    RESPIRATORY:  Fair air entry bilateral. No rhonchi or wheezing.    CARDIOVASCULAR:  S1, S2. Regular rate and rhythm. No murmur or gallop appreciated.    ABDOMEN:  Soft, obese, nontender. Bowel sounds present in all four quadrants.  No organomegaly appreciated.    EXTREMITIES:  No edema.No Calf " Tenderness.    NEUROLOGIC:  Alert, awake and oriented ×3.  No  Motor or sensory deficit appreciated. Cranial Nerves 2-12 grossly intact.    SKIN : No new skin lesion identified  DIAGNOSTIC DATA:    Glucose   Date Value Ref Range Status   09/09/2019 259 (H) 65 - 99 mg/dL Final     Sodium   Date Value Ref Range Status   09/09/2019 136 136 - 145 mmol/L Final   06/14/2019 130 (L) 136 - 145 mmol/L Final     Potassium   Date Value Ref Range Status   09/09/2019 4.1 3.5 - 5.2 mmol/L Final   06/14/2019 4.1 3.5 - 5.1 mmol/L Final     CO2   Date Value Ref Range Status   09/09/2019 24.0 22.0 - 29.0 mmol/L Final     Chloride   Date Value Ref Range Status   09/09/2019 99 98 - 107 mmol/L Final   06/14/2019 94 (L) 98 - 107 mmol/L Final     Anion Gap   Date Value Ref Range Status   09/09/2019 13.0 5.0 - 15.0 mmol/L Final     Creatinine   Date Value Ref Range Status   09/09/2019 0.73 0.57 - 1.00 mg/dL Final   06/14/2019 0.8 0.6 - 1.0 mg/dL Final     Comment:     **The MDRD GFR formula is valid only for ages 18-70.    GFR will not be reported for individuals aging <18 or >70.     BUN   Date Value Ref Range Status   09/09/2019 13 8 - 23 mg/dL Final   06/14/2019 13 7 - 18 mg/dL Final     BUN/Creatinine Ratio   Date Value Ref Range Status   09/09/2019 17.8 7.0 - 25.0 Final     Calcium   Date Value Ref Range Status   09/09/2019 9.1 8.6 - 10.5 mg/dL Final   06/14/2019 8.8 8.5 - 10.1 mg/dL Final     eGFR Non  Amer   Date Value Ref Range Status   09/09/2019 77 >60 mL/min/1.73 Final     Alkaline Phosphatase   Date Value Ref Range Status   09/09/2019 91 39 - 117 U/L Final   06/03/2019 90 46 - 116 U/L Final     Total Protein   Date Value Ref Range Status   09/09/2019 6.8 6.0 - 8.5 g/dL Final   06/03/2019 8.2 6.4 - 8.2 g/dL Final     ALT (SGPT)   Date Value Ref Range Status   09/09/2019 20 1 - 33 U/L Final   06/03/2019 21 14 - 59 U/L Final     AST (SGOT)   Date Value Ref Range Status   09/09/2019 23 1 - 32 U/L Final   06/03/2019 18 15 -  37 U/L Final     Total Bilirubin   Date Value Ref Range Status   09/09/2019 0.4 0.2 - 1.2 mg/dL Final   06/03/2019 0.3 0.2 - 1.0 mg/dL Final     Albumin   Date Value Ref Range Status   09/09/2019 4.00 3.50 - 5.20 g/dL Final   06/03/2019 4.6 3.4 - 5.0 g/dL Final     Globulin   Date Value Ref Range Status   09/09/2019 2.8 gm/dL Final     Lab Results   Component Value Date    WBC 5.96 09/09/2019    HGB 12.8 09/09/2019    HCT 39.4 09/09/2019    MCV 91.0 09/09/2019     09/09/2019     Lab Results   Component Value Date    NEUTROABS 4.14 09/09/2019    IRON 70 09/09/2019    TIBC 338 09/09/2019    LABIRON 21 09/09/2019    FERRITIN 89.43 09/09/2019    [id number] 476 09/09/2019    FOLATE >20.00 09/09/2019     Lab Results   Component Value Date    CEA 2.69 09/09/2019    REFLABREPO SEE NOTE: 12/22/2015   ]  CT Of abdomen and pelvis with and without contrast done on Saba 3, 2019 was reviewed, discussed with the patient, it showed:  FINDINGS: Comparison study dated  November 30, 2017. Axial  computer tomography sequential imaging was performed from the  diaphragms through the symphysis pubis without and with IV  contrast administration. Sagittal and coronal reformates was  performed. This exam was performed according to our departmental  dose optimization program, which includes automated exposure  control, adjustment of the mA and/or KV according to patient size  and/or use of iterative reconstruction technique.      Imaging through lung bases reveals stable right middle lobe  inferior small calcified lung parenchymal granuloma consistent  with old granulomatous disease. Lung bases are otherwise  unremarkable.     The liver is normal. The gallbladder is normal. The biliary  system is normal. The pancreas is normal. The spleen is normal.  Bilateral adrenal glands are normal. Right kidney mid zone stable  simple renal cortical cyst which measures 3.38 cm in greatest  diameter. The right kidney and ureter are otherwise  unremarkable.  The left kidney and ureter are normal. The bladder is normal. The  uterus is surgically absent. Postsurgical changes consistent with  partial right colon resection. Otherwise hollow viscera is  unremarkable. No lymphadenopathy in the abdomen or pelvis. No  acute osseous abnormality.     IMPRESSION:  1. Stable evidence of old granulomatous disease..  2. Stable right kidney mid zone simple renal cortical cyst..  3. Otherwise unremarkable CT of the abdomen and pelvis with and  without contrast..           Ct of abdomen and Pelvis with contrast done on November 30, 2017 showed:  ABDOMEN:     - LIVER:    normal size / contour, no ductal dilatation , no  focal lesion   - GB:      grossly negative    - CBD:      grossly negative   - SPLEEN:    normal size and contour   - PANCREAS:    normal in size, contour, no focal mass    - VISCERA:    normal caliber, no wall thickening     - MESENTERY:  no mesenteric mass   - CAVITY:    no free abdominal fluid, no free intraperitoneal  air   - BODY WALL:  wnl   - OSSEOUS:  Mild leftward deviating lumbar scoliosis, unchanged.  - MISC:                                       RETROPERITONEUM:   - KIDNEYS:    normal size / contour, no collecting system  dilation        no evidence of an enhancing mass   - URETERS:    normal course, caliber   - ADRENALS:    normal size, contour   - MISC:      no sig retroperitoneal adenopathy or mass   - VASCULAR:    aorta / iliacs: wnl for age      - - - CT PELVIS - - -       - VISCERA:    normal caliber small/large bowel, no focal  thickening/mass - MESENTERY:  no mass   - VASCULAR:    wnl for age   - CAVITY:    no free fluid / air   - BLADDER:    unremarkable   - OSSEOUS:    wnl    - MISC:   - UTERUS/OVARY:  Status post hysterectomy   .     IMPRESSION:   CONCLUSION:  1. Stable examination.                 ASSESSMENT AND PLAN:       1.  Recurrent iron deficiency: Positive recurrence of iron deficiency since October 2016.  Patient had a side  effect with Feraheme so was started on Venofer.  Last dose of Venofer was given on October 27 , 2017.  Patient had a EGD, colonoscopy and capsule endoscopy done on January 23, 2018.  EGD showed some gastritis, colonoscopy showed a single tubular adenoma without any cancer.  Patient had a very narrow pylorus after dilating pylorus by Dr. Rico, capsule was passed into the small intestine but Endoscopy report is not helpful since there was so much food as per report in the small intestine.  Developing iron deficiency, patient receive another round of intravenous Venofer from February 19, 2018 until February 23, 2018.  Case was discussed with Dr. Rico, he does not think any much intervention can be done regarding her recurrent iron deficiency  From GI point of view at this point.  He recommends rechecking iron level frequently and supplementing it is required.    -Anemia workup done on November 30, 2018 again shows developing iron deficiency with iron saturation of 10%.    -Patient received 5 days of intravenous Venofer from December 10, 2018 until December 14, 2018.  -Anemia workup done on 9/9/2019  shows adequate amount of iron.  Hemoglobin is 12.8 iron studies were adequate.  No need to restart intravenous Venofer at this point.  -Vitamin B12 level is 421.  Continue with oral B-12Recommend starting B12 thousand micrograms p.o. daily.  Prescription for B12 has been sent to her pharmacy today on June 5, 2019.  -We will ask patient to return to clinic in 4 months with repeat CBC and anemia workup to be done prior to that.        2.  Colon cancer, stage III, diagnosed in October 2015.  Patient had a hemicolectomy followed by chemotherapy.  Patient had a very complicated course with chemotherapy.  Initially patient was started on Xelox but Xeloda gave her skin rash.  Subsequently patient was changed over to FOLFOX for which she took 3 cycles and had a significant neuropathy.  Subsequently patient was changed to 5-FU  and leucovorin which patient could not tolerate either subsequently after 8 cycles of chemotherapy in total chemotherapy was discontinued in July 2016.  Last colonoscopy done in January 2018 did not show any evidence of recurrence.    -Most recent CEA level done on Saba 3, 2019 is normal at 2.6.  -CT of chest, abdomen and pelvis with contrast done on Saba 3 does not show any evidence of recurrence.  -We will recheck CEA with next clinic visit in 3 months.  -Next surveillance CT of chest, abdomen and pelvis with contrast will be done around June 2020.     3.  Cystic lesion in pancreas:  -CT scan done on November 30, 2018 shows 1 cm cystic lesion in pancreas.  Differential diagnosis for cystic lesion in pancreas includes benign cyst versus cystic neoplasm of the pancreas.  -CT of abdomen and pelvis with and without contrast done on Saba 3, 2019 does not show any evidence of cystic lesion in the pancreas.  This was discussed with them on last visit.     4.  History of dementia                This document has been signed by FLY Rutherford on September 12, 2019 1:09 PM

## 2019-09-23 ENCOUNTER — TELEPHONE (OUTPATIENT)
Dept: NUTRITION | Facility: HOSPITAL | Age: 79
End: 2019-09-23

## 2019-09-23 NOTE — PROGRESS NOTES
Adult Outpatient Nutrition  Assessment    Patient Name:  Mary Gutierrez  YOB: 1940  MRN: [id number]    Assessment Date:  9/23/2019    Comments:  Received telephone message that pt needs [employer] discount coupons. Coupons mailed today.                        Electronically signed by:  Love Black RD  09/23/19 10:56 AM

## 2019-10-21 ENCOUNTER — TELEPHONE (OUTPATIENT)
Dept: NUTRITION | Facility: HOSPITAL | Age: 79
End: 2019-10-21

## 2019-10-21 NOTE — PROGRESS NOTES
Adult Outpatient Nutrition  Assessment    Patient Name:  Mary Gutierrez  YOB: 1940  MRN: [id number]    Assessment Date:  10/21/2019    Comments: Mailed [employer] discount coupons to home .                        Electronically signed by:  Love Black RD  10/21/19 9:42 AM

## 2019-10-22 ENCOUNTER — INFUSION (OUTPATIENT)
Dept: ONCOLOGY | Facility: HOSPITAL | Age: 79
End: 2019-10-22

## 2019-10-22 VITALS
HEART RATE: 80 BPM | SYSTOLIC BLOOD PRESSURE: 146 MMHG | TEMPERATURE: 97.5 F | RESPIRATION RATE: 16 BRPM | DIASTOLIC BLOOD PRESSURE: 67 MMHG

## 2019-10-22 DIAGNOSIS — Z45.2 ENCOUNTER FOR CARE RELATED TO PORT-A-CATH: Primary | ICD-10-CM

## 2019-10-22 DIAGNOSIS — Z45.2 ENCOUNTER FOR VENOUS ACCESS DEVICE CARE: ICD-10-CM

## 2019-10-22 DIAGNOSIS — Z23 FLU VACCINE NEED: ICD-10-CM

## 2019-10-22 PROCEDURE — G0008 ADMIN INFLUENZA VIRUS VAC: HCPCS | Performed by: NURSE PRACTITIONER

## 2019-10-22 PROCEDURE — 90674 CCIIV4 VAC NO PRSV 0.5 ML IM: CPT | Performed by: NURSE PRACTITIONER

## 2019-10-22 PROCEDURE — 96523 IRRIG DRUG DELIVERY DEVICE: CPT | Performed by: NURSE PRACTITIONER

## 2019-10-22 PROCEDURE — 25010000002 INFLUENZA VAC SUBUNIT QUAD 0.5 ML SUSPENSION PREFILLED SYRINGE: Performed by: NURSE PRACTITIONER

## 2019-10-22 RX ORDER — SODIUM CHLORIDE 0.9 % (FLUSH) 0.9 %
10 SYRINGE (ML) INJECTION AS NEEDED
Status: DISCONTINUED | OUTPATIENT
Start: 2019-10-22 | End: 2019-10-22 | Stop reason: HOSPADM

## 2019-10-22 RX ORDER — SODIUM CHLORIDE 0.9 % (FLUSH) 0.9 %
10 SYRINGE (ML) INJECTION AS NEEDED
Status: CANCELLED | OUTPATIENT
Start: 2019-12-03

## 2019-10-22 RX ADMIN — INFLUENZA A VIRUS A/IDAHO/07/2018 (H1N1) ANTIGEN (MDCK CELL DERIVED, PROPIOLACTONE INACTIVATED, INFLUENZA A VIRUS A/INDIANA/08/2018 (H3N2) ANTIGEN (MDCK CELL DERIVED, PROPIOLACTONE INACTIVATED), INFLUENZA B VIRUS B/SINGAPORE/INFTT-16-0610/2016 ANTIGEN (MDCK CELL DERIVED, PROPIOLACTONE INACTIVATED), INFLUENZA B VIRUS B/IOWA/06/2017 ANTIGEN (MDCK CELL DERIVED, PROPIOLACTONE INACTIVATED) 0.5 ML: 15; 15; 15; 15 INJECTION, SUSPENSION INTRAMUSCULAR at 08:32

## 2019-10-22 RX ADMIN — SODIUM CHLORIDE, PRESERVATIVE FREE 10 ML: 5 INJECTION INTRAVENOUS at 08:24

## 2019-10-22 RX ADMIN — Medication 500 UNITS: at 08:24

## 2019-12-03 ENCOUNTER — INFUSION (OUTPATIENT)
Dept: ONCOLOGY | Facility: HOSPITAL | Age: 79
End: 2019-12-03

## 2019-12-03 VITALS
HEART RATE: 77 BPM | DIASTOLIC BLOOD PRESSURE: 73 MMHG | TEMPERATURE: 98.7 F | SYSTOLIC BLOOD PRESSURE: 166 MMHG | RESPIRATION RATE: 18 BRPM

## 2019-12-03 DIAGNOSIS — Z45.2 ENCOUNTER FOR CARE RELATED TO PORT-A-CATH: Primary | ICD-10-CM

## 2019-12-03 DIAGNOSIS — Z45.2 ENCOUNTER FOR VENOUS ACCESS DEVICE CARE: ICD-10-CM

## 2019-12-03 PROCEDURE — 96523 IRRIG DRUG DELIVERY DEVICE: CPT | Performed by: INTERNAL MEDICINE

## 2019-12-03 RX ORDER — SODIUM CHLORIDE 0.9 % (FLUSH) 0.9 %
10 SYRINGE (ML) INJECTION AS NEEDED
Status: DISCONTINUED | OUTPATIENT
Start: 2019-12-03 | End: 2019-12-03 | Stop reason: HOSPADM

## 2019-12-03 RX ORDER — SODIUM CHLORIDE 0.9 % (FLUSH) 0.9 %
10 SYRINGE (ML) INJECTION AS NEEDED
Status: CANCELLED | OUTPATIENT
Start: 2020-01-13

## 2019-12-03 RX ADMIN — SODIUM CHLORIDE, PRESERVATIVE FREE 10 ML: 5 INJECTION INTRAVENOUS at 08:11

## 2019-12-03 RX ADMIN — Medication 500 UNITS: at 08:11

## 2019-12-23 ENCOUNTER — DOCUMENTATION (OUTPATIENT)
Dept: NUTRITION | Facility: HOSPITAL | Age: 79
End: 2019-12-23

## 2019-12-23 NOTE — PROGRESS NOTES
ASSESSMENT  1. Hyperprolactinemia, diagnosed in 1970, initially treated with bromocriptine. As of 11/6/14, she was been on cabergoline 0.5 mg twice weekly with documented decrease of her prolactin level to just below the normal range, and her cabergoline was decreased to 0.25 mg twice weekly as of 5/9/16.  Discussed a trial of discontinuing the cabergoline.  If her prolactin level remains in the normal range, the medication can be discontinued.  The prolactin level should be monitored over several months to document recurrence of  Hyperprolactinemia.  If indicated, the cabergoline may be resumed.  She agrees to proceed.    2. Hypothyroidism, diagnosed in 1968. Her dose of Synthroid was increased from 175 mcg daily to 200 mcg daily on 12/2/16.  Symptoms are much improved after the dose increase.  Her prescription is updated for one year.    PLAN  1.  Hold cabergoline for 2 weeks.  2.  Repeat prolactin level the week of May 1, and She will be contacted with results and further recommendations as indicated.  3.  Follow-up in 12 months with a TSH and prolactin prior to the visit.    CHIEF COMPLAINT:  Chief Complaint   Patient presents with   • Thyroid Problem     1 year FUV/hypothyroidism       HISTORY OF PRESENT ILLNESS:  This 62 year old White female returns today for followup of a known diagnosis of hyperprolactinemia and hypothyroidism.  Since last seen, she has had bilateral foot surgery, in October, 2016 and March 2017.  She has recovered well, other than swelling in her feet if she is on them for too long.  She is supposed to be keeping her feet elevated for about half the day.  Last fall, she was noting a significant increase in fatigue and increase in hair loss.  A TSH done in late November was elevated and her TSH was increased to 200 mcg daily.  Since the dose change, her symptoms have resolved and she feels much better.  She denies any change in her headaches.     Patient Active Problem List   Diagnosis   •  Adult Outpatient Nutrition  Assessment    Patient Name:  Mary Gutierrez  YOB: 1940  MRN: [id number]    Assessment Date:  12/23/2019    Comments: Mailed additional [employer] discount coupons to home.                        Electronically signed by:  Love Black RD  12/23/19 12:11 PM    Plaquenil 400mg daily since 10/2012   • Abdominal pain   • Allergic rhinitis   • Asthma   • Fibromyalgia   • GERD (gastroesophageal reflux disease)   • Hypersomnia   • Intractable headache   • Irritable bowel syndrome   • Personality disorder   • Snoring   • Obstructive sleep apnea (adult) (pediatric)   • Astigmatism with presbyopia   • Other and unspecified hyperlipidemia   • Depressive disorder, not elsewhere classified   • Sjogren's syndrome   • Vitamin D deficiency   • OA (osteoarthritis)   • Herpes simplex   • Perineal pain in female   • Essential hypertension with goal blood pressure less than 140/90   • Hypothyroidism due to Hashimoto's thyroiditis   • Muscle weakness   • Migraine without status migrainosus, not intractable   • Vertigo        HISTORIES:    ARTHROSCOPY KNEE MEDIAL&LATERA                  10/05/2011    COLONOSCOPY                                     2008    ESOPHAGOGASTRODUODENOSCOPY TRANSORAL FLEX W/BX* 2005      Comment: EGD with Bx    DEXA BONE DENSITY AXIAL SKELETON                2009    TONSILLECTOMY AND ADENOIDECTOMY                                SECTION, LOW TRANSVERSE                              PELVIC LAPAROSCOPY                                            CATARACT EXTRACTION, BILATERAL                                BUNIONECTOMY                                    10/2016       FOOT SURGERY                                    10/2016       WISDOM TOOTH EXTRACTION                                         EMPLOYER:      Allergies as of 2017 - [facility] as Reviewed 2017   Allergen Reaction Noted   • Amitriptyline Other (See Comments) 07/15/2013   • Ceftin  07/15/2013   • Compazine ANXIETY 07/15/2013   • Hydrocodone Other (See Comments) 07/15/2013   • Sulfa antibiotics Nausea & Vomiting 07/15/2013       MEDICATIONS PRIOR TO THIS VISIT:  Current Outpatient Prescriptions   Medication Sig Dispense Refill   • buPROPion (WELLBUTRIN) 100 MG tablet Take 1.5 in am  and 1 tab nightly 75 tablet 2   • pravastatin (PRAVACHOL) 20 MG tablet Take 1 tablet by mouth daily. 90 tablet 2   • betamethasone valerate (VALISONE) 0.1 % cream Apply topically 2 times daily. 30 g 2   • metaxalone (SKELAXIN) 800 MG tablet Take 1 tablet by mouth 3 times daily as needed for Pain. 90 tablet 0   • fluocinonide (LIDEX) 0.05 % cream Apply topically 2 times daily. 30 g 1   • LATUDA 40 MG tablet TAKE 1 TABLET BY MOUTH DAILY WITH BREAKFAST 30 tablet 3   • gabapentin (NEURONTIN) 300 MG capsule Take 1 capsule by mouth daily. 400mg am. 400mg pm,  500 mg     • vitamin - therapeutic multivitamins w/minerals (CENTRUM SILVER,THERA-M) Tab Take 1 tablet by mouth daily.     • lisinopril (PRINIVIL,ZESTRIL) 40 MG tablet TAKE 1 TABLET BY MOUTH DAILY 90 tablet 3   • sertraline (ZOLOFT) 100 MG tablet Take 1.5 tablets by mouth daily. 135 tablet 1   • LATUDA 40 MG tablet TAKE 1 TABLET BY MOUTH DAILY WITH BREAKFAST 30 tablet 2   • levothyroxine (SYNTHROID, LEVOTHROID) 200 MCG tablet Take 1 tablet by mouth daily. Brand name only 90 tablet 1   • Valacyclovir HCl 1000 MG Tab TAKE 1 TABLET BY MOUTH DAILY 30 tablet 2   • cabergoline (DOSTINEX) 0.5 MG tablet Take 0.25 mg tablets by mouth twice weekly. 30 tablet 3   • vitamin E 1000 UNITS capsule Take 1,000 Units by mouth daily.     • tiZANidine (ZANAFLEX) 4 MG tablet Take 1 tablet by mouth every 6 hours as needed (spasm). 90 tablet 1   • simethicone (GAS-X) 80 MG chewable tablet Chew 1 tablet by mouth every 6 hours as needed for Flatulence. 30 tablet 0   • docusate sodium (COLACE) 100 MG capsule Take 1 capsule by mouth 2 times daily. 10 capsule 0   • hydroxychloroquine (PLAQUENIL) 200 MG tablet Take 2 tablets by mouth daily. 180 tablet 3   • verapamil 120 MG tablet Take 240 mg by mouth nightly.  135 tablet 1   • cetirizine (ZYRTEC) 10 MG tablet Take 10 mg by mouth daily. Indications: as needed      • albuterol (VENTOLIN HFA) 108 (90 BASE) MCG/ACT inhaler Inhale 2 puffs into the  lungs every 4 hours as needed for Shortness of Breath or Wheezing. 1 Inhaler 0   • cholecalciferol (VITAMIN D3) 1000 UNITS tablet Take 2,000 Units by mouth daily.     • nortriptyline (PAMELOR) 25 MG capsule Take 50 mg by mouth 2 times daily. 2 tablets in the am and 2 tablets in the pm     • ibuprofen (MOTRIN) 600 MG tablet Take 1 tablet by mouth every 8 hours as needed for Pain. 15 tablet 0   • DISPENSE Apply 1 application topically 3 times daily as needed. Fluocinonide 0.0.5% ointment. Apply topically up to three times daily as needed.     • phenylephrine (EQ SUPHEDRINE PE) 10 MG TABS Take 10 mg by mouth as needed.     • CALCIUM-MAGNESIUM-ZINC PO Take 2 tablets by mouth nightly.     • aspirin 81 MG tablet Take 81 mg by mouth daily.     • guaiFENesin (MUCINEX) 600 MG 12 hr tablet Take 1,200 mg by mouth as needed.     • metoCLOPramide (REGLAN) 10 MG tablet Take 10 mg by mouth 4 times daily as needed.     • Omega-3 Fatty Acids (OMEGA 3 PO) Take  by mouth.     • sumatriptan (IMITREX) 100 MG tablet Take 100 mg by mouth as needed.       No current facility-administered medications for this visit.         MEDICATIONS AFTER THIS VISIT:  Current Outpatient Prescriptions   Medication Sig Dispense Refill   • buPROPion (WELLBUTRIN) 100 MG tablet Take 1.5 in am and 1 tab nightly 75 tablet 2   • pravastatin (PRAVACHOL) 20 MG tablet Take 1 tablet by mouth daily. 90 tablet 2   • betamethasone valerate (VALISONE) 0.1 % cream Apply topically 2 times daily. 30 g 2   • metaxalone (SKELAXIN) 800 MG tablet Take 1 tablet by mouth 3 times daily as needed for Pain. 90 tablet 0   • fluocinonide (LIDEX) 0.05 % cream Apply topically 2 times daily. 30 g 1   • LATUDA 40 MG tablet TAKE 1 TABLET BY MOUTH DAILY WITH BREAKFAST 30 tablet 3   • gabapentin (NEURONTIN) 300 MG capsule Take 1 capsule by mouth daily. 400mg am. 400mg pm,  500 mg     • vitamin - therapeutic multivitamins w/minerals (CENTRUM SILVER,THERA-M) Tab Take 1 tablet by mouth daily.      • lisinopril (PRINIVIL,ZESTRIL) 40 MG tablet TAKE 1 TABLET BY MOUTH DAILY 90 tablet 3   • sertraline (ZOLOFT) 100 MG tablet Take 1.5 tablets by mouth daily. 135 tablet 1   • LATUDA 40 MG tablet TAKE 1 TABLET BY MOUTH DAILY WITH BREAKFAST 30 tablet 2   • levothyroxine (SYNTHROID, LEVOTHROID) 200 MCG tablet Take 1 tablet by mouth daily. Brand name only 90 tablet 1   • Valacyclovir HCl 1000 MG Tab TAKE 1 TABLET BY MOUTH DAILY 30 tablet 2   • cabergoline (DOSTINEX) 0.5 MG tablet Take 0.25 mg tablets by mouth twice weekly. 30 tablet 3   • vitamin E 1000 UNITS capsule Take 1,000 Units by mouth daily.     • tiZANidine (ZANAFLEX) 4 MG tablet Take 1 tablet by mouth every 6 hours as needed (spasm). 90 tablet 1   • simethicone (GAS-X) 80 MG chewable tablet Chew 1 tablet by mouth every 6 hours as needed for Flatulence. 30 tablet 0   • docusate sodium (COLACE) 100 MG capsule Take 1 capsule by mouth 2 times daily. 10 capsule 0   • hydroxychloroquine (PLAQUENIL) 200 MG tablet Take 2 tablets by mouth daily. 180 tablet 3   • verapamil 120 MG tablet Take 240 mg by mouth nightly.  135 tablet 1   • cetirizine (ZYRTEC) 10 MG tablet Take 10 mg by mouth daily. Indications: as needed      • albuterol (VENTOLIN HFA) 108 (90 BASE) MCG/ACT inhaler Inhale 2 puffs into the lungs every 4 hours as needed for Shortness of Breath or Wheezing. 1 Inhaler 0   • cholecalciferol (VITAMIN D3) 1000 UNITS tablet Take 2,000 Units by mouth daily.     • nortriptyline (PAMELOR) 25 MG capsule Take 50 mg by mouth 2 times daily. 2 tablets in the am and 2 tablets in the pm     • ibuprofen (MOTRIN) 600 MG tablet Take 1 tablet by mouth every 8 hours as needed for Pain. 15 tablet 0   • DISPENSE Apply 1 application topically 3 times daily as needed. Fluocinonide 0.0.5% ointment. Apply topically up to three times daily as needed.     • phenylephrine (EQ SUPHEDRINE PE) 10 MG TABS Take 10 mg by mouth as needed.     • CALCIUM-MAGNESIUM-ZINC PO Take 2 tablets by mouth  nightly.     • aspirin 81 MG tablet Take 81 mg by mouth daily.     • guaiFENesin (MUCINEX) 600 MG 12 hr tablet Take 1,200 mg by mouth as needed.     • metoCLOPramide (REGLAN) 10 MG tablet Take 10 mg by mouth 4 times daily as needed.     • Omega-3 Fatty Acids (OMEGA 3 PO) Take  by mouth.     • sumatriptan (IMITREX) 100 MG tablet Take 100 mg by mouth as needed.       No current facility-administered medications for this visit.         REVIEW OF SYSTEMS:  Reviewed in nursing notes, and I agree.      PHYSICAL EXAMINATION:  Vital Signs:    Visit Vitals   • /80 (BP Location: [facility], Patient Position: Sitting, Cuff Size: Large Adult)   • Pulse 88   • Resp 16   • Ht 5' 6\" (1.676 m)   • Wt 104.5 kg   • LMP  (LMP Unknown)   • BMI 37.17 kg/m2     General:  This is a(n) Obese White female in no distress, who is alert, oriented to person with normal eye contact, normal affect, normal thought processes and clear speech.  She ambulates normally without assistance. Non-labored respirations.  HEENT:   There is no stare or proptosis.  Puffiness of the lower eyelids is present.  Atraumatic.  Neck:  Supple.  No goiter visible on inspection. Thyroid is palpable, non-nodular, nontender and mobile.  There are no neck masses palpable.  There is no cervical or supraclavicular lymphadenopathy.  Trachea is midline.  Extremities:  Skin and nail texture are normal.  Palms are warm and dry.  No upper extremity fine tremor.  No pedal edema.   Neurologic:  Deep tendon reflexes 2+ with a normal appearing return phase and symmetric, upper and lower extremities.    Skin:  Normal.  Good turgor.    LABS:   TSH (mcUnits/mL)   Date Value   03/01/2017 2.862   11/26/2016 5.545 (H)   05/04/2016 1.220     Component      Latest Ref Rng & Units 5/14/2015 4/11/2016 5/4/2016 7/26/2016   PROLACTIN      2.8 - 29.2 ng/mL 26.1 2.2 (L) 1.2 (L) 1.9 (L)       IMAGING:  none

## 2019-12-30 ENCOUNTER — TELEPHONE (OUTPATIENT)
Dept: NUTRITION | Facility: HOSPITAL | Age: 79
End: 2019-12-30

## 2020-01-13 ENCOUNTER — INFUSION (OUTPATIENT)
Dept: ONCOLOGY | Facility: HOSPITAL | Age: 80
End: 2020-01-13

## 2020-01-13 DIAGNOSIS — Z45.2 ENCOUNTER FOR VENOUS ACCESS DEVICE CARE: ICD-10-CM

## 2020-01-13 DIAGNOSIS — C18.2 MALIGNANT NEOPLASM OF ASCENDING COLON (HCC): ICD-10-CM

## 2020-01-13 DIAGNOSIS — Z45.2 ENCOUNTER FOR CARE RELATED TO PORT-A-CATH: Primary | ICD-10-CM

## 2020-01-13 DIAGNOSIS — D50.8 OTHER IRON DEFICIENCY ANEMIA: ICD-10-CM

## 2020-01-13 LAB
ALBUMIN SERPL-MCNC: 4.2 G/DL (ref 3.5–5.2)
ALBUMIN/GLOB SERPL: 1.8 G/DL
ALP SERPL-CCNC: 79 U/L (ref 39–117)
ALT SERPL W P-5'-P-CCNC: 10 U/L (ref 1–33)
ANION GAP SERPL CALCULATED.3IONS-SCNC: 12 MMOL/L (ref 5–15)
AST SERPL-CCNC: 23 U/L (ref 1–32)
BASOPHILS # BLD AUTO: 0.04 10*3/MM3 (ref 0–0.2)
BASOPHILS NFR BLD AUTO: 0.6 % (ref 0–1.5)
BILIRUB SERPL-MCNC: 0.3 MG/DL (ref 0.2–1.2)
BUN BLD-MCNC: 15 MG/DL (ref 8–23)
BUN/CREAT SERPL: 19.2 (ref 7–25)
CALCIUM SPEC-SCNC: 9.2 MG/DL (ref 8.6–10.5)
CEA SERPL-MCNC: 2.27 NG/ML
CHLORIDE SERPL-SCNC: 93 MMOL/L (ref 98–107)
CO2 SERPL-SCNC: 24 MMOL/L (ref 22–29)
CREAT BLD-MCNC: 0.78 MG/DL (ref 0.57–1)
DEPRECATED RDW RBC AUTO: 40.4 FL (ref 37–54)
EOSINOPHIL # BLD AUTO: 0.38 10*3/MM3 (ref 0–0.4)
EOSINOPHIL NFR BLD AUTO: 5.8 % (ref 0.3–6.2)
ERYTHROCYTE [DISTWIDTH] IN BLOOD BY AUTOMATED COUNT: 13.1 % (ref 12.3–15.4)
FERRITIN SERPL-MCNC: 71.23 NG/ML (ref 13–150)
FOLATE SERPL-MCNC: >20 NG/ML (ref 4.78–24.2)
GFR SERPL CREATININE-BSD FRML MDRD: 71 ML/MIN/1.73
GLOBULIN UR ELPH-MCNC: 2.4 GM/DL
GLUCOSE BLD-MCNC: 218 MG/DL (ref 65–99)
HCT VFR BLD AUTO: 39 % (ref 34–46.6)
HGB BLD-MCNC: 13 G/DL (ref 12–15.9)
IMM GRANULOCYTES # BLD AUTO: 0.03 10*3/MM3 (ref 0–0.05)
IMM GRANULOCYTES NFR BLD AUTO: 0.5 % (ref 0–0.5)
IRON 24H UR-MRATE: 76 MCG/DL (ref 37–145)
IRON SATN MFR SERPL: 19 % (ref 20–50)
LYMPHOCYTES # BLD AUTO: 1.32 10*3/MM3 (ref 0.7–3.1)
LYMPHOCYTES NFR BLD AUTO: 20 % (ref 19.6–45.3)
MCH RBC QN AUTO: 28.5 PG (ref 26.6–33)
MCHC RBC AUTO-ENTMCNC: 33.3 G/DL (ref 31.5–35.7)
MCV RBC AUTO: 85.5 FL (ref 79–97)
MONOCYTES # BLD AUTO: 0.48 10*3/MM3 (ref 0.1–0.9)
MONOCYTES NFR BLD AUTO: 7.3 % (ref 5–12)
NEUTROPHILS # BLD AUTO: 4.35 10*3/MM3 (ref 1.7–7)
NEUTROPHILS NFR BLD AUTO: 65.8 % (ref 42.7–76)
NRBC BLD AUTO-RTO: 0 /100 WBC (ref 0–0.2)
PLATELET # BLD AUTO: 354 10*3/MM3 (ref 140–450)
PMV BLD AUTO: 9.1 FL (ref 6–12)
POTASSIUM BLD-SCNC: 4.6 MMOL/L (ref 3.5–5.2)
PROT SERPL-MCNC: 6.6 G/DL (ref 6–8.5)
RBC # BLD AUTO: 4.56 10*6/MM3 (ref 3.77–5.28)
SODIUM BLD-SCNC: 129 MMOL/L (ref 136–145)
TIBC SERPL-MCNC: 408 MCG/DL (ref 298–536)
TRANSFERRIN SERPL-MCNC: 274 MG/DL (ref 200–360)
VIT B12 BLD-MCNC: 359 PG/ML (ref 211–946)
WBC NRBC COR # BLD: 6.6 10*3/MM3 (ref 3.4–10.8)

## 2020-01-13 PROCEDURE — 84466 ASSAY OF TRANSFERRIN: CPT | Performed by: NURSE PRACTITIONER

## 2020-01-13 PROCEDURE — 82378 CARCINOEMBRYONIC ANTIGEN: CPT | Performed by: INTERNAL MEDICINE

## 2020-01-13 PROCEDURE — 85025 COMPLETE CBC W/AUTO DIFF WBC: CPT | Performed by: NURSE PRACTITIONER

## 2020-01-13 PROCEDURE — 82746 ASSAY OF FOLIC ACID SERUM: CPT | Performed by: NURSE PRACTITIONER

## 2020-01-13 PROCEDURE — 83540 ASSAY OF IRON: CPT | Performed by: NURSE PRACTITIONER

## 2020-01-13 PROCEDURE — 82607 VITAMIN B-12: CPT | Performed by: NURSE PRACTITIONER

## 2020-01-13 PROCEDURE — 82728 ASSAY OF FERRITIN: CPT | Performed by: NURSE PRACTITIONER

## 2020-01-13 PROCEDURE — 36591 DRAW BLOOD OFF VENOUS DEVICE: CPT | Performed by: INTERNAL MEDICINE

## 2020-01-13 PROCEDURE — 80053 COMPREHEN METABOLIC PANEL: CPT | Performed by: NURSE PRACTITIONER

## 2020-01-13 RX ORDER — SODIUM CHLORIDE 0.9 % (FLUSH) 0.9 %
10 SYRINGE (ML) INJECTION AS NEEDED
Status: CANCELLED | OUTPATIENT
Start: 2020-01-21

## 2020-01-13 RX ORDER — HEPARIN SODIUM (PORCINE) LOCK FLUSH IV SOLN 100 UNIT/ML 100 UNIT/ML
500 SOLUTION INTRAVENOUS AS NEEDED
Status: CANCELLED | OUTPATIENT
Start: 2020-01-21

## 2020-01-13 RX ORDER — SODIUM CHLORIDE 0.9 % (FLUSH) 0.9 %
10 SYRINGE (ML) INJECTION AS NEEDED
Status: DISCONTINUED | OUTPATIENT
Start: 2020-01-13 | End: 2020-01-13 | Stop reason: HOSPADM

## 2020-01-13 RX ADMIN — SODIUM CHLORIDE, PRESERVATIVE FREE 10 ML: 5 INJECTION INTRAVENOUS at 08:21

## 2020-01-13 RX ADMIN — HEPARIN 500 UNITS: 100 SYRINGE at 08:21

## 2020-01-13 RX ADMIN — SODIUM CHLORIDE, PRESERVATIVE FREE 10 ML: 5 INJECTION INTRAVENOUS at 08:20

## 2020-01-15 ENCOUNTER — OFFICE VISIT (OUTPATIENT)
Dept: ONCOLOGY | Facility: CLINIC | Age: 80
End: 2020-01-15

## 2020-01-15 ENCOUNTER — TELEPHONE (OUTPATIENT)
Dept: ONCOLOGY | Facility: CLINIC | Age: 80
End: 2020-01-15

## 2020-01-15 VITALS
RESPIRATION RATE: 16 BRPM | HEART RATE: 76 BPM | DIASTOLIC BLOOD PRESSURE: 73 MMHG | HEIGHT: 64 IN | WEIGHT: 126 LBS | TEMPERATURE: 98.4 F | SYSTOLIC BLOOD PRESSURE: 153 MMHG | BODY MASS INDEX: 21.51 KG/M2

## 2020-01-15 DIAGNOSIS — K86.2 CYST OF PANCREAS: ICD-10-CM

## 2020-01-15 DIAGNOSIS — K90.9 MALABSORPTION OF IRON: ICD-10-CM

## 2020-01-15 DIAGNOSIS — D50.8 OTHER IRON DEFICIENCY ANEMIA: ICD-10-CM

## 2020-01-15 DIAGNOSIS — C18.2 MALIGNANT NEOPLASM OF ASCENDING COLON (HCC): Primary | ICD-10-CM

## 2020-01-15 PROBLEM — E53.8 B12 DEFICIENCY: Status: ACTIVE | Noted: 2020-01-15

## 2020-01-15 PROCEDURE — 1123F ACP DISCUSS/DSCN MKR DOCD: CPT | Performed by: INTERNAL MEDICINE

## 2020-01-15 PROCEDURE — 99214 OFFICE O/P EST MOD 30 MIN: CPT | Performed by: INTERNAL MEDICINE

## 2020-01-15 PROCEDURE — G9903 PT SCRN TBCO ID AS NON USER: HCPCS | Performed by: INTERNAL MEDICINE

## 2020-01-15 PROCEDURE — G8731 PAIN NEG NO PLAN: HCPCS | Performed by: INTERNAL MEDICINE

## 2020-01-15 PROCEDURE — G0463 HOSPITAL OUTPT CLINIC VISIT: HCPCS | Performed by: INTERNAL MEDICINE

## 2020-01-15 RX ORDER — CYANOCOBALAMIN 1000 UG/ML
1000 INJECTION, SOLUTION INTRAMUSCULAR; SUBCUTANEOUS ONCE
Status: CANCELLED | OUTPATIENT
Start: 2020-01-20

## 2020-01-15 RX ORDER — FAMOTIDINE 20 MG/1
20 TABLET, FILM COATED ORAL ONCE
Status: CANCELLED | OUTPATIENT
Start: 2020-01-20

## 2020-01-15 RX ORDER — DIPHENHYDRAMINE HCL 25 MG
25 CAPSULE ORAL ONCE
Status: CANCELLED | OUTPATIENT
Start: 2020-01-20

## 2020-01-15 RX ORDER — SODIUM CHLORIDE 9 MG/ML
250 INJECTION, SOLUTION INTRAVENOUS ONCE
Status: CANCELLED | OUTPATIENT
Start: 2020-01-20

## 2020-01-15 RX ORDER — ACETAMINOPHEN 325 MG/1
650 TABLET ORAL ONCE
Status: CANCELLED | OUTPATIENT
Start: 2020-01-20

## 2020-01-15 NOTE — PROGRESS NOTES
DATE OF VISIT: 1/15/2020      REASON FOR VISIT: colon cancer,recurrent iron deficiency anemia, cystic lesion of pancreas      HISTORY OF PRESENT ILLNESS:    79-year-old female with a past medical history significant for stage III colon cancer, status post surgery followed by chemotherapy last of which was in July 2016.  In view of recurrent iron deficiency since October 2016 patient has required intravenous venofer.  His last round of intravenous venofer was in 12/2018.  Patient is here for follow-up visit today.    Complains of intermittent dizziness.  Denies any excessive fatigue.  Denies any abdominal pain.  Denies any blood in the stool or urine.  Denies any new lymph node enlargement.      PAST MEDICAL HISTORY:    Past Medical History:   Diagnosis Date   • Acid reflux    • Diabetes mellitus (CMS/HCC)    • Hypertension    • Malignant neoplasm of ascending colon (CMS/HCC) 11/1/2016   • Malignant tumor of cecum (CMS/HCC)        SOCIAL HISTORY:    Social History     Tobacco Use   • Smoking status: Never Smoker   • Smokeless tobacco: Never Used   Substance Use Topics   • Alcohol use: No   • Drug use: No       Surgical History :  Past Surgical History:   Procedure Laterality Date   • CAPSULE ENDOSCOPY N/A 6/22/2017    Procedure: CAPSULE ENDOSCOPY M2A;  Surgeon: Stuart Rico DO;  Location: [facility] ENDOSCOPY;  Service:    • CAPSULE ENDOSCOPY N/A 1/23/2018    Procedure: CAPSULE ENDOSCOPY M2A;  Surgeon: Stuart Rico DO;  Location: [facility] ENDOSCOPY;  Service:    • CENTRAL VENOUS LINE INSERTION  10/30/2015    Ultrasound localization, left basilic vein.Placement of left upper extremity PICC line   • COLONOSCOPY N/A 1/23/2018    Procedure: COLONOSCOPY;  Surgeon: Stuart Rico DO;  Location: [facility] ENDOSCOPY;  Service:    • ENDOSCOPY N/A 6/22/2017    Procedure: ESOPHAGOGASTRODUODENOSCOPY;  Surgeon: Stuart Rico DO;  Location: [facility] ENDOSCOPY;  Service:    • ENDOSCOPY N/A 1/23/2018    Procedure:  "ESOPHAGOGASTRODUODENOSCOPY;  Surgeon: Stuart Rico DO;  Location: [facility] ENDOSCOPY;  Service:    • FRACTURE SURGERY      right femur    • HYSTERECTOMY     • LAPAROSCOPIC ASSISSTED TOTAL COLECTOMY W/ J-POUCH  10/15/2015    Laparoscopic right hemicolectomy with ileotransverse colostomy   • VENOUS ACCESS DEVICE (PORT) INSERTION  01/12/2016    Right internal jugular Mediport       ALLERGIES:    Allergies   Allergen Reactions   • Other Rash     Oral Chemo Meds       REVIEW OF SYSTEMS:      CONSTITUTIONAL: Denies any excessive fatigue.Has gained  2 pounds since last clinic visit.  No fever, chills, or night sweats.     HEENT:  No epistaxis, mouth sores, or difficulty swallowing.    RESPIRATORY:  No new shortness of breath or cough at present.    CARDIOVASCULAR:  No chest pain or palpitations.    GASTROINTESTINAL:   No abdominal pain, nausea, vomiting, or blood in the stool.    GENITOURINARY:  No dysuria or hematuria.    MUSCULOSKELETAL:  No any new back pain or arthralgias.     NEUROLOGICAL:  Neuropathy in upper and lower extremities resolved completely.  Complains of intermittent dizziness.  No new headache .     LYMPHATICS:  Denies any abnormal swollen and anywhere in the body.    SKIN:  Denies any new skin rash.          PHYSICAL EXAMINATION:      VITAL SIGNS:  /73   Pulse 76   Temp 98.4 °F (36.9 °C) (Temporal)   Resp 16   Ht 162.6 cm (64.02\")   Wt 57.2 kg (126 lb)   BMI 21.62 kg/m²      ECOG performance status: 2    GENERAL:  Not in any distress.    HEENT:  Normocephalic, Atraumatic.Mild Conjunctival pallor. No icterus. Extraocular Movements Intact. No Facial Asymmetry noted.    NECK:  No adenopathy. No JVD.Trachea in midline.    RESPIRATORY:  Fair air entry bilateral. No rhonchi or wheezing.    CARDIOVASCULAR:  S1, S2. Regular rate and rhythm. No murmur or gallop appreciated.    ABDOMEN:  Soft,  nontender. Bowel sounds present in all four quadrants.  No hepatosplenomegaly " appreciated.    MUSCULOSKELETAL:  No edema.No Calf Tenderness.  Normal range of motion.    NEUROLOGIC:  Alert, awake and oriented ×3.  No  Motor or sensory deficit appreciated. Cranial Nerves 2-12 grossly intact.    SKIN: No new skin lesions.Skin is warm and moist.    LYMPHATICS: No new lymph node enlargement in neck or supraclavicular area.        DIAGNOSTIC DATA:    Glucose   Date Value Ref Range Status   01/13/2020 218 (H) 65 - 99 mg/dL Final     Sodium   Date Value Ref Range Status   01/13/2020 129 (L) 136 - 145 mmol/L Final   10/25/2019 137 136 - 145 mmol/L Final     Potassium   Date Value Ref Range Status   01/13/2020 4.6 3.5 - 5.2 mmol/L Final   10/25/2019 3.6 3.5 - 5.1 mmol/L Final     CO2   Date Value Ref Range Status   01/13/2020 24.0 22.0 - 29.0 mmol/L Final     Chloride   Date Value Ref Range Status   01/13/2020 93 (L) 98 - 107 mmol/L Final   10/25/2019 100 98 - 107 mmol/L Final     Anion Gap   Date Value Ref Range Status   01/13/2020 12.0 5.0 - 15.0 mmol/L Final     Creatinine   Date Value Ref Range Status   01/13/2020 0.78 0.57 - 1.00 mg/dL Final   10/25/2019 0.8 0.6 - 1.0 mg/dL Final     Comment:     **The MDRD GFR formula is valid only for ages 18-70.    GFR will not be reported for individuals aging <18 or >70.     BUN   Date Value Ref Range Status   01/13/2020 15 8 - 23 mg/dL Final   10/25/2019 10 7 - 18 mg/dL Final     BUN/Creatinine Ratio   Date Value Ref Range Status   01/13/2020 19.2 7.0 - 25.0 Final     Calcium   Date Value Ref Range Status   01/13/2020 9.2 8.6 - 10.5 mg/dL Final   10/25/2019 9 8.5 - 10.1 mg/dL Final     eGFR Non  Amer   Date Value Ref Range Status   01/13/2020 71 >60 mL/min/1.73 Final     Alkaline Phosphatase   Date Value Ref Range Status   01/13/2020 79 39 - 117 U/L Final   10/25/2019 101 46 - 116 U/L Final     Total Protein   Date Value Ref Range Status   01/13/2020 6.6 6.0 - 8.5 g/dL Final   10/25/2019 6.9 6.4 - 8.2 g/dL Final     ALT (SGPT)   Date Value Ref Range  Status   01/13/2020 10 1 - 33 U/L Final   10/25/2019 23 14 - 59 U/L Final     AST (SGOT)   Date Value Ref Range Status   01/13/2020 23 1 - 32 U/L Final   10/25/2019 20 15 - 37 U/L Final     Total Bilirubin   Date Value Ref Range Status   01/13/2020 0.3 0.2 - 1.2 mg/dL Final   10/25/2019 0.3 0.2 - 1.0 mg/dL Final     Albumin   Date Value Ref Range Status   01/13/2020 4.20 3.50 - 5.20 g/dL Final   10/25/2019 3.6 3.4 - 5.0 g/dL Final     Globulin   Date Value Ref Range Status   01/13/2020 2.4 gm/dL Final     Lab Results   Component Value Date    WBC 6.60 01/13/2020    HGB 13.0 01/13/2020    HCT 39.0 01/13/2020    MCV 85.5 01/13/2020     01/13/2020     Lab Results   Component Value Date    NEUTROABS 4.35 01/13/2020    IRON 76 01/13/2020    TIBC 408 01/13/2020    LABIRON 19 (L) 01/13/2020    FERRITIN 71.23 01/13/2020    QILPTZCC30 359 01/13/2020    FOLATE >20.00 01/13/2020     Lab Results   Component Value Date    CEA 2.27 01/13/2020    REFLABREPO SEE NOTE: 12/22/2015           RADIOLOGY DATA :  CT Of abdomen and pelvis with and without contrast done on Saba 3, 2019 was reviewed, discussed with the patient, it showed:  FINDINGS: Comparison study dated  November 30, 2017. Axial  computer tomography sequential imaging was performed from the  diaphragms through the symphysis pubis without and with IV  contrast administration. Sagittal and coronal reformates was  performed. This exam was performed according to our departmental  dose optimization program, which includes automated exposure  control, adjustment of the mA and/or KV according to patient size  and/or use of iterative reconstruction technique.      Imaging through lung bases reveals stable right middle lobe  inferior small calcified lung parenchymal granuloma consistent  with old granulomatous disease. Lung bases are otherwise  unremarkable.     The liver is normal. The gallbladder is normal. The biliary  system is normal. The pancreas is normal. The spleen  is normal.  Bilateral adrenal glands are normal. Right kidney mid zone stable  simple renal cortical cyst which measures 3.38 cm in greatest  diameter. The right kidney and ureter are otherwise unremarkable.  The left kidney and ureter are normal. The bladder is normal. The  uterus is surgically absent. Postsurgical changes consistent with  partial right colon resection. Otherwise hollow viscera is  unremarkable. No lymphadenopathy in the abdomen or pelvis. No  acute osseous abnormality.     IMPRESSION:  1. Stable evidence of old granulomatous disease..  2. Stable right kidney mid zone simple renal cortical cyst..  3. Otherwise unremarkable CT of the abdomen and pelvis with and  without contrast..        Ct of abdomen and Pelvis with contrast done on November 30, 2017 showed:  ABDOMEN:     - LIVER:    normal size / contour, no ductal dilatation , no  focal lesion   - GB:      grossly negative    - CBD:      grossly negative   - SPLEEN:    normal size and contour   - PANCREAS:    normal in size, contour, no focal mass    - VISCERA:    normal caliber, no wall thickening     - MESENTERY:  no mesenteric mass   - CAVITY:    no free abdominal fluid, no free intraperitoneal  air   - BODY WALL:  wnl   - OSSEOUS:  Mild leftward deviating lumbar scoliosis, unchanged.  - MISC:                                       RETROPERITONEUM:   - KIDNEYS:    normal size / contour, no collecting system  dilation        no evidence of an enhancing mass   - URETERS:    normal course, caliber   - ADRENALS:    normal size, contour   - MISC:      no sig retroperitoneal adenopathy or mass   - VASCULAR:    aorta / iliacs: wnl for age      - - - CT PELVIS - - -       - VISCERA:    normal caliber small/large bowel, no focal  thickening/mass - MESENTERY:  no mass   - VASCULAR:    wnl for age   - CAVITY:    no free fluid / air   - BLADDER:    unremarkable   - OSSEOUS:    wnl    - MISC:   - UTERUS/OVARY:  Status post hysterectomy   .     IMPRESSION:    CONCLUSION:  1. Stable examination.              ASSESSMENT AND PLAN:      1.  Recurrent iron deficiency: Positive recurrence of iron deficiency since October 2016.  Patient had a side effect with Feraheme so was started on Venofer.  Last dose of Venofer was given on October 27 , 2017.  Patient had a EGD, colonoscopy and capsule endoscopy done on January 23, 2018.  EGD showed some gastritis, colonoscopy showed a single tubular adenoma without any cancer.  Patient had a very narrow pylorus after dilating pylorus by Dr. Rico, capsule was passed into the small intestine but Endoscopy report is not helpful since there was so much food as per report in the small intestine.  Developing iron deficiency, patient receive another round of intravenous Venofer from February 19, 2018 until February 23, 2018.  Case was discussed with Dr. Rico, he does not think any much intervention can be done regarding her recurrent iron deficiency  From GI point of view at this point.  He recommends rechecking iron level frequently and supplementing it is required.    -Anemia workup done on November 30, 2018 again shows developing iron deficiency with iron saturation of 10%.    -Patient received 5 days of intravenous Venofer from December 10, 2018 until December 14, 2018.  -Anemia work-up done on January 13, 2020 shows developing iron deficiency with iron saturation of 19% and iron level of 71.  Since patient has iron malabsorption, will start patient on intravenous Venofer starting next week.  - Vitamin B12 level is 359.  Her level was 421, 4 months ago.Patient has been on B12 tablet daily  Patient has b12 malabsorption. Will start patient on B12 IM next week with indusction regimen.  -We will ask patient to return to clinic in 3 months with repeat CBC and anemia workup to be done prior to that.      2.  Colon cancer, stage III, diagnosed in October 2015.  Patient had a hemicolectomy followed by chemotherapy.  Patient had a very  complicated course with chemotherapy.  Initially patient was started on Xelox but Xeloda gave her skin rash.  Subsequently patient was changed over to FOLFOX for which she took 3 cycles and had a significant neuropathy.  Subsequently patient was changed to 5-FU and leucovorin which patient could not tolerate either subsequently after 8 cycles of chemotherapy in total chemotherapy was discontinued in July 2016.  Last colonoscopy done in January 2018 did not show any evidence of recurrence.    -Most recent CEA level done on January 13, 2020 is normal at 2.27.  -CT of chest, abdomen and pelvis with contrast done on Saba 3 does not show any evidence of recurrence.  -We will recheck CEA with next clinic visit in 3 months.  -Next surveillance CT of chest, abdomen and pelvis with contrast will be done around June 2020.    3.  Cystic lesion in pancreas:  -CT scan done on November 30, 2018 shows 1 cm cystic lesion in pancreas.  Differential diagnosis for cystic lesion in pancreas includes benign cyst versus cystic neoplasm of the pancreas.  -CT of abdomen and pelvis with and without contrast done on Saba 3, 2019 does not show any evidence of cystic lesion in the pancreas.  CT scan was reviewed with Dr. Roberts.  Results of CT scan were discussed with patient and her family.    4.  History of dementia    5. Hyponatremia:  -Corrected sodium is 132. Patient was counseled about water intake upto 1500 ml.    5.  Health maintenance: Patient does not smoke.  Had a colonoscopy done in January 2018.  Patient did not have mammogram for many years now.  She was counseled about the importance of screening mammogram she wants to think about whether she wants to do it or not.      6.BMI: Patient's Body mass index is 21.62 kg/m². BMI is  In normal range.  No follow-up is required.    7. Advance Care Planning: For now patient remains full code and is able to make her decisions.  Patient has health care surrogate mentioned on chart.    8. Pain  assessment:  -Patient denies any pain today.             Duong Hanley MD  1/15/2020  8:36 AM

## 2020-01-15 NOTE — TELEPHONE ENCOUNTER
Pt  Chava called and asked if Elisha can give him a call. They just left the office and realized the appt they schedule for 1/20/20 will not work. Please call Chava at [phone] to get this rescheduled.

## 2020-01-20 ENCOUNTER — APPOINTMENT (OUTPATIENT)
Dept: ONCOLOGY | Facility: HOSPITAL | Age: 80
End: 2020-01-20

## 2020-01-21 ENCOUNTER — INFUSION (OUTPATIENT)
Dept: ONCOLOGY | Facility: HOSPITAL | Age: 80
End: 2020-01-21

## 2020-01-21 VITALS
DIASTOLIC BLOOD PRESSURE: 69 MMHG | SYSTOLIC BLOOD PRESSURE: 169 MMHG | TEMPERATURE: 97.7 F | RESPIRATION RATE: 18 BRPM | HEART RATE: 83 BPM

## 2020-01-21 DIAGNOSIS — Z45.2 ENCOUNTER FOR VENOUS ACCESS DEVICE CARE: ICD-10-CM

## 2020-01-21 DIAGNOSIS — Z45.2 ENCOUNTER FOR CARE RELATED TO PORT-A-CATH: Primary | ICD-10-CM

## 2020-01-21 DIAGNOSIS — D50.8 OTHER IRON DEFICIENCY ANEMIA: ICD-10-CM

## 2020-01-21 DIAGNOSIS — E53.8 B12 DEFICIENCY: ICD-10-CM

## 2020-01-21 DIAGNOSIS — K90.9 MALABSORPTION OF IRON: ICD-10-CM

## 2020-01-21 PROCEDURE — 63710000001 DIPHENHYDRAMINE PER 50 MG: Performed by: INTERNAL MEDICINE

## 2020-01-21 PROCEDURE — 25010000003 HEPARIN LOCK FLUCH PER 10 UNITS: Performed by: INTERNAL MEDICINE

## 2020-01-21 PROCEDURE — 25010000002 CYANOCOBALAMIN PER 1000 MCG: Performed by: INTERNAL MEDICINE

## 2020-01-21 PROCEDURE — 96372 THER/PROPH/DIAG INJ SC/IM: CPT | Performed by: INTERNAL MEDICINE

## 2020-01-21 PROCEDURE — 96374 THER/PROPH/DIAG INJ IV PUSH: CPT | Performed by: INTERNAL MEDICINE

## 2020-01-21 PROCEDURE — 25010000002 IRON SUCROSE PER 1 MG: Performed by: INTERNAL MEDICINE

## 2020-01-21 RX ORDER — HEPARIN SODIUM (PORCINE) LOCK FLUSH IV SOLN 100 UNIT/ML 100 UNIT/ML
500 SOLUTION INTRAVENOUS AS NEEDED
Status: DISCONTINUED | OUTPATIENT
Start: 2020-01-21 | End: 2020-01-21 | Stop reason: HOSPADM

## 2020-01-21 RX ORDER — SODIUM CHLORIDE 0.9 % (FLUSH) 0.9 %
10 SYRINGE (ML) INJECTION AS NEEDED
Status: DISCONTINUED | OUTPATIENT
Start: 2020-01-21 | End: 2020-01-21 | Stop reason: HOSPADM

## 2020-01-21 RX ORDER — CYANOCOBALAMIN 1000 UG/ML
1000 INJECTION, SOLUTION INTRAMUSCULAR; SUBCUTANEOUS ONCE
Status: CANCELLED | OUTPATIENT
Start: 2020-01-22

## 2020-01-21 RX ORDER — SODIUM CHLORIDE 9 MG/ML
250 INJECTION, SOLUTION INTRAVENOUS ONCE
Status: CANCELLED | OUTPATIENT
Start: 2020-01-22

## 2020-01-21 RX ORDER — SODIUM CHLORIDE 0.9 % (FLUSH) 0.9 %
10 SYRINGE (ML) INJECTION AS NEEDED
Status: CANCELLED | OUTPATIENT
Start: 2020-01-21

## 2020-01-21 RX ORDER — ACETAMINOPHEN 325 MG/1
650 TABLET ORAL ONCE
Status: CANCELLED | OUTPATIENT
Start: 2020-01-22

## 2020-01-21 RX ORDER — CYANOCOBALAMIN 1000 UG/ML
1000 INJECTION, SOLUTION INTRAMUSCULAR; SUBCUTANEOUS ONCE
Status: COMPLETED | OUTPATIENT
Start: 2020-01-21 | End: 2020-01-21

## 2020-01-21 RX ORDER — DIPHENHYDRAMINE HCL 25 MG
25 CAPSULE ORAL ONCE
Status: CANCELLED | OUTPATIENT
Start: 2020-01-22

## 2020-01-21 RX ORDER — ACETAMINOPHEN 325 MG/1
650 TABLET ORAL ONCE
Status: COMPLETED | OUTPATIENT
Start: 2020-01-21 | End: 2020-01-21

## 2020-01-21 RX ORDER — FAMOTIDINE 20 MG/1
20 TABLET, FILM COATED ORAL ONCE
Status: CANCELLED | OUTPATIENT
Start: 2020-01-22

## 2020-01-21 RX ORDER — DIPHENHYDRAMINE HCL 25 MG
25 CAPSULE ORAL ONCE
Status: COMPLETED | OUTPATIENT
Start: 2020-01-21 | End: 2020-01-21

## 2020-01-21 RX ORDER — HEPARIN SODIUM (PORCINE) LOCK FLUSH IV SOLN 100 UNIT/ML 100 UNIT/ML
500 SOLUTION INTRAVENOUS AS NEEDED
Status: CANCELLED | OUTPATIENT
Start: 2020-01-21

## 2020-01-21 RX ORDER — FAMOTIDINE 20 MG/1
20 TABLET, FILM COATED ORAL ONCE
Status: COMPLETED | OUTPATIENT
Start: 2020-01-21 | End: 2020-01-21

## 2020-01-21 RX ORDER — SODIUM CHLORIDE 9 MG/ML
250 INJECTION, SOLUTION INTRAVENOUS ONCE
Status: COMPLETED | OUTPATIENT
Start: 2020-01-21 | End: 2020-01-21

## 2020-01-21 RX ADMIN — FAMOTIDINE 20 MG: 20 TABLET ORAL at 08:20

## 2020-01-21 RX ADMIN — ACETAMINOPHEN 650 MG: 325 TABLET, FILM COATED ORAL at 08:20

## 2020-01-21 RX ADMIN — IRON SUCROSE 200 MG: 20 INJECTION, SOLUTION INTRAVENOUS at 08:44

## 2020-01-21 RX ADMIN — SODIUM CHLORIDE, PRESERVATIVE FREE 10 ML: 5 INJECTION INTRAVENOUS at 09:20

## 2020-01-21 RX ADMIN — CYANOCOBALAMIN 1000 MCG: 1000 INJECTION, SOLUTION INTRAMUSCULAR at 09:20

## 2020-01-21 RX ADMIN — DIPHENHYDRAMINE HYDROCHLORIDE 25 MG: 25 CAPSULE ORAL at 08:20

## 2020-01-21 RX ADMIN — HEPARIN 500 UNITS: 100 SYRINGE at 09:21

## 2020-01-21 RX ADMIN — SODIUM CHLORIDE 250 ML: 9 INJECTION, SOLUTION INTRAVENOUS at 08:22

## 2020-01-22 ENCOUNTER — INFUSION (OUTPATIENT)
Dept: ONCOLOGY | Facility: HOSPITAL | Age: 80
End: 2020-01-22

## 2020-01-22 VITALS
SYSTOLIC BLOOD PRESSURE: 147 MMHG | TEMPERATURE: 98.1 F | DIASTOLIC BLOOD PRESSURE: 69 MMHG | RESPIRATION RATE: 16 BRPM | HEART RATE: 80 BPM

## 2020-01-22 DIAGNOSIS — E53.8 B12 DEFICIENCY: Primary | ICD-10-CM

## 2020-01-22 DIAGNOSIS — K90.9 MALABSORPTION OF IRON: ICD-10-CM

## 2020-01-22 DIAGNOSIS — Z45.2 ENCOUNTER FOR VENOUS ACCESS DEVICE CARE: ICD-10-CM

## 2020-01-22 DIAGNOSIS — Z45.2 ENCOUNTER FOR CARE RELATED TO PORT-A-CATH: ICD-10-CM

## 2020-01-22 DIAGNOSIS — D50.8 OTHER IRON DEFICIENCY ANEMIA: ICD-10-CM

## 2020-01-22 PROCEDURE — 96365 THER/PROPH/DIAG IV INF INIT: CPT | Performed by: INTERNAL MEDICINE

## 2020-01-22 PROCEDURE — 25010000002 CYANOCOBALAMIN PER 1000 MCG: Performed by: INTERNAL MEDICINE

## 2020-01-22 PROCEDURE — 25010000003 HEPARIN LOCK FLUCH PER 10 UNITS: Performed by: INTERNAL MEDICINE

## 2020-01-22 PROCEDURE — 63710000001 DIPHENHYDRAMINE PER 50 MG: Performed by: INTERNAL MEDICINE

## 2020-01-22 PROCEDURE — 25010000002 IRON SUCROSE PER 1 MG: Performed by: INTERNAL MEDICINE

## 2020-01-22 RX ORDER — DIPHENHYDRAMINE HCL 25 MG
25 CAPSULE ORAL ONCE
Status: CANCELLED | OUTPATIENT
Start: 2020-01-23

## 2020-01-22 RX ORDER — ACETAMINOPHEN 325 MG/1
650 TABLET ORAL ONCE
Status: COMPLETED | OUTPATIENT
Start: 2020-01-22 | End: 2020-01-22

## 2020-01-22 RX ORDER — ACETAMINOPHEN 325 MG/1
650 TABLET ORAL ONCE
Status: CANCELLED | OUTPATIENT
Start: 2020-01-23

## 2020-01-22 RX ORDER — SODIUM CHLORIDE 9 MG/ML
250 INJECTION, SOLUTION INTRAVENOUS ONCE
Status: CANCELLED | OUTPATIENT
Start: 2020-01-23

## 2020-01-22 RX ORDER — SODIUM CHLORIDE 0.9 % (FLUSH) 0.9 %
10 SYRINGE (ML) INJECTION AS NEEDED
Status: DISCONTINUED | OUTPATIENT
Start: 2020-01-22 | End: 2020-01-22 | Stop reason: HOSPADM

## 2020-01-22 RX ORDER — CYANOCOBALAMIN 1000 UG/ML
1000 INJECTION, SOLUTION INTRAMUSCULAR; SUBCUTANEOUS ONCE
Status: CANCELLED | OUTPATIENT
Start: 2020-01-23

## 2020-01-22 RX ORDER — SODIUM CHLORIDE 0.9 % (FLUSH) 0.9 %
10 SYRINGE (ML) INJECTION AS NEEDED
Status: CANCELLED | OUTPATIENT
Start: 2020-01-22

## 2020-01-22 RX ORDER — DIPHENHYDRAMINE HCL 25 MG
25 CAPSULE ORAL ONCE
Status: COMPLETED | OUTPATIENT
Start: 2020-01-22 | End: 2020-01-22

## 2020-01-22 RX ORDER — FAMOTIDINE 20 MG/1
20 TABLET, FILM COATED ORAL ONCE
Status: CANCELLED | OUTPATIENT
Start: 2020-01-23

## 2020-01-22 RX ORDER — FAMOTIDINE 20 MG/1
20 TABLET, FILM COATED ORAL ONCE
Status: COMPLETED | OUTPATIENT
Start: 2020-01-22 | End: 2020-01-22

## 2020-01-22 RX ORDER — HEPARIN SODIUM (PORCINE) LOCK FLUSH IV SOLN 100 UNIT/ML 100 UNIT/ML
500 SOLUTION INTRAVENOUS AS NEEDED
Status: CANCELLED | OUTPATIENT
Start: 2020-01-22

## 2020-01-22 RX ORDER — CYANOCOBALAMIN 1000 UG/ML
1000 INJECTION, SOLUTION INTRAMUSCULAR; SUBCUTANEOUS ONCE
Status: COMPLETED | OUTPATIENT
Start: 2020-01-22 | End: 2020-01-22

## 2020-01-22 RX ORDER — SODIUM CHLORIDE 9 MG/ML
250 INJECTION, SOLUTION INTRAVENOUS ONCE
Status: COMPLETED | OUTPATIENT
Start: 2020-01-22 | End: 2020-01-22

## 2020-01-22 RX ORDER — HEPARIN SODIUM (PORCINE) LOCK FLUSH IV SOLN 100 UNIT/ML 100 UNIT/ML
500 SOLUTION INTRAVENOUS AS NEEDED
Status: DISCONTINUED | OUTPATIENT
Start: 2020-01-22 | End: 2020-01-22 | Stop reason: HOSPADM

## 2020-01-22 RX ADMIN — SODIUM CHLORIDE, PRESERVATIVE FREE 10 ML: 5 INJECTION INTRAVENOUS at 09:30

## 2020-01-22 RX ADMIN — ACETAMINOPHEN 650 MG: 325 TABLET, FILM COATED ORAL at 08:18

## 2020-01-22 RX ADMIN — HEPARIN 500 UNITS: 100 SYRINGE at 09:30

## 2020-01-22 RX ADMIN — IRON SUCROSE 200 MG: 20 INJECTION, SOLUTION INTRAVENOUS at 08:39

## 2020-01-22 RX ADMIN — CYANOCOBALAMIN 1000 MCG: 1000 INJECTION, SOLUTION INTRAMUSCULAR; SUBCUTANEOUS at 09:30

## 2020-01-22 RX ADMIN — SODIUM CHLORIDE 250 ML: 9 INJECTION, SOLUTION INTRAVENOUS at 08:18

## 2020-01-22 RX ADMIN — FAMOTIDINE 20 MG: 20 TABLET ORAL at 08:19

## 2020-01-22 RX ADMIN — DIPHENHYDRAMINE HYDROCHLORIDE 25 MG: 25 CAPSULE ORAL at 08:18

## 2020-01-23 ENCOUNTER — INFUSION (OUTPATIENT)
Dept: ONCOLOGY | Facility: HOSPITAL | Age: 80
End: 2020-01-23

## 2020-01-23 VITALS
SYSTOLIC BLOOD PRESSURE: 147 MMHG | TEMPERATURE: 97.8 F | RESPIRATION RATE: 16 BRPM | DIASTOLIC BLOOD PRESSURE: 69 MMHG | HEART RATE: 80 BPM

## 2020-01-23 DIAGNOSIS — Z45.2 ENCOUNTER FOR CARE RELATED TO PORT-A-CATH: ICD-10-CM

## 2020-01-23 DIAGNOSIS — E53.8 B12 DEFICIENCY: Primary | ICD-10-CM

## 2020-01-23 DIAGNOSIS — Z45.2 ENCOUNTER FOR VENOUS ACCESS DEVICE CARE: ICD-10-CM

## 2020-01-23 DIAGNOSIS — D50.8 OTHER IRON DEFICIENCY ANEMIA: ICD-10-CM

## 2020-01-23 DIAGNOSIS — K90.9 MALABSORPTION OF IRON: ICD-10-CM

## 2020-01-23 PROCEDURE — 25010000002 CYANOCOBALAMIN PER 1000 MCG: Performed by: INTERNAL MEDICINE

## 2020-01-23 PROCEDURE — 96365 THER/PROPH/DIAG IV INF INIT: CPT | Performed by: INTERNAL MEDICINE

## 2020-01-23 PROCEDURE — 96372 THER/PROPH/DIAG INJ SC/IM: CPT | Performed by: INTERNAL MEDICINE

## 2020-01-23 PROCEDURE — 63710000001 DIPHENHYDRAMINE PER 50 MG: Performed by: INTERNAL MEDICINE

## 2020-01-23 PROCEDURE — 25010000002 IRON SUCROSE PER 1 MG: Performed by: INTERNAL MEDICINE

## 2020-01-23 PROCEDURE — 25010000003 HEPARIN LOCK FLUCH PER 10 UNITS: Performed by: INTERNAL MEDICINE

## 2020-01-23 RX ORDER — CYANOCOBALAMIN 1000 UG/ML
1000 INJECTION, SOLUTION INTRAMUSCULAR; SUBCUTANEOUS ONCE
Status: CANCELLED | OUTPATIENT
Start: 2020-01-24

## 2020-01-23 RX ORDER — SODIUM CHLORIDE 0.9 % (FLUSH) 0.9 %
10 SYRINGE (ML) INJECTION AS NEEDED
Status: CANCELLED | OUTPATIENT
Start: 2020-01-23

## 2020-01-23 RX ORDER — ACETAMINOPHEN 325 MG/1
650 TABLET ORAL ONCE
Status: COMPLETED | OUTPATIENT
Start: 2020-01-23 | End: 2020-01-23

## 2020-01-23 RX ORDER — SODIUM CHLORIDE 9 MG/ML
250 INJECTION, SOLUTION INTRAVENOUS ONCE
Status: CANCELLED | OUTPATIENT
Start: 2020-01-24

## 2020-01-23 RX ORDER — DIPHENHYDRAMINE HCL 25 MG
25 CAPSULE ORAL ONCE
Status: COMPLETED | OUTPATIENT
Start: 2020-01-23 | End: 2020-01-23

## 2020-01-23 RX ORDER — DIPHENHYDRAMINE HCL 25 MG
25 CAPSULE ORAL ONCE
Status: CANCELLED | OUTPATIENT
Start: 2020-01-24

## 2020-01-23 RX ORDER — FAMOTIDINE 20 MG/1
20 TABLET, FILM COATED ORAL ONCE
Status: COMPLETED | OUTPATIENT
Start: 2020-01-23 | End: 2020-01-23

## 2020-01-23 RX ORDER — HEPARIN SODIUM (PORCINE) LOCK FLUSH IV SOLN 100 UNIT/ML 100 UNIT/ML
500 SOLUTION INTRAVENOUS AS NEEDED
Status: DISCONTINUED | OUTPATIENT
Start: 2020-01-23 | End: 2020-01-23 | Stop reason: HOSPADM

## 2020-01-23 RX ORDER — SODIUM CHLORIDE 9 MG/ML
250 INJECTION, SOLUTION INTRAVENOUS ONCE
Status: COMPLETED | OUTPATIENT
Start: 2020-01-23 | End: 2020-01-23

## 2020-01-23 RX ORDER — ACETAMINOPHEN 325 MG/1
650 TABLET ORAL ONCE
Status: CANCELLED | OUTPATIENT
Start: 2020-01-24

## 2020-01-23 RX ORDER — HEPARIN SODIUM (PORCINE) LOCK FLUSH IV SOLN 100 UNIT/ML 100 UNIT/ML
500 SOLUTION INTRAVENOUS AS NEEDED
Status: CANCELLED | OUTPATIENT
Start: 2020-01-23

## 2020-01-23 RX ORDER — SODIUM CHLORIDE 0.9 % (FLUSH) 0.9 %
10 SYRINGE (ML) INJECTION AS NEEDED
Status: DISCONTINUED | OUTPATIENT
Start: 2020-01-23 | End: 2020-01-23 | Stop reason: HOSPADM

## 2020-01-23 RX ORDER — CYANOCOBALAMIN 1000 UG/ML
1000 INJECTION, SOLUTION INTRAMUSCULAR; SUBCUTANEOUS ONCE
Status: COMPLETED | OUTPATIENT
Start: 2020-01-23 | End: 2020-01-23

## 2020-01-23 RX ORDER — FAMOTIDINE 20 MG/1
20 TABLET, FILM COATED ORAL ONCE
Status: CANCELLED | OUTPATIENT
Start: 2020-01-24

## 2020-01-23 RX ADMIN — DIPHENHYDRAMINE HYDROCHLORIDE 25 MG: 25 CAPSULE ORAL at 08:10

## 2020-01-23 RX ADMIN — ACETAMINOPHEN 650 MG: 325 TABLET, FILM COATED ORAL at 08:10

## 2020-01-23 RX ADMIN — HEPARIN 500 UNITS: 100 SYRINGE at 09:27

## 2020-01-23 RX ADMIN — SODIUM CHLORIDE, PRESERVATIVE FREE 10 ML: 5 INJECTION INTRAVENOUS at 09:26

## 2020-01-23 RX ADMIN — IRON SUCROSE 200 MG: 20 INJECTION, SOLUTION INTRAVENOUS at 08:41

## 2020-01-23 RX ADMIN — CYANOCOBALAMIN 1000 MCG: 1000 INJECTION, SOLUTION INTRAMUSCULAR; SUBCUTANEOUS at 09:26

## 2020-01-23 RX ADMIN — FAMOTIDINE 20 MG: 20 TABLET, FILM COATED ORAL at 08:10

## 2020-01-23 RX ADMIN — SODIUM CHLORIDE 250 ML: 9 INJECTION, SOLUTION INTRAVENOUS at 08:15

## 2020-01-24 ENCOUNTER — INFUSION (OUTPATIENT)
Dept: ONCOLOGY | Facility: HOSPITAL | Age: 80
End: 2020-01-24

## 2020-01-24 VITALS
TEMPERATURE: 97.7 F | SYSTOLIC BLOOD PRESSURE: 165 MMHG | DIASTOLIC BLOOD PRESSURE: 70 MMHG | RESPIRATION RATE: 18 BRPM | HEART RATE: 91 BPM

## 2020-01-24 DIAGNOSIS — E53.8 B12 DEFICIENCY: Primary | ICD-10-CM

## 2020-01-24 DIAGNOSIS — K90.9 MALABSORPTION OF IRON: ICD-10-CM

## 2020-01-24 DIAGNOSIS — D50.8 OTHER IRON DEFICIENCY ANEMIA: ICD-10-CM

## 2020-01-24 DIAGNOSIS — Z45.2 ENCOUNTER FOR VENOUS ACCESS DEVICE CARE: ICD-10-CM

## 2020-01-24 DIAGNOSIS — Z45.2 ENCOUNTER FOR CARE RELATED TO PORT-A-CATH: ICD-10-CM

## 2020-01-24 PROCEDURE — 63710000001 DIPHENHYDRAMINE PER 50 MG: Performed by: INTERNAL MEDICINE

## 2020-01-24 PROCEDURE — 25010000003 HEPARIN LOCK FLUCH PER 10 UNITS: Performed by: INTERNAL MEDICINE

## 2020-01-24 PROCEDURE — 96372 THER/PROPH/DIAG INJ SC/IM: CPT | Performed by: INTERNAL MEDICINE

## 2020-01-24 PROCEDURE — 96374 THER/PROPH/DIAG INJ IV PUSH: CPT | Performed by: INTERNAL MEDICINE

## 2020-01-24 PROCEDURE — 25010000002 CYANOCOBALAMIN PER 1000 MCG: Performed by: INTERNAL MEDICINE

## 2020-01-24 PROCEDURE — 25010000002 IRON SUCROSE PER 1 MG: Performed by: INTERNAL MEDICINE

## 2020-01-24 RX ORDER — CYANOCOBALAMIN 1000 UG/ML
1000 INJECTION, SOLUTION INTRAMUSCULAR; SUBCUTANEOUS ONCE
Status: COMPLETED | OUTPATIENT
Start: 2020-01-24 | End: 2020-01-24

## 2020-01-24 RX ORDER — ACETAMINOPHEN 325 MG/1
650 TABLET ORAL ONCE
Status: CANCELLED | OUTPATIENT
Start: 2020-01-25

## 2020-01-24 RX ORDER — DIPHENHYDRAMINE HCL 25 MG
25 CAPSULE ORAL ONCE
Status: COMPLETED | OUTPATIENT
Start: 2020-01-24 | End: 2020-01-24

## 2020-01-24 RX ORDER — ACETAMINOPHEN 325 MG/1
650 TABLET ORAL ONCE
Status: COMPLETED | OUTPATIENT
Start: 2020-01-24 | End: 2020-01-24

## 2020-01-24 RX ORDER — SODIUM CHLORIDE 9 MG/ML
250 INJECTION, SOLUTION INTRAVENOUS ONCE
Status: CANCELLED | OUTPATIENT
Start: 2020-01-25

## 2020-01-24 RX ORDER — CYANOCOBALAMIN 1000 UG/ML
1000 INJECTION, SOLUTION INTRAMUSCULAR; SUBCUTANEOUS ONCE
Status: CANCELLED | OUTPATIENT
Start: 2020-01-25

## 2020-01-24 RX ORDER — SODIUM CHLORIDE 0.9 % (FLUSH) 0.9 %
10 SYRINGE (ML) INJECTION AS NEEDED
Status: DISCONTINUED | OUTPATIENT
Start: 2020-01-24 | End: 2020-01-24 | Stop reason: HOSPADM

## 2020-01-24 RX ORDER — SODIUM CHLORIDE 0.9 % (FLUSH) 0.9 %
10 SYRINGE (ML) INJECTION AS NEEDED
Status: CANCELLED | OUTPATIENT
Start: 2020-01-24

## 2020-01-24 RX ORDER — FAMOTIDINE 20 MG/1
20 TABLET, FILM COATED ORAL ONCE
Status: COMPLETED | OUTPATIENT
Start: 2020-01-24 | End: 2020-01-24

## 2020-01-24 RX ORDER — HEPARIN SODIUM (PORCINE) LOCK FLUSH IV SOLN 100 UNIT/ML 100 UNIT/ML
500 SOLUTION INTRAVENOUS AS NEEDED
Status: DISCONTINUED | OUTPATIENT
Start: 2020-01-24 | End: 2020-01-24 | Stop reason: HOSPADM

## 2020-01-24 RX ORDER — FAMOTIDINE 20 MG/1
20 TABLET, FILM COATED ORAL ONCE
Status: CANCELLED | OUTPATIENT
Start: 2020-01-25

## 2020-01-24 RX ORDER — SODIUM CHLORIDE 9 MG/ML
250 INJECTION, SOLUTION INTRAVENOUS ONCE
Status: COMPLETED | OUTPATIENT
Start: 2020-01-24 | End: 2020-01-24

## 2020-01-24 RX ORDER — DIPHENHYDRAMINE HCL 25 MG
25 CAPSULE ORAL ONCE
Status: CANCELLED | OUTPATIENT
Start: 2020-01-25

## 2020-01-24 RX ORDER — HEPARIN SODIUM (PORCINE) LOCK FLUSH IV SOLN 100 UNIT/ML 100 UNIT/ML
500 SOLUTION INTRAVENOUS AS NEEDED
Status: CANCELLED | OUTPATIENT
Start: 2020-01-24

## 2020-01-24 RX ADMIN — HEPARIN 500 UNITS: 100 SYRINGE at 09:10

## 2020-01-24 RX ADMIN — ACETAMINOPHEN 650 MG: 325 TABLET, FILM COATED ORAL at 08:24

## 2020-01-24 RX ADMIN — DIPHENHYDRAMINE HYDROCHLORIDE 25 MG: 25 CAPSULE ORAL at 08:24

## 2020-01-24 RX ADMIN — IRON SUCROSE 200 MG: 20 INJECTION, SOLUTION INTRAVENOUS at 08:41

## 2020-01-24 RX ADMIN — SODIUM CHLORIDE, PRESERVATIVE FREE 10 ML: 5 INJECTION INTRAVENOUS at 09:10

## 2020-01-24 RX ADMIN — FAMOTIDINE 20 MG: 20 TABLET, FILM COATED ORAL at 08:24

## 2020-01-24 RX ADMIN — CYANOCOBALAMIN 1000 MCG: 1000 INJECTION, SOLUTION INTRAMUSCULAR at 09:09

## 2020-01-24 RX ADMIN — SODIUM CHLORIDE 250 ML: 9 INJECTION, SOLUTION INTRAVENOUS at 08:24

## 2020-01-27 ENCOUNTER — APPOINTMENT (OUTPATIENT)
Dept: ONCOLOGY | Facility: HOSPITAL | Age: 80
End: 2020-01-27

## 2020-01-27 ENCOUNTER — INFUSION (OUTPATIENT)
Dept: PEDIATRICS | Facility: HOSPITAL | Age: 80
End: 2020-01-27

## 2020-01-27 VITALS
SYSTOLIC BLOOD PRESSURE: 163 MMHG | TEMPERATURE: 98.4 F | DIASTOLIC BLOOD PRESSURE: 72 MMHG | RESPIRATION RATE: 18 BRPM | HEART RATE: 83 BPM

## 2020-01-27 DIAGNOSIS — Z45.2 ENCOUNTER FOR CARE RELATED TO PORT-A-CATH: ICD-10-CM

## 2020-01-27 DIAGNOSIS — K90.9 MALABSORPTION OF IRON: ICD-10-CM

## 2020-01-27 DIAGNOSIS — E53.8 B12 DEFICIENCY: Primary | ICD-10-CM

## 2020-01-27 DIAGNOSIS — Z45.2 ENCOUNTER FOR VENOUS ACCESS DEVICE CARE: ICD-10-CM

## 2020-01-27 DIAGNOSIS — D50.8 OTHER IRON DEFICIENCY ANEMIA: ICD-10-CM

## 2020-01-27 PROCEDURE — 25010000002 CYANOCOBALAMIN PER 1000 MCG: Performed by: INTERNAL MEDICINE

## 2020-01-27 PROCEDURE — 25010000002 IRON SUCROSE PER 1 MG: Performed by: INTERNAL MEDICINE

## 2020-01-27 PROCEDURE — 25010000003 HEPARIN LOCK FLUCH PER 10 UNITS: Performed by: INTERNAL MEDICINE

## 2020-01-27 PROCEDURE — 63710000001 DIPHENHYDRAMINE PER 50 MG: Performed by: INTERNAL MEDICINE

## 2020-01-27 PROCEDURE — 96365 THER/PROPH/DIAG IV INF INIT: CPT | Performed by: INTERNAL MEDICINE

## 2020-01-27 RX ORDER — CYANOCOBALAMIN 1000 UG/ML
1000 INJECTION, SOLUTION INTRAMUSCULAR; SUBCUTANEOUS ONCE
Status: CANCELLED | OUTPATIENT
Start: 2020-01-28

## 2020-01-27 RX ORDER — DIPHENHYDRAMINE HCL 25 MG
25 CAPSULE ORAL ONCE
Status: COMPLETED | OUTPATIENT
Start: 2020-01-27 | End: 2020-01-27

## 2020-01-27 RX ORDER — FAMOTIDINE 20 MG/1
20 TABLET, FILM COATED ORAL ONCE
Status: COMPLETED | OUTPATIENT
Start: 2020-01-27 | End: 2020-01-27

## 2020-01-27 RX ORDER — SODIUM CHLORIDE 9 MG/ML
250 INJECTION, SOLUTION INTRAVENOUS ONCE
Status: COMPLETED | OUTPATIENT
Start: 2020-01-27 | End: 2020-01-27

## 2020-01-27 RX ORDER — HEPARIN SODIUM (PORCINE) LOCK FLUSH IV SOLN 100 UNIT/ML 100 UNIT/ML
500 SOLUTION INTRAVENOUS AS NEEDED
Status: CANCELLED | OUTPATIENT
Start: 2020-01-28

## 2020-01-27 RX ORDER — DIPHENHYDRAMINE HCL 25 MG
25 CAPSULE ORAL ONCE
Status: CANCELLED | OUTPATIENT
Start: 2020-01-28

## 2020-01-27 RX ORDER — ACETAMINOPHEN 325 MG/1
650 TABLET ORAL ONCE
Status: CANCELLED | OUTPATIENT
Start: 2020-01-28

## 2020-01-27 RX ORDER — SODIUM CHLORIDE 0.9 % (FLUSH) 0.9 %
10 SYRINGE (ML) INJECTION AS NEEDED
Status: CANCELLED | OUTPATIENT
Start: 2020-01-28

## 2020-01-27 RX ORDER — CYANOCOBALAMIN 1000 UG/ML
1000 INJECTION, SOLUTION INTRAMUSCULAR; SUBCUTANEOUS ONCE
Status: COMPLETED | OUTPATIENT
Start: 2020-01-27 | End: 2020-01-27

## 2020-01-27 RX ORDER — SODIUM CHLORIDE 0.9 % (FLUSH) 0.9 %
10 SYRINGE (ML) INJECTION AS NEEDED
Status: DISCONTINUED | OUTPATIENT
Start: 2020-01-27 | End: 2020-01-27 | Stop reason: HOSPADM

## 2020-01-27 RX ORDER — SODIUM CHLORIDE 9 MG/ML
250 INJECTION, SOLUTION INTRAVENOUS ONCE
Status: CANCELLED | OUTPATIENT
Start: 2020-01-28

## 2020-01-27 RX ORDER — HEPARIN SODIUM (PORCINE) LOCK FLUSH IV SOLN 100 UNIT/ML 100 UNIT/ML
500 SOLUTION INTRAVENOUS AS NEEDED
Status: DISCONTINUED | OUTPATIENT
Start: 2020-01-27 | End: 2020-01-27 | Stop reason: HOSPADM

## 2020-01-27 RX ORDER — ACETAMINOPHEN 325 MG/1
650 TABLET ORAL ONCE
Status: COMPLETED | OUTPATIENT
Start: 2020-01-27 | End: 2020-01-27

## 2020-01-27 RX ORDER — FAMOTIDINE 20 MG/1
20 TABLET, FILM COATED ORAL ONCE
Status: CANCELLED | OUTPATIENT
Start: 2020-01-28

## 2020-01-27 RX ADMIN — SODIUM CHLORIDE, PRESERVATIVE FREE 10 ML: 5 INJECTION INTRAVENOUS at 10:01

## 2020-01-27 RX ADMIN — CYANOCOBALAMIN 1000 MCG: 1000 INJECTION, SOLUTION INTRAMUSCULAR; SUBCUTANEOUS at 10:01

## 2020-01-27 RX ADMIN — IRON SUCROSE 200 MG: 20 INJECTION, SOLUTION INTRAVENOUS at 09:10

## 2020-01-27 RX ADMIN — SODIUM CHLORIDE 250 ML: 9 INJECTION, SOLUTION INTRAVENOUS at 09:10

## 2020-01-27 RX ADMIN — HEPARIN 500 UNITS: 100 SYRINGE at 10:01

## 2020-01-27 RX ADMIN — DIPHENHYDRAMINE HYDROCHLORIDE 25 MG: 25 CAPSULE ORAL at 08:29

## 2020-01-27 RX ADMIN — ACETAMINOPHEN 650 MG: 325 TABLET, FILM COATED ORAL at 08:29

## 2020-01-27 RX ADMIN — FAMOTIDINE 20 MG: 20 TABLET, FILM COATED ORAL at 08:29

## 2020-01-27 NOTE — PROGRESS NOTES
Addended by: TALON OLIVAS on: 1/27/2020 12:39 PM     Modules accepted: Orders     DATE OF VISIT: 10/19/2017    REASON FOR VISIT:  History of colon cancer and recurrent iron deficiency anemia    HISTORY OF PRESENT ILLNESS:    76-year-old female with a past medical history significant for stage III colon cancer, status post surgery followed by chemotherapy last of which was in July 2016.  In view of recurrent iron deficiency since October 2016 patient has required intravenous venofer.  His last round of intravenous venofer was in 09/18/17.  Patient is here for follow-up visit today.  Denies any excessive fatigue.  Complains of chronic diarrhea.  Denies any blood in the stool or urine.  He underwent a EGD as well as capsule endoscopy in June 2017 by Dr. Rico which did not find any source of bleeding.      PAST MEDICAL HISTORY:    Past Medical History:   Diagnosis Date   • Acid reflux    • Diabetes mellitus    • Hypertension    • Malignant neoplasm of ascending colon 11/1/2016   • Malignant tumor of cecum        SOCIAL HISTORY:    Social History   Substance Use Topics   • Smoking status: Never Smoker   • Smokeless tobacco: None   • Alcohol use No       Surgical History :  Past Surgical History:   Procedure Laterality Date   • CAPSULE ENDOSCOPY N/A 6/22/2017    Procedure: CAPSULE ENDOSCOPY M2A;  Surgeon: Stuart Rico DO;  Location: [facility] ENDOSCOPY;  Service:    • CENTRAL VENOUS LINE INSERTION  10/30/2015    Ultrasound localization, left basilic vein.Placement of left upper extremity PICC line   • ENDOSCOPY N/A 6/22/2017    Procedure: ESOPHAGOGASTRODUODENOSCOPY;  Surgeon: Stuart Rico DO;  Location: [facility] ENDOSCOPY;  Service:    • FRACTURE SURGERY      right femur    • HYSTERECTOMY     • LAPAROSCOPIC ASSISSTED TOTAL COLECTOMY W/ J-POUCH  10/15/2015    Laparoscopic right hemicolectomy with ileotransverse colostomy   • VENOUS ACCESS DEVICE (PORT) INSERTION  01/12/2016    Right internal jugular Mediport       ALLERGIES:    Allergies   Allergen Reactions   • Other Rash     Oral Chemo Meds  "      REVIEW OF SYSTEMS:      CONSTITUTIONAL: Denies any excessive fatigue.  No fever, chills, or night sweats.     HEENT:  No epistaxis, mouth sores, or difficulty swallowing.    RESPIRATORY:  No new shortness of breath or cough at present.    CARDIOVASCULAR:  No chest pain or palpitations.    GASTROINTESTINAL: Complains of chronic diarrhea.  No abdominal pain, nausea, vomiting, or blood in the stool.    GENITOURINARY:  No dysuria or hematuria.    MUSCULOSKELETAL:  No any new back pain or arthralgias.     NEUROLOGICAL:  Neuropathy in upper and lower extremities resolved completely.. No new headache or dizziness.     LYMPHATICS:  Denies any abnormal swollen and anywhere in the body.    SKIN:  Denies any new skin rash.          PHYSICAL EXAMINATION:      VITAL SIGNS:  /72  Pulse 84  Temp 97.9 °F (36.6 °C) (Temporal Artery )   Resp 16  Ht 64\" (162.6 cm)  Wt 119 lb (54 kg)  BMI 20.43 kg/m2    GENERAL:  Not in any distress.    HEENT:  Normocephalic, Atraumatic.Mild Conjunctival pallor. No icterus. Extraocular Movements Intact. No Facial Asymmetry noted.    NECK:  No adenopathy. No JVD.    RESPIRATORY:  Fair air entry bilateral. No rhonchi or wheezing.    CARDIOVASCULAR:  S1, S2. Regular rate and rhythm. No murmur or gallop appreciated.    ABDOMEN:  Soft,  nontender. Bowel sounds present in all four quadrants.  No organomegaly appreciated.    EXTREMITIES:  No edema.No Calf Tenderness.    NEUROLOGIC:  Alert, awake and oriented ×3.  No  Motor or sensory deficit appreciated. Cranial Nerves 2-12 grossly intact.        DIAGNOSTIC DATA:    Glucose   Date Value Ref Range Status   08/25/2017 183 (H) 60 - 100 mg/dL Final     Sodium   Date Value Ref Range Status   08/25/2017 136 (L) 137 - 145 mmol/L Final     Potassium   Date Value Ref Range Status   08/25/2017 4.1 3.5 - 5.1 mmol/L Final     CO2   Date Value Ref Range Status   08/25/2017 22.0 22.0 - 31.0 mmol/L Final     Chloride   Date Value Ref Range Status "   08/25/2017 102 95 - 110 mmol/L Final     Anion Gap   Date Value Ref Range Status   08/25/2017 12.0 5.0 - 15.0 mmol/L Final     Creatinine   Date Value Ref Range Status   08/25/2017 0.92 0.50 - 1.00 mg/dL Final     BUN   Date Value Ref Range Status   08/25/2017 17 7 - 21 mg/dL Final     BUN/Creatinine Ratio   Date Value Ref Range Status   08/25/2017 18.5 7.0 - 25.0 Final     Calcium   Date Value Ref Range Status   08/25/2017 9.2 8.4 - 10.2 mg/dL Final     eGFR Non  Amer   Date Value Ref Range Status   08/25/2017 60 39 - 90 mL/min/1.73 Final     Alkaline Phosphatase   Date Value Ref Range Status   08/25/2017 38 38 - 126 U/L Final     Total Protein   Date Value Ref Range Status   08/25/2017 6.2 (L) 6.3 - 8.6 g/dL Final     ALT (SGPT)   Date Value Ref Range Status   08/25/2017 30 9 - 52 U/L Final     AST (SGOT)   Date Value Ref Range Status   08/25/2017 22 14 - 36 U/L Final     Total Bilirubin   Date Value Ref Range Status   08/25/2017 0.4 0.2 - 1.3 mg/dL Final     Albumin   Date Value Ref Range Status   08/25/2017 4.10 3.40 - 4.80 g/dL Final     Globulin   Date Value Ref Range Status   08/25/2017 2.1 (L) 2.3 - 3.5 gm/dL Final     A/G Ratio   Date Value Ref Range Status   08/25/2017 2.0 (H) 1.1 - 1.8 g/dL Final     Lab Results   Component Value Date    WBC 5.20 10/17/2017    HGB 10.1 (L) 10/17/2017    HCT 31.4 (L) 10/17/2017    MCV 91.8 10/17/2017     10/17/2017     Lab Results   Component Value Date    NEUTROABS 3.51 10/17/2017    IRON 36 (L) 10/17/2017    TIBC 334 10/17/2017    LABIRON 11 (L) 10/17/2017    FERRITIN 115.00 10/17/2017    DRVTOTHA96 883 10/17/2017    FOLATE >20.00 10/17/2017     Lab Results   Component Value Date    CEA 1.70 04/19/2017    REFLABREPO SEE NOTE: 12/22/2015             RADIOLOGY DATA :  CT of abdomen and pelvis done on November 9, 2016 showed:  LOWER CHEST- Scarring and calcification noted in the anterior right  middle lobe, possibly due to old granulomatous  disease.      HEPATOBILIARY- Unremarkable.  SPLEEN- Unremarkable.  PANCREAS- Unremarkable  ADRENAL GLANDS- Unremarkable.  KIDNEYS/URETERS- No evidence of hydronephrosis or suspicious mass.  There is a stable right renal cyst.      GASTROINTESTINAL- There is a moderate amount of stool in the colon.  REPRODUCTIVE ORGANS- The uterus has been resected  URINARY BLADDER- Unremarkable      VASCULAR- Unremarkable  LYMPH NODES- No pathologically enlarged nodes by size criteria.  PERITONEUM/RETROPERITONEUM- Fat stranding in the right lower quadrant  anterior abdominal wall has decreased in size, possibly postsurgical  or due to previous inflammation. There is a linear focus of nodularity  in the right lower quadrant mesentery, which appears unchanged,  possibly due to a thrombosed vessel or posttreatment changes,  unchanged in comparison to the exam dated July 7, 2016.      OSSEOUS STRUCTURES- There are degenerative changes of the right  greater trochanter.  There is a levoconvex curvature of the spine.      CONCLUSION-   Stable exam in comparison to the previous study of July 7, 2016.  Postsurgical/posttreatment changes in the right lower abdomen as  described above.            ASSESSMENT AND PLAN:      1.  Recurrent iron deficiency: Positive recurrence of iron deficiency since October 2016.  Patient had a side effect with Feraheme so was started on Venofer.  Last dose of Venofer was given on September 18, 2017.  Anemia workup done on October 17, 2017 shows iron saturation is still low at 11% with an level of 36 only.  Patient's hemoglobin is marginally improved to 10.1.  At this point we will restart her on intravenous 104 since patient is not able to absorb iron by mouth.  We will see her back in about 6 weeks with a repeat CBC, anemia workup to be done prior to that.  If her iron level completely normalize and  Still anemic after that she will need bone marrow biopsy which was discussed with patient and her .    2.   Colon cancer, stage III, diagnosed in October 2015.  Patient had a hemicolectomy followed by chemotherapy.  Patient had a very complicated course with chemotherapy.  Initially patient was started on Xelox but Xeloda gave her skin rash.  Subsequently patient was changed over to FOLFOX for which she took 3 cycles and had a significant neuropathy.  Subsequently patient was changed to 5-FU and leucovorin which patient could not tolerate either subsequently after 8 cycles of chemotherapy in total chemotherapy was discontinued in July 2016.  Last colonoscopy done in November 2016 did not show any evidence of recurrence.  Her last CT scan was done in November 2016 which was negative for recurrence either.  Get CT of abdomen and pelvis with contrast prior to next clinic visit in December 2017.    3.  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    4.  History of dementia    5.  Health maintenance: Patient does not smoke.  Had a colonoscopy done in November 2016.  She remains full code.             Duong Hanley MD  10/19/2017  8:17 AM

## 2020-01-28 ENCOUNTER — INFUSION (OUTPATIENT)
Dept: ONCOLOGY | Facility: HOSPITAL | Age: 80
End: 2020-01-28

## 2020-01-28 VITALS — RESPIRATION RATE: 18 BRPM | TEMPERATURE: 97.8 F

## 2020-01-28 DIAGNOSIS — E53.8 B12 DEFICIENCY: Primary | ICD-10-CM

## 2020-01-28 PROCEDURE — 25010000002 CYANOCOBALAMIN PER 1000 MCG: Performed by: INTERNAL MEDICINE

## 2020-01-28 PROCEDURE — 96372 THER/PROPH/DIAG INJ SC/IM: CPT | Performed by: INTERNAL MEDICINE

## 2020-01-28 RX ORDER — CYANOCOBALAMIN 1000 UG/ML
1000 INJECTION, SOLUTION INTRAMUSCULAR; SUBCUTANEOUS ONCE
Status: COMPLETED | OUTPATIENT
Start: 2020-01-28 | End: 2020-01-28

## 2020-01-28 RX ORDER — CYANOCOBALAMIN 1000 UG/ML
1000 INJECTION, SOLUTION INTRAMUSCULAR; SUBCUTANEOUS ONCE
Status: CANCELLED | OUTPATIENT
Start: 2020-01-29

## 2020-01-28 RX ADMIN — CYANOCOBALAMIN 1000 MCG: 1000 INJECTION, SOLUTION INTRAMUSCULAR; SUBCUTANEOUS at 08:09

## 2020-01-29 ENCOUNTER — INFUSION (OUTPATIENT)
Dept: ONCOLOGY | Facility: HOSPITAL | Age: 80
End: 2020-01-29

## 2020-01-29 VITALS
DIASTOLIC BLOOD PRESSURE: 71 MMHG | TEMPERATURE: 97.8 F | HEART RATE: 95 BPM | SYSTOLIC BLOOD PRESSURE: 151 MMHG | RESPIRATION RATE: 18 BRPM

## 2020-01-29 DIAGNOSIS — E53.8 B12 DEFICIENCY: Primary | ICD-10-CM

## 2020-01-29 PROCEDURE — 25010000002 CYANOCOBALAMIN PER 1000 MCG: Performed by: INTERNAL MEDICINE

## 2020-01-29 PROCEDURE — 96372 THER/PROPH/DIAG INJ SC/IM: CPT | Performed by: INTERNAL MEDICINE

## 2020-01-29 RX ORDER — CYANOCOBALAMIN 1000 UG/ML
1000 INJECTION, SOLUTION INTRAMUSCULAR; SUBCUTANEOUS ONCE
Status: COMPLETED | OUTPATIENT
Start: 2020-01-29 | End: 2020-01-29

## 2020-01-29 RX ORDER — CYANOCOBALAMIN 1000 UG/ML
1000 INJECTION, SOLUTION INTRAMUSCULAR; SUBCUTANEOUS ONCE
Status: CANCELLED | OUTPATIENT
Start: 2020-02-04

## 2020-01-29 RX ADMIN — CYANOCOBALAMIN 1000 MCG: 1000 INJECTION, SOLUTION INTRAMUSCULAR; SUBCUTANEOUS at 08:09

## 2020-02-04 ENCOUNTER — INFUSION (OUTPATIENT)
Dept: ONCOLOGY | Facility: HOSPITAL | Age: 80
End: 2020-02-04

## 2020-02-04 ENCOUNTER — TELEPHONE (OUTPATIENT)
Dept: ONCOLOGY | Facility: CLINIC | Age: 80
End: 2020-02-04

## 2020-02-04 VITALS
SYSTOLIC BLOOD PRESSURE: 147 MMHG | TEMPERATURE: 97.7 F | RESPIRATION RATE: 16 BRPM | HEART RATE: 87 BPM | DIASTOLIC BLOOD PRESSURE: 70 MMHG

## 2020-02-04 DIAGNOSIS — E53.8 B12 DEFICIENCY: Primary | ICD-10-CM

## 2020-02-04 PROCEDURE — 96372 THER/PROPH/DIAG INJ SC/IM: CPT | Performed by: INTERNAL MEDICINE

## 2020-02-04 PROCEDURE — 25010000002 CYANOCOBALAMIN PER 1000 MCG: Performed by: INTERNAL MEDICINE

## 2020-02-04 RX ORDER — CYANOCOBALAMIN 1000 UG/ML
1000 INJECTION, SOLUTION INTRAMUSCULAR; SUBCUTANEOUS ONCE
Status: COMPLETED | OUTPATIENT
Start: 2020-02-04 | End: 2020-02-04

## 2020-02-04 RX ORDER — CYANOCOBALAMIN 1000 UG/ML
1000 INJECTION, SOLUTION INTRAMUSCULAR; SUBCUTANEOUS ONCE
Status: CANCELLED | OUTPATIENT
Start: 2020-02-11

## 2020-02-04 RX ADMIN — CYANOCOBALAMIN 1000 MCG: 1000 INJECTION, SOLUTION INTRAMUSCULAR; SUBCUTANEOUS at 08:11

## 2020-02-04 NOTE — TELEPHONE ENCOUNTER
Provider: Love Black (Dietician)  Caller: Chava Gutierrez  Relationship to Patient:   Phone Number: [phone]   Reason for Call: Patients  states his wife needs more [employer] Coupons and that Love Black the Dietician usually sends them.

## 2020-02-04 NOTE — TELEPHONE ENCOUNTER
Left message for patient or spouse to call back to clarify if they want to pick them up or want us to send them in the mail.  Dayami Lucia RN  February 4, 2020  12:20 PM

## 2020-02-05 ENCOUNTER — TELEPHONE (OUTPATIENT)
Dept: ONCOLOGY | Facility: CLINIC | Age: 80
End: 2020-02-05

## 2020-02-05 NOTE — TELEPHONE ENCOUNTER
Patient called for Love. Requesting a call back in reference to the [employer] coupons.     Phone: [phone]

## 2020-02-06 ENCOUNTER — TELEPHONE (OUTPATIENT)
Dept: NUTRITION | Facility: HOSPITAL | Age: 80
End: 2020-02-06

## 2020-02-06 NOTE — PROGRESS NOTES
Adult Outpatient Nutrition  Assessment    Patient Name:  Mary Gutierrez  YOB: 1940  MRN: [id number]    Assessment Date:  2/6/2020    Comments: Received message from  requesting [employer] discount coupons. Coupons mailed.                        Electronically signed by:  Love Black RD  02/06/20 10:48 AM

## 2020-02-11 ENCOUNTER — INFUSION (OUTPATIENT)
Dept: ONCOLOGY | Facility: HOSPITAL | Age: 80
End: 2020-02-11

## 2020-02-11 VITALS
RESPIRATION RATE: 16 BRPM | DIASTOLIC BLOOD PRESSURE: 85 MMHG | SYSTOLIC BLOOD PRESSURE: 169 MMHG | TEMPERATURE: 96.6 F | HEART RATE: 80 BPM

## 2020-02-11 DIAGNOSIS — E53.8 B12 DEFICIENCY: Primary | ICD-10-CM

## 2020-02-11 PROCEDURE — 96372 THER/PROPH/DIAG INJ SC/IM: CPT | Performed by: INTERNAL MEDICINE

## 2020-02-11 PROCEDURE — 25010000002 CYANOCOBALAMIN PER 1000 MCG: Performed by: INTERNAL MEDICINE

## 2020-02-11 RX ORDER — CYANOCOBALAMIN 1000 UG/ML
1000 INJECTION, SOLUTION INTRAMUSCULAR; SUBCUTANEOUS ONCE
Status: CANCELLED | OUTPATIENT
Start: 2020-02-18

## 2020-02-11 RX ORDER — CYANOCOBALAMIN 1000 UG/ML
1000 INJECTION, SOLUTION INTRAMUSCULAR; SUBCUTANEOUS ONCE
Status: COMPLETED | OUTPATIENT
Start: 2020-02-11 | End: 2020-02-11

## 2020-02-11 RX ADMIN — CYANOCOBALAMIN 1000 MCG: 1000 INJECTION, SOLUTION INTRAMUSCULAR; SUBCUTANEOUS at 08:23

## 2020-02-18 ENCOUNTER — INFUSION (OUTPATIENT)
Dept: ONCOLOGY | Facility: HOSPITAL | Age: 80
End: 2020-02-18

## 2020-02-18 VITALS — SYSTOLIC BLOOD PRESSURE: 154 MMHG | DIASTOLIC BLOOD PRESSURE: 67 MMHG | HEART RATE: 95 BPM | RESPIRATION RATE: 18 BRPM

## 2020-02-18 DIAGNOSIS — E53.8 B12 DEFICIENCY: Primary | ICD-10-CM

## 2020-02-18 PROCEDURE — 25010000002 CYANOCOBALAMIN PER 1000 MCG: Performed by: INTERNAL MEDICINE

## 2020-02-18 PROCEDURE — 96372 THER/PROPH/DIAG INJ SC/IM: CPT | Performed by: INTERNAL MEDICINE

## 2020-02-18 RX ORDER — CYANOCOBALAMIN 1000 UG/ML
1000 INJECTION, SOLUTION INTRAMUSCULAR; SUBCUTANEOUS ONCE
Status: COMPLETED | OUTPATIENT
Start: 2020-02-18 | End: 2020-02-18

## 2020-02-18 RX ORDER — CYANOCOBALAMIN 1000 UG/ML
1000 INJECTION, SOLUTION INTRAMUSCULAR; SUBCUTANEOUS ONCE
Status: CANCELLED | OUTPATIENT
Start: 2020-02-25

## 2020-02-18 RX ADMIN — CYANOCOBALAMIN 1000 MCG: 1000 INJECTION, SOLUTION INTRAMUSCULAR; SUBCUTANEOUS at 08:52

## 2020-02-20 ENCOUNTER — TELEPHONE (OUTPATIENT)
Dept: NUTRITION | Facility: HOSPITAL | Age: 80
End: 2020-02-20

## 2020-02-20 NOTE — PROGRESS NOTES
Adult Outpatient Nutrition  Assessment    Patient Name:  Mary Gutierrez  YOB: 1940  MRN: [id number]    Assessment Date:  2/20/2020    Comments: Left message for pt/ letting them know that [employer] discount coupons arrive (recently not available). Mailing many coupons to home.                        Electronically signed by:  Love Black RD  02/20/20 10:25 AM

## 2020-02-25 ENCOUNTER — INFUSION (OUTPATIENT)
Dept: ONCOLOGY | Facility: HOSPITAL | Age: 80
End: 2020-02-25

## 2020-02-25 DIAGNOSIS — E53.8 B12 DEFICIENCY: Primary | ICD-10-CM

## 2020-02-25 PROCEDURE — 96372 THER/PROPH/DIAG INJ SC/IM: CPT | Performed by: INTERNAL MEDICINE

## 2020-02-25 PROCEDURE — 25010000002 CYANOCOBALAMIN PER 1000 MCG: Performed by: INTERNAL MEDICINE

## 2020-02-25 RX ORDER — CYANOCOBALAMIN 1000 UG/ML
1000 INJECTION, SOLUTION INTRAMUSCULAR; SUBCUTANEOUS ONCE
Status: CANCELLED | OUTPATIENT
Start: 2020-03-24

## 2020-02-25 RX ORDER — CYANOCOBALAMIN 1000 UG/ML
1000 INJECTION, SOLUTION INTRAMUSCULAR; SUBCUTANEOUS ONCE
Status: COMPLETED | OUTPATIENT
Start: 2020-02-25 | End: 2020-02-25

## 2020-02-25 RX ADMIN — CYANOCOBALAMIN 1000 MCG: 1000 INJECTION, SOLUTION INTRAMUSCULAR; SUBCUTANEOUS at 08:04

## 2020-03-24 ENCOUNTER — INFUSION (OUTPATIENT)
Dept: ONCOLOGY | Facility: HOSPITAL | Age: 80
End: 2020-03-24

## 2020-03-24 VITALS
TEMPERATURE: 97.2 F | RESPIRATION RATE: 18 BRPM | HEART RATE: 90 BPM | SYSTOLIC BLOOD PRESSURE: 160 MMHG | DIASTOLIC BLOOD PRESSURE: 87 MMHG

## 2020-03-24 DIAGNOSIS — Z45.2 ENCOUNTER FOR VENOUS ACCESS DEVICE CARE: ICD-10-CM

## 2020-03-24 DIAGNOSIS — Z45.2 ENCOUNTER FOR CARE RELATED TO PORT-A-CATH: ICD-10-CM

## 2020-03-24 DIAGNOSIS — E53.8 B12 DEFICIENCY: Primary | ICD-10-CM

## 2020-03-24 PROCEDURE — 96372 THER/PROPH/DIAG INJ SC/IM: CPT | Performed by: INTERNAL MEDICINE

## 2020-03-24 PROCEDURE — 25010000003 HEPARIN LOCK FLUCH PER 10 UNITS: Performed by: INTERNAL MEDICINE

## 2020-03-24 PROCEDURE — 25010000002 CYANOCOBALAMIN PER 1000 MCG: Performed by: INTERNAL MEDICINE

## 2020-03-24 RX ORDER — CYANOCOBALAMIN 1000 UG/ML
1000 INJECTION, SOLUTION INTRAMUSCULAR; SUBCUTANEOUS ONCE
Status: CANCELLED | OUTPATIENT
Start: 2020-04-21

## 2020-03-24 RX ORDER — SODIUM CHLORIDE 0.9 % (FLUSH) 0.9 %
10 SYRINGE (ML) INJECTION AS NEEDED
Status: DISCONTINUED | OUTPATIENT
Start: 2020-03-24 | End: 2020-03-24 | Stop reason: HOSPADM

## 2020-03-24 RX ORDER — SODIUM CHLORIDE 0.9 % (FLUSH) 0.9 %
10 SYRINGE (ML) INJECTION AS NEEDED
Status: CANCELLED | OUTPATIENT
Start: 2020-04-13

## 2020-03-24 RX ORDER — HEPARIN SODIUM (PORCINE) LOCK FLUSH IV SOLN 100 UNIT/ML 100 UNIT/ML
500 SOLUTION INTRAVENOUS AS NEEDED
Status: DISCONTINUED | OUTPATIENT
Start: 2020-03-24 | End: 2020-03-24 | Stop reason: HOSPADM

## 2020-03-24 RX ORDER — CYANOCOBALAMIN 1000 UG/ML
1000 INJECTION, SOLUTION INTRAMUSCULAR; SUBCUTANEOUS ONCE
Status: COMPLETED | OUTPATIENT
Start: 2020-03-24 | End: 2020-03-24

## 2020-03-24 RX ORDER — HEPARIN SODIUM (PORCINE) LOCK FLUSH IV SOLN 100 UNIT/ML 100 UNIT/ML
500 SOLUTION INTRAVENOUS AS NEEDED
Status: CANCELLED | OUTPATIENT
Start: 2020-04-13

## 2020-03-24 RX ADMIN — HEPARIN 500 UNITS: 100 SYRINGE at 08:23

## 2020-03-24 RX ADMIN — CYANOCOBALAMIN 1000 MCG: 1000 INJECTION, SOLUTION INTRAMUSCULAR; SUBCUTANEOUS at 08:23

## 2020-03-24 RX ADMIN — SODIUM CHLORIDE, PRESERVATIVE FREE 20 ML: 5 INJECTION INTRAVENOUS at 08:24

## 2020-04-13 ENCOUNTER — APPOINTMENT (OUTPATIENT)
Dept: ONCOLOGY | Facility: HOSPITAL | Age: 80
End: 2020-04-13

## 2020-04-15 ENCOUNTER — APPOINTMENT (OUTPATIENT)
Dept: ONCOLOGY | Facility: CLINIC | Age: 80
End: 2020-04-15

## 2020-05-11 ENCOUNTER — TELEPHONE (OUTPATIENT)
Dept: ONCOLOGY | Facility: CLINIC | Age: 80
End: 2020-05-11

## 2020-05-21 ENCOUNTER — APPOINTMENT (OUTPATIENT)
Dept: ONCOLOGY | Facility: HOSPITAL | Age: 80
End: 2020-05-21

## 2020-05-22 ENCOUNTER — LAB (OUTPATIENT)
Dept: ONCOLOGY | Facility: HOSPITAL | Age: 80
End: 2020-05-22

## 2020-05-22 ENCOUNTER — APPOINTMENT (OUTPATIENT)
Dept: ONCOLOGY | Facility: HOSPITAL | Age: 80
End: 2020-05-22

## 2020-05-22 DIAGNOSIS — Z45.2 ENCOUNTER FOR VENOUS ACCESS DEVICE CARE: ICD-10-CM

## 2020-05-22 DIAGNOSIS — K86.2 CYST OF PANCREAS: ICD-10-CM

## 2020-05-22 DIAGNOSIS — Z45.2 ENCOUNTER FOR CARE RELATED TO PORT-A-CATH: ICD-10-CM

## 2020-05-22 DIAGNOSIS — C18.2 MALIGNANT NEOPLASM OF ASCENDING COLON (HCC): Primary | ICD-10-CM

## 2020-05-22 DIAGNOSIS — D50.8 OTHER IRON DEFICIENCY ANEMIA: ICD-10-CM

## 2020-05-22 DIAGNOSIS — K90.9 MALABSORPTION OF IRON: ICD-10-CM

## 2020-05-22 LAB
ALBUMIN SERPL-MCNC: 4.1 G/DL (ref 3.5–5.2)
ALBUMIN/GLOB SERPL: 1.8 G/DL
ALP SERPL-CCNC: 83 U/L (ref 39–117)
ALT SERPL W P-5'-P-CCNC: 20 U/L (ref 1–33)
ANION GAP SERPL CALCULATED.3IONS-SCNC: 14 MMOL/L (ref 5–15)
AST SERPL-CCNC: 26 U/L (ref 1–32)
BASOPHILS # BLD AUTO: 0.04 10*3/MM3 (ref 0–0.2)
BASOPHILS NFR BLD AUTO: 0.9 % (ref 0–1.5)
BILIRUB SERPL-MCNC: 0.3 MG/DL (ref 0.2–1.2)
BUN BLD-MCNC: 20 MG/DL (ref 8–23)
BUN/CREAT SERPL: 22.5 (ref 7–25)
CALCIUM SPEC-SCNC: 9.1 MG/DL (ref 8.6–10.5)
CEA SERPL-MCNC: 3.12 NG/ML
CHLORIDE SERPL-SCNC: 100 MMOL/L (ref 98–107)
CO2 SERPL-SCNC: 24 MMOL/L (ref 22–29)
CREAT BLD-MCNC: 0.89 MG/DL (ref 0.57–1)
DEPRECATED RDW RBC AUTO: 40.8 FL (ref 37–54)
EOSINOPHIL # BLD AUTO: 0.19 10*3/MM3 (ref 0–0.4)
EOSINOPHIL NFR BLD AUTO: 4.3 % (ref 0.3–6.2)
ERYTHROCYTE [DISTWIDTH] IN BLOOD BY AUTOMATED COUNT: 12.2 % (ref 12.3–15.4)
FERRITIN SERPL-MCNC: 705.3 NG/ML (ref 13–150)
FOLATE SERPL-MCNC: >20 NG/ML (ref 4.78–24.2)
GFR SERPL CREATININE-BSD FRML MDRD: 61 ML/MIN/1.73
GLOBULIN UR ELPH-MCNC: 2.3 GM/DL
GLUCOSE BLD-MCNC: 312 MG/DL (ref 65–99)
HCT VFR BLD AUTO: 37 % (ref 34–46.6)
HGB BLD-MCNC: 12.2 G/DL (ref 12–15.9)
IMM GRANULOCYTES # BLD AUTO: 0.02 10*3/MM3 (ref 0–0.05)
IMM GRANULOCYTES NFR BLD AUTO: 0.5 % (ref 0–0.5)
IRON 24H UR-MRATE: 108 MCG/DL (ref 37–145)
IRON SATN MFR SERPL: 35 % (ref 20–50)
LYMPHOCYTES # BLD AUTO: 0.93 10*3/MM3 (ref 0.7–3.1)
LYMPHOCYTES NFR BLD AUTO: 21.1 % (ref 19.6–45.3)
MCH RBC QN AUTO: 30.1 PG (ref 26.6–33)
MCHC RBC AUTO-ENTMCNC: 33 G/DL (ref 31.5–35.7)
MCV RBC AUTO: 91.4 FL (ref 79–97)
MONOCYTES # BLD AUTO: 0.34 10*3/MM3 (ref 0.1–0.9)
MONOCYTES NFR BLD AUTO: 7.7 % (ref 5–12)
NEUTROPHILS # BLD AUTO: 2.89 10*3/MM3 (ref 1.7–7)
NEUTROPHILS NFR BLD AUTO: 65.5 % (ref 42.7–76)
NRBC BLD AUTO-RTO: 0 /100 WBC (ref 0–0.2)
PLATELET # BLD AUTO: 305 10*3/MM3 (ref 140–450)
PMV BLD AUTO: 9.5 FL (ref 6–12)
POTASSIUM BLD-SCNC: 4.5 MMOL/L (ref 3.5–5.2)
PROT SERPL-MCNC: 6.4 G/DL (ref 6–8.5)
RBC # BLD AUTO: 4.05 10*6/MM3 (ref 3.77–5.28)
SODIUM BLD-SCNC: 138 MMOL/L (ref 136–145)
TIBC SERPL-MCNC: 311 MCG/DL (ref 298–536)
TRANSFERRIN SERPL-MCNC: 209 MG/DL (ref 200–360)
VIT B12 BLD-MCNC: 974 PG/ML (ref 211–946)
WBC NRBC COR # BLD: 4.41 10*3/MM3 (ref 3.4–10.8)

## 2020-05-22 PROCEDURE — 83540 ASSAY OF IRON: CPT

## 2020-05-22 PROCEDURE — 82378 CARCINOEMBRYONIC ANTIGEN: CPT

## 2020-05-22 PROCEDURE — 84466 ASSAY OF TRANSFERRIN: CPT

## 2020-05-22 PROCEDURE — 82607 VITAMIN B-12: CPT

## 2020-05-22 PROCEDURE — 82746 ASSAY OF FOLIC ACID SERUM: CPT

## 2020-05-22 PROCEDURE — 36591 DRAW BLOOD OFF VENOUS DEVICE: CPT | Performed by: NURSE PRACTITIONER

## 2020-05-22 PROCEDURE — 85025 COMPLETE CBC W/AUTO DIFF WBC: CPT

## 2020-05-22 PROCEDURE — 82728 ASSAY OF FERRITIN: CPT

## 2020-05-22 PROCEDURE — 80053 COMPREHEN METABOLIC PANEL: CPT

## 2020-05-22 RX ORDER — SODIUM CHLORIDE 0.9 % (FLUSH) 0.9 %
10 SYRINGE (ML) INJECTION AS NEEDED
Status: CANCELLED | OUTPATIENT
Start: 2020-08-24

## 2020-05-22 RX ORDER — SODIUM CHLORIDE 0.9 % (FLUSH) 0.9 %
10 SYRINGE (ML) INJECTION AS NEEDED
Status: DISCONTINUED | OUTPATIENT
Start: 2020-05-22 | End: 2020-05-22 | Stop reason: HOSPADM

## 2020-05-22 RX ORDER — HEPARIN SODIUM (PORCINE) LOCK FLUSH IV SOLN 100 UNIT/ML 100 UNIT/ML
500 SOLUTION INTRAVENOUS AS NEEDED
Status: CANCELLED | OUTPATIENT
Start: 2020-08-24

## 2020-05-22 RX ORDER — HEPARIN SODIUM (PORCINE) LOCK FLUSH IV SOLN 100 UNIT/ML 100 UNIT/ML
500 SOLUTION INTRAVENOUS AS NEEDED
Status: DISCONTINUED | OUTPATIENT
Start: 2020-05-22 | End: 2020-05-22 | Stop reason: HOSPADM

## 2020-05-27 ENCOUNTER — TELEMEDICINE - AUDIO (OUTPATIENT)
Dept: ONCOLOGY | Facility: CLINIC | Age: 80
End: 2020-05-27

## 2020-05-27 DIAGNOSIS — D50.8 OTHER IRON DEFICIENCY ANEMIA: ICD-10-CM

## 2020-05-27 DIAGNOSIS — C18.2 MALIGNANT NEOPLASM OF ASCENDING COLON (HCC): Primary | ICD-10-CM

## 2020-05-27 DIAGNOSIS — E53.8 B12 DEFICIENCY: ICD-10-CM

## 2020-05-27 DIAGNOSIS — K86.2 CYST OF PANCREAS: ICD-10-CM

## 2020-05-27 PROCEDURE — G9903 PT SCRN TBCO ID AS NON USER: HCPCS | Performed by: INTERNAL MEDICINE

## 2020-05-27 PROCEDURE — G8731 PAIN NEG NO PLAN: HCPCS | Performed by: INTERNAL MEDICINE

## 2020-05-27 PROCEDURE — 1123F ACP DISCUSS/DSCN MKR DOCD: CPT | Performed by: INTERNAL MEDICINE

## 2020-05-27 PROCEDURE — 99214 OFFICE O/P EST MOD 30 MIN: CPT | Performed by: INTERNAL MEDICINE

## 2020-05-27 RX ORDER — LANOLIN ALCOHOL/MO/W.PET/CERES
CREAM (GRAM) TOPICAL
Qty: 36 TABLET | Refills: 1 | Status: SHIPPED | OUTPATIENT
Start: 2020-05-27

## 2020-05-27 NOTE — PROGRESS NOTES
DATE OF VISIT: 5/27/2020    You have chosen to receive care through a telehealth visit.  Do you consent to use a video/audio connection for your medical care today? Yes       REASON FOR VISIT:  colon cancer,recurrent iron deficiency anemia, cystic lesion of pancreas,, B12 deficiency      HISTORY OF PRESENT ILLNESS:    79-year-old male with medical problem consisting of diabetes mellitus, hypertension, history of colon cancer status post surgery and chemotherapy currently on surveillance is here for 3-month follow-up video visit today.  Due to iron deficiency, patient received intravenous Venofer last of which was on January 27, 2020.  Due to B12 deficiency she was also started on induction regimen in January 2020.  As per , her dementia has got worse since last clinic visit and most of the history was provided by .  There is no reported fever or bleeding or any new lymph node enlargement is but denies any major weight loss.    PAST MEDICAL HISTORY:    Past Medical History:   Diagnosis Date   • Acid reflux    • Diabetes mellitus (CMS/HCC)    • Hypertension    • Malignant neoplasm of ascending colon (CMS/HCC) 11/1/2016   • Malignant tumor of cecum (CMS/HCC)        SOCIAL HISTORY:    Social History     Tobacco Use   • Smoking status: Never Smoker   • Smokeless tobacco: Never Used   Substance Use Topics   • Alcohol use: No   • Drug use: No       Surgical History :  Past Surgical History:   Procedure Laterality Date   • CAPSULE ENDOSCOPY N/A 6/22/2017    Procedure: CAPSULE ENDOSCOPY M2A;  Surgeon: Stuart Rico DO;  Location: [facility] ENDOSCOPY;  Service:    • CAPSULE ENDOSCOPY N/A 1/23/2018    Procedure: CAPSULE ENDOSCOPY M2A;  Surgeon: Stuart Rico DO;  Location: [facility] ENDOSCOPY;  Service:    • CENTRAL VENOUS LINE INSERTION  10/30/2015    Ultrasound localization, left basilic vein.Placement of left upper extremity PICC line   • COLONOSCOPY N/A 1/23/2018    Procedure: COLONOSCOPY;  Surgeon: Stuart  PAT Rico DO;  Location: [facility] ENDOSCOPY;  Service:    • ENDOSCOPY N/A 6/22/2017    Procedure: ESOPHAGOGASTRODUODENOSCOPY;  Surgeon: Stuart Rico DO;  Location: [facility] ENDOSCOPY;  Service:    • ENDOSCOPY N/A 1/23/2018    Procedure: ESOPHAGOGASTRODUODENOSCOPY;  Surgeon: Stuart Rico DO;  Location: [facility] ENDOSCOPY;  Service:    • FRACTURE SURGERY      right femur    • HYSTERECTOMY     • LAPAROSCOPIC ASSISSTED TOTAL COLECTOMY W/ J-POUCH  10/15/2015    Laparoscopic right hemicolectomy with ileotransverse colostomy   • VENOUS ACCESS DEVICE (PORT) INSERTION  01/12/2016    Right internal jugular Mediport       ALLERGIES:    Allergies   Allergen Reactions   • Other Rash     Oral Chemo Meds         FAMILY HISTORY:  Family History   Family history unknown: Yes           REVIEW OF SYSTEMS:    Unable to obtain due to dementia        PHYSICAL EXAMINATION:      Unable to obtain secondary to connection issue with video call        DIAGNOSTIC DATA:    Glucose   Date Value Ref Range Status   05/22/2020 312 (H) 65 - 99 mg/dL Final     Sodium   Date Value Ref Range Status   05/22/2020 138 136 - 145 mmol/L Final   10/25/2019 137 136 - 145 mmol/L Final     Potassium   Date Value Ref Range Status   05/22/2020 4.5 3.5 - 5.2 mmol/L Final   10/25/2019 3.6 3.5 - 5.1 mmol/L Final     CO2   Date Value Ref Range Status   05/22/2020 24.0 22.0 - 29.0 mmol/L Final     Chloride   Date Value Ref Range Status   05/22/2020 100 98 - 107 mmol/L Final   10/25/2019 100 98 - 107 mmol/L Final     Anion Gap   Date Value Ref Range Status   05/22/2020 14.0 5.0 - 15.0 mmol/L Final     Creatinine   Date Value Ref Range Status   05/22/2020 0.89 0.57 - 1.00 mg/dL Final   10/25/2019 0.8 0.6 - 1.0 mg/dL Final     Comment:     **The MDRD GFR formula is valid only for ages 18-70.    GFR will not be reported for individuals aging <18 or >70.     BUN   Date Value Ref Range Status   05/22/2020 20 8 - 23 mg/dL Final   10/25/2019 10 7 - 18 mg/dL Final      BUN/Creatinine Ratio   Date Value Ref Range Status   05/22/2020 22.5 7.0 - 25.0 Final     Calcium   Date Value Ref Range Status   05/22/2020 9.1 8.6 - 10.5 mg/dL Final   10/25/2019 9 8.5 - 10.1 mg/dL Final     eGFR Non  Amer   Date Value Ref Range Status   05/22/2020 61 >60 mL/min/1.73 Final     Alkaline Phosphatase   Date Value Ref Range Status   05/22/2020 83 39 - 117 U/L Final   10/25/2019 101 46 - 116 U/L Final     Total Protein   Date Value Ref Range Status   05/22/2020 6.4 6.0 - 8.5 g/dL Final   10/25/2019 6.9 6.4 - 8.2 g/dL Final     ALT (SGPT)   Date Value Ref Range Status   05/22/2020 20 1 - 33 U/L Final   10/25/2019 23 14 - 59 U/L Final     AST (SGOT)   Date Value Ref Range Status   05/22/2020 26 1 - 32 U/L Final   10/25/2019 20 15 - 37 U/L Final     Total Bilirubin   Date Value Ref Range Status   05/22/2020 0.3 0.2 - 1.2 mg/dL Final   10/25/2019 0.3 0.2 - 1.0 mg/dL Final     Albumin   Date Value Ref Range Status   05/22/2020 4.10 3.50 - 5.20 g/dL Final   10/25/2019 3.6 3.4 - 5.0 g/dL Final     Globulin   Date Value Ref Range Status   05/22/2020 2.3 gm/dL Final     Lab Results   Component Value Date    WBC 4.41 05/22/2020    HGB 12.2 05/22/2020    HCT 37.0 05/22/2020    MCV 91.4 05/22/2020     05/22/2020     Lab Results   Component Value Date    NEUTROABS 2.89 05/22/2020    IRON 108 05/22/2020    TIBC 311 05/22/2020    LABIRON 35 05/22/2020    FERRITIN 705.30 (H) 05/22/2020    [id number] 974 (H) 05/22/2020    FOLATE >20.00 05/22/2020     Lab Results   Component Value Date    CEA 3.12 05/22/2020    REFLABREPO SEE NOTE: 12/22/2015               ASSESSMENT AND PLAN:      1.  Recurrent iron deficiency: Positive recurrence of iron deficiency since October 2016.  Patient had a side effect with Feraheme so was started on Venofer.  Last dose of Venofer was given on October 27 , 2017.  Patient had a EGD, colonoscopy and capsule endoscopy done on January 23, 2018.  EGD showed some gastritis,  colonoscopy showed a single tubular adenoma without any cancer.  Patient had a very narrow pylorus after dilating pylorus by Dr. Rico, capsule was passed into the small intestine but Endoscopy report is not helpful since there was so much food as per report in the small intestine.  Developing iron deficiency, patient receive another round of intravenous Venofer from February 19, 2018 until February 23, 2018.  Case was discussed with Dr. Rico, he does not think any much intervention can be done regarding her recurrent iron deficiency  From GI point of view at this point.  He recommends rechecking iron level frequently and supplementing it is required.    -Patient received 5 days of intravenous Venofer from January 21, 2020 until January 28, 2020.  -Anemia work-up done on May 22, 2020 shows hemoglobin is normal at 12.4 with adequate amount of iron.  No need to restart intravenous Venofer.  -We will ask patient to return to clinic in 3 months with repeat CBC, CMP, CEA and anemia workup to be done prior to that.        2.  Colon cancer, stage III, diagnosed in October 2015.  Patient had a hemicolectomy followed by chemotherapy.  Patient had a very complicated course with chemotherapy.  Initially patient was started on Xelox but Xeloda gave her skin rash.  Subsequently patient was changed over to FOLFOX for which she took 3 cycles and had a significant neuropathy.  Subsequently patient was changed to 5-FU and leucovorin which patient could not tolerate either subsequently after 8 cycles of chemotherapy in total chemotherapy was discontinued in July 2016.  Last colonoscopy done in January 2018 did not show any evidence of recurrence.    -Most recent CEA level done on  May 22 , 2020 is normal at 3.14.  -CT of chest, abdomen and pelvis with contrast done on Saba 3 does not show any evidence of recurrence.  -We will recheck CEA with next clinic visit in 3 months.  -We will get CT of chest abdomen and pelvis with contrast  prior to next clinic visit in 3 months.       3.  Cystic lesion in pancreas:  -CT scan done on November 30, 2018 shows 1 cm cystic lesion in pancreas.  Differential diagnosis for cystic lesion in pancreas includes benign cyst versus cystic neoplasm of the pancreas.  -CT of abdomen and pelvis with and without contrast done on Saba 3, 2019 does not show any evidence of cystic lesion in the pancreas.  CT scan was reviewed with Dr. Roberts.  Results of CT scan were discussed with patient and her family.    4.  B12 deficiency:  -Due to B12 malabsorption, patient was started on induction regimen in January 2020.  B12 injection has been held secondary to coronavirus pandemic.  -For now recommend taking B12 tablet 3 times a week on Monday Wednesday Friday.  -We will repeat B12 level upon next clinic visit in 3 months.     5.  History of dementia  -Patient's dementia has got worse recently.  Recommend following up with primary medical provider for further management of dementia.     6.  Health maintenance: Patient does not smoke.  Had a colonoscopy done in January 2018.  Patient did not have mammogram for many years now.  She was counseled about the importance of screening mammogram she wants to think about whether she wants to do it or not.       7. Advance Care Planning: For now patient remains full code and is able to make decisions.  Patient has health care surrogate mentioned on chart.    PHQ-9 Total Score:       Mary Gutierrez reports a pain score of 0.  Given her pain assessment as noted, treatment options were discussed and the following options were decided upon as a follow-up plan to address the patient's pain: No acute intervention required.               This visit was completed as a video visit due to ongoing pandemic with coronavirus.    Unable to complete visit using a video connection to the patient. A phone visit was used to complete this visits. Total time of discussion was 12 minutes.      Duong LUNA  MD Dayan  5/27/2020  10:16          Part of this note may be an electronic transcription/translation of spoken language to printed text using the Dragon Dictation System.

## 2020-07-10 ENCOUNTER — TELEPHONE (OUTPATIENT)
Dept: ONCOLOGY | Facility: CLINIC | Age: 80
End: 2020-07-10

## 2020-07-20 ENCOUNTER — TELEPHONE (OUTPATIENT)
Dept: NUTRITION | Facility: HOSPITAL | Age: 80
End: 2020-07-20

## 2020-08-13 ENCOUNTER — DOCUMENTATION (OUTPATIENT)
Dept: NUTRITION | Facility: HOSPITAL | Age: 80
End: 2020-08-13

## 2020-08-13 NOTE — PROGRESS NOTES
Adult Outpatient Nutrition  Assessment    Patient Name:  Mary Gutierrez  YOB: 1940  MRN: [id number]    Assessment Date:  8/13/2020    Comments: Mailed additional [employer] discount coupons to home.                        Electronically signed by:  Love Black RD  08/13/20 09:44

## 2020-08-24 ENCOUNTER — HOSPITAL ENCOUNTER (OUTPATIENT)
Dept: CT IMAGING | Facility: HOSPITAL | Age: 80
Discharge: HOME | End: 2020-08-24
Admitting: INTERNAL MEDICINE

## 2020-08-24 ENCOUNTER — APPOINTMENT (OUTPATIENT)
Dept: CT IMAGING | Facility: HOSPITAL | Age: 80
End: 2020-08-24

## 2020-08-24 ENCOUNTER — LAB (OUTPATIENT)
Dept: ONCOLOGY | Facility: HOSPITAL | Age: 80
End: 2020-08-24

## 2020-08-24 DIAGNOSIS — D50.8 OTHER IRON DEFICIENCY ANEMIA: ICD-10-CM

## 2020-08-24 DIAGNOSIS — K86.2 CYST OF PANCREAS: ICD-10-CM

## 2020-08-24 DIAGNOSIS — E53.8 B12 DEFICIENCY: ICD-10-CM

## 2020-08-24 DIAGNOSIS — C18.2 MALIGNANT NEOPLASM OF ASCENDING COLON (HCC): ICD-10-CM

## 2020-08-24 LAB
ALBUMIN SERPL-MCNC: 4.1 G/DL (ref 3.5–5.2)
ALBUMIN/GLOB SERPL: 1.6 G/DL
ALP SERPL-CCNC: 96 U/L (ref 39–117)
ALT SERPL W P-5'-P-CCNC: 25 U/L (ref 1–33)
ANION GAP SERPL CALCULATED.3IONS-SCNC: 12 MMOL/L (ref 5–15)
AST SERPL-CCNC: 27 U/L (ref 1–32)
BASOPHILS # BLD AUTO: 0.04 10*3/MM3 (ref 0–0.2)
BASOPHILS NFR BLD AUTO: 0.6 % (ref 0–1.5)
BILIRUB SERPL-MCNC: 0.5 MG/DL (ref 0–1.2)
BUN SERPL-MCNC: 21 MG/DL (ref 8–23)
BUN/CREAT SERPL: 24.7 (ref 7–25)
CALCIUM SPEC-SCNC: 9 MG/DL (ref 8.6–10.5)
CEA SERPL-MCNC: 3.59 NG/ML
CHLORIDE SERPL-SCNC: 95 MMOL/L (ref 98–107)
CO2 SERPL-SCNC: 24 MMOL/L (ref 22–29)
CREAT SERPL-MCNC: 0.85 MG/DL (ref 0.57–1)
DEPRECATED RDW RBC AUTO: 40.3 FL (ref 37–54)
EOSINOPHIL # BLD AUTO: 0.26 10*3/MM3 (ref 0–0.4)
EOSINOPHIL NFR BLD AUTO: 4 % (ref 0.3–6.2)
ERYTHROCYTE [DISTWIDTH] IN BLOOD BY AUTOMATED COUNT: 12.4 % (ref 12.3–15.4)
FERRITIN SERPL-MCNC: 473.9 NG/ML (ref 13–150)
FOLATE SERPL-MCNC: >20 NG/ML (ref 4.78–24.2)
GFR SERPL CREATININE-BSD FRML MDRD: 65 ML/MIN/1.73
GLOBULIN UR ELPH-MCNC: 2.5 GM/DL
GLUCOSE SERPL-MCNC: 419 MG/DL (ref 65–99)
HCT VFR BLD AUTO: 41.3 % (ref 34–46.6)
HGB BLD-MCNC: 13.6 G/DL (ref 12–15.9)
IMM GRANULOCYTES # BLD AUTO: 0.04 10*3/MM3 (ref 0–0.05)
IMM GRANULOCYTES NFR BLD AUTO: 0.6 % (ref 0–0.5)
IRON 24H UR-MRATE: 82 MCG/DL (ref 37–145)
IRON SATN MFR SERPL: 25 % (ref 20–50)
LYMPHOCYTES # BLD AUTO: 1.08 10*3/MM3 (ref 0.7–3.1)
LYMPHOCYTES NFR BLD AUTO: 16.6 % (ref 19.6–45.3)
MCH RBC QN AUTO: 29.6 PG (ref 26.6–33)
MCHC RBC AUTO-ENTMCNC: 32.9 G/DL (ref 31.5–35.7)
MCV RBC AUTO: 90 FL (ref 79–97)
MONOCYTES # BLD AUTO: 0.4 10*3/MM3 (ref 0.1–0.9)
MONOCYTES NFR BLD AUTO: 6.2 % (ref 5–12)
NEUTROPHILS NFR BLD AUTO: 4.67 10*3/MM3 (ref 1.7–7)
NEUTROPHILS NFR BLD AUTO: 72 % (ref 42.7–76)
NRBC BLD AUTO-RTO: 0 /100 WBC (ref 0–0.2)
PLATELET # BLD AUTO: 287 10*3/MM3 (ref 140–450)
PMV BLD AUTO: 9.7 FL (ref 6–12)
POTASSIUM SERPL-SCNC: 4.6 MMOL/L (ref 3.5–5.2)
PROT SERPL-MCNC: 6.6 G/DL (ref 6–8.5)
RBC # BLD AUTO: 4.59 10*6/MM3 (ref 3.77–5.28)
SODIUM SERPL-SCNC: 131 MMOL/L (ref 136–145)
TIBC SERPL-MCNC: 325 MCG/DL (ref 298–536)
TRANSFERRIN SERPL-MCNC: 218 MG/DL (ref 200–360)
VIT B12 BLD-MCNC: 827 PG/ML (ref 211–946)
WBC # BLD AUTO: 6.49 10*3/MM3 (ref 3.4–10.8)

## 2020-08-24 PROCEDURE — 80053 COMPREHEN METABOLIC PANEL: CPT | Performed by: INTERNAL MEDICINE

## 2020-08-24 PROCEDURE — 82378 CARCINOEMBRYONIC ANTIGEN: CPT | Performed by: INTERNAL MEDICINE

## 2020-08-24 PROCEDURE — 71260 CT THORAX DX C+: CPT

## 2020-08-24 PROCEDURE — 25010000002 IOPAMIDOL 61 % SOLUTION: Performed by: INTERNAL MEDICINE

## 2020-08-24 PROCEDURE — 82607 VITAMIN B-12: CPT | Performed by: INTERNAL MEDICINE

## 2020-08-24 PROCEDURE — 36415 COLL VENOUS BLD VENIPUNCTURE: CPT | Performed by: INTERNAL MEDICINE

## 2020-08-24 PROCEDURE — 83540 ASSAY OF IRON: CPT | Performed by: INTERNAL MEDICINE

## 2020-08-24 PROCEDURE — 82746 ASSAY OF FOLIC ACID SERUM: CPT | Performed by: INTERNAL MEDICINE

## 2020-08-24 PROCEDURE — 84466 ASSAY OF TRANSFERRIN: CPT | Performed by: INTERNAL MEDICINE

## 2020-08-24 PROCEDURE — 82728 ASSAY OF FERRITIN: CPT | Performed by: INTERNAL MEDICINE

## 2020-08-24 PROCEDURE — 85025 COMPLETE CBC W/AUTO DIFF WBC: CPT | Performed by: INTERNAL MEDICINE

## 2020-08-24 PROCEDURE — 25010000003 HEPARIN LOCK FLUSH PER 10 UNITS

## 2020-08-24 PROCEDURE — 74177 CT ABD & PELVIS W/CONTRAST: CPT

## 2020-08-24 RX ORDER — HEPARIN SODIUM (PORCINE) LOCK FLUSH IV SOLN 100 UNIT/ML 100 UNIT/ML
SOLUTION INTRAVENOUS
Status: COMPLETED
Start: 2020-08-24 | End: 2020-08-24

## 2020-08-24 RX ORDER — SODIUM CHLORIDE 9 MG/ML
10 INJECTION INTRAVENOUS ONCE
Status: COMPLETED | OUTPATIENT
Start: 2020-08-24 | End: 2020-08-24

## 2020-08-24 RX ORDER — HEPARIN SODIUM (PORCINE) LOCK FLUSH IV SOLN 100 UNIT/ML 100 UNIT/ML
500 SOLUTION INTRAVENOUS ONCE
Status: COMPLETED | OUTPATIENT
Start: 2020-08-24 | End: 2020-08-24

## 2020-08-24 RX ADMIN — SODIUM CHLORIDE 10 ML: 9 INJECTION, SOLUTION INTRAMUSCULAR; INTRAVENOUS; SUBCUTANEOUS at 15:18

## 2020-08-24 RX ADMIN — HEPARIN SODIUM (PORCINE) LOCK FLUSH IV SOLN 100 UNIT/ML 500 UNITS: 100 SOLUTION at 15:18

## 2020-08-24 RX ADMIN — IOPAMIDOL 80 ML: 612 INJECTION, SOLUTION INTRAVENOUS at 15:17

## 2020-08-24 RX ADMIN — HEPARIN 500 UNITS: 100 SYRINGE at 15:18

## 2020-08-25 ENCOUNTER — APPOINTMENT (OUTPATIENT)
Dept: CT IMAGING | Facility: HOSPITAL | Age: 80
End: 2020-08-25

## 2020-08-27 ENCOUNTER — OFFICE VISIT (OUTPATIENT)
Dept: ONCOLOGY | Facility: CLINIC | Age: 80
End: 2020-08-27

## 2020-08-27 VITALS
TEMPERATURE: 97.8 F | WEIGHT: 125.7 LBS | HEIGHT: 64 IN | DIASTOLIC BLOOD PRESSURE: 85 MMHG | HEART RATE: 80 BPM | BODY MASS INDEX: 21.46 KG/M2 | SYSTOLIC BLOOD PRESSURE: 199 MMHG | RESPIRATION RATE: 16 BRPM

## 2020-08-27 DIAGNOSIS — D50.8 OTHER IRON DEFICIENCY ANEMIA: ICD-10-CM

## 2020-08-27 DIAGNOSIS — E53.8 B12 DEFICIENCY: ICD-10-CM

## 2020-08-27 DIAGNOSIS — C18.2 MALIGNANT NEOPLASM OF ASCENDING COLON (HCC): Primary | ICD-10-CM

## 2020-08-27 PROCEDURE — G9903 PT SCRN TBCO ID AS NON USER: HCPCS | Performed by: INTERNAL MEDICINE

## 2020-08-27 PROCEDURE — G8731 PAIN NEG NO PLAN: HCPCS | Performed by: INTERNAL MEDICINE

## 2020-08-27 PROCEDURE — G0463 HOSPITAL OUTPT CLINIC VISIT: HCPCS | Performed by: INTERNAL MEDICINE

## 2020-08-27 PROCEDURE — 99214 OFFICE O/P EST MOD 30 MIN: CPT | Performed by: INTERNAL MEDICINE

## 2020-08-27 PROCEDURE — 1123F ACP DISCUSS/DSCN MKR DOCD: CPT | Performed by: INTERNAL MEDICINE

## 2020-08-27 NOTE — PROGRESS NOTES
DATE OF VISIT: 8/27/2020      REASON FOR VISIT: Colon cancer, recurrent iron deficiency anemia, B12 deficiency, hyperglycemia, hypertension      HISTORY OF PRESENT ILLNESS:    79-year-old female with medical problem consisting of diabetes mellitus, hypertension, colon cancer status post surgery and chemotherapy currently on surveillance is here for follow-up appointment today to discuss recently done blood work as well as CT scan.  No reported history of fever or bleeding or any new lymph node enlargement.        PAST MEDICAL HISTORY:    Past Medical History:   Diagnosis Date   • Acid reflux    • Diabetes mellitus (CMS/HCC)    • Hypertension    • Malignant neoplasm of ascending colon (CMS/HCC) 11/1/2016   • Malignant tumor of cecum (CMS/HCC)        SOCIAL HISTORY:    Social History     Tobacco Use   • Smoking status: Never Smoker   • Smokeless tobacco: Never Used   Substance Use Topics   • Alcohol use: No   • Drug use: No       Surgical History :  Past Surgical History:   Procedure Laterality Date   • CAPSULE ENDOSCOPY N/A 6/22/2017    Procedure: CAPSULE ENDOSCOPY M2A;  Surgeon: Stuart Rico DO;  Location: [facility] ENDOSCOPY;  Service:    • CAPSULE ENDOSCOPY N/A 1/23/2018    Procedure: CAPSULE ENDOSCOPY M2A;  Surgeon: Stuart Rico DO;  Location: [facility];  Service:    • CENTRAL VENOUS LINE INSERTION  10/30/2015    Ultrasound localization, left basilic vein.Placement of left upper extremity PICC line   • COLONOSCOPY N/A 1/23/2018    Procedure: COLONOSCOPY;  Surgeon: Stuart Rico DO;  Location: [facility] ENDOSCOPY;  Service:    • ENDOSCOPY N/A 6/22/2017    Procedure: ESOPHAGOGASTRODUODENOSCOPY;  Surgeon: Stuart Rico DO;  Location: [facility] ENDOSCOPY;  Service:    • ENDOSCOPY N/A 1/23/2018    Procedure: ESOPHAGOGASTRODUODENOSCOPY;  Surgeon: Stuart Rico DO;  Location: [facility] ENDOSCOPY;  Service:    • FRACTURE SURGERY      right femur    • HYSTERECTOMY     • LAPAROSCOPIC ASSISSTED TOTAL  "COLECTOMY W/ J-POUCH  10/15/2015    Laparoscopic right hemicolectomy with ileotransverse colostomy   • VENOUS ACCESS DEVICE (PORT) INSERTION  01/12/2016    Right internal jugular Mediport       ALLERGIES:    Allergies   Allergen Reactions   • Other Rash     Oral Chemo Meds         FAMILY HISTORY:  Family History   Family history unknown: Yes           REVIEW OF SYSTEMS:      CONSTITUTIONAL:  Complains of fatigue. Denies any fever, chills or weight loss.     EYES: No visual disturbances. No discharge. No new lesions    ENMT:  No epistaxis, mouth sores or difficulty swallowing.    RESPIRATORY:  No new shortness of breath. No new cough or hemoptysis.    CARDIOVASCULAR:  No chest pain or palpitations.    GASTROINTESTINAL:  No abdominal pain nausea, vomiting or blood in the stool.    GENITOURINARY: No Dysuria or Hematuria.    MUSCULOSKELETAL: Positive for arthritis pain.    LYMPHATICS:  Denies any abnormal swollen glands anywhere in the body.    NEUROLOGICAL : Positive for tingling and numbness affecting lower extremity. No headache or dizziness. No seizures or balance problems.    SKIN: No new skin lesions.    ENDOCRINE : No new hot or cold intolerance. No new polyuria . No polydipsia.        PHYSICAL EXAMINATION:      VITAL SIGNS:  BP (!) 199/85 Comment: Pt has not taken BP meds  Pulse 80   Temp 97.8 °F (36.6 °C) (Temporal)   Resp 16   Ht 162.6 cm (64\")   Wt 57 kg (125 lb 11.2 oz)   BMI 21.58 kg/m²       08/27/20  0800   Weight: 57 kg (125 lb 11.2 oz)         ECOG performance status: 2    CONSTITUTIONAL:  Not in any distress.    EYES: Mild conjunctival Pallor. No Icterus. No Pterygium. Extraocular Movements intact.No ptosis.    ENMT:  Normocephalic, Atraumatic.No Facial Asymmetry noted.    NECK:  No adenopathy.Trachea midline. NO JVD.    RESPIRATORY:  Fair air entry bilateral. No rhonchi or wheezing.Fair respiratory effort.    CARDIOVASCULAR:  S1, S2. Regular rate and rhythm. No murmur or gallop " appreciated.    ABDOMEN:  Soft,  nontender. Bowel sounds present in all four quadrants.  No Hepatosplenomegaly appreciated.    MUSCULOSKELETAL:  No edema.No Calf Tenderness.Decreased range of motion.    NEUROLOGIC:    No  Motor  deficit appreciated. Cranial Nerves 2-12 grossly intact.    SKIN : No new skin lesion identified. Skin is warm and moist to touch.    LYMPHATICS: No new enlarged lymph nodes in neck or supraclavicular area.    PSYCHIATRY: Alert, awake and oriented ×3.Flat affect.  Normal judgment.  Makes good eye contact.        DIAGNOSTIC DATA:    Glucose   Date Value Ref Range Status   08/24/2020 419 (C) 65 - 99 mg/dL Final     Sodium   Date Value Ref Range Status   08/24/2020 131 (L) 136 - 145 mmol/L Final   10/25/2019 137 136 - 145 mmol/L Final     Potassium   Date Value Ref Range Status   08/24/2020 4.6 3.5 - 5.2 mmol/L Final   10/25/2019 3.6 3.5 - 5.1 mmol/L Final     CO2   Date Value Ref Range Status   08/24/2020 24.0 22.0 - 29.0 mmol/L Final     Chloride   Date Value Ref Range Status   08/24/2020 95 (L) 98 - 107 mmol/L Final   10/25/2019 100 98 - 107 mmol/L Final     Anion Gap   Date Value Ref Range Status   08/24/2020 12.0 5.0 - 15.0 mmol/L Final     Creatinine   Date Value Ref Range Status   08/24/2020 0.85 0.57 - 1.00 mg/dL Final   10/25/2019 0.8 0.6 - 1.0 mg/dL Final     Comment:     **The MDRD GFR formula is valid only for ages 18-70.    GFR will not be reported for individuals aging <18 or >70.     BUN   Date Value Ref Range Status   08/24/2020 21 8 - 23 mg/dL Final   10/25/2019 10 7 - 18 mg/dL Final     BUN/Creatinine Ratio   Date Value Ref Range Status   08/24/2020 24.7 7.0 - 25.0 Final     Calcium   Date Value Ref Range Status   08/24/2020 9.0 8.6 - 10.5 mg/dL Final   10/25/2019 9 8.5 - 10.1 mg/dL Final     eGFR Non  Amer   Date Value Ref Range Status   08/24/2020 65 >60 mL/min/1.73 Final     Alkaline Phosphatase   Date Value Ref Range Status   08/24/2020 96 39 - 117 U/L Final    10/25/2019 101 46 - 116 U/L Final     Total Protein   Date Value Ref Range Status   08/24/2020 6.6 6.0 - 8.5 g/dL Final   10/25/2019 6.9 6.4 - 8.2 g/dL Final     ALT (SGPT)   Date Value Ref Range Status   08/24/2020 25 1 - 33 U/L Final   10/25/2019 23 14 - 59 U/L Final     AST (SGOT)   Date Value Ref Range Status   08/24/2020 27 1 - 32 U/L Final   10/25/2019 20 15 - 37 U/L Final     Total Bilirubin   Date Value Ref Range Status   08/24/2020 0.5 0.0 - 1.2 mg/dL Final   10/25/2019 0.3 0.2 - 1.0 mg/dL Final     Albumin   Date Value Ref Range Status   08/24/2020 4.10 3.50 - 5.20 g/dL Final   10/25/2019 3.6 3.4 - 5.0 g/dL Final     Globulin   Date Value Ref Range Status   08/24/2020 2.5 gm/dL Final     Lab Results   Component Value Date    WBC 6.49 08/24/2020    HGB 13.6 08/24/2020    HCT 41.3 08/24/2020    MCV 90.0 08/24/2020     08/24/2020     Lab Results   Component Value Date    NEUTROABS 4.67 08/24/2020    IRON 82 08/24/2020    TIBC 325 08/24/2020    LABIRON 25 08/24/2020    FERRITIN 473.90 (H) 08/24/2020    [id number] 827 08/24/2020    FOLATE >20.00 08/24/2020     Lab Results   Component Value Date    CEA 3.59 08/24/2020    REFLABREPO SEE NOTE: 12/22/2015               RADIOLOGY DATA :  Ct Chest With Contrast    Result Date: 8/24/2020  1. No radiographic evidence of recurrent or residual malignancy 2. Postoperative changes of the colon, similar to previous study 3. Fatty infiltration of liver 4. Stable appearance of right renal cyst 5. Status post hysterectomy 6. Please see findings section above for further details Electronically signed by:  Maria Esther Young MD  8/24/2020 4:10 PM CDT Workstation: [id number]    Ct Abdomen Pelvis With Contrast    Result Date: 8/24/2020  1. No radiographic evidence of recurrent or residual malignancy 2. Postoperative changes of the colon, similar to previous study 3. Fatty infiltration of liver 4. Stable appearance of right renal cyst 5. Status post hysterectomy 6. Please see  findings section above for further details Electronically signed by:  Maria Esther Young MD  8/24/2020 4:10 PM CDT Workstation: [id number]        ASSESSMENT AND PLAN:      1.  Recurrent iron deficiency anemia:  -Patient has recurrent iron deficiency anemia since October 2016 for which patient has required intravenous Venofer in the past.  - Most recent dose of intravenous Venofer was on January 20, 2020.  - Patient had a GI work-up done by Dr. Rico in January 2018 that did not show any active bleeding.  Capsule endoscopy could not be performed secondary to Staying in Stomach.  - Recent anemia work-up done on August 24, 2020 shows adequate amount of iron.  No need to start any iron replacement at present.  -We will repeat anemia work-up upon next clinic visit in 4 months.    2.  Colon cancer stage III diagnosed in October 2015  -Had a hemicolectomy followed by chemotherapy.  -Patient had a very poor tolerance and complicated course to chemotherapy.  - Patient was initially started on XELOX but could not tolerate Xeloda secondary to skin rash.  -Subsequently patient was changed to FOLFOX but could not tolerate more than 3 cycles due to neuropathy.  -Patient was changed to 5-FU and leucovorin which he could not tolerate either and subsequently after total 8 cycles of chemotherapy it was discontinued in July 2016.  - Recent colonoscopy done in January 2018 did not show any evidence of recurrence.  -Recent CEA level is 3.7.  -CT of chest abdomen and pelvis with contrast done in August 2020 does not show any evidence of recurrence.  -We will recheck CEA upon next clinic visit.  -We will repeat CT of chest abdomen and pelvis with contrast in August 2021.    3.  History of cystic lesion in pancreas:  -No recent evidence of cystic lesion that is getting beginning pancreas    4.  B12 deficiency:  - Patient was started on intramuscular B12 injection due to malabsorption.  Since coronavirus pandemic patient has not been getting  B12 injection  -Recommend continue with B12 1 tablet 3 times a week.    5.  Hypertension:  -Patient's blood pressure is elevated at 199/85.  Patient denies any headache or chest pain or shortness of breath.   in the room states patient did not take her blood pressure medication today, encourage taking blood pressure medication after going back home.    6.  Hyperglycemia:  -Patient glucose was 419 on August 24, 2020.  Patient was again counseled about importance of blood glucose control in view of CT scan showing fatty liver.    7.  Dementia    8.  Health maintenance: Patient does not smoke.  Had a colonoscopy in January 2018.  In the past patient has been counseled about screening mammogram but she is not interested in that.    9. Advance Care Planning: For now patient remains full code and is able to make decisions.  Patient has health care surrogate mentioned on chart.         PHQ-9 Total Score: 0   -Patient is not homicidal or suicidal.  No acute intervention required.    Mary Gutierrez reports a pain score of 0.  Given her pain assessment as noted, treatment options were discussed and the following options were decided upon as a follow-up plan to address the patient's pain: No acute intervention required.         Duong Hanley MD  8/27/2020  08:19        Part of this note may be an electronic transcription/translation of spoken language to printed text using the Dragon Dictation System.

## 2020-08-31 ENCOUNTER — DOCUMENTATION (OUTPATIENT)
Dept: NUTRITION | Facility: HOSPITAL | Age: 80
End: 2020-08-31

## 2020-10-27 ENCOUNTER — APPOINTMENT (OUTPATIENT)
Dept: CT IMAGING | Facility: HOSPITAL | Age: 80
End: 2020-10-27

## 2020-11-16 ENCOUNTER — TELEPHONE (OUTPATIENT)
Dept: ONCOLOGY | Facility: CLINIC | Age: 80
End: 2020-11-16

## 2020-11-16 NOTE — TELEPHONE ENCOUNTER
PATIENT'S  ABDOULAYE (ON  [facility]) CALLING, TRYING TO REACH LUIS, WANTS TO KNOW IF SHE HAS THE PATIENT'S BOOST COUPONS AND THAT SHE KNOWS WHERE TO MAIL THEM, PLEASE ADVISE?    PT CALL BACK #[phone]

## 2020-12-01 ENCOUNTER — LAB (OUTPATIENT)
Dept: ONCOLOGY | Facility: HOSPITAL | Age: 80
End: 2020-12-01

## 2020-12-01 ENCOUNTER — TELEPHONE (OUTPATIENT)
Dept: ONCOLOGY | Facility: HOSPITAL | Age: 80
End: 2020-12-01

## 2020-12-01 DIAGNOSIS — E53.8 B12 DEFICIENCY: ICD-10-CM

## 2020-12-01 DIAGNOSIS — Z45.2 ENCOUNTER FOR VENOUS ACCESS DEVICE CARE: ICD-10-CM

## 2020-12-01 DIAGNOSIS — C18.2 MALIGNANT NEOPLASM OF ASCENDING COLON (HCC): Primary | ICD-10-CM

## 2020-12-01 DIAGNOSIS — Z45.2 ENCOUNTER FOR CARE RELATED TO PORT-A-CATH: ICD-10-CM

## 2020-12-01 DIAGNOSIS — D50.8 OTHER IRON DEFICIENCY ANEMIA: ICD-10-CM

## 2020-12-01 LAB
ALBUMIN SERPL-MCNC: 4.6 G/DL (ref 3.5–5.2)
ALBUMIN/GLOB SERPL: 1.8 G/DL
ALP SERPL-CCNC: 84 U/L (ref 39–117)
ALT SERPL W P-5'-P-CCNC: 12 U/L (ref 1–33)
ANION GAP SERPL CALCULATED.3IONS-SCNC: 11 MMOL/L (ref 5–15)
AST SERPL-CCNC: 20 U/L (ref 1–32)
BASOPHILS # BLD AUTO: 0.05 10*3/MM3 (ref 0–0.2)
BASOPHILS NFR BLD AUTO: 0.9 % (ref 0–1.5)
BILIRUB SERPL-MCNC: 0.8 MG/DL (ref 0–1.2)
BUN SERPL-MCNC: 24 MG/DL (ref 8–23)
BUN/CREAT SERPL: 25 (ref 7–25)
CALCIUM SPEC-SCNC: 9.7 MG/DL (ref 8.6–10.5)
CEA SERPL-MCNC: 3.89 NG/ML
CHLORIDE SERPL-SCNC: 93 MMOL/L (ref 98–107)
CO2 SERPL-SCNC: 25 MMOL/L (ref 22–29)
CREAT SERPL-MCNC: 0.96 MG/DL (ref 0.57–1)
DEPRECATED RDW RBC AUTO: 42.5 FL (ref 37–54)
EOSINOPHIL # BLD AUTO: 0.13 10*3/MM3 (ref 0–0.4)
EOSINOPHIL NFR BLD AUTO: 2.4 % (ref 0.3–6.2)
ERYTHROCYTE [DISTWIDTH] IN BLOOD BY AUTOMATED COUNT: 12.3 % (ref 12.3–15.4)
FERRITIN SERPL-MCNC: 631.7 NG/ML (ref 13–150)
FOLATE SERPL-MCNC: >20 NG/ML (ref 4.78–24.2)
GFR SERPL CREATININE-BSD FRML MDRD: 56 ML/MIN/1.73
GLOBULIN UR ELPH-MCNC: 2.5 GM/DL
GLUCOSE SERPL-MCNC: 441 MG/DL (ref 65–99)
HCT VFR BLD AUTO: 38.6 % (ref 34–46.6)
HGB BLD-MCNC: 12.6 G/DL (ref 12–15.9)
IMM GRANULOCYTES # BLD AUTO: 0.04 10*3/MM3 (ref 0–0.05)
IMM GRANULOCYTES NFR BLD AUTO: 0.8 % (ref 0–0.5)
IRON 24H UR-MRATE: 100 MCG/DL (ref 37–145)
IRON SATN MFR SERPL: 22 % (ref 20–50)
LYMPHOCYTES # BLD AUTO: 1.28 10*3/MM3 (ref 0.7–3.1)
LYMPHOCYTES NFR BLD AUTO: 24.1 % (ref 19.6–45.3)
MCH RBC QN AUTO: 30.4 PG (ref 26.6–33)
MCHC RBC AUTO-ENTMCNC: 32.6 G/DL (ref 31.5–35.7)
MCV RBC AUTO: 93.2 FL (ref 79–97)
MONOCYTES # BLD AUTO: 0.32 10*3/MM3 (ref 0.1–0.9)
MONOCYTES NFR BLD AUTO: 6 % (ref 5–12)
NEUTROPHILS NFR BLD AUTO: 3.5 10*3/MM3 (ref 1.7–7)
NEUTROPHILS NFR BLD AUTO: 65.8 % (ref 42.7–76)
NRBC BLD AUTO-RTO: 0 /100 WBC (ref 0–0.2)
PLATELET # BLD AUTO: 360 10*3/MM3 (ref 140–450)
PMV BLD AUTO: 10.2 FL (ref 6–12)
POTASSIUM SERPL-SCNC: 4.8 MMOL/L (ref 3.5–5.2)
PROT SERPL-MCNC: 7.1 G/DL (ref 6–8.5)
RBC # BLD AUTO: 4.14 10*6/MM3 (ref 3.77–5.28)
SODIUM SERPL-SCNC: 129 MMOL/L (ref 136–145)
TIBC SERPL-MCNC: 454 MCG/DL (ref 298–536)
TRANSFERRIN SERPL-MCNC: 305 MG/DL (ref 200–360)
VIT B12 BLD-MCNC: 641 PG/ML (ref 211–946)
WBC # BLD AUTO: 5.32 10*3/MM3 (ref 3.4–10.8)

## 2020-12-01 PROCEDURE — 80053 COMPREHEN METABOLIC PANEL: CPT

## 2020-12-01 PROCEDURE — 83540 ASSAY OF IRON: CPT

## 2020-12-01 PROCEDURE — 85025 COMPLETE CBC W/AUTO DIFF WBC: CPT

## 2020-12-01 PROCEDURE — 84466 ASSAY OF TRANSFERRIN: CPT

## 2020-12-01 PROCEDURE — 82728 ASSAY OF FERRITIN: CPT

## 2020-12-01 PROCEDURE — 82607 VITAMIN B-12: CPT

## 2020-12-01 PROCEDURE — 82746 ASSAY OF FOLIC ACID SERUM: CPT

## 2020-12-01 PROCEDURE — 82378 CARCINOEMBRYONIC ANTIGEN: CPT

## 2020-12-01 PROCEDURE — 36591 DRAW BLOOD OFF VENOUS DEVICE: CPT | Performed by: INTERNAL MEDICINE

## 2020-12-01 PROCEDURE — 25010000003 HEPARIN LOCK FLUSH PER 10 UNITS: Performed by: INTERNAL MEDICINE

## 2020-12-01 RX ORDER — HEPARIN SODIUM (PORCINE) LOCK FLUSH IV SOLN 100 UNIT/ML 100 UNIT/ML
500 SOLUTION INTRAVENOUS AS NEEDED
Status: CANCELLED | OUTPATIENT
Start: 2021-01-14

## 2020-12-01 RX ORDER — SODIUM CHLORIDE 0.9 % (FLUSH) 0.9 %
10 SYRINGE (ML) INJECTION AS NEEDED
Status: CANCELLED | OUTPATIENT
Start: 2021-01-14

## 2020-12-01 RX ORDER — HEPARIN SODIUM (PORCINE) LOCK FLUSH IV SOLN 100 UNIT/ML 100 UNIT/ML
500 SOLUTION INTRAVENOUS AS NEEDED
Status: DISCONTINUED | OUTPATIENT
Start: 2020-12-01 | End: 2020-12-01 | Stop reason: HOSPADM

## 2020-12-01 RX ORDER — SODIUM CHLORIDE 0.9 % (FLUSH) 0.9 %
10 SYRINGE (ML) INJECTION AS NEEDED
Status: DISCONTINUED | OUTPATIENT
Start: 2020-12-01 | End: 2020-12-01 | Stop reason: HOSPADM

## 2020-12-01 RX ADMIN — HEPARIN 500 UNITS: 100 SYRINGE at 08:33

## 2020-12-01 NOTE — TELEPHONE ENCOUNTER
Called and spoke with pt's  regarding pt lab results and instruction for going to the ER per Dr. Hanley  V/u obtained.

## 2020-12-01 NOTE — TELEPHONE ENCOUNTER
----- Message from Duong Hanley MD sent at 12/1/2020 10:03 AM CST -----  Please let patient know, her glucose is again elevated at 441.  If patient cannot control sugar at home, recommend coming to the emergency room for better control of blood glucose.  Thank you

## 2020-12-03 ENCOUNTER — OFFICE VISIT (OUTPATIENT)
Dept: ONCOLOGY | Facility: CLINIC | Age: 80
End: 2020-12-03

## 2020-12-03 ENCOUNTER — APPOINTMENT (OUTPATIENT)
Dept: ONCOLOGY | Facility: CLINIC | Age: 80
End: 2020-12-03

## 2020-12-03 VITALS
TEMPERATURE: 98.4 F | RESPIRATION RATE: 16 BRPM | HEIGHT: 64 IN | BODY MASS INDEX: 20.66 KG/M2 | WEIGHT: 121 LBS | SYSTOLIC BLOOD PRESSURE: 179 MMHG | HEART RATE: 92 BPM | DIASTOLIC BLOOD PRESSURE: 76 MMHG

## 2020-12-03 DIAGNOSIS — C18.2 MALIGNANT NEOPLASM OF ASCENDING COLON (HCC): ICD-10-CM

## 2020-12-03 DIAGNOSIS — E53.8 B12 DEFICIENCY: ICD-10-CM

## 2020-12-03 PROCEDURE — G0463 HOSPITAL OUTPT CLINIC VISIT: HCPCS | Performed by: NURSE PRACTITIONER

## 2020-12-03 PROCEDURE — 99213 OFFICE O/P EST LOW 20 MIN: CPT | Performed by: NURSE PRACTITIONER

## 2020-12-07 NOTE — PROGRESS NOTES
DATE OF VISIT: 12/3/2020    REASON FOR VISIT:  Colon cancer, recurrent iron deficiency anemia, B12 deficiency, hyperglycemia, hypertension        HISTORY OF PRESENT ILLNESS:    79-year-old female with medical problem consisting of diabetes mellitus, hypertension, colon cancer status post surgery and chemotherapy currently on surveillance. Here today for follow-up visit. No reported history of fever or bleeding or any new lymph node enlargement. Last surveillance CT scan in August 2020 showed OZ; planning to repeat again in one year, August 2021.        PAST MEDICAL HISTORY:    Past Medical History:   Diagnosis Date   • Acid reflux    • Diabetes mellitus (CMS/HCC)    • Hypertension    • Malignant neoplasm of ascending colon (CMS/HCC) 11/1/2016   • Malignant tumor of cecum (CMS/HCC)        SOCIAL HISTORY:    Social History     Tobacco Use   • Smoking status: Never Smoker   • Smokeless tobacco: Never Used   Substance Use Topics   • Alcohol use: No   • Drug use: No       Surgical History :  Past Surgical History:   Procedure Laterality Date   • CAPSULE ENDOSCOPY N/A 6/22/2017    Procedure: CAPSULE ENDOSCOPY M2A;  Surgeon: Stuart Rico DO;  Location: [facility] ENDOSCOPY;  Service:    • CAPSULE ENDOSCOPY N/A 1/23/2018    Procedure: CAPSULE ENDOSCOPY M2A;  Surgeon: Stuart Rico DO;  Location: [facility];  Service:    • CENTRAL VENOUS LINE INSERTION  10/30/2015    Ultrasound localization, left basilic vein.Placement of left upper extremity PICC line   • COLONOSCOPY N/A 1/23/2018    Procedure: COLONOSCOPY;  Surgeon: Stuart Rico DO;  Location: [facility] ENDOSCOPY;  Service:    • ENDOSCOPY N/A 6/22/2017    Procedure: ESOPHAGOGASTRODUODENOSCOPY;  Surgeon: Stuart Rico DO;  Location: [facility] ENDOSCOPY;  Service:    • ENDOSCOPY N/A 1/23/2018    Procedure: ESOPHAGOGASTRODUODENOSCOPY;  Surgeon: Stuart Rico DO;  Location: [facility] ENDOSCOPY;  Service:    • FRACTURE SURGERY      right femur    • HYSTERECTOMY   "   • LAPAROSCOPIC ASSISSTED TOTAL COLECTOMY W/ J-POUCH  10/15/2015    Laparoscopic right hemicolectomy with ileotransverse colostomy   • VENOUS ACCESS DEVICE (PORT) INSERTION  01/12/2016    Right internal jugular Mediport       ALLERGIES:    Allergies   Allergen Reactions   • Other Rash     Oral Chemo Meds       REVIEW OF SYSTEMS:      CONSTITUTIONAL:  No fever, chills, or night sweats.     HEENT:  No epistaxis, mouth sores, or difficulty swallowing.    RESPIRATORY:  No new shortness of breath or cough at present.    CARDIOVASCULAR:  No chest pain or palpitations.    GASTROINTESTINAL:  No abdominal pain, nausea, vomiting, or blood in the stool.    GENITOURINARY:  No dysuria or hematuria.    MUSCULOSKELETAL:  No any new back pain or arthralgias.     NEUROLOGICAL:  No tingling or numbness. No new headache or dizziness.     LYMPHATICS:  Denies any abnormal swollen and anywhere in the body.    SKIN:  Denies any new skin rash.    PHYSICAL EXAMINATION:      VITAL SIGNS:  /76   Pulse 92   Temp 98.4 °F (36.9 °C) (Temporal)   Resp 16   Ht 162.6 cm (64.02\")   Wt 54.9 kg (121 lb)   BMI 20.76 kg/m²     GENERAL:  Not in any distress.    HEENT:  Normocephalic, Atraumatic.Mild Conjunctival pallor. No icterus. Extraocular Movements Intact. No Facial Asymmetry noted.    NECK:  No adenopathy. No JVD.    RESPIRATORY:  Fair air entry bilateral. No rhonchi or wheezing.    CARDIOVASCULAR:  S1, S2. Regular rate and rhythm. No murmur or gallop appreciated.    ABDOMEN:  Soft, obese, nontender. Bowel sounds present in all four quadrants.  No organomegaly appreciated.    EXTREMITIES:  No edema.No Calf Tenderness.    NEUROLOGIC:  Alert, awake and oriented ×3.  No  Motor or sensory deficit appreciated. Cranial Nerves 2-12 grossly intact.    SKIN : No new skin lesion identified  DIAGNOSTIC DATA:    Glucose   Date Value Ref Range Status   12/01/2020 441 (C) 65 - 99 mg/dL Final     Sodium   Date Value Ref Range Status   12/01/2020 129 " (L) 136 - 145 mmol/L Final   10/25/2019 137 136 - 145 mmol/L Final     Potassium   Date Value Ref Range Status   12/01/2020 4.8 3.5 - 5.2 mmol/L Final   10/25/2019 3.6 3.5 - 5.1 mmol/L Final     CO2   Date Value Ref Range Status   12/01/2020 25.0 22.0 - 29.0 mmol/L Final     Chloride   Date Value Ref Range Status   12/01/2020 93 (L) 98 - 107 mmol/L Final   10/25/2019 100 98 - 107 mmol/L Final     Anion Gap   Date Value Ref Range Status   12/01/2020 11.0 5.0 - 15.0 mmol/L Final     Creatinine   Date Value Ref Range Status   12/01/2020 0.96 0.57 - 1.00 mg/dL Final   10/25/2019 0.8 0.6 - 1.0 mg/dL Final     Comment:     **The MDRD GFR formula is valid only for ages 18-70.    GFR will not be reported for individuals aging <18 or >70.     BUN   Date Value Ref Range Status   12/01/2020 24 (H) 8 - 23 mg/dL Final   10/25/2019 10 7 - 18 mg/dL Final     BUN/Creatinine Ratio   Date Value Ref Range Status   12/01/2020 25.0 7.0 - 25.0 Final     Calcium   Date Value Ref Range Status   12/01/2020 9.7 8.6 - 10.5 mg/dL Final   10/25/2019 9 8.5 - 10.1 mg/dL Final     eGFR Non  Amer   Date Value Ref Range Status   12/01/2020 56 (L) >60 mL/min/1.73 Final     Alkaline Phosphatase   Date Value Ref Range Status   12/01/2020 84 39 - 117 U/L Final   10/25/2019 101 46 - 116 U/L Final     Total Protein   Date Value Ref Range Status   12/01/2020 7.1 6.0 - 8.5 g/dL Final   10/25/2019 6.9 6.4 - 8.2 g/dL Final     ALT (SGPT)   Date Value Ref Range Status   12/01/2020 12 1 - 33 U/L Final   10/25/2019 23 14 - 59 U/L Final     AST (SGOT)   Date Value Ref Range Status   12/01/2020 20 1 - 32 U/L Final   10/25/2019 20 15 - 37 U/L Final     Total Bilirubin   Date Value Ref Range Status   12/01/2020 0.8 0.0 - 1.2 mg/dL Final   10/25/2019 0.3 0.2 - 1.0 mg/dL Final     Albumin   Date Value Ref Range Status   12/01/2020 4.60 3.50 - 5.20 g/dL Final   10/25/2019 3.6 3.4 - 5.0 g/dL Final     Globulin   Date Value Ref Range Status   12/01/2020 2.5 gm/dL  Final     Lab Results   Component Value Date    WBC 5.32 12/01/2020    HGB 12.6 12/01/2020    HCT 38.6 12/01/2020    MCV 93.2 12/01/2020     12/01/2020     Lab Results   Component Value Date    NEUTROABS 3.50 12/01/2020    IRON 100 12/01/2020    TIBC 454 12/01/2020    LABIRON 22 12/01/2020    FERRITIN 631.70 (H) 12/01/2020    [id number] 641 12/01/2020    FOLATE >20.00 12/01/2020     Lab Results   Component Value Date    CEA 3.89 12/01/2020    REFLABREPO SEE NOTE: 12/22/2015   ]    PATHOLOGY:  * Cannot find OR log *     RADIOLOGY DATA :  No radiology results for the last 7 days      ASSESSMENT AND PLAN:       1.  Recurrent iron deficiency anemia:  -Patient has recurrent iron deficiency anemia since October 2016 for which patient has required intravenous Venofer in the past.  - Most recent dose of intravenous Venofer was on January 20, 2020.  - Patient had a GI work-up done by Dr. Rico in January 2018 that did not show any active bleeding.  Capsule endoscopy could not be performed secondary to Staying in Stomach.  - Recent anemia work-up done on 12/1/2020 shows adequate amount of iron.  No need to start any iron replacement at present.  -We will repeat anemia work-up upon next clinic visit in 4 months.     2.  Colon cancer stage III diagnosed in October 2015  -Had a hemicolectomy followed by chemotherapy.  -Patient had a very poor tolerance and complicated course to chemotherapy.  - Patient was initially started on XELOX but could not tolerate Xeloda secondary to skin rash.  -Subsequently patient was changed to FOLFOX but could not tolerate more than 3 cycles due to neuropathy.  -Patient was changed to 5-FU and leucovorin which he could not tolerate either and subsequently after total 8 cycles of chemotherapy it was discontinued in July 2016.  - Recent colonoscopy done in January 2018 did not show any evidence of recurrence.  -Recent CEA level is stable at 3.89.  -CT of chest abdomen and pelvis with contrast  done in August 2020 does not show any evidence of recurrence.  -We will recheck CEA upon next clinic visit.  -We will repeat CT of chest abdomen and pelvis with contrast in August 2021.     3.  History of cystic lesion in pancreas:  -No recent evidence of cystic lesion that is getting beginning pancreas     4.  B12 deficiency:  - Patient was started on intramuscular B12 injection due to malabsorption.  Since coronavirus pandemic patient has not been getting B12 injection  -Recommend continue with B12 1 tablet 4 times a week.         This document has been signed by FLY Rutherford on December 7, 2020 08:29 CST

## 2020-12-08 ENCOUNTER — TELEPHONE (OUTPATIENT)
Dept: ONCOLOGY | Facility: CLINIC | Age: 80
End: 2020-12-08

## 2020-12-08 NOTE — TELEPHONE ENCOUNTER
Caller: ABDOULAYE DAY     Relationship to patient:     Best call back number: [phone]    Abdoulaye is looking for [employer] so he can get some boost coupons.

## 2020-12-09 ENCOUNTER — DOCUMENTATION (OUTPATIENT)
Dept: NUTRITION | Facility: HOSPITAL | Age: 80
End: 2020-12-09

## 2020-12-09 NOTE — PROGRESS NOTES
Adult Outpatient Nutrition  Assessment    Patient Name:  Mary Gutierrez  YOB: 1940  MRN: [id number]    Assessment Date:  12/9/2020    Comments: Mailed additional Boost discount coupons to home.                        Electronically signed by:  Love Black RD  12/09/20 10:50 CST

## 2021-01-06 ENCOUNTER — TELEPHONE (OUTPATIENT)
Dept: ONCOLOGY | Facility: CLINIC | Age: 81
End: 2021-01-06

## 2021-01-07 ENCOUNTER — DOCUMENTATION (OUTPATIENT)
Dept: NUTRITION | Facility: HOSPITAL | Age: 81
End: 2021-01-07

## 2021-01-07 NOTE — PROGRESS NOTES
Adult Outpatient Nutrition  Assessment    Patient Name:  Mary Gutierrez  YOB: 1940  MRN: [id number]    Assessment Date:  1/7/2021    Comments: Mailed Boost discount coupons to home.                        Electronically signed by:  Love Black RD  01/07/21 09:17 CST

## 2021-02-17 RX ORDER — SODIUM CHLORIDE 0.9 % (FLUSH) 0.9 %
10 SYRINGE (ML) INJECTION AS NEEDED
Status: CANCELLED | OUTPATIENT
Start: 2021-02-25

## 2021-02-17 RX ORDER — HEPARIN SODIUM (PORCINE) LOCK FLUSH IV SOLN 100 UNIT/ML 100 UNIT/ML
500 SOLUTION INTRAVENOUS AS NEEDED
Status: CANCELLED | OUTPATIENT
Start: 2021-02-25

## 2021-02-19 ENCOUNTER — TELEPHONE (OUTPATIENT)
Dept: ONCOLOGY | Facility: CLINIC | Age: 81
End: 2021-02-19

## 2021-02-19 NOTE — TELEPHONE ENCOUNTER
PT: SELF    PT'S  ABDOULAYE CALLING TO GET A MESSAGE TO LUIS MENDEZ THEY ARE WANTING TO KNOW IF YOU HAVE ANYMORE BOOST COUPONS THAT SHE COULD POSSIBLY GET?    PT #: [phone]

## 2021-02-23 ENCOUNTER — DOCUMENTATION (OUTPATIENT)
Dept: NUTRITION | Facility: HOSPITAL | Age: 81
End: 2021-02-23

## 2021-02-23 NOTE — PROGRESS NOTES
Adult Outpatient Nutrition  Assessment    Patient Name:  Mary Gutierrez  YOB: 1940  MRN: [id number]    Assessment Date:  2/23/2021    Comments: Mailed Boost discount coupons to home.                        Electronically signed by:  Love Black RD  02/23/21 08:53 CST

## 2021-02-25 ENCOUNTER — INFUSION (OUTPATIENT)
Dept: ONCOLOGY | Facility: HOSPITAL | Age: 81
End: 2021-02-25

## 2021-02-25 DIAGNOSIS — Z45.2 ENCOUNTER FOR CARE RELATED TO PORT-A-CATH: Primary | ICD-10-CM

## 2021-02-25 DIAGNOSIS — Z45.2 ENCOUNTER FOR VENOUS ACCESS DEVICE CARE: ICD-10-CM

## 2021-02-25 PROCEDURE — 25010000003 HEPARIN LOCK FLUSH PER 10 UNITS: Performed by: INTERNAL MEDICINE

## 2021-02-25 PROCEDURE — 96523 IRRIG DRUG DELIVERY DEVICE: CPT | Performed by: INTERNAL MEDICINE

## 2021-02-25 RX ORDER — HEPARIN SODIUM (PORCINE) LOCK FLUSH IV SOLN 100 UNIT/ML 100 UNIT/ML
500 SOLUTION INTRAVENOUS AS NEEDED
Status: CANCELLED | OUTPATIENT
Start: 2021-04-06

## 2021-02-25 RX ORDER — HEPARIN SODIUM (PORCINE) LOCK FLUSH IV SOLN 100 UNIT/ML 100 UNIT/ML
500 SOLUTION INTRAVENOUS AS NEEDED
Status: DISCONTINUED | OUTPATIENT
Start: 2021-02-25 | End: 2021-02-25 | Stop reason: HOSPADM

## 2021-02-25 RX ORDER — SODIUM CHLORIDE 0.9 % (FLUSH) 0.9 %
10 SYRINGE (ML) INJECTION AS NEEDED
Status: CANCELLED | OUTPATIENT
Start: 2021-04-06

## 2021-02-25 RX ORDER — SODIUM CHLORIDE 0.9 % (FLUSH) 0.9 %
10 SYRINGE (ML) INJECTION AS NEEDED
Status: DISCONTINUED | OUTPATIENT
Start: 2021-02-25 | End: 2021-02-25 | Stop reason: HOSPADM

## 2021-02-25 RX ADMIN — HEPARIN 500 UNITS: 100 SYRINGE at 08:08

## 2021-02-25 RX ADMIN — SODIUM CHLORIDE, PRESERVATIVE FREE 10 ML: 5 INJECTION INTRAVENOUS at 08:08

## 2021-03-23 ENCOUNTER — BULK ORDERING (OUTPATIENT)
Dept: CASE MANAGEMENT | Facility: OTHER | Age: 81
End: 2021-03-23

## 2021-03-23 DIAGNOSIS — Z23 IMMUNIZATION DUE: ICD-10-CM

## 2021-04-06 ENCOUNTER — INFUSION (OUTPATIENT)
Dept: ONCOLOGY | Facility: HOSPITAL | Age: 81
End: 2021-04-06

## 2021-04-06 DIAGNOSIS — E53.8 B12 DEFICIENCY: Primary | ICD-10-CM

## 2021-04-06 DIAGNOSIS — C18.2 MALIGNANT NEOPLASM OF ASCENDING COLON (HCC): ICD-10-CM

## 2021-04-06 DIAGNOSIS — Z45.2 ENCOUNTER FOR VENOUS ACCESS DEVICE CARE: ICD-10-CM

## 2021-04-06 DIAGNOSIS — Z45.2 ENCOUNTER FOR CARE RELATED TO PORT-A-CATH: ICD-10-CM

## 2021-04-06 LAB
ALBUMIN SERPL-MCNC: 4.2 G/DL (ref 3.5–5.2)
ALBUMIN/GLOB SERPL: 1.8 G/DL
ALP SERPL-CCNC: 70 U/L (ref 39–117)
ALT SERPL W P-5'-P-CCNC: 19 U/L (ref 1–33)
ANION GAP SERPL CALCULATED.3IONS-SCNC: 10 MMOL/L (ref 5–15)
AST SERPL-CCNC: 26 U/L (ref 1–32)
BASOPHILS # BLD AUTO: 0.04 10*3/MM3 (ref 0–0.2)
BASOPHILS NFR BLD AUTO: 0.5 % (ref 0–1.5)
BILIRUB SERPL-MCNC: 0.6 MG/DL (ref 0–1.2)
BUN SERPL-MCNC: 30 MG/DL (ref 8–23)
BUN/CREAT SERPL: 29.7 (ref 7–25)
CALCIUM SPEC-SCNC: 9.1 MG/DL (ref 8.6–10.5)
CEA SERPL-MCNC: 2.99 NG/ML
CHLORIDE SERPL-SCNC: 102 MMOL/L (ref 98–107)
CO2 SERPL-SCNC: 25 MMOL/L (ref 22–29)
CREAT SERPL-MCNC: 1.01 MG/DL (ref 0.57–1)
DEPRECATED RDW RBC AUTO: 45.4 FL (ref 37–54)
EOSINOPHIL # BLD AUTO: 0.22 10*3/MM3 (ref 0–0.4)
EOSINOPHIL NFR BLD AUTO: 2.8 % (ref 0.3–6.2)
ERYTHROCYTE [DISTWIDTH] IN BLOOD BY AUTOMATED COUNT: 13.5 % (ref 12.3–15.4)
FERRITIN SERPL-MCNC: 239 NG/ML (ref 13–150)
FOLATE SERPL-MCNC: >20 NG/ML (ref 4.78–24.2)
GFR SERPL CREATININE-BSD FRML MDRD: 53 ML/MIN/1.73
GLOBULIN UR ELPH-MCNC: 2.3 GM/DL
GLUCOSE SERPL-MCNC: 291 MG/DL (ref 65–99)
HCT VFR BLD AUTO: 39.6 % (ref 34–46.6)
HGB BLD-MCNC: 12.7 G/DL (ref 12–15.9)
IMM GRANULOCYTES # BLD AUTO: 0.02 10*3/MM3 (ref 0–0.05)
IMM GRANULOCYTES NFR BLD AUTO: 0.3 % (ref 0–0.5)
IRON 24H UR-MRATE: 43 MCG/DL (ref 37–145)
IRON SATN MFR SERPL: 10 % (ref 20–50)
LYMPHOCYTES # BLD AUTO: 1.07 10*3/MM3 (ref 0.7–3.1)
LYMPHOCYTES NFR BLD AUTO: 13.8 % (ref 19.6–45.3)
MCH RBC QN AUTO: 29.5 PG (ref 26.6–33)
MCHC RBC AUTO-ENTMCNC: 32.1 G/DL (ref 31.5–35.7)
MCV RBC AUTO: 92.1 FL (ref 79–97)
MONOCYTES # BLD AUTO: 0.44 10*3/MM3 (ref 0.1–0.9)
MONOCYTES NFR BLD AUTO: 5.7 % (ref 5–12)
NEUTROPHILS NFR BLD AUTO: 5.97 10*3/MM3 (ref 1.7–7)
NEUTROPHILS NFR BLD AUTO: 76.9 % (ref 42.7–76)
NRBC BLD AUTO-RTO: 0 /100 WBC (ref 0–0.2)
PLATELET # BLD AUTO: 319 10*3/MM3 (ref 140–450)
PMV BLD AUTO: 9.8 FL (ref 6–12)
POTASSIUM SERPL-SCNC: 4.6 MMOL/L (ref 3.5–5.2)
PROT SERPL-MCNC: 6.5 G/DL (ref 6–8.5)
RBC # BLD AUTO: 4.3 10*6/MM3 (ref 3.77–5.28)
SODIUM SERPL-SCNC: 137 MMOL/L (ref 136–145)
TIBC SERPL-MCNC: 416 MCG/DL (ref 298–536)
TRANSFERRIN SERPL-MCNC: 279 MG/DL (ref 200–360)
VIT B12 BLD-MCNC: 918 PG/ML (ref 211–946)
WBC # BLD AUTO: 7.76 10*3/MM3 (ref 3.4–10.8)

## 2021-04-06 PROCEDURE — 25010000003 HEPARIN LOCK FLUSH PER 10 UNITS: Performed by: INTERNAL MEDICINE

## 2021-04-06 PROCEDURE — 80053 COMPREHEN METABOLIC PANEL: CPT

## 2021-04-06 PROCEDURE — 85025 COMPLETE CBC W/AUTO DIFF WBC: CPT

## 2021-04-06 PROCEDURE — 36591 DRAW BLOOD OFF VENOUS DEVICE: CPT | Performed by: INTERNAL MEDICINE

## 2021-04-06 PROCEDURE — 82607 VITAMIN B-12: CPT

## 2021-04-06 PROCEDURE — 82728 ASSAY OF FERRITIN: CPT

## 2021-04-06 PROCEDURE — 82746 ASSAY OF FOLIC ACID SERUM: CPT

## 2021-04-06 PROCEDURE — 84466 ASSAY OF TRANSFERRIN: CPT

## 2021-04-06 PROCEDURE — 83540 ASSAY OF IRON: CPT

## 2021-04-06 PROCEDURE — 82378 CARCINOEMBRYONIC ANTIGEN: CPT

## 2021-04-06 RX ORDER — SODIUM CHLORIDE 0.9 % (FLUSH) 0.9 %
10 SYRINGE (ML) INJECTION AS NEEDED
Status: CANCELLED | OUTPATIENT
Start: 2021-04-06

## 2021-04-06 RX ORDER — HEPARIN SODIUM (PORCINE) LOCK FLUSH IV SOLN 100 UNIT/ML 100 UNIT/ML
500 SOLUTION INTRAVENOUS AS NEEDED
Status: CANCELLED | OUTPATIENT
Start: 2021-04-06

## 2021-04-06 RX ORDER — HEPARIN SODIUM (PORCINE) LOCK FLUSH IV SOLN 100 UNIT/ML 100 UNIT/ML
500 SOLUTION INTRAVENOUS AS NEEDED
Status: DISCONTINUED | OUTPATIENT
Start: 2021-04-06 | End: 2021-04-06 | Stop reason: HOSPADM

## 2021-04-06 RX ORDER — SODIUM CHLORIDE 0.9 % (FLUSH) 0.9 %
10 SYRINGE (ML) INJECTION AS NEEDED
Status: DISCONTINUED | OUTPATIENT
Start: 2021-04-06 | End: 2021-04-06 | Stop reason: HOSPADM

## 2021-04-06 RX ADMIN — HEPARIN 500 UNITS: 100 SYRINGE at 08:16

## 2021-04-08 ENCOUNTER — OFFICE VISIT (OUTPATIENT)
Dept: ONCOLOGY | Facility: CLINIC | Age: 81
End: 2021-04-08

## 2021-04-08 VITALS — BODY MASS INDEX: 21.65 KG/M2 | TEMPERATURE: 98.1 F | WEIGHT: 126.8 LBS | HEIGHT: 64 IN | RESPIRATION RATE: 16 BRPM

## 2021-04-08 DIAGNOSIS — K90.9 MALABSORPTION OF IRON: ICD-10-CM

## 2021-04-08 DIAGNOSIS — K86.2 CYST OF PANCREAS: ICD-10-CM

## 2021-04-08 DIAGNOSIS — C18.2 MALIGNANT NEOPLASM OF ASCENDING COLON (HCC): Primary | ICD-10-CM

## 2021-04-08 DIAGNOSIS — N17.9 ACUTE KIDNEY INJURY (HCC): ICD-10-CM

## 2021-04-08 DIAGNOSIS — E53.8 B12 DEFICIENCY: ICD-10-CM

## 2021-04-08 DIAGNOSIS — D50.8 OTHER IRON DEFICIENCY ANEMIA: ICD-10-CM

## 2021-04-08 PROBLEM — D64.9 ANEMIA: Chronic | Status: ACTIVE | Noted: 2017-04-19

## 2021-04-08 PROCEDURE — 1126F AMNT PAIN NOTED NONE PRSNT: CPT | Performed by: INTERNAL MEDICINE

## 2021-04-08 PROCEDURE — 1123F ACP DISCUSS/DSCN MKR DOCD: CPT | Performed by: INTERNAL MEDICINE

## 2021-04-08 PROCEDURE — G0463 HOSPITAL OUTPT CLINIC VISIT: HCPCS | Performed by: INTERNAL MEDICINE

## 2021-04-08 PROCEDURE — 99214 OFFICE O/P EST MOD 30 MIN: CPT | Performed by: INTERNAL MEDICINE

## 2021-04-08 PROCEDURE — G9903 PT SCRN TBCO ID AS NON USER: HCPCS | Performed by: INTERNAL MEDICINE

## 2021-04-08 RX ORDER — ACETAMINOPHEN 325 MG/1
650 TABLET ORAL ONCE
Status: CANCELLED | OUTPATIENT
Start: 2021-04-12

## 2021-04-08 RX ORDER — DIPHENHYDRAMINE HYDROCHLORIDE 50 MG/ML
25 INJECTION INTRAMUSCULAR; INTRAVENOUS ONCE
Status: CANCELLED | OUTPATIENT
Start: 2021-04-12

## 2021-04-08 RX ORDER — SODIUM CHLORIDE 9 MG/ML
250 INJECTION, SOLUTION INTRAVENOUS ONCE
Status: CANCELLED | OUTPATIENT
Start: 2021-04-12

## 2021-04-08 NOTE — PROGRESS NOTES
"DATE OF VISIT: 4/8/2021      REASON FOR VISIT: Colon cancer, recurrent iron deficiency, B12 deficiency, hyperglycemia, acute kidney injury      HISTORY OF PRESENT ILLNESS:   80-year-old female with medical problem consisting of diabetes mellitus, hypertension, colon cancer status post surgery and chemotherapy currently on surveillance, B12 deficiency, recurrent iron deficiency is here for follow-up appointment today to discuss recently done blood work.  Denies any bleeding.  Denies any new lymph node enlargement.  Denies any recent change in bowel habits.          Past Medical History, Past Surgical History, Social History, Family History have been reviewed and are without significant changes except as mentioned.    Review of Systems   Constitutional: Positive for fatigue.   Musculoskeletal: Positive for arthralgias.   Neurological: Positive for numbness (Positive for chronic tingling and numbness affecting lower extremity).   Hematological: Negative for adenopathy.      A comprehensive 14 point review of systems was performed and was negative except as mentioned.    Medications:  The current medication list was reviewed in the EMR    ALLERGIES:    Allergies   Allergen Reactions   • Other Rash     Oral Chemo Meds       Objective      Vitals:    04/08/21 0803   Resp: 16   Temp: 98.1 °F (36.7 °C)   TempSrc: Temporal   Weight: 57.5 kg (126 lb 12.8 oz)   Height: 162.6 cm (64.02\")   PainSc: 0-No pain     Current Status 4/8/2021   ECOG score 1       Physical Exam  Cardiovascular:      Rate and Rhythm: Normal rate and regular rhythm.   Pulmonary:      Breath sounds: Normal breath sounds.   Neurological:      Mental Status: She is alert and oriented to person, place, and time.           RECENT LABS:  Glucose   Date Value Ref Range Status   04/06/2021 291 (H) 65 - 99 mg/dL Final     Sodium   Date Value Ref Range Status   04/06/2021 137 136 - 145 mmol/L Final   10/25/2019 137 136 - 145 mmol/L Final     Potassium   Date Value " Ref Range Status   04/06/2021 4.6 3.5 - 5.2 mmol/L Final   10/25/2019 3.6 3.5 - 5.1 mmol/L Final     CO2   Date Value Ref Range Status   04/06/2021 25.0 22.0 - 29.0 mmol/L Final     Chloride   Date Value Ref Range Status   04/06/2021 102 98 - 107 mmol/L Final   10/25/2019 100 98 - 107 mmol/L Final     Anion Gap   Date Value Ref Range Status   04/06/2021 10.0 5.0 - 15.0 mmol/L Final     Creatinine   Date Value Ref Range Status   04/06/2021 1.01 (H) 0.57 - 1.00 mg/dL Final   10/25/2019 0.8 0.6 - 1.0 mg/dL Final     Comment:     **The MDRD GFR formula is valid only for ages 18-70.    GFR will not be reported for individuals aging <18 or >70.     BUN   Date Value Ref Range Status   04/06/2021 30 (H) 8 - 23 mg/dL Final   10/25/2019 10 7 - 18 mg/dL Final     BUN/Creatinine Ratio   Date Value Ref Range Status   04/06/2021 29.7 (H) 7.0 - 25.0 Final     Calcium   Date Value Ref Range Status   04/06/2021 9.1 8.6 - 10.5 mg/dL Final   10/25/2019 9 8.5 - 10.1 mg/dL Final     eGFR Non  Amer   Date Value Ref Range Status   04/06/2021 53 (L) >60 mL/min/1.73 Final     Alkaline Phosphatase   Date Value Ref Range Status   04/06/2021 70 39 - 117 U/L Final   10/25/2019 101 46 - 116 U/L Final     Total Protein   Date Value Ref Range Status   04/06/2021 6.5 6.0 - 8.5 g/dL Final   10/25/2019 6.9 6.4 - 8.2 g/dL Final     ALT (SGPT)   Date Value Ref Range Status   04/06/2021 19 1 - 33 U/L Final   10/25/2019 23 14 - 59 U/L Final     AST (SGOT)   Date Value Ref Range Status   04/06/2021 26 1 - 32 U/L Final   10/25/2019 20 15 - 37 U/L Final     Total Bilirubin   Date Value Ref Range Status   04/06/2021 0.6 0.0 - 1.2 mg/dL Final   10/25/2019 0.3 0.2 - 1.0 mg/dL Final     Albumin   Date Value Ref Range Status   04/06/2021 4.20 3.50 - 5.20 g/dL Final   10/25/2019 3.6 3.4 - 5.0 g/dL Final     Globulin   Date Value Ref Range Status   04/06/2021 2.3 gm/dL Final     Lab Results   Component Value Date    WBC 7.76 04/06/2021    HGB 12.7  04/06/2021    HCT 39.6 04/06/2021    MCV 92.1 04/06/2021     04/06/2021     Lab Results   Component Value Date    NEUTROABS 5.97 04/06/2021    IRON 43 04/06/2021    IRON 100 12/01/2020    IRON 82 08/24/2020    TIBC 416 04/06/2021    TIBC 454 12/01/2020    TIBC 325 08/24/2020    LABIRON 10 (L) 04/06/2021    LABIRON 22 12/01/2020    LABIRON 25 08/24/2020    FERRITIN 239.00 (H) 04/06/2021    FERRITIN 631.70 (H) 12/01/2020    FERRITIN 473.90 (H) 08/24/2020    [id number] 918 04/06/2021    [id number] 644 03/26/2021    [id number] 641 12/01/2020    FOLATE >20.00 04/06/2021    FOLATE >20.00 12/01/2020    FOLATE >20.00 08/24/2020     Lab Results   Component Value Date    CEA 2.99 04/06/2021    REFLABREPO SEE NOTE: 12/22/2015         PATHOLOGY:  * Cannot find OR log *         RADIOLOGY DATA :  No radiology results for the last 7 days        Assessment/Plan     1.  Recurrent iron deficiency anemia:  -Patient is a recurrent iron deficiency anemia since October 2016 for which patient has required intravenous Venofer in the past  -Most recent dose of Venofer was in January 20, 2020  -Patient has had a GI work-up by Dr. Rico in January 2018 that did not show any active bleeding.  Capsule endoscopy was not performed secondary to capsule staying in stomach and not moving any forward.  -Anemia work-up done on April 6, 2021 shows hemoglobin is 12.6.  Iron studies are consistent with developing iron deficiency with iron saturation of 10% and TIBC of 116.  Recommend starting intravenous Venofer next week.  The side effect of Venofer including allergic reaction were discussed with patient and her family  -We will ask patient to return to clinic in 4 months with repeat CBC, CMP, CEA, iron studies, ferritin, B12, folate, CT of chest abdomen and pelvis without contrast to be done prior to that.    2.  Colon cancer stage III diagnosed in October 2015  -Right hemicolectomy followed by chemotherapy  -Patient was initially started on  XELOX but could not tolerate Xeloda secondary to skin rash  -Subsequently patient was changed to FOLFOX but could not tolerate more than 3 cycles due to neuropathy  -Patient was changed over to 5-FU and leucovorin which she could not tolerate either and subsequently after total 8 cycles chemotherapy was discontinued in July 2016  -Colonoscopy done in January 2020 did not show any evidence of recurrence  -Recent CEA level is 2.99  -We will get surveillance CT of chest abdomen and pelvis without contrast prior to next clinic visit in 4 months.    3.  History of cystic lesion in pancreas  -Most recent CT scan did not show any evidence of cystic lesion in the pancreas    4.  Vitamin B12 deficiency  -Recommend continue with B12 3 times a week.  B12 level is stable at 920    5.  Acute kidney injury:  -Creatinine is elevated at 1.01.  Patient was notified about elevated creatinine need to increase hydration    6.  Hypertension    7.  Hyperglycemia:  -Blood glucose was again elevated at 299.  Patient was notified about elevated blood glucose and need to control it better.    8.  Dementia    9.  Health maintenance: Patient does not smoke.  Had a anoscopy in January 2018.  Has been counseled about screening mammogram in the past, patient is not interested in screening mammogram    10. Advance Care Planning: For now patient remains full code and is able to make decisions.  Patient has health care surrogate mentioned on chart.                 PHQ-9 Total Score: 0   -Patient is not homicidal or suicidal.  No acute intervention required.    Mary Gutierrez reports a pain score of 0.  Given her pain assessment as noted, treatment options were discussed and the following options were decided upon as a follow-up plan to address the patient's pain: No acute intervention required.         Duong Hanley MD  4/8/2021  08:23 CDT        Part of this note may be an electronic transcription/translation of spoken language to  printed text using the Dragon Dictation System.          CC:

## 2021-04-12 ENCOUNTER — INFUSION (OUTPATIENT)
Dept: ONCOLOGY | Facility: HOSPITAL | Age: 81
End: 2021-04-12

## 2021-04-12 VITALS
RESPIRATION RATE: 18 BRPM | TEMPERATURE: 98 F | HEART RATE: 88 BPM | SYSTOLIC BLOOD PRESSURE: 157 MMHG | DIASTOLIC BLOOD PRESSURE: 75 MMHG

## 2021-04-12 DIAGNOSIS — Z45.2 ENCOUNTER FOR CARE RELATED TO PORT-A-CATH: Primary | ICD-10-CM

## 2021-04-12 DIAGNOSIS — Z45.2 ENCOUNTER FOR VENOUS ACCESS DEVICE CARE: ICD-10-CM

## 2021-04-12 DIAGNOSIS — D50.8 OTHER IRON DEFICIENCY ANEMIA: ICD-10-CM

## 2021-04-12 DIAGNOSIS — K90.9 MALABSORPTION OF IRON: ICD-10-CM

## 2021-04-12 PROCEDURE — 25010000002 IRON SUCROSE PER 1 MG: Performed by: INTERNAL MEDICINE

## 2021-04-12 PROCEDURE — 25010000002 DIPHENHYDRAMINE PER 50 MG: Performed by: INTERNAL MEDICINE

## 2021-04-12 PROCEDURE — 96365 THER/PROPH/DIAG IV INF INIT: CPT | Performed by: INTERNAL MEDICINE

## 2021-04-12 PROCEDURE — 96375 TX/PRO/DX INJ NEW DRUG ADDON: CPT | Performed by: INTERNAL MEDICINE

## 2021-04-12 RX ORDER — ACETAMINOPHEN 325 MG/1
650 TABLET ORAL ONCE
Status: COMPLETED | OUTPATIENT
Start: 2021-04-12 | End: 2021-04-12

## 2021-04-12 RX ORDER — DIPHENHYDRAMINE HYDROCHLORIDE 50 MG/ML
25 INJECTION INTRAMUSCULAR; INTRAVENOUS ONCE
Status: CANCELLED | OUTPATIENT
Start: 2021-04-13

## 2021-04-12 RX ORDER — SODIUM CHLORIDE 0.9 % (FLUSH) 0.9 %
10 SYRINGE (ML) INJECTION AS NEEDED
Status: CANCELLED | OUTPATIENT
Start: 2021-04-12

## 2021-04-12 RX ORDER — HEPARIN SODIUM (PORCINE) LOCK FLUSH IV SOLN 100 UNIT/ML 100 UNIT/ML
500 SOLUTION INTRAVENOUS AS NEEDED
Status: DISCONTINUED | OUTPATIENT
Start: 2021-04-12 | End: 2021-04-12 | Stop reason: HOSPADM

## 2021-04-12 RX ORDER — HEPARIN SODIUM (PORCINE) LOCK FLUSH IV SOLN 100 UNIT/ML 100 UNIT/ML
500 SOLUTION INTRAVENOUS AS NEEDED
Status: CANCELLED | OUTPATIENT
Start: 2021-04-12

## 2021-04-12 RX ORDER — ACETAMINOPHEN 325 MG/1
650 TABLET ORAL ONCE
Status: CANCELLED | OUTPATIENT
Start: 2021-04-13

## 2021-04-12 RX ORDER — SODIUM CHLORIDE 9 MG/ML
250 INJECTION, SOLUTION INTRAVENOUS ONCE
Status: COMPLETED | OUTPATIENT
Start: 2021-04-12 | End: 2021-04-12

## 2021-04-12 RX ORDER — DIPHENHYDRAMINE HYDROCHLORIDE 50 MG/ML
25 INJECTION INTRAMUSCULAR; INTRAVENOUS ONCE
Status: COMPLETED | OUTPATIENT
Start: 2021-04-12 | End: 2021-04-12

## 2021-04-12 RX ORDER — SODIUM CHLORIDE 9 MG/ML
250 INJECTION, SOLUTION INTRAVENOUS ONCE
Status: CANCELLED | OUTPATIENT
Start: 2021-04-13

## 2021-04-12 RX ORDER — SODIUM CHLORIDE 0.9 % (FLUSH) 0.9 %
10 SYRINGE (ML) INJECTION AS NEEDED
Status: DISCONTINUED | OUTPATIENT
Start: 2021-04-12 | End: 2021-04-12 | Stop reason: HOSPADM

## 2021-04-12 RX ADMIN — SODIUM CHLORIDE, PRESERVATIVE FREE 10 ML: 5 INJECTION INTRAVENOUS at 09:34

## 2021-04-12 RX ADMIN — FAMOTIDINE 20 MG: 10 INJECTION INTRAVENOUS at 08:33

## 2021-04-12 RX ADMIN — ACETAMINOPHEN 650 MG: 325 TABLET, FILM COATED ORAL at 08:16

## 2021-04-12 RX ADMIN — SODIUM CHLORIDE 250 ML: 9 INJECTION, SOLUTION INTRAVENOUS at 08:16

## 2021-04-12 RX ADMIN — DIPHENHYDRAMINE HYDROCHLORIDE 25 MG: 50 INJECTION INTRAMUSCULAR; INTRAVENOUS at 08:17

## 2021-04-12 RX ADMIN — IRON SUCROSE 200 MG: 20 INJECTION, SOLUTION INTRAVENOUS at 08:45

## 2021-04-13 ENCOUNTER — INFUSION (OUTPATIENT)
Dept: ONCOLOGY | Facility: HOSPITAL | Age: 81
End: 2021-04-13

## 2021-04-13 VITALS
HEART RATE: 85 BPM | TEMPERATURE: 97.8 F | SYSTOLIC BLOOD PRESSURE: 180 MMHG | DIASTOLIC BLOOD PRESSURE: 76 MMHG | RESPIRATION RATE: 18 BRPM

## 2021-04-13 DIAGNOSIS — D50.8 OTHER IRON DEFICIENCY ANEMIA: ICD-10-CM

## 2021-04-13 DIAGNOSIS — Z45.2 ENCOUNTER FOR CARE RELATED TO PORT-A-CATH: Primary | ICD-10-CM

## 2021-04-13 DIAGNOSIS — K90.9 MALABSORPTION OF IRON: ICD-10-CM

## 2021-04-13 DIAGNOSIS — Z45.2 ENCOUNTER FOR VENOUS ACCESS DEVICE CARE: ICD-10-CM

## 2021-04-13 PROCEDURE — 25010000002 IRON SUCROSE PER 1 MG: Performed by: INTERNAL MEDICINE

## 2021-04-13 PROCEDURE — 25010000002 HEPARIN LOCK FLUSH PER 10 UNITS: Performed by: INTERNAL MEDICINE

## 2021-04-13 PROCEDURE — 96375 TX/PRO/DX INJ NEW DRUG ADDON: CPT | Performed by: INTERNAL MEDICINE

## 2021-04-13 PROCEDURE — 96365 THER/PROPH/DIAG IV INF INIT: CPT | Performed by: INTERNAL MEDICINE

## 2021-04-13 PROCEDURE — 25010000002 DIPHENHYDRAMINE PER 50 MG: Performed by: INTERNAL MEDICINE

## 2021-04-13 RX ORDER — HEPARIN SODIUM (PORCINE) LOCK FLUSH IV SOLN 100 UNIT/ML 100 UNIT/ML
500 SOLUTION INTRAVENOUS AS NEEDED
Status: DISCONTINUED | OUTPATIENT
Start: 2021-04-13 | End: 2021-04-13 | Stop reason: HOSPADM

## 2021-04-13 RX ORDER — HEPARIN SODIUM (PORCINE) LOCK FLUSH IV SOLN 100 UNIT/ML 100 UNIT/ML
500 SOLUTION INTRAVENOUS AS NEEDED
Status: CANCELLED | OUTPATIENT
Start: 2021-04-13

## 2021-04-13 RX ORDER — DIPHENHYDRAMINE HYDROCHLORIDE 50 MG/ML
25 INJECTION INTRAMUSCULAR; INTRAVENOUS ONCE
Status: COMPLETED | OUTPATIENT
Start: 2021-04-13 | End: 2021-04-13

## 2021-04-13 RX ORDER — SODIUM CHLORIDE 0.9 % (FLUSH) 0.9 %
10 SYRINGE (ML) INJECTION AS NEEDED
Status: DISCONTINUED | OUTPATIENT
Start: 2021-04-13 | End: 2021-04-13 | Stop reason: HOSPADM

## 2021-04-13 RX ORDER — SODIUM CHLORIDE 9 MG/ML
250 INJECTION, SOLUTION INTRAVENOUS ONCE
Status: COMPLETED | OUTPATIENT
Start: 2021-04-13 | End: 2021-04-13

## 2021-04-13 RX ORDER — SODIUM CHLORIDE 9 MG/ML
250 INJECTION, SOLUTION INTRAVENOUS ONCE
Status: CANCELLED | OUTPATIENT
Start: 2021-04-14

## 2021-04-13 RX ORDER — DIPHENHYDRAMINE HYDROCHLORIDE 50 MG/ML
25 INJECTION INTRAMUSCULAR; INTRAVENOUS ONCE
Status: CANCELLED | OUTPATIENT
Start: 2021-04-14

## 2021-04-13 RX ORDER — ACETAMINOPHEN 325 MG/1
650 TABLET ORAL ONCE
Status: COMPLETED | OUTPATIENT
Start: 2021-04-13 | End: 2021-04-13

## 2021-04-13 RX ORDER — ACETAMINOPHEN 325 MG/1
650 TABLET ORAL ONCE
Status: CANCELLED | OUTPATIENT
Start: 2021-04-14

## 2021-04-13 RX ORDER — SODIUM CHLORIDE 0.9 % (FLUSH) 0.9 %
10 SYRINGE (ML) INJECTION AS NEEDED
Status: CANCELLED | OUTPATIENT
Start: 2021-04-13

## 2021-04-13 RX ADMIN — IRON SUCROSE 200 MG: 20 INJECTION, SOLUTION INTRAVENOUS at 08:38

## 2021-04-13 RX ADMIN — HEPARIN 500 UNITS: 100 SYRINGE at 09:23

## 2021-04-13 RX ADMIN — SODIUM CHLORIDE, PRESERVATIVE FREE 10 ML: 5 INJECTION INTRAVENOUS at 09:23

## 2021-04-13 RX ADMIN — DIPHENHYDRAMINE HYDROCHLORIDE 25 MG: 50 INJECTION INTRAMUSCULAR; INTRAVENOUS at 08:17

## 2021-04-13 RX ADMIN — FAMOTIDINE 20 MG: 10 INJECTION INTRAVENOUS at 08:22

## 2021-04-13 RX ADMIN — ACETAMINOPHEN 650 MG: 325 TABLET ORAL at 08:16

## 2021-04-13 RX ADMIN — SODIUM CHLORIDE 250 ML: 9 INJECTION, SOLUTION INTRAVENOUS at 08:16

## 2021-04-14 ENCOUNTER — DOCUMENTATION (OUTPATIENT)
Dept: NUTRITION | Facility: HOSPITAL | Age: 81
End: 2021-04-14

## 2021-04-14 ENCOUNTER — INFUSION (OUTPATIENT)
Dept: ONCOLOGY | Facility: HOSPITAL | Age: 81
End: 2021-04-14

## 2021-04-14 VITALS
DIASTOLIC BLOOD PRESSURE: 75 MMHG | HEART RATE: 89 BPM | RESPIRATION RATE: 18 BRPM | TEMPERATURE: 97.5 F | SYSTOLIC BLOOD PRESSURE: 175 MMHG

## 2021-04-14 DIAGNOSIS — K90.9 MALABSORPTION OF IRON: Primary | ICD-10-CM

## 2021-04-14 DIAGNOSIS — Z45.2 ENCOUNTER FOR VENOUS ACCESS DEVICE CARE: ICD-10-CM

## 2021-04-14 DIAGNOSIS — D50.8 OTHER IRON DEFICIENCY ANEMIA: ICD-10-CM

## 2021-04-14 DIAGNOSIS — Z45.2 ENCOUNTER FOR CARE RELATED TO PORT-A-CATH: ICD-10-CM

## 2021-04-14 PROCEDURE — 25010000002 IRON SUCROSE PER 1 MG: Performed by: INTERNAL MEDICINE

## 2021-04-14 PROCEDURE — 25010000002 HEPARIN LOCK FLUSH PER 10 UNITS: Performed by: INTERNAL MEDICINE

## 2021-04-14 PROCEDURE — 96375 TX/PRO/DX INJ NEW DRUG ADDON: CPT | Performed by: INTERNAL MEDICINE

## 2021-04-14 PROCEDURE — 96365 THER/PROPH/DIAG IV INF INIT: CPT | Performed by: INTERNAL MEDICINE

## 2021-04-14 PROCEDURE — 25010000002 DIPHENHYDRAMINE PER 50 MG: Performed by: INTERNAL MEDICINE

## 2021-04-14 RX ORDER — ACETAMINOPHEN 325 MG/1
650 TABLET ORAL ONCE
Status: CANCELLED | OUTPATIENT
Start: 2021-04-15

## 2021-04-14 RX ORDER — HEPARIN SODIUM (PORCINE) LOCK FLUSH IV SOLN 100 UNIT/ML 100 UNIT/ML
500 SOLUTION INTRAVENOUS AS NEEDED
Status: CANCELLED | OUTPATIENT
Start: 2021-04-14

## 2021-04-14 RX ORDER — SODIUM CHLORIDE 0.9 % (FLUSH) 0.9 %
10 SYRINGE (ML) INJECTION AS NEEDED
Status: CANCELLED | OUTPATIENT
Start: 2021-04-14

## 2021-04-14 RX ORDER — SODIUM CHLORIDE 9 MG/ML
250 INJECTION, SOLUTION INTRAVENOUS ONCE
Status: CANCELLED | OUTPATIENT
Start: 2021-04-15

## 2021-04-14 RX ORDER — ACETAMINOPHEN 325 MG/1
650 TABLET ORAL ONCE
Status: COMPLETED | OUTPATIENT
Start: 2021-04-14 | End: 2021-04-14

## 2021-04-14 RX ORDER — HEPARIN SODIUM (PORCINE) LOCK FLUSH IV SOLN 100 UNIT/ML 100 UNIT/ML
500 SOLUTION INTRAVENOUS AS NEEDED
Status: DISCONTINUED | OUTPATIENT
Start: 2021-04-14 | End: 2021-04-14 | Stop reason: HOSPADM

## 2021-04-14 RX ORDER — SODIUM CHLORIDE 0.9 % (FLUSH) 0.9 %
10 SYRINGE (ML) INJECTION AS NEEDED
Status: DISCONTINUED | OUTPATIENT
Start: 2021-04-14 | End: 2021-04-14 | Stop reason: HOSPADM

## 2021-04-14 RX ORDER — DIPHENHYDRAMINE HYDROCHLORIDE 50 MG/ML
25 INJECTION INTRAMUSCULAR; INTRAVENOUS ONCE
Status: COMPLETED | OUTPATIENT
Start: 2021-04-14 | End: 2021-04-14

## 2021-04-14 RX ORDER — SODIUM CHLORIDE 9 MG/ML
250 INJECTION, SOLUTION INTRAVENOUS ONCE
Status: COMPLETED | OUTPATIENT
Start: 2021-04-14 | End: 2021-04-14

## 2021-04-14 RX ORDER — DIPHENHYDRAMINE HYDROCHLORIDE 50 MG/ML
25 INJECTION INTRAMUSCULAR; INTRAVENOUS ONCE
Status: CANCELLED | OUTPATIENT
Start: 2021-04-15

## 2021-04-14 RX ADMIN — DIPHENHYDRAMINE HYDROCHLORIDE 25 MG: 50 INJECTION INTRAMUSCULAR; INTRAVENOUS at 08:05

## 2021-04-14 RX ADMIN — FAMOTIDINE 20 MG: 10 INJECTION INTRAVENOUS at 08:17

## 2021-04-14 RX ADMIN — ACETAMINOPHEN 650 MG: 325 TABLET, FILM COATED ORAL at 08:05

## 2021-04-14 RX ADMIN — SODIUM CHLORIDE, PRESERVATIVE FREE 10 ML: 5 INJECTION INTRAVENOUS at 09:17

## 2021-04-14 RX ADMIN — SODIUM CHLORIDE 250 ML: 9 INJECTION, SOLUTION INTRAVENOUS at 08:04

## 2021-04-14 RX ADMIN — HEPARIN 500 UNITS: 100 SYRINGE at 09:17

## 2021-04-14 RX ADMIN — IRON SUCROSE 200 MG: 20 INJECTION, SOLUTION INTRAVENOUS at 08:35

## 2021-04-14 NOTE — PROGRESS NOTES
Adult Outpatient Nutrition  Assessment    Patient Name:  Mary Gutierrez  YOB: 1940  MRN: [id number]    Assessment Date:  4/14/2021    Comments: Follow-up to check nutrition. Wt 126.9 lb. Alb 4.2. Glucose 291. A1C 10.2. Will remind pt/ to select Boost Glucose Control or High Protein Boost. Will also encourage pt to limit intake of simples sugars, and space carbohydrates throughout day.                         Electronically signed by:  Love Black RD  04/14/21 13:41 CDT

## 2021-04-15 ENCOUNTER — INFUSION (OUTPATIENT)
Dept: ONCOLOGY | Facility: HOSPITAL | Age: 81
End: 2021-04-15

## 2021-04-15 VITALS
TEMPERATURE: 97.3 F | RESPIRATION RATE: 18 BRPM | HEART RATE: 95 BPM | DIASTOLIC BLOOD PRESSURE: 72 MMHG | SYSTOLIC BLOOD PRESSURE: 166 MMHG

## 2021-04-15 DIAGNOSIS — K90.9 MALABSORPTION OF IRON: Primary | ICD-10-CM

## 2021-04-15 DIAGNOSIS — Z45.2 ENCOUNTER FOR CARE RELATED TO PORT-A-CATH: ICD-10-CM

## 2021-04-15 DIAGNOSIS — D50.8 OTHER IRON DEFICIENCY ANEMIA: ICD-10-CM

## 2021-04-15 DIAGNOSIS — Z45.2 ENCOUNTER FOR VENOUS ACCESS DEVICE CARE: ICD-10-CM

## 2021-04-15 PROCEDURE — 96365 THER/PROPH/DIAG IV INF INIT: CPT | Performed by: INTERNAL MEDICINE

## 2021-04-15 PROCEDURE — 25010000002 IRON SUCROSE PER 1 MG: Performed by: INTERNAL MEDICINE

## 2021-04-15 PROCEDURE — 96375 TX/PRO/DX INJ NEW DRUG ADDON: CPT | Performed by: INTERNAL MEDICINE

## 2021-04-15 PROCEDURE — 25010000002 DIPHENHYDRAMINE PER 50 MG: Performed by: INTERNAL MEDICINE

## 2021-04-15 PROCEDURE — 25010000002 HEPARIN LOCK FLUSH PER 10 UNITS: Performed by: INTERNAL MEDICINE

## 2021-04-15 RX ORDER — HEPARIN SODIUM (PORCINE) LOCK FLUSH IV SOLN 100 UNIT/ML 100 UNIT/ML
500 SOLUTION INTRAVENOUS AS NEEDED
Status: DISCONTINUED | OUTPATIENT
Start: 2021-04-15 | End: 2021-04-15 | Stop reason: HOSPADM

## 2021-04-15 RX ORDER — SODIUM CHLORIDE 9 MG/ML
250 INJECTION, SOLUTION INTRAVENOUS ONCE
Status: COMPLETED | OUTPATIENT
Start: 2021-04-15 | End: 2021-04-15

## 2021-04-15 RX ORDER — SODIUM CHLORIDE 9 MG/ML
250 INJECTION, SOLUTION INTRAVENOUS ONCE
Status: CANCELLED | OUTPATIENT
Start: 2021-04-16

## 2021-04-15 RX ORDER — ACETAMINOPHEN 325 MG/1
650 TABLET ORAL ONCE
Status: CANCELLED | OUTPATIENT
Start: 2021-04-16

## 2021-04-15 RX ORDER — DIPHENHYDRAMINE HYDROCHLORIDE 50 MG/ML
25 INJECTION INTRAMUSCULAR; INTRAVENOUS ONCE
Status: CANCELLED | OUTPATIENT
Start: 2021-04-16

## 2021-04-15 RX ORDER — DIPHENHYDRAMINE HYDROCHLORIDE 50 MG/ML
25 INJECTION INTRAMUSCULAR; INTRAVENOUS ONCE
Status: COMPLETED | OUTPATIENT
Start: 2021-04-15 | End: 2021-04-15

## 2021-04-15 RX ORDER — HEPARIN SODIUM (PORCINE) LOCK FLUSH IV SOLN 100 UNIT/ML 100 UNIT/ML
500 SOLUTION INTRAVENOUS AS NEEDED
Status: CANCELLED | OUTPATIENT
Start: 2021-04-15

## 2021-04-15 RX ORDER — ACETAMINOPHEN 325 MG/1
650 TABLET ORAL ONCE
Status: COMPLETED | OUTPATIENT
Start: 2021-04-15 | End: 2021-04-15

## 2021-04-15 RX ORDER — SODIUM CHLORIDE 0.9 % (FLUSH) 0.9 %
10 SYRINGE (ML) INJECTION AS NEEDED
Status: DISCONTINUED | OUTPATIENT
Start: 2021-04-15 | End: 2021-04-15 | Stop reason: HOSPADM

## 2021-04-15 RX ORDER — SODIUM CHLORIDE 0.9 % (FLUSH) 0.9 %
10 SYRINGE (ML) INJECTION AS NEEDED
Status: CANCELLED | OUTPATIENT
Start: 2021-04-15

## 2021-04-15 RX ADMIN — IRON SUCROSE 200 MG: 20 INJECTION, SOLUTION INTRAVENOUS at 08:38

## 2021-04-15 RX ADMIN — SODIUM CHLORIDE 250 ML: 9 INJECTION, SOLUTION INTRAVENOUS at 08:05

## 2021-04-15 RX ADMIN — SODIUM CHLORIDE, PRESERVATIVE FREE 10 ML: 5 INJECTION INTRAVENOUS at 09:32

## 2021-04-15 RX ADMIN — HEPARIN 500 UNITS: 100 SYRINGE at 09:32

## 2021-04-15 RX ADMIN — FAMOTIDINE 20 MG: 10 INJECTION INTRAVENOUS at 08:09

## 2021-04-15 RX ADMIN — DIPHENHYDRAMINE HYDROCHLORIDE 25 MG: 50 INJECTION INTRAMUSCULAR; INTRAVENOUS at 08:04

## 2021-04-15 RX ADMIN — ACETAMINOPHEN 650 MG: 325 TABLET, FILM COATED ORAL at 08:06

## 2021-04-16 ENCOUNTER — INFUSION (OUTPATIENT)
Dept: ONCOLOGY | Facility: HOSPITAL | Age: 81
End: 2021-04-16

## 2021-04-16 VITALS
RESPIRATION RATE: 18 BRPM | TEMPERATURE: 97.3 F | SYSTOLIC BLOOD PRESSURE: 176 MMHG | DIASTOLIC BLOOD PRESSURE: 73 MMHG | HEART RATE: 92 BPM

## 2021-04-16 DIAGNOSIS — K90.9 MALABSORPTION OF IRON: Primary | ICD-10-CM

## 2021-04-16 DIAGNOSIS — Z45.2 ENCOUNTER FOR CARE RELATED TO PORT-A-CATH: ICD-10-CM

## 2021-04-16 DIAGNOSIS — D50.8 OTHER IRON DEFICIENCY ANEMIA: ICD-10-CM

## 2021-04-16 DIAGNOSIS — Z45.2 ENCOUNTER FOR VENOUS ACCESS DEVICE CARE: ICD-10-CM

## 2021-04-16 PROCEDURE — 25010000002 HEPARIN LOCK FLUSH PER 10 UNITS: Performed by: INTERNAL MEDICINE

## 2021-04-16 PROCEDURE — 96375 TX/PRO/DX INJ NEW DRUG ADDON: CPT | Performed by: INTERNAL MEDICINE

## 2021-04-16 PROCEDURE — 96365 THER/PROPH/DIAG IV INF INIT: CPT | Performed by: INTERNAL MEDICINE

## 2021-04-16 PROCEDURE — 25010000002 IRON SUCROSE PER 1 MG: Performed by: INTERNAL MEDICINE

## 2021-04-16 PROCEDURE — 25010000002 DIPHENHYDRAMINE PER 50 MG: Performed by: INTERNAL MEDICINE

## 2021-04-16 RX ORDER — SODIUM CHLORIDE 0.9 % (FLUSH) 0.9 %
10 SYRINGE (ML) INJECTION AS NEEDED
Status: CANCELLED | OUTPATIENT
Start: 2021-05-20

## 2021-04-16 RX ORDER — SODIUM CHLORIDE 9 MG/ML
250 INJECTION, SOLUTION INTRAVENOUS ONCE
Status: CANCELLED | OUTPATIENT
Start: 2021-04-16

## 2021-04-16 RX ORDER — DIPHENHYDRAMINE HYDROCHLORIDE 50 MG/ML
25 INJECTION INTRAMUSCULAR; INTRAVENOUS ONCE
Status: CANCELLED | OUTPATIENT
Start: 2021-04-16

## 2021-04-16 RX ORDER — SODIUM CHLORIDE 9 MG/ML
250 INJECTION, SOLUTION INTRAVENOUS ONCE
Status: COMPLETED | OUTPATIENT
Start: 2021-04-16 | End: 2021-04-16

## 2021-04-16 RX ORDER — GEMFIBROZIL 600 MG/1
600 TABLET, FILM COATED ORAL 2 TIMES DAILY
COMMUNITY
End: 2021-09-09 | Stop reason: HOSPADM

## 2021-04-16 RX ORDER — HEPARIN SODIUM (PORCINE) LOCK FLUSH IV SOLN 100 UNIT/ML 100 UNIT/ML
500 SOLUTION INTRAVENOUS AS NEEDED
Status: DISCONTINUED | OUTPATIENT
Start: 2021-04-16 | End: 2021-04-16 | Stop reason: HOSPADM

## 2021-04-16 RX ORDER — HEPARIN SODIUM (PORCINE) LOCK FLUSH IV SOLN 100 UNIT/ML 100 UNIT/ML
500 SOLUTION INTRAVENOUS AS NEEDED
Status: CANCELLED | OUTPATIENT
Start: 2021-05-20

## 2021-04-16 RX ORDER — ACETAMINOPHEN 325 MG/1
650 TABLET ORAL ONCE
Status: CANCELLED | OUTPATIENT
Start: 2021-04-16

## 2021-04-16 RX ORDER — SODIUM CHLORIDE 0.9 % (FLUSH) 0.9 %
10 SYRINGE (ML) INJECTION AS NEEDED
Status: DISCONTINUED | OUTPATIENT
Start: 2021-04-16 | End: 2021-04-16 | Stop reason: HOSPADM

## 2021-04-16 RX ORDER — DIPHENHYDRAMINE HYDROCHLORIDE 50 MG/ML
25 INJECTION INTRAMUSCULAR; INTRAVENOUS ONCE
Status: COMPLETED | OUTPATIENT
Start: 2021-04-16 | End: 2021-04-16

## 2021-04-16 RX ORDER — EMPAGLIFLOZIN 25 MG/1
25 TABLET, FILM COATED ORAL DAILY
COMMUNITY

## 2021-04-16 RX ORDER — ACETAMINOPHEN 325 MG/1
650 TABLET ORAL ONCE
Status: COMPLETED | OUTPATIENT
Start: 2021-04-16 | End: 2021-04-16

## 2021-04-16 RX ADMIN — SODIUM CHLORIDE, PRESERVATIVE FREE 10 ML: 5 INJECTION INTRAVENOUS at 09:04

## 2021-04-16 RX ADMIN — HEPARIN 500 UNITS: 100 SYRINGE at 09:04

## 2021-04-16 RX ADMIN — ACETAMINOPHEN 650 MG: 325 TABLET, FILM COATED ORAL at 07:57

## 2021-04-16 RX ADMIN — IRON SUCROSE 200 MG: 20 INJECTION, SOLUTION INTRAVENOUS at 08:19

## 2021-04-16 RX ADMIN — SODIUM CHLORIDE 250 ML: 9 INJECTION, SOLUTION INTRAVENOUS at 07:57

## 2021-04-16 RX ADMIN — FAMOTIDINE 20 MG: 10 INJECTION INTRAVENOUS at 08:05

## 2021-04-16 RX ADMIN — DIPHENHYDRAMINE HYDROCHLORIDE 25 MG: 50 INJECTION INTRAMUSCULAR; INTRAVENOUS at 07:57

## 2021-04-19 ENCOUNTER — DOCUMENTATION (OUTPATIENT)
Dept: NUTRITION | Facility: HOSPITAL | Age: 81
End: 2021-04-19

## 2021-04-19 NOTE — PROGRESS NOTES
Adult Outpatient Nutrition  Assessment    Patient Name:  Mary Gutirerez  YOB: 1940  MRN: [id number]    Assessment Date: Entry from 4/16/21    Comments: Follow-up visit to assess hyperglycemia. Wt 126.9 lb. Glucose 291. Alb 4.2.  stated PCP recently added jardiance . Has been very effective--home glucose readings in low 100'2. So effective that levemir dose decreased by . Urged  to inform PCP. Reinforced importance of  home glucose monitoring. Urged diabetic friendly supplements --list reviewed and provided. Pt and  very receptive.                        Electronically signed by:  Love Black RD  04/19/21 08:06 CDT

## 2021-05-20 ENCOUNTER — INFUSION (OUTPATIENT)
Dept: ONCOLOGY | Facility: HOSPITAL | Age: 81
End: 2021-05-20

## 2021-05-20 DIAGNOSIS — Z45.2 ENCOUNTER FOR VENOUS ACCESS DEVICE CARE: ICD-10-CM

## 2021-05-20 DIAGNOSIS — Z45.2 ENCOUNTER FOR CARE RELATED TO PORT-A-CATH: Primary | ICD-10-CM

## 2021-05-20 PROCEDURE — 96523 IRRIG DRUG DELIVERY DEVICE: CPT | Performed by: INTERNAL MEDICINE

## 2021-05-20 PROCEDURE — 25010000002 HEPARIN LOCK FLUSH PER 10 UNITS: Performed by: INTERNAL MEDICINE

## 2021-05-20 RX ORDER — SODIUM CHLORIDE 0.9 % (FLUSH) 0.9 %
10 SYRINGE (ML) INJECTION AS NEEDED
Status: DISCONTINUED | OUTPATIENT
Start: 2021-05-20 | End: 2021-05-20 | Stop reason: HOSPADM

## 2021-05-20 RX ORDER — SODIUM CHLORIDE 0.9 % (FLUSH) 0.9 %
10 SYRINGE (ML) INJECTION AS NEEDED
Status: CANCELLED | OUTPATIENT
Start: 2021-07-01

## 2021-05-20 RX ORDER — HEPARIN SODIUM (PORCINE) LOCK FLUSH IV SOLN 100 UNIT/ML 100 UNIT/ML
500 SOLUTION INTRAVENOUS AS NEEDED
Status: CANCELLED | OUTPATIENT
Start: 2021-07-01

## 2021-05-20 RX ORDER — HEPARIN SODIUM (PORCINE) LOCK FLUSH IV SOLN 100 UNIT/ML 100 UNIT/ML
500 SOLUTION INTRAVENOUS AS NEEDED
Status: DISCONTINUED | OUTPATIENT
Start: 2021-05-20 | End: 2021-05-20 | Stop reason: HOSPADM

## 2021-05-20 RX ADMIN — HEPARIN 500 UNITS: 100 SYRINGE at 08:09

## 2021-06-28 ENCOUNTER — TELEPHONE (OUTPATIENT)
Dept: NUTRITION | Facility: HOSPITAL | Age: 81
End: 2021-06-28

## 2021-07-01 ENCOUNTER — INFUSION (OUTPATIENT)
Dept: ONCOLOGY | Facility: HOSPITAL | Age: 81
End: 2021-07-01

## 2021-07-01 DIAGNOSIS — Z45.2 ENCOUNTER FOR CARE RELATED TO PORT-A-CATH: Primary | ICD-10-CM

## 2021-07-01 DIAGNOSIS — Z45.2 ENCOUNTER FOR VENOUS ACCESS DEVICE CARE: ICD-10-CM

## 2021-07-01 PROCEDURE — 25010000002 HEPARIN LOCK FLUSH PER 10 UNITS: Performed by: INTERNAL MEDICINE

## 2021-07-01 PROCEDURE — 96523 IRRIG DRUG DELIVERY DEVICE: CPT | Performed by: INTERNAL MEDICINE

## 2021-07-01 RX ORDER — SODIUM CHLORIDE 0.9 % (FLUSH) 0.9 %
10 SYRINGE (ML) INJECTION AS NEEDED
Status: DISCONTINUED | OUTPATIENT
Start: 2021-07-01 | End: 2021-07-01 | Stop reason: HOSPADM

## 2021-07-01 RX ORDER — SODIUM CHLORIDE 0.9 % (FLUSH) 0.9 %
10 SYRINGE (ML) INJECTION AS NEEDED
Status: CANCELLED | OUTPATIENT
Start: 2021-08-03

## 2021-07-01 RX ORDER — HEPARIN SODIUM (PORCINE) LOCK FLUSH IV SOLN 100 UNIT/ML 100 UNIT/ML
500 SOLUTION INTRAVENOUS AS NEEDED
Status: DISCONTINUED | OUTPATIENT
Start: 2021-07-01 | End: 2021-07-01 | Stop reason: HOSPADM

## 2021-07-01 RX ORDER — HEPARIN SODIUM (PORCINE) LOCK FLUSH IV SOLN 100 UNIT/ML 100 UNIT/ML
500 SOLUTION INTRAVENOUS AS NEEDED
Status: CANCELLED | OUTPATIENT
Start: 2021-08-03

## 2021-07-01 RX ADMIN — HEPARIN 500 UNITS: 100 SYRINGE at 08:15

## 2021-07-16 NOTE — TELEPHONE ENCOUNTER
Called patient, gave iron results, informed patient no need for IV iron at this time.  Verbal understanding by patient.  Dayami Lucia RN  April 18, 2018  9:17 AM     Advancement Flap (Double) Text: The defect edges were debeveled with a #15 scalpel blade.  Given the location of the defect and the proximity to free margins a double advancement flap was deemed most appropriate.  Using a sterile surgical marker, the appropriate advancement flaps were drawn incorporating the defect and placing the expected incisions within the relaxed skin tension lines where possible.    The area thus outlined was incised deep to adipose tissue with a #15 scalpel blade.  The skin margins were undermined to an appropriate distance in all directions utilizing iris scissors.

## 2021-08-03 ENCOUNTER — TELEPHONE (OUTPATIENT)
Dept: ONCOLOGY | Facility: HOSPITAL | Age: 81
End: 2021-08-03

## 2021-08-03 ENCOUNTER — HOSPITAL ENCOUNTER (OUTPATIENT)
Dept: CT IMAGING | Facility: HOSPITAL | Age: 81
Discharge: HOME | End: 2021-08-03

## 2021-08-03 ENCOUNTER — INFUSION (OUTPATIENT)
Dept: ONCOLOGY | Facility: HOSPITAL | Age: 81
End: 2021-08-03

## 2021-08-03 ENCOUNTER — DOCUMENTATION (OUTPATIENT)
Dept: NUTRITION | Facility: HOSPITAL | Age: 81
End: 2021-08-03

## 2021-08-03 DIAGNOSIS — D50.8 OTHER IRON DEFICIENCY ANEMIA: ICD-10-CM

## 2021-08-03 DIAGNOSIS — C18.2 MALIGNANT NEOPLASM OF ASCENDING COLON (HCC): Primary | ICD-10-CM

## 2021-08-03 DIAGNOSIS — C18.2 MALIGNANT NEOPLASM OF ASCENDING COLON (HCC): ICD-10-CM

## 2021-08-03 DIAGNOSIS — K90.9 MALABSORPTION OF IRON: ICD-10-CM

## 2021-08-03 DIAGNOSIS — Z45.2 ENCOUNTER FOR VENOUS ACCESS DEVICE CARE: ICD-10-CM

## 2021-08-03 DIAGNOSIS — Z45.2 ENCOUNTER FOR CARE RELATED TO PORT-A-CATH: ICD-10-CM

## 2021-08-03 DIAGNOSIS — K86.2 CYST OF PANCREAS: ICD-10-CM

## 2021-08-03 DIAGNOSIS — E53.8 B12 DEFICIENCY: ICD-10-CM

## 2021-08-03 LAB
ALBUMIN SERPL-MCNC: 4.1 G/DL (ref 3.5–5.2)
ALBUMIN/GLOB SERPL: 1.9 G/DL
ALP SERPL-CCNC: 87 U/L (ref 39–117)
ALT SERPL W P-5'-P-CCNC: 17 U/L (ref 1–33)
ANION GAP SERPL CALCULATED.3IONS-SCNC: 9 MMOL/L (ref 5–15)
AST SERPL-CCNC: 17 U/L (ref 1–32)
BASOPHILS # BLD AUTO: 0.06 10*3/MM3 (ref 0–0.2)
BASOPHILS NFR BLD AUTO: 1.2 % (ref 0–1.5)
BILIRUB SERPL-MCNC: 0.5 MG/DL (ref 0–1.2)
BUN SERPL-MCNC: 17 MG/DL (ref 8–23)
BUN/CREAT SERPL: 22.7 (ref 7–25)
CALCIUM SPEC-SCNC: 9 MG/DL (ref 8.6–10.5)
CEA SERPL-MCNC: 4.63 NG/ML
CHLORIDE SERPL-SCNC: 94 MMOL/L (ref 98–107)
CO2 SERPL-SCNC: 26 MMOL/L (ref 22–29)
CREAT SERPL-MCNC: 0.75 MG/DL (ref 0.57–1)
DEPRECATED RDW RBC AUTO: 45.7 FL (ref 37–54)
EOSINOPHIL # BLD AUTO: 0.15 10*3/MM3 (ref 0–0.4)
EOSINOPHIL NFR BLD AUTO: 2.9 % (ref 0.3–6.2)
ERYTHROCYTE [DISTWIDTH] IN BLOOD BY AUTOMATED COUNT: 12.6 % (ref 12.3–15.4)
FERRITIN SERPL-MCNC: 687.6 NG/ML (ref 13–150)
FOLATE SERPL-MCNC: >20 NG/ML (ref 4.78–24.2)
GFR SERPL CREATININE-BSD FRML MDRD: 75 ML/MIN/1.73
GLOBULIN UR ELPH-MCNC: 2.2 GM/DL
GLUCOSE SERPL-MCNC: 482 MG/DL (ref 65–99)
HCT VFR BLD AUTO: 36.4 % (ref 34–46.6)
HGB BLD-MCNC: 11.9 G/DL (ref 12–15.9)
HOLD SPECIMEN: NORMAL
IMM GRANULOCYTES # BLD AUTO: 0.05 10*3/MM3 (ref 0–0.05)
IMM GRANULOCYTES NFR BLD AUTO: 1 % (ref 0–0.5)
IRON 24H UR-MRATE: 64 MCG/DL (ref 37–145)
IRON SATN MFR SERPL: 21 % (ref 20–50)
LYMPHOCYTES # BLD AUTO: 0.86 10*3/MM3 (ref 0.7–3.1)
LYMPHOCYTES NFR BLD AUTO: 16.6 % (ref 19.6–45.3)
MCH RBC QN AUTO: 32.6 PG (ref 26.6–33)
MCHC RBC AUTO-ENTMCNC: 32.7 G/DL (ref 31.5–35.7)
MCV RBC AUTO: 99.7 FL (ref 79–97)
MONOCYTES # BLD AUTO: 0.29 10*3/MM3 (ref 0.1–0.9)
MONOCYTES NFR BLD AUTO: 5.6 % (ref 5–12)
NEUTROPHILS NFR BLD AUTO: 3.76 10*3/MM3 (ref 1.7–7)
NEUTROPHILS NFR BLD AUTO: 72.7 % (ref 42.7–76)
NRBC BLD AUTO-RTO: 0 /100 WBC (ref 0–0.2)
PLATELET # BLD AUTO: 258 10*3/MM3 (ref 140–450)
PMV BLD AUTO: 10 FL (ref 6–12)
POTASSIUM SERPL-SCNC: 4.5 MMOL/L (ref 3.5–5.2)
PROT SERPL-MCNC: 6.3 G/DL (ref 6–8.5)
RBC # BLD AUTO: 3.65 10*6/MM3 (ref 3.77–5.28)
SODIUM SERPL-SCNC: 129 MMOL/L (ref 136–145)
TIBC SERPL-MCNC: 305 MCG/DL (ref 298–536)
TRANSFERRIN SERPL-MCNC: 205 MG/DL (ref 200–360)
VIT B12 BLD-MCNC: 763 PG/ML (ref 211–946)
WBC # BLD AUTO: 5.17 10*3/MM3 (ref 3.4–10.8)

## 2021-08-03 PROCEDURE — 85025 COMPLETE CBC W/AUTO DIFF WBC: CPT

## 2021-08-03 PROCEDURE — 25010000002 HEPARIN LOCK FLUSH PER 10 UNITS: Performed by: INTERNAL MEDICINE

## 2021-08-03 PROCEDURE — 82746 ASSAY OF FOLIC ACID SERUM: CPT

## 2021-08-03 PROCEDURE — 82728 ASSAY OF FERRITIN: CPT

## 2021-08-03 PROCEDURE — 82378 CARCINOEMBRYONIC ANTIGEN: CPT

## 2021-08-03 PROCEDURE — 71250 CT THORAX DX C-: CPT

## 2021-08-03 PROCEDURE — 36591 DRAW BLOOD OFF VENOUS DEVICE: CPT | Performed by: INTERNAL MEDICINE

## 2021-08-03 PROCEDURE — 74176 CT ABD & PELVIS W/O CONTRAST: CPT

## 2021-08-03 PROCEDURE — 82607 VITAMIN B-12: CPT

## 2021-08-03 PROCEDURE — 36415 COLL VENOUS BLD VENIPUNCTURE: CPT

## 2021-08-03 PROCEDURE — 84466 ASSAY OF TRANSFERRIN: CPT

## 2021-08-03 PROCEDURE — 83540 ASSAY OF IRON: CPT

## 2021-08-03 PROCEDURE — 80053 COMPREHEN METABOLIC PANEL: CPT

## 2021-08-03 RX ORDER — HEPARIN SODIUM (PORCINE) LOCK FLUSH IV SOLN 100 UNIT/ML 100 UNIT/ML
500 SOLUTION INTRAVENOUS AS NEEDED
Status: CANCELLED | OUTPATIENT
Start: 2021-12-10

## 2021-08-03 RX ORDER — SODIUM CHLORIDE 0.9 % (FLUSH) 0.9 %
10 SYRINGE (ML) INJECTION AS NEEDED
Status: DISCONTINUED | OUTPATIENT
Start: 2021-08-03 | End: 2021-08-03 | Stop reason: HOSPADM

## 2021-08-03 RX ORDER — SODIUM CHLORIDE 0.9 % (FLUSH) 0.9 %
10 SYRINGE (ML) INJECTION AS NEEDED
Status: CANCELLED | OUTPATIENT
Start: 2021-12-10

## 2021-08-03 RX ORDER — HEPARIN SODIUM (PORCINE) LOCK FLUSH IV SOLN 100 UNIT/ML 100 UNIT/ML
500 SOLUTION INTRAVENOUS AS NEEDED
Status: DISCONTINUED | OUTPATIENT
Start: 2021-08-03 | End: 2021-08-03 | Stop reason: HOSPADM

## 2021-08-03 RX ADMIN — HEPARIN 500 UNITS: 100 SYRINGE at 08:44

## 2021-08-03 RX ADMIN — SODIUM CHLORIDE, PRESERVATIVE FREE 10 ML: 5 INJECTION INTRAVENOUS at 08:44

## 2021-08-03 NOTE — TELEPHONE ENCOUNTER
Called and spoke with pt's  regarding pt glucose level of 482.  states that he cannot get wife to take her meds.  Advised that if they are unable to get glucose under control, she needs to go to the ER. V/u obtained.

## 2021-08-03 NOTE — PROGRESS NOTES
Adult Outpatient Nutrition  Assessment    Patient Name:  Mary Gutierrez  YOB: 1940  MRN: [id number]    Assessment Date:  8/3/2021    Comments: Wt 126.9 lb--trending up. Alb 4.1. Glucose 482. Provided additional Boost discount coupons (recommended diabetic friendly products). Urged  to contact PCP re: hyperglycemia.                         Electronically signed by:  Love Black RD  08/03/21 10:13 CDT

## 2021-08-05 ENCOUNTER — OFFICE VISIT (OUTPATIENT)
Dept: ONCOLOGY | Facility: CLINIC | Age: 81
End: 2021-08-05

## 2021-08-05 ENCOUNTER — APPOINTMENT (OUTPATIENT)
Dept: ONCOLOGY | Facility: CLINIC | Age: 81
End: 2021-08-05

## 2021-08-05 VITALS
DIASTOLIC BLOOD PRESSURE: 74 MMHG | SYSTOLIC BLOOD PRESSURE: 142 MMHG | TEMPERATURE: 96.8 F | RESPIRATION RATE: 16 BRPM | HEART RATE: 92 BPM | WEIGHT: 117.3 LBS | BODY MASS INDEX: 20.12 KG/M2

## 2021-08-05 DIAGNOSIS — C18.2 MALIGNANT NEOPLASM OF ASCENDING COLON (HCC): Primary | ICD-10-CM

## 2021-08-05 DIAGNOSIS — D50.0 IRON DEFICIENCY ANEMIA DUE TO CHRONIC BLOOD LOSS: ICD-10-CM

## 2021-08-05 DIAGNOSIS — D50.8 OTHER IRON DEFICIENCY ANEMIA: ICD-10-CM

## 2021-08-05 DIAGNOSIS — K90.9 MALABSORPTION OF IRON: ICD-10-CM

## 2021-08-05 DIAGNOSIS — E53.8 B12 DEFICIENCY: ICD-10-CM

## 2021-08-05 PROCEDURE — 99214 OFFICE O/P EST MOD 30 MIN: CPT | Performed by: INTERNAL MEDICINE

## 2021-08-05 PROCEDURE — 1126F AMNT PAIN NOTED NONE PRSNT: CPT | Performed by: INTERNAL MEDICINE

## 2021-08-05 PROCEDURE — G9903 PT SCRN TBCO ID AS NON USER: HCPCS | Performed by: INTERNAL MEDICINE

## 2021-08-05 PROCEDURE — G0463 HOSPITAL OUTPT CLINIC VISIT: HCPCS | Performed by: INTERNAL MEDICINE

## 2021-08-05 PROCEDURE — 1123F ACP DISCUSS/DSCN MKR DOCD: CPT | Performed by: INTERNAL MEDICINE

## 2021-08-05 NOTE — PROGRESS NOTES
DATE OF VISIT: 8/5/2021      REASON FOR VISIT: Colon cancer, recurrent iron deficiency, B12 deficiency, hyperglycemia,      HISTORY OF PRESENT ILLNESS:   80-year-old female with medical problem consisting of diabetes mellitus, hypertension, colon cancer status post surgery and chemotherapy currently on surveillance, B12 deficiency, recurrent iron deficiency, uncontrolled diabetes mellitus is here for follow-up appointment today to discuss recently done blood work as well as CT scan.  Denies any bleeding.  Denies any new lymph node enlargement.          Past Medical History, Past Surgical History, Social History, Family History have been reviewed and are without significant changes except as mentioned.    Review of Systems   Constitutional: Positive for fatigue.   Musculoskeletal: Positive for arthralgias.   Neurological: Positive for numbness.   Hematological: Negative for adenopathy.      A comprehensive 14 point review of systems was performed and was negative except as mentioned.    Medications:  The current medication list was reviewed in the EMR    ALLERGIES:    Allergies   Allergen Reactions   • Other Rash     Oral Chemo Meds       Objective      Vitals:    08/05/21 1531   BP: 142/74   Pulse: 92   Resp: 16   Temp: 96.8 °F (36 °C)   TempSrc: Temporal   Weight: 53.2 kg (117 lb 4.8 oz)   PainSc: 0-No pain     Current Status 8/5/2021   ECOG score 1       Physical Exam  Cardiovascular:      Rate and Rhythm: Normal rate and regular rhythm.   Pulmonary:      Breath sounds: Normal breath sounds.   Neurological:      Mental Status: She is alert.           RECENT LABS:  Glucose   Date Value Ref Range Status   08/03/2021 482 (C) 65 - 99 mg/dL Final     Sodium   Date Value Ref Range Status   08/03/2021 129 (L) 136 - 145 mmol/L Final   10/25/2019 137 136 - 145 mmol/L Final     Potassium   Date Value Ref Range Status   08/03/2021 4.5 3.5 - 5.2 mmol/L Final   10/25/2019 3.6 3.5 - 5.1 mmol/L Final     CO2   Date Value Ref  Range Status   08/03/2021 26.0 22.0 - 29.0 mmol/L Final     Chloride   Date Value Ref Range Status   08/03/2021 94 (L) 98 - 107 mmol/L Final   10/25/2019 100 98 - 107 mmol/L Final     Anion Gap   Date Value Ref Range Status   08/03/2021 9.0 5.0 - 15.0 mmol/L Final     Creatinine   Date Value Ref Range Status   08/03/2021 0.75 0.57 - 1.00 mg/dL Final   10/25/2019 0.8 0.6 - 1.0 mg/dL Final     Comment:     **The MDRD GFR formula is valid only for ages 18-70.    GFR will not be reported for individuals aging <18 or >70.     BUN   Date Value Ref Range Status   08/03/2021 17 8 - 23 mg/dL Final   10/25/2019 10 7 - 18 mg/dL Final     BUN/Creatinine Ratio   Date Value Ref Range Status   08/03/2021 22.7 7.0 - 25.0 Final     Calcium   Date Value Ref Range Status   08/03/2021 9.0 8.6 - 10.5 mg/dL Final   10/25/2019 9 8.5 - 10.1 mg/dL Final     eGFR Non  Amer   Date Value Ref Range Status   08/03/2021 75 >60 mL/min/1.73 Final     Alkaline Phosphatase   Date Value Ref Range Status   08/03/2021 87 39 - 117 U/L Final   10/25/2019 101 46 - 116 U/L Final     Total Protein   Date Value Ref Range Status   08/03/2021 6.3 6.0 - 8.5 g/dL Final   10/25/2019 6.9 6.4 - 8.2 g/dL Final     ALT (SGPT)   Date Value Ref Range Status   08/03/2021 17 1 - 33 U/L Final   10/25/2019 23 14 - 59 U/L Final     AST (SGOT)   Date Value Ref Range Status   08/03/2021 17 1 - 32 U/L Final   10/25/2019 20 15 - 37 U/L Final     Total Bilirubin   Date Value Ref Range Status   08/03/2021 0.5 0.0 - 1.2 mg/dL Final   10/25/2019 0.3 0.2 - 1.0 mg/dL Final     Albumin   Date Value Ref Range Status   08/03/2021 4.10 3.50 - 5.20 g/dL Final   10/25/2019 3.6 3.4 - 5.0 g/dL Final     Globulin   Date Value Ref Range Status   08/03/2021 2.2 gm/dL Final     Lab Results   Component Value Date    WBC 5.17 08/03/2021    HGB 11.9 (L) 08/03/2021    HCT 36.4 08/03/2021    MCV 99.7 (H) 08/03/2021     08/03/2021     Lab Results   Component Value Date    NEUTROABS 3.76  08/03/2021    IRON 64 08/03/2021    IRON 43 04/06/2021    IRON 100 12/01/2020    TIBC 305 08/03/2021    TIBC 416 04/06/2021    TIBC 454 12/01/2020    LABIRON 21 08/03/2021    LABIRON 10 (L) 04/06/2021    LABIRON 22 12/01/2020    FERRITIN 687.60 (H) 08/03/2021    FERRITIN 239.00 (H) 04/06/2021    FERRITIN 631.70 (H) 12/01/2020    CDMSQLLK14 763 08/03/2021    ONIOIDGD16 918 04/06/2021    BZAWQBRV56 644 03/26/2021    FOLATE >20.00 08/03/2021    FOLATE >20.00 04/06/2021    FOLATE >20.00 12/01/2020     Lab Results   Component Value Date    CEA 4.63 08/03/2021    REFLABREPO SEE NOTE: 12/22/2015         PATHOLOGY:  * Cannot find OR log *         RADIOLOGY DATA :  CT Abdomen Pelvis Without Contrast    Result Date: 8/3/2021  1. Evidence of old granulomatous disease. 2. Left lower lobe inferior anterior small linear opacities suspicious for discoid atelectasis. 3. Right kidney mid zone 2.9 cm simple renal cortical cyst. 4.Postsurgical changes consistent with partial right colectomy. 5. Otherwise unremarkable unenhanced CT abdomen and pelvis exam. Electronically signed by:  Fili Roberts MD  8/3/2021 1:55 PM CDT Workstation: [id number]    CT Chest Without Contrast Diagnostic    Result Date: 8/3/2021  1. Evidence of old granulomatous disease. 2. Left lower lobe inferior anterior small linear opacities suspicious for discoid atelectasis. 3. Right kidney mid zone 2.9 cm simple renal cortical cyst. 4.Postsurgical changes consistent with partial right colectomy. 5. Otherwise unremarkable unenhanced CT abdomen and pelvis exam. Electronically signed by:  Fili Roberts MD  8/3/2021 1:55 PM CDT Workstation: [id number]          Assessment/Plan     1.  Recurrent iron deficiency anemia:  -Patient has been having recurrent iron deficiency since October 2016.  -Most recent dose of intravenous Venofer was in April 2021.  -Had GI work-up done in January 2018 which was negative for bleeding.  Capsule endoscopy was not able to done secondary to  capsule staying in stomach and not moving forward.  -Anemia work-up done on August 3, 2021 shows hemoglobin is 11.9.  Iron studies are adequate.  No need to start intravenous Venofer at present  -We will ask patient to return to clinic in 4 months with repeat CBC, CMP, iron studies, ferritin, B12, folate, CEA to be done on that day.    2.  Colon cancer stage III diagnosed in October 2015  -Patient had a high colectomy followed by chemotherapy  -Patient was initially started on XELOX but could not tolerate Xeloda secondary to skin rash  -Subsequently patient was changed to FOLFOX but could not tolerate more than three cycles due to neuropathy  -Patient was changed over to 5-FU and leucovorin which she could not tolerate either and subsequently chemotherapy was discontinued after total eight cycles which was discontinued in July 2016  -Patient had a colonoscopy done in January 2018 which was negative for malignancy.  Patient is due for colonoscopy.  As per family patient has been refusing colonoscopy at present  -Recent CEA level is 4.6  -Patient is 5 years out of diagnosis, no further CT scan has been recommended unless patient has rising CEA or any symptoms suggestive of recurrence    3.  Vitamin B12 deficiency:  -Recommend continue with B12 three times a week    4.  Uncontrolled diabetes mellitus  His blood glucose was elevated at 482.  As per family patient also has decided not to take any medication for diabetes mellitus.  Patient was counseled about importance of controlling diabetes mellitus and possible health risk with uncontrolled diabetes mellitus including stroke and possible death.    5.  Hypertension    6.  Health maintenance: Patient does not smoke.  Had a colonoscopy in January 2018    7. Advance Care Planning: For now patient remains full code and is able to make decisions.  Patient has health care surrogate mentioned on chart.                 PHQ-9 Total Score:       Mary Gutierrez reports  a pain score of 0.  Given her pain assessment as noted, treatment options were discussed and the following options were decided upon as a follow-up plan to address the patient's pain: continuation of current treatment plan for pain.         Duong Hanley MD  8/5/2021  18:03 CDT        Part of this note may be an electronic transcription/translation of spoken language to printed text using the Dragon Dictation System.          CC:

## 2021-09-03 ENCOUNTER — HOSPITAL ENCOUNTER (EMERGENCY)
Facility: HOSPITAL | Age: 81
Discharge: PSYCHIATRIC OR SUBSTANCE USE FACILITY | End: 2021-09-04
Attending: FAMILY MEDICINE | Admitting: FAMILY MEDICINE

## 2021-09-03 ENCOUNTER — HOSPITAL ENCOUNTER (EMERGENCY)
Facility: HOSPITAL | Age: 81
Discharge: HOME | End: 2021-09-03
Attending: STUDENT IN AN ORGANIZED HEALTH CARE EDUCATION/TRAINING PROGRAM | Admitting: STUDENT IN AN ORGANIZED HEALTH CARE EDUCATION/TRAINING PROGRAM

## 2021-09-03 ENCOUNTER — APPOINTMENT (OUTPATIENT)
Dept: GENERAL RADIOLOGY | Facility: HOSPITAL | Age: 81
End: 2021-09-03

## 2021-09-03 VITALS
WEIGHT: 118 LBS | HEIGHT: 62 IN | BODY MASS INDEX: 21.71 KG/M2 | HEART RATE: 100 BPM | TEMPERATURE: 97.4 F | SYSTOLIC BLOOD PRESSURE: 190 MMHG | OXYGEN SATURATION: 97 % | DIASTOLIC BLOOD PRESSURE: 85 MMHG | RESPIRATION RATE: 18 BRPM

## 2021-09-03 DIAGNOSIS — G20 DEMENTIA DUE TO PARKINSON'S DISEASE WITH BEHAVIORAL DISTURBANCE (HCC): Primary | ICD-10-CM

## 2021-09-03 DIAGNOSIS — F02.818 DEMENTIA DUE TO PARKINSON'S DISEASE WITH BEHAVIORAL DISTURBANCE (HCC): Primary | ICD-10-CM

## 2021-09-03 DIAGNOSIS — S42.251A CLOSED DISPLACED FRACTURE OF GREATER TUBEROSITY OF RIGHT HUMERUS, INITIAL ENCOUNTER: Primary | ICD-10-CM

## 2021-09-03 LAB
AMPHET+METHAMPHET UR QL: NEGATIVE
AMPHETAMINES UR QL: NEGATIVE
ANION GAP SERPL CALCULATED.3IONS-SCNC: 11 MMOL/L (ref 5–15)
APAP SERPL-MCNC: <5 MCG/ML (ref 0–30)
APTT PPP: 26.7 SECONDS (ref 20–40.3)
BACTERIA UR QL AUTO: ABNORMAL /HPF
BARBITURATES UR QL SCN: NEGATIVE
BASOPHILS # BLD AUTO: 0.05 10*3/MM3 (ref 0–0.2)
BASOPHILS NFR BLD AUTO: 0.4 % (ref 0–1.5)
BENZODIAZ UR QL SCN: NEGATIVE
BILIRUB UR QL STRIP: NEGATIVE
BUN SERPL-MCNC: 18 MG/DL (ref 8–23)
BUN/CREAT SERPL: 23.4 (ref 7–25)
BUPRENORPHINE SERPL-MCNC: NEGATIVE NG/ML
CALCIUM SPEC-SCNC: 9.2 MG/DL (ref 8.6–10.5)
CANNABINOIDS SERPL QL: NEGATIVE
CHLORIDE SERPL-SCNC: 93 MMOL/L (ref 98–107)
CLARITY UR: CLEAR
CO2 SERPL-SCNC: 26 MMOL/L (ref 22–29)
COCAINE UR QL: NEGATIVE
COLOR UR: YELLOW
CREAT SERPL-MCNC: 0.77 MG/DL (ref 0.57–1)
DEPRECATED RDW RBC AUTO: 41.8 FL (ref 37–54)
EOSINOPHIL # BLD AUTO: 0.05 10*3/MM3 (ref 0–0.4)
EOSINOPHIL NFR BLD AUTO: 0.4 % (ref 0.3–6.2)
ERYTHROCYTE [DISTWIDTH] IN BLOOD BY AUTOMATED COUNT: 12 % (ref 12.3–15.4)
ETHANOL BLD-MCNC: <10 MG/DL (ref 0–10)
ETHANOL UR QL: <0.01 %
FLUAV RNA RESP QL NAA+PROBE: NOT DETECTED
FLUBV RNA RESP QL NAA+PROBE: NOT DETECTED
GFR SERPL CREATININE-BSD FRML MDRD: 72 ML/MIN/1.73
GLUCOSE SERPL-MCNC: 306 MG/DL (ref 65–99)
GLUCOSE UR STRIP-MCNC: ABNORMAL MG/DL
HCT VFR BLD AUTO: 46.8 % (ref 34–46.6)
HGB BLD-MCNC: 15.4 G/DL (ref 12–15.9)
HGB UR QL STRIP.AUTO: ABNORMAL
HOLD SPECIMEN: NORMAL
HYALINE CASTS UR QL AUTO: ABNORMAL /LPF
IMM GRANULOCYTES # BLD AUTO: 0.04 10*3/MM3 (ref 0–0.05)
IMM GRANULOCYTES NFR BLD AUTO: 0.3 % (ref 0–0.5)
INR PPP: 0.89 (ref 0.8–1.2)
KETONES UR QL STRIP: NEGATIVE
LEUKOCYTE ESTERASE UR QL STRIP.AUTO: NEGATIVE
LYMPHOCYTES # BLD AUTO: 0.76 10*3/MM3 (ref 0.7–3.1)
LYMPHOCYTES NFR BLD AUTO: 5.7 % (ref 19.6–45.3)
MCH RBC QN AUTO: 30.9 PG (ref 26.6–33)
MCHC RBC AUTO-ENTMCNC: 32.9 G/DL (ref 31.5–35.7)
MCV RBC AUTO: 94 FL (ref 79–97)
METHADONE UR QL SCN: NEGATIVE
MONOCYTES # BLD AUTO: 0.47 10*3/MM3 (ref 0.1–0.9)
MONOCYTES NFR BLD AUTO: 3.5 % (ref 5–12)
NEUTROPHILS NFR BLD AUTO: 11.89 10*3/MM3 (ref 1.7–7)
NEUTROPHILS NFR BLD AUTO: 89.7 % (ref 42.7–76)
NITRITE UR QL STRIP: NEGATIVE
NRBC BLD AUTO-RTO: 0 /100 WBC (ref 0–0.2)
OPIATES UR QL: POSITIVE
OXYCODONE UR QL SCN: NEGATIVE
PCP UR QL SCN: NEGATIVE
PH UR STRIP.AUTO: 6.5 [PH] (ref 5–9)
PLATELET # BLD AUTO: 219 10*3/MM3 (ref 140–450)
PMV BLD AUTO: 9.9 FL (ref 6–12)
POTASSIUM SERPL-SCNC: 3.9 MMOL/L (ref 3.5–5.2)
PROPOXYPH UR QL: NEGATIVE
PROT UR QL STRIP: ABNORMAL
PROTHROMBIN TIME: 12 SECONDS (ref 11.1–15.3)
RBC # BLD AUTO: 4.98 10*6/MM3 (ref 3.77–5.28)
RBC # UR: ABNORMAL /HPF
REF LAB TEST METHOD: ABNORMAL
SALICYLATES SERPL-MCNC: <0.3 MG/DL
SARS-COV-2 RNA RESP QL NAA+PROBE: NOT DETECTED
SODIUM SERPL-SCNC: 130 MMOL/L (ref 136–145)
SP GR UR STRIP: 1.03 (ref 1–1.03)
SQUAMOUS #/AREA URNS HPF: ABNORMAL /HPF
TRICYCLICS UR QL SCN: NEGATIVE
UROBILINOGEN UR QL STRIP: ABNORMAL
WBC # BLD AUTO: 13.26 10*3/MM3 (ref 3.4–10.8)
WBC UR QL AUTO: ABNORMAL /HPF

## 2021-09-03 PROCEDURE — 80306 DRUG TEST PRSMV INSTRMNT: CPT | Performed by: FAMILY MEDICINE

## 2021-09-03 PROCEDURE — 96374 THER/PROPH/DIAG INJ IV PUSH: CPT

## 2021-09-03 PROCEDURE — C9803 HOPD COVID-19 SPEC COLLECT: HCPCS

## 2021-09-03 PROCEDURE — 99283 EMERGENCY DEPT VISIT LOW MDM: CPT

## 2021-09-03 PROCEDURE — 25010000002 ONDANSETRON PER 1 MG: Performed by: STUDENT IN AN ORGANIZED HEALTH CARE EDUCATION/TRAINING PROGRAM

## 2021-09-03 PROCEDURE — 80143 DRUG ASSAY ACETAMINOPHEN: CPT | Performed by: FAMILY MEDICINE

## 2021-09-03 PROCEDURE — 25010000002 TDAP 5-2.5-18.5 LF-MCG/0.5 SUSPENSION: Performed by: STUDENT IN AN ORGANIZED HEALTH CARE EDUCATION/TRAINING PROGRAM

## 2021-09-03 PROCEDURE — 90471 IMMUNIZATION ADMIN: CPT | Performed by: STUDENT IN AN ORGANIZED HEALTH CARE EDUCATION/TRAINING PROGRAM

## 2021-09-03 PROCEDURE — 73090 X-RAY EXAM OF FOREARM: CPT

## 2021-09-03 PROCEDURE — 80179 DRUG ASSAY SALICYLATE: CPT | Performed by: FAMILY MEDICINE

## 2021-09-03 PROCEDURE — 90715 TDAP VACCINE 7 YRS/> IM: CPT | Performed by: STUDENT IN AN ORGANIZED HEALTH CARE EDUCATION/TRAINING PROGRAM

## 2021-09-03 PROCEDURE — 82077 ASSAY SPEC XCP UR&BREATH IA: CPT | Performed by: FAMILY MEDICINE

## 2021-09-03 PROCEDURE — 25010000002 MORPHINE PER 10 MG: Performed by: STUDENT IN AN ORGANIZED HEALTH CARE EDUCATION/TRAINING PROGRAM

## 2021-09-03 PROCEDURE — 85025 COMPLETE CBC W/AUTO DIFF WBC: CPT | Performed by: STUDENT IN AN ORGANIZED HEALTH CARE EDUCATION/TRAINING PROGRAM

## 2021-09-03 PROCEDURE — 85610 PROTHROMBIN TIME: CPT | Performed by: STUDENT IN AN ORGANIZED HEALTH CARE EDUCATION/TRAINING PROGRAM

## 2021-09-03 PROCEDURE — 96375 TX/PRO/DX INJ NEW DRUG ADDON: CPT

## 2021-09-03 PROCEDURE — 85730 THROMBOPLASTIN TIME PARTIAL: CPT | Performed by: STUDENT IN AN ORGANIZED HEALTH CARE EDUCATION/TRAINING PROGRAM

## 2021-09-03 PROCEDURE — 80048 BASIC METABOLIC PNL TOTAL CA: CPT | Performed by: STUDENT IN AN ORGANIZED HEALTH CARE EDUCATION/TRAINING PROGRAM

## 2021-09-03 PROCEDURE — 81001 URINALYSIS AUTO W/SCOPE: CPT | Performed by: FAMILY MEDICINE

## 2021-09-03 PROCEDURE — 73030 X-RAY EXAM OF SHOULDER: CPT

## 2021-09-03 PROCEDURE — 73060 X-RAY EXAM OF HUMERUS: CPT

## 2021-09-03 PROCEDURE — 87636 SARSCOV2 & INF A&B AMP PRB: CPT | Performed by: FAMILY MEDICINE

## 2021-09-03 RX ORDER — HYDROCODONE BITARTRATE AND ACETAMINOPHEN 7.5; 325 MG/1; MG/1
1 TABLET ORAL EVERY 6 HOURS PRN
Qty: 12 TABLET | Refills: 0 | Status: SHIPPED | OUTPATIENT
Start: 2021-09-03 | End: 2021-09-09 | Stop reason: HOSPADM

## 2021-09-03 RX ORDER — ONDANSETRON 2 MG/ML
4 INJECTION INTRAMUSCULAR; INTRAVENOUS ONCE
Status: COMPLETED | OUTPATIENT
Start: 2021-09-03 | End: 2021-09-03

## 2021-09-03 RX ADMIN — MORPHINE SULFATE 4 MG: 4 INJECTION, SOLUTION INTRAMUSCULAR; INTRAVENOUS at 10:42

## 2021-09-03 RX ADMIN — ONDANSETRON 4 MG: 2 INJECTION INTRAMUSCULAR; INTRAVENOUS at 10:41

## 2021-09-03 RX ADMIN — TETANUS TOXOID, REDUCED DIPHTHERIA TOXOID AND ACELLULAR PERTUSSIS VACCINE, ADSORBED 0.5 ML: 5; 2.5; 8; 8; 2.5 SUSPENSION INTRAMUSCULAR at 10:45

## 2021-09-03 NOTE — ED PROVIDER NOTES
Subjective   80-year-old female not on a blood thinner comes to the ER after mechanical fall this morning falling on her right shoulder.  She is complaining of right arm pain and decreased range of motion.  No numbness.  She did not hit her head.  No loss of consciousness.  She has skin abrasions to her forearm.  Last tetanus was more than 5 years ago.      History provided by:  Patient   used: No        Review of Systems   Constitutional: Negative for chills and fever.   Respiratory: Negative for cough and shortness of breath.    Cardiovascular: Negative for chest pain and palpitations.   Gastrointestinal: Negative for abdominal pain and nausea.   Genitourinary: Negative for flank pain.   Musculoskeletal: Negative for back pain and neck pain.        Shoulder/arm pain   Skin: Positive for wound. Negative for color change and rash.   Neurological: Negative for dizziness, weakness, light-headedness, numbness and headaches.   Psychiatric/Behavioral: Negative for agitation. The patient is not nervous/anxious.        Past Medical History:   Diagnosis Date   • Acid reflux    • Diabetes mellitus (CMS/HCC)    • Hypertension    • Malignant neoplasm of ascending colon (CMS/HCC) 11/1/2016   • Malignant tumor of cecum (CMS/HCC)        Allergies   Allergen Reactions   • Other Rash     Oral Chemo Meds       Past Surgical History:   Procedure Laterality Date   • CAPSULE ENDOSCOPY N/A 6/22/2017    Procedure: CAPSULE ENDOSCOPY M2A;  Surgeon: Stuart Rico DO;  Location: [facility] ENDOSCOPY;  Service:    • CAPSULE ENDOSCOPY N/A 1/23/2018    Procedure: CAPSULE ENDOSCOPY M2A;  Surgeon: Stuart Rico DO;  Location: [facility] ENDOSCOPY;  Service:    • CENTRAL VENOUS LINE INSERTION  10/30/2015    Ultrasound localization, left basilic vein.Placement of left upper extremity PICC line   • COLONOSCOPY N/A 1/23/2018    Procedure: COLONOSCOPY;  Surgeon: Stuart Rico DO;  Location: [facility] ENDOSCOPY;  Service:    •  "ENDOSCOPY N/A 6/22/2017    Procedure: ESOPHAGOGASTRODUODENOSCOPY;  Surgeon: Stuart Rico DO;  Location: [facility] ENDOSCOPY;  Service:    • ENDOSCOPY N/A 1/23/2018    Procedure: ESOPHAGOGASTRODUODENOSCOPY;  Surgeon: Stuart Rico DO;  Location: [facility] ENDOSCOPY;  Service:    • FRACTURE SURGERY      right femur    • HYSTERECTOMY     • LAPAROSCOPIC ASSISSTED TOTAL COLECTOMY W/ J-POUCH  10/15/2015    Laparoscopic right hemicolectomy with ileotransverse colostomy   • VENOUS ACCESS DEVICE (PORT) INSERTION  01/12/2016    Right internal jugular Mediport       Family History   Family history unknown: Yes       Social History     Socioeconomic History   • Marital status:      Spouse name: Not on file   • Number of children: Not on file   • Years of education: Not on file   • Highest education level: Not on file   Tobacco Use   • Smoking status: Never Smoker   • Smokeless tobacco: Never Used   Substance and Sexual Activity   • Alcohol use: No   • Drug use: No   • Sexual activity: Defer           Objective    Vitals:    09/03/21 0906 09/03/21 1032   BP: (!) 200/93 (!) 189/77   BP Location:  Left arm   Patient Position:  Lying   Pulse: 94 83   Resp: 18 18   Temp: 97.4 °F (36.3 °C)    TempSrc: Temporal    SpO2: 98% 98%   Weight: 53.5 kg (118 lb)    Height: 157.5 cm (62\")        Physical Exam  Vitals and nursing note reviewed.   Constitutional:       General: She is not in acute distress.     Appearance: She is well-developed. She is not diaphoretic.   HENT:      Head: Normocephalic.      Right Ear: External ear normal.      Left Ear: External ear normal.   Eyes:      Conjunctiva/sclera: Conjunctivae normal.   Neck:      Trachea: Trachea normal.   Pulmonary:      Effort: Pulmonary effort is normal. No accessory muscle usage or respiratory distress.   Musculoskeletal:      Right shoulder: Tenderness present. Decreased range of motion.      Right upper arm: Tenderness present.      Right forearm: Tenderness present. "   Skin:     General: Skin is warm and dry.      Capillary Refill: Capillary refill takes less than 2 seconds.      Findings: Abrasion present.          Neurological:      Mental Status: She is alert.   Psychiatric:         Behavior: Behavior normal.         Procedures           ED Course      Results for orders placed or performed during the hospital encounter of 09/03/21   Basic Metabolic Panel    Specimen: Blood   Result Value Ref Range    Glucose 306 (H) 65 - 99 mg/dL    BUN 18 8 - 23 mg/dL    Creatinine 0.77 0.57 - 1.00 mg/dL    Sodium 130 (L) 136 - 145 mmol/L    Potassium 3.9 3.5 - 5.2 mmol/L    Chloride 93 (L) 98 - 107 mmol/L    CO2 26.0 22.0 - 29.0 mmol/L    Calcium 9.2 8.6 - 10.5 mg/dL    eGFR Non African Amer 72 >60 mL/min/1.73    BUN/Creatinine Ratio 23.4 7.0 - 25.0    Anion Gap 11.0 5.0 - 15.0 mmol/L   aPTT    Specimen: Blood   Result Value Ref Range    PTT 26.7 20.0 - 40.3 seconds   Protime-INR    Specimen: Blood   Result Value Ref Range    Protime 12.0 11.1 - 15.3 Seconds    INR 0.89 0.80 - 1.20   CBC Auto Differential    Specimen: Blood   Result Value Ref Range    WBC 13.26 (H) 3.40 - 10.80 10*3/mm3    RBC 4.98 3.77 - 5.28 10*6/mm3    Hemoglobin 15.4 12.0 - 15.9 g/dL    Hematocrit 46.8 (H) 34.0 - 46.6 %    MCV 94.0 79.0 - 97.0 fL    MCH 30.9 26.6 - 33.0 pg    MCHC 32.9 31.5 - 35.7 g/dL    RDW 12.0 (L) 12.3 - 15.4 %    RDW-SD 41.8 37.0 - 54.0 fl    MPV 9.9 6.0 - 12.0 fL    Platelets 219 140 - 450 10*3/mm3    Neutrophil % 89.7 (H) 42.7 - 76.0 %    Lymphocyte % 5.7 (L) 19.6 - 45.3 %    Monocyte % 3.5 (L) 5.0 - 12.0 %    Eosinophil % 0.4 0.3 - 6.2 %    Basophil % 0.4 0.0 - 1.5 %    Immature Grans % 0.3 0.0 - 0.5 %    Neutrophils, Absolute 11.89 (H) 1.70 - 7.00 10*3/mm3    Lymphocytes, Absolute 0.76 0.70 - 3.10 10*3/mm3    Monocytes, Absolute 0.47 0.10 - 0.90 10*3/mm3    Eosinophils, Absolute 0.05 0.00 - 0.40 10*3/mm3    Basophils, Absolute 0.05 0.00 - 0.20 10*3/mm3    Immature Grans, Absolute 0.04 0.00 -  0.05 10*3/mm3    nRBC 0.0 0.0 - 0.2 /100 WBC   Gold Top - SST   Result Value Ref Range    Extra Tube Hold for add-ons.      XR Shoulder 2+ View Right   Final Result   Minimally displaced fracture of the right humeral head greater   tuberosity.            Electronically signed by:  Luis Carlos Castillo MD  9/3/2021   10:30 AM CDT Workstation: [id number]      XR Humerus Right   Final Result   Fracture of the right humeral head greater tuberosity.            Electronically signed by:  Luis Carlos Castillo MD  9/3/2021   10:25 AM CDT Workstation: [id number]      XR Forearm 2 View Right   Final Result   No acute osseous abnormality.      Right wrist osteoarthritis.      Electronically signed by:  Luis Carlos Castillo MD  9/3/2021   10:27 AM CDT Workstation: [id number]              MDM  Number of Diagnoses or Management Options  Closed displaced fracture of greater tuberosity of right humerus, initial encounter: new and requires workup  Diagnosis management comments: Vital signs are stable, afebrile.  Abrasion cleaned.  Tetanus updated.  X-rays show a minimal displaced right humerus greater tuberosity fracture.  Spoke with Ortho, recommended sling and follow-up in clinic.  Results of the emergency department evaluation shared. Recommend primary care and Ortho follow-up. Return precautions provided.      Final diagnoses:   Closed displaced fracture of greater tuberosity of right humerus, initial encounter       ED Disposition  ED Disposition     ED Disposition Condition Comment    Discharge Stable           Gurvinder Todd MD  [address] [facility] [address] [phone]    Schedule an appointment as soon as possible for a visit in 2 days  ER follow up    [facility]  [address]  [facility] [address] [phone]  Schedule an appointment as soon as possible for a visit in 2 days  ER follow up         Medication List      New Prescriptions     HYDROcodone-acetaminophen 7.5-325 MG per tablet  Commonly known as: NORCO  Take 1 tablet by mouth Every 6 (Six) Hours As Needed for Moderate Pain  for up to 12 days.           Where to Get Your Medications      These medications were sent to [id number] - [facility] [address] [facility] [address] - [phone]  - [phone]   [address] [address] [address]    Phone: [phone]   · HYDROcodone-acetaminophen 7.5-325 MG per tablet          Vernon Garner MD  09/03/21 2318

## 2021-09-04 ENCOUNTER — HOSPITAL ENCOUNTER (INPATIENT)
Facility: HOSPITAL | Age: 81
LOS: 5 days | Discharge: NURSING, REHAB OR LONG-TERM CARE | End: 2021-09-09
Attending: PSYCHIATRY & NEUROLOGY | Admitting: PSYCHIATRY & NEUROLOGY

## 2021-09-04 ENCOUNTER — APPOINTMENT (OUTPATIENT)
Dept: CT IMAGING | Facility: HOSPITAL | Age: 81
End: 2021-09-04

## 2021-09-04 VITALS
OXYGEN SATURATION: 99 % | HEART RATE: 85 BPM | DIASTOLIC BLOOD PRESSURE: 60 MMHG | BODY MASS INDEX: 19.66 KG/M2 | HEIGHT: 65 IN | RESPIRATION RATE: 16 BRPM | SYSTOLIC BLOOD PRESSURE: 155 MMHG | WEIGHT: 118 LBS | TEMPERATURE: 98.8 F

## 2021-09-04 DIAGNOSIS — Z79.4 TYPE 2 DIABETES MELLITUS WITHOUT COMPLICATION, WITH LONG-TERM CURRENT USE OF INSULIN (HCC): ICD-10-CM

## 2021-09-04 DIAGNOSIS — Z45.2 ENCOUNTER FOR CARE RELATED TO PORT-A-CATH: ICD-10-CM

## 2021-09-04 DIAGNOSIS — E53.8 B12 DEFICIENCY: ICD-10-CM

## 2021-09-04 DIAGNOSIS — K90.9 MALABSORPTION OF IRON: ICD-10-CM

## 2021-09-04 DIAGNOSIS — C18.2 MALIGNANT NEOPLASM OF ASCENDING COLON (HCC): ICD-10-CM

## 2021-09-04 DIAGNOSIS — Z23 FLU VACCINE NEED: ICD-10-CM

## 2021-09-04 DIAGNOSIS — I10 BENIGN ESSENTIAL HTN: Primary | ICD-10-CM

## 2021-09-04 DIAGNOSIS — F03.90 MAJOR NEUROCOGNITIVE DISORDER (HCC): ICD-10-CM

## 2021-09-04 DIAGNOSIS — Z74.09 IMPAIRED FUNCTIONAL MOBILITY, BALANCE, GAIT, AND ENDURANCE: ICD-10-CM

## 2021-09-04 DIAGNOSIS — Z45.2 ENCOUNTER FOR VENOUS ACCESS DEVICE CARE: ICD-10-CM

## 2021-09-04 DIAGNOSIS — F03.91 DEMENTIA WITH BEHAVIORAL DISTURBANCE, UNSPECIFIED DEMENTIA TYPE: ICD-10-CM

## 2021-09-04 DIAGNOSIS — E11.9 TYPE 2 DIABETES MELLITUS WITHOUT COMPLICATION, WITH LONG-TERM CURRENT USE OF INSULIN (HCC): ICD-10-CM

## 2021-09-04 DIAGNOSIS — E78.5 DYSLIPIDEMIA: ICD-10-CM

## 2021-09-04 DIAGNOSIS — K86.2 CYST OF PANCREAS: ICD-10-CM

## 2021-09-04 DIAGNOSIS — D50.8 OTHER IRON DEFICIENCY ANEMIA: ICD-10-CM

## 2021-09-04 PROBLEM — F03.918 DEMENTIA WITH BEHAVIORAL DISTURBANCE (HCC): Status: ACTIVE | Noted: 2021-09-04

## 2021-09-04 LAB — GLUCOSE BLDC GLUCOMTR-MCNC: 254 MG/DL (ref 70–130)

## 2021-09-04 PROCEDURE — 63710000001 INSULIN ASPART PER 5 UNITS: Performed by: NURSE PRACTITIONER

## 2021-09-04 PROCEDURE — 97760 ORTHOTIC MGMT&TRAING 1ST ENC: CPT

## 2021-09-04 PROCEDURE — 97162 PT EVAL MOD COMPLEX 30 MIN: CPT

## 2021-09-04 PROCEDURE — 99223 1ST HOSP IP/OBS HIGH 75: CPT | Performed by: PSYCHIATRY & NEUROLOGY

## 2021-09-04 PROCEDURE — 82962 GLUCOSE BLOOD TEST: CPT

## 2021-09-04 PROCEDURE — 25010000002 LORAZEPAM PER 2 MG: Performed by: PSYCHIATRY & NEUROLOGY

## 2021-09-04 PROCEDURE — 63710000001 INSULIN DETEMIR PER 5 UNITS: Performed by: PSYCHIATRY & NEUROLOGY

## 2021-09-04 PROCEDURE — 70450 CT HEAD/BRAIN W/O DYE: CPT

## 2021-09-04 RX ORDER — OMEPRAZOLE 20 MG/1
20 CAPSULE, DELAYED RELEASE ORAL
Status: DISCONTINUED | OUTPATIENT
Start: 2021-09-04 | End: 2021-09-04 | Stop reason: ALTCHOICE

## 2021-09-04 RX ORDER — GRANULES FOR ORAL 3 G/1
3 POWDER ORAL ONCE
Status: COMPLETED | OUTPATIENT
Start: 2021-09-04 | End: 2021-09-04

## 2021-09-04 RX ORDER — LISINOPRIL 20 MG/1
20 TABLET ORAL DAILY
Status: DISCONTINUED | OUTPATIENT
Start: 2021-09-04 | End: 2021-09-09 | Stop reason: HOSPADM

## 2021-09-04 RX ORDER — CLONIDINE HYDROCHLORIDE 0.1 MG/1
0.1 TABLET ORAL EVERY 4 HOURS PRN
Status: DISCONTINUED | OUTPATIENT
Start: 2021-09-04 | End: 2021-09-09 | Stop reason: HOSPADM

## 2021-09-04 RX ORDER — ACETAMINOPHEN 500 MG
500 TABLET ORAL 3 TIMES DAILY
Status: DISCONTINUED | OUTPATIENT
Start: 2021-09-04 | End: 2021-09-09 | Stop reason: HOSPADM

## 2021-09-04 RX ORDER — DEXTROSE MONOHYDRATE 25 G/50ML
25 INJECTION, SOLUTION INTRAVENOUS
Status: DISCONTINUED | OUTPATIENT
Start: 2021-09-04 | End: 2021-09-09 | Stop reason: HOSPADM

## 2021-09-04 RX ORDER — ALUMINA, MAGNESIA, AND SIMETHICONE 2400; 2400; 240 MG/30ML; MG/30ML; MG/30ML
15 SUSPENSION ORAL EVERY 6 HOURS PRN
Status: DISCONTINUED | OUTPATIENT
Start: 2021-09-04 | End: 2021-09-09 | Stop reason: HOSPADM

## 2021-09-04 RX ORDER — DORZOLAMIDE HYDROCHLORIDE AND TIMOLOL MALEATE 20; 5 MG/ML; MG/ML
1 SOLUTION/ DROPS OPHTHALMIC 2 TIMES DAILY
Status: DISCONTINUED | OUTPATIENT
Start: 2021-09-04 | End: 2021-09-09 | Stop reason: HOSPADM

## 2021-09-04 RX ORDER — PANTOPRAZOLE SODIUM 40 MG/1
40 TABLET, DELAYED RELEASE ORAL EVERY MORNING
Status: DISCONTINUED | OUTPATIENT
Start: 2021-09-04 | End: 2021-09-09 | Stop reason: HOSPADM

## 2021-09-04 RX ORDER — ACETAMINOPHEN 325 MG/1
650 TABLET ORAL EVERY 4 HOURS PRN
Status: DISCONTINUED | OUTPATIENT
Start: 2021-09-04 | End: 2021-09-09 | Stop reason: HOSPADM

## 2021-09-04 RX ORDER — NICOTINE POLACRILEX 4 MG
15 LOZENGE BUCCAL
Status: DISCONTINUED | OUTPATIENT
Start: 2021-09-04 | End: 2021-09-09 | Stop reason: HOSPADM

## 2021-09-04 RX ORDER — LORAZEPAM 1 MG/1
1 TABLET ORAL EVERY 6 HOURS PRN
Status: DISCONTINUED | OUTPATIENT
Start: 2021-09-04 | End: 2021-09-09 | Stop reason: HOSPADM

## 2021-09-04 RX ORDER — LORAZEPAM 2 MG/ML
1 INJECTION INTRAMUSCULAR EVERY 6 HOURS PRN
Status: DISCONTINUED | OUTPATIENT
Start: 2021-09-04 | End: 2021-09-09 | Stop reason: HOSPADM

## 2021-09-04 RX ORDER — LOPERAMIDE HYDROCHLORIDE 2 MG/1
2 CAPSULE ORAL
Status: DISCONTINUED | OUTPATIENT
Start: 2021-09-04 | End: 2021-09-09 | Stop reason: HOSPADM

## 2021-09-04 RX ADMIN — METFORMIN HYDROCHLORIDE 500 MG: 500 TABLET ORAL at 10:15

## 2021-09-04 RX ADMIN — CANAGLIFLOZIN 100 MG: 100 TABLET, FILM COATED ORAL at 10:15

## 2021-09-04 RX ADMIN — ACETAMINOPHEN 500 MG: 500 TABLET, FILM COATED ORAL at 17:00

## 2021-09-04 RX ADMIN — INSULIN ASPART 8 UNITS: 100 INJECTION, SOLUTION INTRAVENOUS; SUBCUTANEOUS at 17:43

## 2021-09-04 RX ADMIN — PANTOPRAZOLE SODIUM 40 MG: 40 TABLET, DELAYED RELEASE ORAL at 10:15

## 2021-09-04 RX ADMIN — DORZOLAMIDE HYDROCHLORIDE AND TIMOLOL MALEATE 1 DROP: 20; 5 SOLUTION/ DROPS OPHTHALMIC at 21:15

## 2021-09-04 RX ADMIN — DORZOLAMIDE HYDROCHLORIDE AND TIMOLOL MALEATE 1 DROP: 20; 5 SOLUTION/ DROPS OPHTHALMIC at 10:15

## 2021-09-04 RX ADMIN — INSULIN DETEMIR 10 UNITS: 100 INJECTION, SOLUTION SUBCUTANEOUS at 20:28

## 2021-09-04 RX ADMIN — LORAZEPAM 1 MG: 2 INJECTION INTRAMUSCULAR; INTRAVENOUS at 04:14

## 2021-09-04 RX ADMIN — LISINOPRIL 20 MG: 20 TABLET ORAL at 10:15

## 2021-09-04 RX ADMIN — METFORMIN HYDROCHLORIDE 500 MG: 500 TABLET ORAL at 17:00

## 2021-09-04 RX ADMIN — FOSFOMYCIN TROMETHAMINE 3 G: 3 POWDER ORAL at 15:54

## 2021-09-04 NOTE — CASE MANAGEMENT/SOCIAL WORK
is in room with another pt. At this time. I called [facility] just to see if they have any david-psych beds available. I spoke with Marzena Cho and she said a consult can be placed, however, they don't have staff for a 1:1 tonight and pt would require that since she has a sling on her arm .

## 2021-09-04 NOTE — CASE MANAGEMENT/SOCIAL WORK
d/w me pt needs NHP and pt's daughter is with her in the ER. He said pt's  told them that [facility] had already accepted pt. I called Patricia adm coor at [facility] and d/w her. She said pt's  toured facility a month ago and today d/w them she is refusing her medications and is being aggressive towards . Patricia said she explained to pt's family that pt would need a psych eval and medications to control her aggression for the safety of other residents at their facility. Patricia said they don't take pt's on the weekends and this is a holiday weekend and it would still have to be reviewed and if they did accept pt the earliest it would be is Tuesday or Wednesday. I will d/w .

## 2021-09-04 NOTE — ED PROVIDER NOTES
Subjective   Patient presents to the emergency department today with increased confusion for the past month.  Much of the patient's history was obtained from her daughter, who is present at bedside.  She states that patient has been diagnosed with Alzheimer's dementia.  She had a fall this morning sustaining a fracture to her right humerus and a skin tear.  The patient's daughter states that patient has not been taking her home medications, she is not taking care of herself and refuses care from her , and has been having a decline in her ADLs for the past 4 months.  Family is seeking nursing home care.  The patient's  has contacted [facility], and the nursing home states that patient needs a psychiatric evaluation prior to them admitting the patient to their nursing home.      Psychiatric Evaluation  Associated symptoms: myalgias    Associated symptoms: no abdominal pain, no chest pain, no congestion, no cough, no diarrhea, no ear pain, no fatigue, no fever, no headaches, no nausea, no rash, no rhinorrhea, no shortness of breath, no sore throat, no vomiting and no wheezing        Review of Systems   Constitutional: Negative for appetite change, chills, diaphoresis, fatigue and fever.   HENT: Negative for congestion, ear discharge, ear pain, nosebleeds, rhinorrhea, sinus pressure, sore throat and trouble swallowing.    Eyes: Negative for discharge and redness.   Respiratory: Negative for apnea, cough, chest tightness, shortness of breath and wheezing.    Cardiovascular: Negative for chest pain.   Gastrointestinal: Negative for abdominal pain, diarrhea, nausea and vomiting.   Endocrine: Negative for polyuria.   Genitourinary: Negative for dysuria, frequency and urgency.   Musculoskeletal: Positive for myalgias. Negative for neck pain.   Skin: Negative for color change and rash.   Allergic/Immunologic: Negative for immunocompromised state.   Neurological: Negative for dizziness, seizures,  syncope, weakness, light-headedness and headaches.   Hematological: Negative for adenopathy. Does not bruise/bleed easily.   Psychiatric/Behavioral: Negative for behavioral problems and confusion.   All other systems reviewed and are negative.      Past Medical History:   Diagnosis Date   • Acid reflux    • Diabetes mellitus (CMS/HCC)    • Hypertension    • Malignant neoplasm of ascending colon (CMS/HCC) 11/1/2016   • Malignant tumor of cecum (CMS/HCC)        Allergies   Allergen Reactions   • Other Rash     Oral Chemo Meds       Past Surgical History:   Procedure Laterality Date   • CAPSULE ENDOSCOPY N/A 6/22/2017    Procedure: CAPSULE ENDOSCOPY M2A;  Surgeon: Stuart Rico DO;  Location: [facility] ENDOSCOPY;  Service:    • CAPSULE ENDOSCOPY N/A 1/23/2018    Procedure: CAPSULE ENDOSCOPY M2A;  Surgeon: Stuart Rico DO;  Location: [facility];  Service:    • CENTRAL VENOUS LINE INSERTION  10/30/2015    Ultrasound localization, left basilic vein.Placement of left upper extremity PICC line   • COLONOSCOPY N/A 1/23/2018    Procedure: COLONOSCOPY;  Surgeon: Stuart Rico DO;  Location: [facility] ENDOSCOPY;  Service:    • ENDOSCOPY N/A 6/22/2017    Procedure: ESOPHAGOGASTRODUODENOSCOPY;  Surgeon: Stuart Rico DO;  Location: [facility] ENDOSCOPY;  Service:    • ENDOSCOPY N/A 1/23/2018    Procedure: ESOPHAGOGASTRODUODENOSCOPY;  Surgeon: Stuart Rico DO;  Location: [facility];  Service:    • FRACTURE SURGERY      right femur    • HYSTERECTOMY     • LAPAROSCOPIC ASSISSTED TOTAL COLECTOMY W/ J-POUCH  10/15/2015    Laparoscopic right hemicolectomy with ileotransverse colostomy   • VENOUS ACCESS DEVICE (PORT) INSERTION  01/12/2016    Right internal jugular Mediport       Family History   Family history unknown: Yes       Social History     Socioeconomic History   • Marital status:      Spouse name: Not on file   • Number of children: Not on file   • Years of education: Not on file   • Highest  education level: Not on file   Tobacco Use   • Smoking status: Never Smoker   • Smokeless tobacco: Never Used   Substance and Sexual Activity   • Alcohol use: No   • Drug use: No   • Sexual activity: Defer           Objective   Physical Exam  Vitals and nursing note reviewed.   Constitutional:       Appearance: She is well-developed.   HENT:      Head: Normocephalic and atraumatic.      Nose: Nose normal.   Eyes:      General: No scleral icterus.        Right eye: No discharge.         Left eye: No discharge.      Conjunctiva/sclera: Conjunctivae normal.      Pupils: Pupils are equal, round, and reactive to light.   Neck:      Trachea: No tracheal deviation.   Cardiovascular:      Rate and Rhythm: Normal rate and regular rhythm.      Heart sounds: Normal heart sounds. No murmur heard.     Pulmonary:      Effort: Pulmonary effort is normal. No respiratory distress.      Breath sounds: Normal breath sounds. No stridor. No wheezing or rales.   Abdominal:      General: Bowel sounds are normal. There is no distension.      Palpations: Abdomen is soft. There is no mass.      Tenderness: There is no abdominal tenderness. There is no guarding or rebound.   Musculoskeletal:      Cervical back: Normal range of motion and neck supple.   Skin:     General: Skin is warm and dry.      Findings: No erythema or rash.   Neurological:      Mental Status: She is alert.      GCS: GCS eye subscore is 4. GCS verbal subscore is 5. GCS motor subscore is 6.      Coordination: Coordination normal.   Psychiatric:         Attention and Perception: She is inattentive.         Mood and Affect: Affect is blunt.         Speech: Speech is delayed.         Behavior: Behavior is slowed. Behavior is cooperative.         Thought Content: Thought content normal.         Procedures           ED Course  ED Course as of Sep 04 0111   Sat Sep 04, 2021   0111 Patient was seen and evaluated by the behavioral health department.  Will discharge from the  emergency department admit to their services for further evaluation and treatment.    [CB]      ED Course User Index  [CB] Andres Mehta MD             Labs Reviewed   URINALYSIS W/ MICROSCOPIC IF INDICATED (NO CULTURE) - Abnormal; Notable for the following components:       Result Value    Glucose, UA >=1000 mg/dL (3+) (*)     Blood, UA Trace (*)     Protein, UA 30 mg/dL (1+) (*)     All other components within normal limits   URINE DRUG SCREEN - Abnormal; Notable for the following components:    Opiate Screen Positive (*)     All other components within normal limits    Narrative:     Cutoff For Drugs Screened:    Amphetamines               500 ng/ml  Barbiturates               200 ng/ml  Benzodiazepines            150 ng/ml  Cocaine                    150 ng/ml  Methadone                  200 ng/ml  Opiates                    100 ng/ml  Phencyclidine               25 ng/ml  THC                            50 ng/ml  Methamphetamine            500 ng/ml  Tricyclic Antidepressants  300 ng/ml  Oxycodone                  100 ng/ml  Propoxyphene               300 ng/ml  Buprenorphine               10 ng/ml    The normal value for all drugs tested is negative. This report includes unconfirmed screening results, with the cutoff values listed, to be used for medical treatment purposes only.  Unconfirmed results must not be used for non-medical purposes such as employment or legal testing.  Clinical consideration should be applied to any drug of abuse test, particularly when unconfirmed results are used.     URINALYSIS, MICROSCOPIC ONLY - Abnormal; Notable for the following components:    RBC, UA 6-12 (*)     Bacteria, UA Trace (*)     All other components within normal limits   COVID-19 AND FLU A/B, NP SWAB IN TRANSPORT MEDIA 8-12 HR TAT - Normal    Narrative:     Fact sheet for providers: https://www.fda.gov/media/023267/download    Fact sheet for patients: https://www.fda.gov/media/678348/download    Test  performed by PCR.   ACETAMINOPHEN LEVEL - Normal   SALICYLATE LEVEL - Normal   ETHANOL       No orders to display                                       MDM    Final diagnoses:   Dementia due to Parkinson's disease with behavioral disturbance (CMS/[facility])       ED Disposition  ED Disposition     ED Disposition Condition Comment    Discharge to Behavioral Health Stable           No follow-up provider specified.       Medication List      No changes were made to your prescriptions during this visit.          Andres Mehta MD  09/04/21 0111

## 2021-09-04 NOTE — CONSULTS
[facility] ER Medicine Admission      Date of Admission: 9/4/2021      Primary Care Physician: Gurvinder Todd MD      Chief Complaint: No complaints    HPI: This is a 80-year-old female with past medical history of DM 2, HTN and colon cancer that presented to [facility] on 9/4/2021 secondary to a mechanical fall and agitation.  Patient's family states she has been accepted to [facility] but since she is refusing her medications they request a psych eval.    Patient complaint of right arm soreness with limited movement.  Shoulder x-ray indicated a minimally displaced fracture of the right humeral head greater tuberosity.  Patient was placed in a sling in the emergency room.  Spoke with Dr. Escalera (on-call orthopedics) and the patient will need a follow-up x-ray in 1 week with those results sent to him.  She will then need to fall with him in 3 to 4 weeks from the injury for recommendations of therapy.    Concurrent Medical History:  has a past medical history of Acid reflux, Diabetes mellitus (CMS/HCC), Hypertension, Malignant neoplasm of ascending colon (CMS/HCC) (11/1/2016), and Malignant tumor of cecum (CMS/HCC).    Past Surgical History:  has a past surgical history that includes Central venous catheter insertion (10/30/2015); Venous Access Device (Port) (01/12/2016); Laparoscopic assissted total colectomy w/ j-pouch (10/15/2015); Hysterectomy; Fracture surgery; Esophagogastroduodenoscopy (N/A, 6/22/2017); Capsule Endoscopy (N/A, 6/22/2017); Esophagogastroduodenoscopy (N/A, 1/23/2018); Capsule Endoscopy (N/A, 1/23/2018); and Colonoscopy (N/A, 1/23/2018).    Family History: Family history is unknown by patient.    Social History:  reports that she has never smoked. She has never used smokeless tobacco. She reports that she does not drink alcohol and does not use drugs.    Allergies:   Allergies   Allergen Reactions   • Other Rash     Oral Chemo Meds        Medications:   Prior to Admission medications    Medication Sig Start Date End Date Taking? Authorizing Provider   Dorzolamide HCl-Timolol Mal PF 22.3-6.8 MG/ML solution Apply 1 drop to eye 2 (Two) Times a Day.    Tete Monroy MD   Empagliflozin (Jardiance) 25 MG tablet Take 25 mg by mouth Daily.    Tete Monroy MD   FREESTYLE LITE test strip USE TO TEST BLOOD SUGAR ONCE DAILY AS DIRECTED 9/5/16   Tete Monroy MD   gemfibrozil (Lopid) 600 MG tablet Take 600 mg by mouth 2 (Two) Times a Day.    Tete Monroy MD   HYDROcodone-acetaminophen (NORCO) 7.5-325 MG per tablet Take 1 tablet by mouth Every 6 (Six) Hours As Needed for Moderate Pain  for up to 12 days. 9/3/21 9/15/21  Vernon Garner MD   insulin detemir (LEVEMIR) 100 UNIT/ML injection Inject 14 Units under the skin into the appropriate area as directed Every Night.    Tete Monroy MD   lisinopril (PRINIVIL,ZESTRIL) 20 MG tablet Take 20 mg by mouth Daily.    Tete Monroy MD   metFORMIN (GLUCOPHAGE) 1000 MG tablet Take 1,000 mg by mouth 2 (Two) Times a Day With Meals.    Tete Monroy MD   omega-3 acid ethyl esters (LOVAZA) 1 G capsule Take 2 g by mouth Daily.    Tete Monroy MD   Omeprazole (PRILOSEC PO) Take 1 tablet by mouth As Needed.    Tete Monroy MD   Promethazine HCl (PHENERGAN PO) Take  by mouth As Needed.    Tete Monroy MD   vitamin B-12 (CYANOCOBALAMIN) 1000 MCG tablet Take 1 tablet by mouth on Monday, Wednesday and Friday 5/27/20   Duong Hanley MD       Review of Systems:  Review of Systems   Unable to perform ROS: Psychiatric disorder      Otherwise complete ROS is negative except as mentioned above.    Physical Exam:   Temp:  [96.1 °F (35.6 °C)-98.8 °F (37.1 °C)] 96.7 °F (35.9 °C)  Heart Rate:  [] 87  Resp:  [16-18] 16  BP: (145-187)/(60-96) 175/77  Physical Exam  Constitutional:       Appearance: She is well-developed.   HENT:      Head:  Normocephalic and atraumatic.   Eyes:      Pupils: Pupils are equal, round, and reactive to light.   Cardiovascular:      Rate and Rhythm: Normal rate and regular rhythm.   Pulmonary:      Effort: Pulmonary effort is normal.      Breath sounds: Normal breath sounds.   Abdominal:      General: Bowel sounds are normal.      Palpations: Abdomen is soft.   Musculoskeletal:         General: Normal range of motion.      Cervical back: Normal range of motion and neck supple.   Skin:     General: Skin is warm and dry.   Neurological:      Mental Status: She is alert.       CN I: Sense of smell intact  CN II: Visual fields intact  CN III,IV,VI: extraocular movements intact  CN V: Masseter strength and sensation in all three divisions intact  CN VII: Smile and eyelid closure symmetrical  CN VIII: Hearing intact  CN IX and X: Voice and palate movement intact  CN XI: Shoulder shrug intact  CN XII: Tongue protrusion and movement intact      Results Reviewed:  I have personally reviewed current lab, radiology, and data and agree with results.  Lab Results (last 24 hours)     ** No results found for the last 24 hours. **        Imaging Results (Last 24 Hours)     Procedure Component Value Units Date/Time    CT Head Without Contrast [[id number]] Collected: 09/04/21 1212     Updated: 09/04/21 1229    Narrative:        CT Head Without Contrast    History: Worsening memory problems and increasing confusion.  Recent fall. Patient on blood thinner.    Axial scans of the brain were obtained without intravenous  contrast.  Coronal and sagital reconstructions were preformed.    This exam was performed according to our departmental  dose-optimization program, which includes automated exposure  control, adjustment of the mA and/or kV according to patient size  and/or use of iterative reconstruction technique.    DLP: 845.30    Comparison: None    Findings:  Bone windows are unremarkable.  The visualized paranasal sinuses are  unremarkable.    No acute process.  Cerebral and cerebellar atrophy.  Minimal small vessel disease.  No hemorrhage.  No mass.  No abnormal areas of increased attenuation.  No midline shift.  No abnormal extra-axial fluid collections.      Impression:      CONCLUSION:  No acute process.  Cerebral and cerebellar atrophy.  Minimal small vessel disease.    [id number]    Electronically signed by:  Noel Cohen MD  9/4/2021 12:28 PM CDT  Workstation: [id number]            Assessment:  Active Hospital Problems    Diagnosis  POA   • **Major neurocognitive disorder (CMS/HCC) [F01.50]  Unknown   • Dementia with behavioral disturbance (CMS/HCC) [F03.91]  Yes   • Type 2 diabetes mellitus without complication, with long-term current use of insulin (CMS/HCC) [E11.9, Z79.4]  Not Applicable   • Dyslipidemia [E78.5]  Unknown   • Benign essential HTN [I10]  Unknown           Plan:  1.  Dementia with behavioral disturbance: Continue therapy with psychiatry.  2.  Diabetes mellitus, type II: Continue Metformin, Canagliflozin, long-acting insulin and SSI.  3.  Hypertension: Continue home lisinopril.  Clonidine 0.1 mg every 4 hours as needed per parameters.   4.  GERD: Continue Protonix.  5.  Minimally displaced fracture, right humeral head:  Continue Sling.  Limit movement.  Tylenol for pain control.  Repeat xray in one week with results to Dr. Escalera.  Follow up with Dr. Escalera in 3-4 weeks.   6.  Pseudohyponatremia: Try to get blood sugars under tighter control.  7.  Urinary tract infection: Fosfomycin.    We will sign off here.  Please contact the hospitalist services for any further issues.        This document has been electronically signed by FLY Mcintosh on September 4, 2021 14:48 CDT

## 2021-09-04 NOTE — NURSING NOTE
"Behavior   Note any precipitants to event or behavior   Describe level and action of any aggressive behavior or speech and associated interventions.     Anxiety: Decreased concentration  Depression: difficulty concentrating  Pain  pt is unable to state, winces with some pain on right upper arm.  AVH   pt does not appear to be responding to avh  S/I   no  Plan  no  H/I   no  Plan  no    Affect   blunted      Note:  Pt asleep deeply as this nurse came on shift.  Pt was awoken post breakfast, voided in toilet, and came to day area with this nurse willfully in david chair, glasses on.  Pt ate small amount of breakfast, drank her milk, and took medications whole in pudding, but tried to chew them, so will crush medications in future.  Pt has shown some wincing, tenderness in right arm which per xray shows fracture to upper humerus.  Pt hair was extremely matted, and this nurse was able to remove matting.  During conversation, pt is very confused.  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  Pt also denies having a , \"never ,\" and \"I have no children.\"  Pt only occasionally answers to her name.  When asked, pt could not tell this nurse any part of her name.  Pt has remained at table with this nurse, at times sleeping.  Will continue to monitor for safety.      Intervention    PRN medication utilized:  no    Instructed in medication usage and effects  Medications administered as ordered  Encouraged to verbalize needs      Response    Verbalized understanding   Did patient take medications as ordered yes   Did patient interact with assessment?  yes     Plan    Will monitor for safety  Will monitor every 15 minutes as ordered  Will evaluate and promote the plan of care    Last BM:  unknown date  (Please chart in I/O as well)  "

## 2021-09-04 NOTE — PLAN OF CARE
Goal Outcome Evaluation:  Plan of Care Reviewed With: patient  Patient Agreement with Plan of Care: unable to participate     Progress: no change  Outcome Summary: new admit

## 2021-09-04 NOTE — NURSING NOTE
This nurse attempted to evaluate patient, per Dr. Huggins, and she would not answer questions or engage in any conversation. Patient's  is present and states that he is unable to care for her anymore, so as a last resort he brought her here to the ER. Patient resides at home with her  and it is reported by him and her daughter that she becomes verbally aggressive towards  when he tries to care for her, she is refusing her medications, and will not allow him to bathe her.  states that it has been several weeks since she has allowed him to give her a full bath. Patient's  has contacted [facility] and they told him they could not accept her until Tuesday and that they needed a [facility] evaluation first.  states that normally patient does ok walking around on her own, but his morning she fell at home and has fractured her right shoulder. Patient has multiple skin tears on her right arm that are covered with gauze and wrapped with bandage.     Notified Dr. Huggins of evaluation and patient's condition. She will be admitted to [facility] geriatric unit with routine orders.

## 2021-09-04 NOTE — THERAPY EVALUATION
Patient Name: Mary Gutierrez  : 1940    MRN: [id number]                              Today's Date: 2021       Admit Date: 2021    Visit Dx:     ICD-10-CM ICD-9-CM   1. Benign essential HTN  I10 401.1   2. Dyslipidemia  E78.5 272.4   3. Type 2 diabetes mellitus without complication, with long-term current use of insulin (CMS/HCC)  E11.9 250.00    Z79.4 V58.67   4. Major neurocognitive disorder (CMS/HCC)  F01.50 294.20   5. Dementia with behavioral disturbance, unspecified dementia type (CMS/HCC)  F03.91 294.21   6. B12 deficiency  E53.8 266.2   7. Cyst of pancreas  K86.2 577.2   8. Encounter for care related to Port-a-Cath  Z45.2 V58.81   9. Malabsorption of iron  K90.9 579.8   10. Flu vaccine need  Z23 V04.81   11. Other iron deficiency anemia  D50.8 280.8   12. Encounter for venous access device care  Z45.2 V58.81   13. Malignant neoplasm of ascending colon (CMS/HCC)  C18.2 153.6   14. Impaired functional mobility, balance, gait, and endurance  Z74.09 V49.89     Patient Active Problem List   Diagnosis   • Malignant neoplasm of ascending colon (CMS/HCC)   • Encounter for venous access device care   • Anemia   • Flu vaccine need   • Malabsorption of iron   • Encounter for care related to Port-a-Cath   • Cyst of pancreas   • B12 deficiency   • Dementia with behavioral disturbance (CMS/HCC)   • Major neurocognitive disorder (CMS/HCC)   • Type 2 diabetes mellitus without complication, with long-term current use of insulin (CMS/HCC)   • Dyslipidemia   • Benign essential HTN     Past Medical History:   Diagnosis Date   • Acid reflux    • Diabetes mellitus (CMS/HCC)    • Hypertension    • Malignant neoplasm of ascending colon (CMS/HCC) 2016   • Malignant tumor of cecum (CMS/HCC)      Past Surgical History:   Procedure Laterality Date   • CAPSULE ENDOSCOPY N/A 2017    Procedure: CAPSULE ENDOSCOPY M2A;  Surgeon: Stuart Rico DO;  Location: [facility] ENDOSCOPY;  Service:    • CAPSULE  ENDOSCOPY N/A 1/23/2018    Procedure: CAPSULE ENDOSCOPY M2A;  Surgeon: Stuart Rico DO;  Location: [facility] ENDOSCOPY;  Service:    • CENTRAL VENOUS LINE INSERTION  10/30/2015    Ultrasound localization, left basilic vein.Placement of left upper extremity PICC line   • COLONOSCOPY N/A 1/23/2018    Procedure: COLONOSCOPY;  Surgeon: Stuart Rico DO;  Location: [facility] ENDOSCOPY;  Service:    • ENDOSCOPY N/A 6/22/2017    Procedure: ESOPHAGOGASTRODUODENOSCOPY;  Surgeon: Stuart Rico DO;  Location: [facility] ENDOSCOPY;  Service:    • ENDOSCOPY N/A 1/23/2018    Procedure: ESOPHAGOGASTRODUODENOSCOPY;  Surgeon: Sutart Rico DO;  Location: [facility] ENDOSCOPY;  Service:    • FRACTURE SURGERY      right femur    • HYSTERECTOMY     • LAPAROSCOPIC ASSISSTED TOTAL COLECTOMY W/ J-POUCH  10/15/2015    Laparoscopic right hemicolectomy with ileotransverse colostomy   • VENOUS ACCESS DEVICE (PORT) INSERTION  01/12/2016    Right internal jugular Mediport     General Information     Row Name 09/04/21 1545          Physical Therapy Time and Intention    Document Type  evaluation  -LR     Mode of Treatment  individual therapy;physical therapy  -LR     Row Name 09/04/21 1545          General Information    Patient Profile Reviewed  yes  -LR     Prior Level of Function  -- Unable to obtain PLOF, Patient had been living at home with  who was attempting to take care of patient  -LR     Existing Precautions/Restrictions  fall  -LR     Barriers to Rehab  medically complex;cognitive status;hearing deficit  -LR     Row Name 09/04/21 1545          Living Environment    Lives With  spouse  -LR     Row Name 09/04/21 1545          Cognition    Orientation Status (Cognition)  disoriented to;person;situation;place;time  -LR     Row Name 09/04/21 1541          Safety Issues, Functional Mobility    Safety Issues Affecting Function (Mobility)  ability to follow commands;at risk behavior observed;awareness of need for  assistance;positioning of assistive device;insight into deficits/self-awareness;safety precautions follow-through/compliance;safety precaution awareness  -LR     Impairments Affecting Function (Mobility)  balance;coordination;endurance/activity tolerance;strength;pain  -LR       User Key  (r) = Recorded By, (t) = Taken By, (c) = Cosigned By    Initials Name Provider Type    LR Sang Lyons Physical Therapist        Mobility     Row Name 09/04/21 1545          Bed Mobility    Bed Mobility  sit-supine  -LR     Sit-Supine [place] (Bed Mobility)  minimum assist (75% patient effort)  -LR     Row Name 09/04/21 1545          Sit-Stand Transfer    Sit-Stand [place] (Transfers)  minimum assist (75% patient effort)  -LR     Row Name 09/04/21 1545          Gait/Stairs (Locomotion)    [place] Level (Gait)  minimum assist (75% patient effort)  -LR     Assistive Device (Gait)  -- HHA  -LR     Distance in Feet (Gait)  45'x1  -LR     Deviations/Abnormal Patterns (Gait)  festinating/shuffling;feliberto decreased;base of support, narrow;gait speed decreased  -LR     Bilateral Gait Deviations  forward flexed posture  -LR       User Key  (r) = Recorded By, (t) = Taken By, (c) = Cosigned By    Initials Name Provider Type    LR Sang Lyons Physical Therapist        Obj/Interventions     Row Name 09/04/21 1545          Range of Motion Comprehensive    General Range of Motion  no range of motion deficits identified  -     Row Name 09/04/21 1545          Strength Comprehensive (MMT)    Comment, General Manual Muscle Testing (MMT) Assessment  BLE grossly 4-/5  -LR     Row Name 09/04/21 1545          Sensory Assessment (Somatosensory)    Sensory Assessment (Somatosensory)  unable/difficult to assess  -LR       User Key  (r) = Recorded By, (t) = Taken By, (c) = Cosigned By    Initials Name Provider Type    Sang Nunez Physical Therapist        Goals/Plan     Row Name 09/04/21 1545          Bed Mobility Goal 1 (PT)     Activity/Assistive Device (Bed Mobility Goal 1, PT)  sit to supine;supine to sit  -LR     [place] Level/Cues Needed (Bed Mobility Goal 1, PT)  contact guard assist  -LR     Time Frame (Bed Mobility Goal 1, PT)  by discharge  -LR     Progress/Outcomes (Bed Mobility Goal 1, PT)  goal not met  -LR     Row Name 09/04/21 1545          Transfer Goal 1 (PT)    Activity/Assistive Device (Transfer Goal 1, PT)  sit-to-stand/stand-to-sit;bed-to-chair/chair-to-bed  -LR     [place] Level/Cues Needed (Transfer Goal 1, PT)  standby assist;verbal cues required  -LR     Time Frame (Transfer Goal 1, PT)  by discharge  -LR     Strategies/Barriers (Transfers Goal 1, PT)  150'x1  -LR     Progress/Outcome (Transfer Goal 1, PT)  goal not met  -LR     Row Name 09/04/21 1545          Gait Training Goal 1 (PT)    Activity/Assistive Device (Gait Training Goal 1, PT)  gait (walking locomotion);assistive device use  -LR     [place] Level (Gait Training Goal 1, PT)  standby assist;verbal cues required  -LR     Distance (Gait Training Goal 1, PT)  150'x1  -LR     Time Frame (Gait Training Goal 1, PT)  by discharge  -LR     Progress/Outcome (Gait Training Goal 1, PT)  goal not met  -LR       User Key  (r) = Recorded By, (t) = Taken By, (c) = Cosigned By    Initials Name Provider Type    LR Sang Lyons Physical Therapist        Clinical Impression     Row Name 09/04/21 1545          Pain    Additional Documentation  Pain Scale: Numbers Pre/Post-Treatment (Group);Pain Scale: FACES Pre/Post-Treatment (Group)  -LR     Row Name 09/04/21 1545          Pain Scale: FACES Pre/Post-Treatment    Pain: FACES Scale, Pretreatment  6-->hurts even more  -LR     Posttreatment Pain Rating  4-->hurts little more  -LR     Pain Location - Side  Right  -LR     Pain Location  shoulder  -LR     Row Name 09/04/21 1544          Plan of Care Review    Plan of Care Reviewed With  patient  -LR     Outcome Summary  PT eval completed on this date. PT  fitted patient with shoulder immobilizer for the R shoulder to help protect R greater tuberosity fx. Awaiting follow-up ortho recommendations. Bed mobility: MinAx1 for supine>sit transfer Transfers: Rod for sit<>stand transfer Gait: Rod for ambulation 45'x1 with HHA. Patient with festinating gait pattern, decrease step length, and moderate unsteainess. Patient would benefit from skilled PT to decrease fall risk, improve gait mechanics, and improve activity tolerance.  -LR     Row Name 09/04/21 1544          Therapy Assessment/Plan (PT)    Patient/Family Therapy Goals Statement (PT)  Patient unable to participate in goal setting.  -LR     Rehab Potential (PT)  fair, will monitor progress closely  -LR     Criteria for Skilled Interventions Met (PT)  yes;meets criteria;skilled treatment is necessary  -LR     Predicted Duration of Therapy Intervention (PT)  until d/c or until all goals met  -LR     Row Name 09/04/21 1544          Vital Signs    Pretreatment Heart Rate (beats/min)  109  -LR     Posttreatment Heart Rate (beats/min)  110  -LR     Pre SpO2 (%)  93  -LR     Post SpO2 (%)  94  -LR     Pre Patient Position  Sitting  -LR     Post Patient Position  Standing  -LR     Row Name 09/04/21 154          Positioning and Restraints    Pre-Treatment Position  sitting in chair/recliner  -LR     Post Treatment Position  bed  -LR     In Bed  notified nsg;supine;with nsg  -LR       User Key  (r) = Recorded By, (t) = Taken By, (c) = Cosigned By    Initials Name Provider Type    LR Sang Lyons Physical Therapist        Outcome Measures     Row Name 09/04/21 5290          How much help from another person do you currently need...    Turning from your back to your side while in flat bed without using bedrails?  3  -LR     Moving from lying on back to sitting on the side of a flat bed without bedrails?  3  -LR     Moving to and from a bed to a chair (including a wheelchair)?  3  -LR     Standing up from a chair using your  arms (e.g., wheelchair, bedside chair)?  3  -LR     Climbing 3-5 steps with a railing?  3  -LR     To walk in hospital room?  3  -LR     AM-PAC 6 Clicks Score (PT)  18  -LR     Row Name 09/04/21 1545          Functional Assessment    Outcome Measure Options  AM-PAC 6 Clicks Basic Mobility (PT)  -LR       User Key  (r) = Recorded By, (t) = Taken By, (c) = Cosigned By    Initials Name Provider Type    LR Sang Lyons Physical Therapist                       Physical Therapy Education                 Title: PT OT SLP Therapies (Done)     Topic: Physical Therapy (Done)     Point: Mobility training (Done)     Learning Progress Summary           Patient Acceptance, E,TB, VU by LR at 9/4/2021 1646    Comment: Educate on PT POC and goals.                   Point: Home exercise program (Done)     Learning Progress Summary           Patient Acceptance, E,TB, VU by LR at 9/4/2021 1646    Comment: Educate on PT POC and goals.                   Point: Body mechanics (Done)     Learning Progress Summary           Patient Acceptance, E,TB, VU by LR at 9/4/2021 1646    Comment: Educate on PT POC and goals.                   Point: Precautions (Done)     Learning Progress Summary           Patient Acceptance, E,TB, VU by LR at 9/4/2021 1646    Comment: Educate on PT POC and goals.                               User Key     Initials Effective Dates Name Provider Type Discipline    LR 06/16/21 -  Sang Lyons Physical Therapist PT              PT Recommendation and Plan  Planned Therapy Interventions (PT): balance training, motor coordination training, wheelchair management/propulsion training, ROM (range of motion), neuromuscular re-education, bed mobility training, gait training, home exercise program, joint mobilization, lumbar stabilization, manual therapy techniques, postural re-education, patient/family education, orthotic fitting/training, stair training, strengthening, transfer training, stretching  Plan of Care Reviewed  With: patient  Outcome Summary: PT eval completed on this date. PT fitted patient with shoulder immobilizer for the R shoulder to help protect R greater tuberosity fx. Awaiting follow-up ortho recommendations. Bed mobility: MinAx1 for supine>sit transfer Transfers: Rod for sit<>stand transfer Gait: Rod for ambulation 45'x1 with HHA. Patient with festinating gait pattern, decrease step length, and moderate unsteainess. Patient would benefit from skilled PT to decrease fall risk, improve gait mechanics, and improve activity tolerance.     Time Calculation:   PT Charges     Row Name 09/04/21 1637             Time Calculation    Start Time  1545  -LR      Stop Time  1625  -LR      Time Calculation (min)  40 min  -LR      PT Received On  09/04/21  -LR      PT Goal Re-Cert Due Date  09/17/21  -LR         Timed Charges    74388 - PT Initial Orthotic Encounter  25  -LR         Untimed Charges    PT Eval/Re-eval Minutes  15  -LR         Total Minutes    Timed Charges Total Minutes  25  -LR      Untimed Charges Total Minutes  15  -LR       Total Minutes  40  -LR        User Key  (r) = Recorded By, (t) = Taken By, (c) = Cosigned By    Initials Name Provider Type    LR Sang Lyons Physical Therapist        Therapy Charges for Today     Code Description Service Date Service Provider Modifiers Qty    54478239002 HC ORTHOTIC(S) MGMT/TRAIN INITIAL ENCOUNTER, EACH 15MIN 9/4/2021 Sang Lyons GP 2    77697727765 HC PT EVAL MOD COMPLEXITY 1 9/4/2021 Sang Lyons GP 1          PT G-Codes  Outcome Measure Options: AM-PAC 6 Clicks Basic Mobility (PT)  AM-PAC 6 Clicks Score (PT): 18    Sang Lyons  9/4/2021

## 2021-09-04 NOTE — H&P
"Psychiatric & Behavioral Health History & Physical  9/4/2021    --> Source of History: chart review and the patient; staff    --> Chief Complaint: Dementia Related Behavioral Issues and Physical Aggression   --> \"I need to get up...\"    History of Present Illness:  Ms. Mary Gutierrez is a 80 y.o. female with a concurrent neuropsychiatric history notable for Alzheimer's disease.      Presents with dementia related behavioral issues. Onset of symptoms was gradual starting unknown months ago.  Symptoms have been present on an increasingly more frequent basis. Symptoms are associated with agitation and irritability.  Symptoms are aggravated by problems with health.   Symptoms improve with supportive care.  Patient's symptom severity is severe.  Patient's symptoms occur in the context of ongoing illness.    Patient is seen in her room with RN staff today.  She is unable to tell me her age but she does respond to her name.  She cannot tell me her location or time including year, or situation.  She denies feeling depressed or anxious.  She cannot tell me whether she is .  She is very focused on getting Out of bed but appears too weak to be able to lift herself up.  With help of nursing staff she is able to do so.  Attempt review history of falls but has no recall of these events.    She was seen twice yesterday in the emergency room.  First was for a fall hitting her shoulder seen by Dr. Garner.  Secondly seen by Dr. Mehta; per his note:  Patient presents to the emergency department today with increased confusion for the past month.  Much of the patient's history was obtained from her daughter, who is present at bedside.  She states that patient has been diagnosed with Alzheimer's dementia.  She had a fall this morning sustaining a fracture to her right humerus and a skin tear.  The patient's daughter states that patient has not been taking her home medications, she is not taking care of herself and " refuses care from her , and has been having a decline in her ADLs for the past 4 months.  Family is seeking nursing home care.  The patient's  has contacted [facility], and the nursing home states that patient needs a psychiatric evaluation prior to them admitting the patient to their nursing home.    The only contact information listed in chart is for the patient's , Chava at [phone] and mobile number [phone].  Per her :  --Concern for dementia; no formal dx  --Memory concerns for a year or more  --Slow and gradual  --Hygiene has went down; he cannot get her to bathe for a least the last 1-2 months; won't comb her hair.   --Refuses to take medication  --Will recognize ; does not recognize all her kids (one out of five)  --No neuro or psych hx  --hx of chemo for colon cancer, 6 months afterwards she became more forgetful  -- is 84 y/o, been together for 63 yrs  --verbally aggressive with   --paranoid with , distrusting, new onset  --concern for [facility] of persons outside  --no tremors  --falls at home; when walking; not when transferin  --had talked to [facility]  --needs help with ALDs      Psychiatric Review Of Systems:  --Confusion, agitation, as above.      Concurrent Psychiatric History:  --Past neuropsychiatric history:  • None formal    --Psychiatric Hospitalizations: none per family      --Suicide Attempts: none per family    --Prior Treatment:  --Outpatient: none formal    --Prior Medications Trials:  • None noted/reported    --History of violence or legal issues:   Denies significant history of legal issues.    -Abuse/Trauma/Neglect/Exploitation: none reported      Substance Use:   --No Nicotine, A&D use.       Social History:  --> five kids;  63 yrs.  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  Social History     Socioeconomic History   • Marital status:      Spouse name: Not on file   • Number of children:  Not on file   • Years of education: Not on file   • Highest education level: Not on file   Tobacco Use   • Smoking status: Never Smoker   • Smokeless tobacco: Never Used   Vaping Use   • Vaping Use: Never used   Substance and Sexual Activity   • Alcohol use: No   • Drug use: No   • Sexual activity: Defer         Family History:  Family History   Family history unknown: Yes     -->Further details: Dementia - none known      Past Medical and Surgical History:  Past Medical History:   Diagnosis Date   • Acid reflux    • Diabetes mellitus (CMS/HCC)    • Hypertension    • Malignant neoplasm of ascending colon (CMS/HCC) 11/1/2016   • Malignant tumor of cecum (CMS/HCC)      --> Seizure Hx: none known      Past Surgical History:   Procedure Laterality Date   • CAPSULE ENDOSCOPY N/A 6/22/2017    Procedure: CAPSULE ENDOSCOPY M2A;  Surgeon: Stuart Rico DO;  Location: [facility] ENDOSCOPY;  Service:    • CAPSULE ENDOSCOPY N/A 1/23/2018    Procedure: CAPSULE ENDOSCOPY M2A;  Surgeon: Stuart Rico DO;  Location: [facility] ENDOSCOPY;  Service:    • CENTRAL VENOUS LINE INSERTION  10/30/2015    Ultrasound localization, left basilic vein.Placement of left upper extremity PICC line   • COLONOSCOPY N/A 1/23/2018    Procedure: COLONOSCOPY;  Surgeon: Stuart Rico DO;  Location: [facility] ENDOSCOPY;  Service:    • ENDOSCOPY N/A 6/22/2017    Procedure: ESOPHAGOGASTRODUODENOSCOPY;  Surgeon: Stuart Rico DO;  Location: [facility] ENDOSCOPY;  Service:    • ENDOSCOPY N/A 1/23/2018    Procedure: ESOPHAGOGASTRODUODENOSCOPY;  Surgeon: Stuart Rico DO;  Location: [facility] ENDOSCOPY;  Service:    • FRACTURE SURGERY      right femur    • HYSTERECTOMY     • LAPAROSCOPIC ASSISSTED TOTAL COLECTOMY W/ J-POUCH  10/15/2015    Laparoscopic right hemicolectomy with ileotransverse colostomy   • VENOUS ACCESS DEVICE (PORT) INSERTION  01/12/2016    Right internal jugular Mediport       Allergies:  Other    Medications Prior to Admission   Medication  Sig Dispense Refill Last Dose   • Dorzolamide HCl-Timolol Mal PF 22.3-6.8 MG/ML solution Apply 1 drop to eye 2 (Two) Times a Day.   Unknown at Unknown time   • Empagliflozin (Jardiance) 25 MG tablet Take 25 mg by mouth Daily.   Unknown at Unknown time   • FREESTYLE LITE test strip USE TO TEST BLOOD SUGAR ONCE DAILY AS DIRECTED  2 Unknown at Unknown time   • gemfibrozil (Lopid) 600 MG tablet Take 600 mg by mouth 2 (Two) Times a Day.   Unknown at Unknown time   • HYDROcodone-acetaminophen (NORCO) 7.5-325 MG per tablet Take 1 tablet by mouth Every 6 (Six) Hours As Needed for Moderate Pain  for up to 12 days. 12 tablet 0 Unknown at Unknown time   • insulin detemir (LEVEMIR) 100 UNIT/ML injection Inject 14 Units under the skin into the appropriate area as directed Every Night.   Unknown at Unknown time   • lisinopril (PRINIVIL,ZESTRIL) 20 MG tablet Take 20 mg by mouth Daily.   Unknown at Unknown time   • metFORMIN (GLUCOPHAGE) 1000 MG tablet Take 1,000 mg by mouth 2 (Two) Times a Day With Meals.   Unknown at Unknown time   • omega-3 acid ethyl esters (LOVAZA) 1 G capsule Take 2 g by mouth Daily.   Unknown at Unknown time   • Omeprazole (PRILOSEC PO) Take 1 tablet by mouth As Needed.   Unknown at Unknown time   • Promethazine HCl (PHENERGAN PO) Take  by mouth As Needed.   Unknown at Unknown time   • vitamin B-12 (CYANOCOBALAMIN) 1000 MCG tablet Take 1 tablet by mouth on Monday, Wednesday and Friday 36 tablet 1 Unknown at Unknown time     --> Reviewed; trouble with compliance as noted above      Medical Review Of Systems:  --Unable to meaningfully obtain given confusion.  ROS        Objective   Objective --    Vital Signs:  Temp:  [96.1 °F (35.6 °C)-98.8 °F (37.1 °C)] 96.1 °F (35.6 °C)  Heart Rate:  [] 104  Resp:  [16-18] 18  BP: (145-190)/(60-96) 153/78    Physical Exam:   -General Appearance:  normal general appearance, in no apparent distress and active; right arm bandage  -Hygiene:  Unkempt   -Gait & Station:   "Deferred, in bed  -Musculoskeletal:  No tremors or abnormal involuntary movements and No Cog [place] or Rigidity and No atrophy noted  -Pulm: Unlaboured     Mental Status Exam:   --Cooperation:  Minimally cooperative  --Eye Contact:  Non-sustained  --Psychomotor Behavior:  Slow  --Mood:  \"Fine\"  --Affect:  mood-incongruent and confused  --Speech:  Minimal, Slow and Soft  --Thought Process:  Incoherent, Dyscognitive, Sluggish and Disorganized  --Associations: Loose Associations  --Themes:  None overt  --Thought Content:     --Mood congruent   --Suicidal:  Denies    --Homicidal:  Denies   --Hallucinations:  Cannot rule out   --Delusion:  Cannot rule out Paranoia given behaviors  --Cognitive Functioning:  -Consciousness: Awake and alert  -Orientation:  Person and Not orientated to Place, Time and Situation  -Attention:  Distractible   -Concentration:  Distractible  -Language:  Below Average based on interaction; Word Finding Difficulties  -Vocabulary:  Below Average based on interaction; Word Finding Difficulties and Paraphasic Errors  -Short Term Memory: Deficits  -Long Term Memory: Deficits  -Fund of Knowledge:  Below Average based on interaction  -Abstraction:  Poor and Patient unable to perform  --Reliability:  limited  --Insight:  None  --Judgment:  Impaired  --Impulse Control:  Impaired      Diagnostic Data:  Results source: EMR      --> Lab Work  Results source: EMR    Recent Results (from the past 72 hour(s))   Basic Metabolic Panel    Collection Time: 09/03/21 10:40 AM    Specimen: Blood   Result Value Ref Range    Glucose 306 (H) 65 - 99 mg/dL    BUN 18 8 - 23 mg/dL    Creatinine 0.77 0.57 - 1.00 mg/dL    Sodium 130 (L) 136 - 145 mmol/L    Potassium 3.9 3.5 - 5.2 mmol/L    Chloride 93 (L) 98 - 107 mmol/L    CO2 26.0 22.0 - 29.0 mmol/L    Calcium 9.2 8.6 - 10.5 mg/dL    eGFR Non African Amer 72 >60 mL/min/1.73    BUN/Creatinine Ratio 23.4 7.0 - 25.0    Anion Gap 11.0 5.0 - 15.0 mmol/L   aPTT    Collection " Time: 09/03/21 10:40 AM    Specimen: Blood   Result Value Ref Range    PTT 26.7 20.0 - 40.3 seconds   Protime-INR    Collection Time: 09/03/21 10:40 AM    Specimen: Blood   Result Value Ref Range    Protime 12.0 11.1 - 15.3 Seconds    INR 0.89 0.80 - 1.20   CBC Auto Differential    Collection Time: 09/03/21 10:40 AM    Specimen: Blood   Result Value Ref Range    WBC 13.26 (H) 3.40 - 10.80 10*3/mm3    RBC 4.98 3.77 - 5.28 10*6/mm3    Hemoglobin 15.4 12.0 - 15.9 g/dL    Hematocrit 46.8 (H) 34.0 - 46.6 %    MCV 94.0 79.0 - 97.0 fL    MCH 30.9 26.6 - 33.0 pg    MCHC 32.9 31.5 - 35.7 g/dL    RDW 12.0 (L) 12.3 - 15.4 %    RDW-SD 41.8 37.0 - 54.0 fl    MPV 9.9 6.0 - 12.0 fL    Platelets 219 140 - 450 10*3/mm3    Neutrophil % 89.7 (H) 42.7 - 76.0 %    Lymphocyte % 5.7 (L) 19.6 - 45.3 %    Monocyte % 3.5 (L) 5.0 - 12.0 %    Eosinophil % 0.4 0.3 - 6.2 %    Basophil % 0.4 0.0 - 1.5 %    Immature Grans % 0.3 0.0 - 0.5 %    Neutrophils, Absolute 11.89 (H) 1.70 - 7.00 10*3/mm3    Lymphocytes, Absolute 0.76 0.70 - 3.10 10*3/mm3    Monocytes, Absolute 0.47 0.10 - 0.90 10*3/mm3    Eosinophils, Absolute 0.05 0.00 - 0.40 10*3/mm3    Basophils, Absolute 0.05 0.00 - 0.20 10*3/mm3    Immature Grans, Absolute 0.04 0.00 - 0.05 10*3/mm3    nRBC 0.0 0.0 - 0.2 /100 WBC   Gold Top - SST    Collection Time: 09/03/21 10:40 AM   Result Value Ref Range    Extra Tube Hold for add-ons.    COVID-19 and FLU A/B PCR - Swab, Nasopharynx    Collection Time: 09/03/21  8:18 PM    Specimen: Nasopharynx; Swab   Result Value Ref Range    COVID19 Not Detected Not Detected - Ref. Range    Influenza A PCR Not Detected Not Detected    Influenza B PCR Not Detected Not Detected   Urinalysis With Microscopic If Indicated (No Culture) - Urine, Clean Catch    Collection Time: 09/03/21  9:31 PM    Specimen: Urine, Clean Catch   Result Value Ref Range    Color, UA Yellow Yellow, Straw, Dark Yellow, Keira    Appearance, UA Clear Clear    pH, UA 6.5 5.0 - 9.0    Specific  Gravity, UA 1.027 1.003 - 1.030    Glucose, UA >=1000 mg/dL (3+) (A) Negative    Ketones, UA Negative Negative    Bilirubin, UA Negative Negative    Blood, UA Trace (A) Negative    Protein, UA 30 mg/dL (1+) (A) Negative    Leuk Esterase, UA Negative Negative    Nitrite, UA Negative Negative    Urobilinogen, UA 0.2 E.U./dL 0.2 - 1.0 E.U./dL   Urine Drug Screen - Urine, Clean Catch    Collection Time: 09/03/21  9:31 PM    Specimen: Urine, Clean Catch   Result Value Ref Range    THC, Screen, Urine Negative Negative    Phencyclidine (PCP), Urine Negative Negative    Cocaine Screen, Urine Negative Negative    Methamphetamine, Ur Negative Negative    Opiate Screen Positive (A) Negative    Amphetamine Screen, Urine Negative Negative    Benzodiazepine Screen, Urine Negative Negative    Tricyclic Antidepressants Screen Negative Negative    Methadone Screen, Urine Negative Negative    Barbiturates Screen, Urine Negative Negative    Oxycodone Screen, Urine Negative Negative    Propoxyphene Screen Negative Negative    Buprenorphine, Screen, Urine Negative Negative   Urinalysis, Microscopic Only - Urine, Clean Catch    Collection Time: 09/03/21  9:31 PM    Specimen: Urine, Clean Catch   Result Value Ref Range    RBC, UA 6-12 (A) None Seen /HPF    WBC, UA 3-5 None Seen, 0-2, 3-5 /HPF    Bacteria, UA Trace (A) None Seen /HPF    Squamous Epithelial Cells, UA 0-2 None Seen, 0-2 /HPF    Hyaline Casts, UA None Seen None Seen /LPF    Methodology Manual Light Microscopy    Acetaminophen Level    Collection Time: 09/03/21  9:41 PM    Specimen: Blood   Result Value Ref Range    Acetaminophen <5.0 0.0 - 30.0 mcg/mL   Salicylate Level    Collection Time: 09/03/21  9:41 PM    Specimen: Blood   Result Value Ref Range    Salicylate <0.3 <=30.0 mg/dL   Ethanol    Collection Time: 09/03/21  9:41 PM    Specimen: Blood   Result Value Ref Range    Ethanol <10 0 - 10 mg/dL    Ethanol % <0.010 %       XR Shoulder 2+ View Right    Result Date:  9/3/2021  Narrative: EXAM: XR SHOULDER 2 OR MORE VIEWS COMPARISON: None INDICATION: fall, pain FINDINGS: Review right shoulder. Minimally displaced fracture of the right humeral head greater tuberosity. No christa dislocation. Visualized lung is clear .     Impression: Minimally displaced fracture of the right humeral head greater tuberosity. Electronically signed by:  Luis Carlos Castillo MD  9/3/2021 10:30 AM CDT Workstation: [id number]    XR Humerus Right    Result Date: 9/3/2021  Narrative: EXAM: XR HUMERUS COMPARISON: None INDICATION: fall, pain FINDINGS: 2 view right humerus. Minimally displaced fracture of the right proximal humerus greater tuberosity. No appreciable dislocation. Joint spaces are preserved. Visualized lung is clear.     Impression: Fracture of the right humeral head greater tuberosity. Electronically signed by:  Luis Carlos Castillo MD  9/3/2021 10:25 AM CDT Workstation: [id number]    XR Forearm 2 View Right    Result Date: 9/3/2021  Narrative: EXAM: XR FOREARM 2 VIEWS COMPARISON: None INDICATION: fall, pain FINDINGS: 2 view right forearm. No acute fracture or dislocation. No suspicious osseous lesion. Joint spaces are preserved. Sclerotic degenerative changes seen throughout the right wrist. Soft tissues are unremarkable.     Impression: No acute osseous abnormality. Right wrist osteoarthritis. Electronically signed by:  Luis Carlos Castillo MD  9/3/2021 10:27 AM CDT Workstation: [id number]      No results found for: GLUF     Lab Results   Component Value Date    HGBA1C 8.0 (H) 05/03/2021       Lab Results   Component Value Date    CHOL 236 (H) 12/30/2020    TRIG 281 (H) 12/30/2020    HDL 66 12/30/2020     (H) 12/30/2020        TSH   Date Value Ref Range Status   06/14/2019 4.02 (H) 0.36 - 3.74 u[iU]/mL Final     Comment:     ** Specimens that contain biotin at a concentration of      100 ng/mL demonstrate a less than or equal to 10% change     in results.  Biotin concentrations  greater than this may     lead to falsely depressed results for patient samples.     Results from patients taking biotin supplements or     receiving high-dose biotin therapy should be interpreted     with caution due to possible interference with this      test. **  ** Patient samples may contain heterophilic antibodies that     could react in immunoassays to give falsely elevated or     depressed results.  This assay has been designed to     minimize interference from heterophilic antibodies.      Nevertheless, complete elimination of this interference     from all patient specimens cannot be guaranteed.  As with     any immuno-recognition measurement of a peptide,     extremely rare genetic variants may exhibit varying     degrees of detection.  A test result that is inconsistant     with the clinical picture and patient history should be     interpreted with caution.  Performance of this assay has     not been established with  specimens. **        Lab Results   Component Value Date    CININIJN25 763 2021    FOLATE >20.00 2021       Lab Results   Component Value Date    IRON 64 2021    TIBC 305 2021    FERRITIN 687.60 (H) 2021       -RPR/HIV: none in chart      --> Neuroimaging: none in chart       Assessment/Plan   --Patient Strengths: supportive family/friends, supportive environment   --Patient Barriers: impaired cognition, resistance to treatment      --Diagnostic Impression: Ms. Gutierrez  is a 80 y.o. female admitted for dementia related behaviors, constituting an imminent risk of harm to self and others (from her dementia) - necessitating inpatient psychiatric stabilization and treatment.     Diagnostically, Major Neurocognitive disorder, most likely Alzhiermer's etiology given age; high index of suspicion for comorbid vascular changes as well given her medical history.  High degree of suspicion for psychosis secondary to neurocognitive disorder with significant paranoia  and Persecutory concerns at her family.          Assessment:  --  Major neurocognitive disorder (CMS/HCC)    Dementia with behavioral disturbance (CMS/HCC)    Type 2 diabetes mellitus without complication, with long-term current use of insulin (CMS/HCC)    Dyslipidemia    Benign essential HTN     Non-Psychiatric Medical Conditions Include:  --HTN: Cont home Lisinopril; Catapres PRN  --t2DM: Cont Invokana (formulary), Levemir 10 mg qHS; Metformin 500mg BID  --GERD: Cont PPI  --Occular: cont Cosopt BID         Treatment Plan:  1) Will admit patient to the behavioral health unit at [facility], adult behavioral health unit, as a voluntary admission per family/ to ensure patient safety given imminent risk status and need for emergent inpatient stabilization and treatment.    2) Patient will be provided treatment with the unit milieu, activities, therapies and psychopharmacological management.    3) Patient placed on  Q15 minute checks and Fall precautions.    4) Hospitalist consulted for assistance in management of medical comorbidities.    5) Will order following labs:   --RPR, HIV, TSH, Folate, B12, Vitamin D to evaluate for any contributing etiologies.   --A1c, Fasting lipids & glucose to establish baseline for any anti-d2 agent therapy    6) Will restart patient on the following psychiatric home meds:   --Not applicable; none at home.     7) Will make the following medication changes, and proceed with the following treatment planning:   --Head CT for evaluation of any neuro anatomical etiologies  -Plan to consider acetylcholinesterase inhibitor, with particular consideration for Exelon given patch compliance concerns  -We will consider Namenda augmentation  -Monitor for paranoia/delusions or other symptoms of psychosis consider antidopaminergic therapy if needed  -EKG to determine QT/QTc  -We will move forward with transition to skilled nursing facility  - is going to further research  guardianship/POA    8) Will begin discharge planning as appropriate for patient.    9) Psychotherapy provided: Less than 5 minutes for the patient.     All questions answered for the patient.  Treatment plan and medication risks and benefits discussed with: Family.  We discussed the benefits, risks alternatives and side effects of medications.  We discussed the rationale for treatment as well as the risk of nontreatment.  We discussed off label use of antipsychotics/anti-dopaminergic's, the focus of improving quality of life in the context of black box warning, lack of FDA approval and off label use, increased overall mortality risk.  All questions answered for him.  He provides consent for treatment for pharmacotherapy with goal of improving quality of life.      Estimated Length of Stay: 10-14 days  Prognosis: Fair      Shaquille Huggins II, MD  09/04/21 @ 09:18 CDT  Dictated using Dragon.

## 2021-09-04 NOTE — PLAN OF CARE
Goal Outcome Evaluation:  Plan of Care Reviewed With: patient  Patient Agreement with Plan of Care: agrees     Progress: improving  Outcome Summary: pt has worked with PT, has taken all medication as ordered. pt has eaten small portions of meals, had drank few fluids, and had 8 units of insulin per scale.

## 2021-09-04 NOTE — CASE MANAGEMENT/SOCIAL WORK
Er CM visit with pt and her daughter to d/w them dcpoc. Pt resides at home with her  and daughter said to d/w pt's  as well. I d/w pt/daughter options and that [facility] has been consulted to evaluate pt tonight in the ER for appropriateness of admission. I d/w them that pt doesn't meet acute care criteria for hospital admission and we needed to discuss a backup plan in case she is denied by [facility].

## 2021-09-04 NOTE — CASE MANAGEMENT/SOCIAL WORK
I went to ER WR to d/w pt's  Chava poc/dcpoc options. I explained to him that [facility] would not be able to accept pt until next Tuesday or Wednesday per their adm coor and that's still not a for sure thing. I d/w spouse once evaluated by U if pt is admitted upstairs , then once ready for d/c she could transition to a NH . I D/W him if she is denied U admission , then pt will be d/c'd home with him and we can start nhp from home tomorrow. I d/w him other [facility] units , but he does not want her far away and wants to keep her local. I d/w him difference in SNF v/s personal care homes v/s assisted living. I gave him local snf choices and he is agreeable to ANY that will take her this weekend. I d/w him [facility] is the only facility that takes new admits on weekends. He is agreeable to [facility] . I d/w him that if pt is d/c'd home tonight , I will contact adm coor tomorrow to send referral and see if they have a bed available etc. Pt's daughter will be staying the night tonight to assist her parents at home if pt is d/c'd home tonight. I d/w all this with . awaiting U to evaluate pt. Pt's  does not want [facility] either. Pt does not have her sling on at this time.  said she is only aggressive towards him and it's mostly verbal and she won't take her medications.
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
## 2021-09-04 NOTE — PLAN OF CARE
Goal Outcome Evaluation:  Plan of Care Reviewed With: patient           Outcome Summary: PT eval completed on this date. PT fitted patient with shoulder immobilizer for the R shoulder to help protect R greater tuberosity fx. Awaiting follow-up ortho recommendations. Bed mobility: MinAx1 for supine>sit transfer Transfers: Rod for sit<>stand transfer Gait: Rod for ambulation 45'x1 with HHA. Patient with festinating gait pattern, decrease step length, and moderate unsteainess. Patient would benefit from skilled PT to decrease fall risk, improve gait mechanics, and improve activity tolerance.

## 2021-09-05 PROBLEM — F02.80 MAJOR NEUROCOGNITIVE DISORDER DUE TO ALZHEIMER'S DISEASE (HCC): Status: ACTIVE | Noted: 2021-09-04

## 2021-09-05 PROBLEM — G30.9 MAJOR NEUROCOGNITIVE DISORDER DUE TO ALZHEIMER'S DISEASE (HCC): Status: ACTIVE | Noted: 2021-09-04

## 2021-09-05 LAB
25(OH)D3 SERPL-MCNC: 36.9 NG/ML (ref 30–100)
CHOLEST SERPL-MCNC: 234 MG/DL (ref 0–200)
FOLATE SERPL-MCNC: >20 NG/ML (ref 4.78–24.2)
GLUCOSE BLDC GLUCOMTR-MCNC: 124 MG/DL (ref 70–130)
GLUCOSE BLDC GLUCOMTR-MCNC: 209 MG/DL (ref 70–130)
GLUCOSE BLDC GLUCOMTR-MCNC: 211 MG/DL (ref 70–130)
GLUCOSE SERPL-MCNC: 93 MG/DL (ref 65–99)
HBA1C MFR BLD: 10.3 % (ref 4.8–5.6)
HDLC SERPL-MCNC: 93 MG/DL (ref 40–60)
HIV1+2 AB SER QL: NORMAL
LDLC SERPL CALC-MCNC: 98 MG/DL (ref 0–100)
LDLC/HDLC SERPL: 0.95 {RATIO}
TRIGL SERPL-MCNC: 262 MG/DL (ref 0–150)
VIT B12 BLD-MCNC: 897 PG/ML (ref 211–946)
VLDLC SERPL-MCNC: 43 MG/DL (ref 5–40)

## 2021-09-05 PROCEDURE — 82962 GLUCOSE BLOOD TEST: CPT

## 2021-09-05 PROCEDURE — 83036 HEMOGLOBIN GLYCOSYLATED A1C: CPT | Performed by: PSYCHIATRY & NEUROLOGY

## 2021-09-05 PROCEDURE — G0432 EIA HIV-1/HIV-2 SCREEN: HCPCS | Performed by: PSYCHIATRY & NEUROLOGY

## 2021-09-05 PROCEDURE — 63710000001 INSULIN DETEMIR PER 5 UNITS: Performed by: PSYCHIATRY & NEUROLOGY

## 2021-09-05 PROCEDURE — 63710000001 INSULIN ASPART PER 5 UNITS: Performed by: NURSE PRACTITIONER

## 2021-09-05 PROCEDURE — 86592 SYPHILIS TEST NON-TREP QUAL: CPT | Performed by: PSYCHIATRY & NEUROLOGY

## 2021-09-05 PROCEDURE — 80061 LIPID PANEL: CPT | Performed by: PSYCHIATRY & NEUROLOGY

## 2021-09-05 PROCEDURE — 82947 ASSAY GLUCOSE BLOOD QUANT: CPT | Performed by: PSYCHIATRY & NEUROLOGY

## 2021-09-05 PROCEDURE — 82607 VITAMIN B-12: CPT | Performed by: PSYCHIATRY & NEUROLOGY

## 2021-09-05 PROCEDURE — 82306 VITAMIN D 25 HYDROXY: CPT | Performed by: PSYCHIATRY & NEUROLOGY

## 2021-09-05 PROCEDURE — 99232 SBSQ HOSP IP/OBS MODERATE 35: CPT | Performed by: PSYCHIATRY & NEUROLOGY

## 2021-09-05 PROCEDURE — 82746 ASSAY OF FOLIC ACID SERUM: CPT | Performed by: PSYCHIATRY & NEUROLOGY

## 2021-09-05 RX ORDER — MELATONIN
1000
Status: DISCONTINUED | OUTPATIENT
Start: 2021-09-05 | End: 2021-09-09 | Stop reason: HOSPADM

## 2021-09-05 RX ORDER — MULTIPLE VITAMINS W/ MINERALS TAB 9MG-400MCG
1 TAB ORAL DAILY
Status: DISCONTINUED | OUTPATIENT
Start: 2021-09-05 | End: 2021-09-09 | Stop reason: HOSPADM

## 2021-09-05 RX ORDER — RIVASTIGMINE 4.6 MG/24H
1 PATCH, EXTENDED RELEASE TRANSDERMAL DAILY
Status: DISCONTINUED | OUTPATIENT
Start: 2021-09-05 | End: 2021-09-06

## 2021-09-05 RX ADMIN — METFORMIN HYDROCHLORIDE 500 MG: 500 TABLET ORAL at 08:06

## 2021-09-05 RX ADMIN — LISINOPRIL 20 MG: 20 TABLET ORAL at 08:06

## 2021-09-05 RX ADMIN — INSULIN DETEMIR 10 UNITS: 100 INJECTION, SOLUTION SUBCUTANEOUS at 21:09

## 2021-09-05 RX ADMIN — Medication 1000 UNITS: at 16:11

## 2021-09-05 RX ADMIN — DORZOLAMIDE HYDROCHLORIDE AND TIMOLOL MALEATE 1 DROP: 20; 5 SOLUTION/ DROPS OPHTHALMIC at 21:13

## 2021-09-05 RX ADMIN — PANTOPRAZOLE SODIUM 40 MG: 40 TABLET, DELAYED RELEASE ORAL at 08:06

## 2021-09-05 RX ADMIN — ACETAMINOPHEN 500 MG: 500 TABLET, FILM COATED ORAL at 08:06

## 2021-09-05 RX ADMIN — ACETAMINOPHEN 500 MG: 500 TABLET, FILM COATED ORAL at 16:11

## 2021-09-05 RX ADMIN — INSULIN ASPART 5 UNITS: 100 INJECTION, SOLUTION INTRAVENOUS; SUBCUTANEOUS at 16:55

## 2021-09-05 RX ADMIN — DORZOLAMIDE HYDROCHLORIDE AND TIMOLOL MALEATE 1 DROP: 20; 5 SOLUTION/ DROPS OPHTHALMIC at 08:08

## 2021-09-05 RX ADMIN — RIVASTIGMINE TRANSDERMAL SYSTEM 1 PATCH: 4.6 PATCH, EXTENDED RELEASE TRANSDERMAL at 16:12

## 2021-09-05 RX ADMIN — INSULIN ASPART 5 UNITS: 100 INJECTION, SOLUTION INTRAVENOUS; SUBCUTANEOUS at 12:09

## 2021-09-05 RX ADMIN — CANAGLIFLOZIN 100 MG: 100 TABLET, FILM COATED ORAL at 08:06

## 2021-09-05 RX ADMIN — METFORMIN HYDROCHLORIDE 500 MG: 500 TABLET ORAL at 16:11

## 2021-09-05 RX ADMIN — Medication 1 TABLET: at 16:12

## 2021-09-05 NOTE — NURSING NOTE
"Pt appears to be hallucinating, has peered quickly at floor and followed things with her eyes, and asked this nurse in a panicked way, \"oh my, what was that, did you see that?\" with eyes wide.  Pt was able to hold conversation and did state that she is , and has 5 children, and also spoke at length about her dog that she loves and said, \"oh, my yes, so many,\" when asked if she had grand children.  Pt then became very confused, asked for her shoes and coat because it was time to go, she would not be able to pay the bill but her  had his charge card and could take care of it, but he was waiting outside and she had to go.  Pt was reoriented to situation and sat back comfortably in chair, appearing confused.  Pt has remained calm, has allowed staff to assist her as needed.  "

## 2021-09-05 NOTE — PLAN OF CARE
"Goal Outcome Evaluation:  Plan of Care Reviewed With: patient  Patient Agreement with Plan of Care: unable to participate     Progress: no change  Outcome Summary: pt has been unable to remain oriented, at one time pt made threat to staff.  she was asleep in david chair, awoke and looked at staff and stated that,\"you, it was you that forgot my two pillows.  you either get them and give them to me or im gonna knock you on the head.\"  pt was asked not to use such language, to which she looked at this nurse, stated,\"say what?\" and went back to sleep in the chair.  pt has also again stated that she knows she has a  and children and misses her dog.  Pt started exelon, patch is located on right scapula.  "

## 2021-09-05 NOTE — NURSING NOTE
"Behavior   Note any precipitants to event or behavior   Describe level and action of any aggressive behavior or speech and associated interventions.     Anxiety: Decreased concentration  Depression: difficulty concentrating  Pain  0  AVH   no  S/I   no  Plan  no  H/I   no  Plan  no    Affect   euthymic/normal      Note:pt seen in day area, has been in milieu since breakfast.  Pt has been calm and cooperative.  Pt is alert to self only, and this comes and goes.  Pt has again stated that she is in her 30s, states she has \"missed calls from her mother.\"  Pt states she feels she is supposed to be somewhere else but cannot name where.  Pt continues to deny having a  or child.  Pt did not remember falling and breaking her arm, and can not remember living at home, taking medications, not bathing.  Pt is eating well and drinking.  Sling present on right arm.      Intervention    PRN medication utilized:  no    Instructed in medication usage and effects  Medications administered as ordered  Encouraged to verbalize needs      Response    Verbalized understanding   Did patient take medications as ordered yes   Did patient interact with assessment?  yes     Plan    Will monitor for safety  Will monitor every 15 minutes as ordered  Will evaluate and promote the plan of care    Last BM:  unknown date  (Please chart in I/O as well)  "

## 2021-09-05 NOTE — PLAN OF CARE
Goal Outcome Evaluation:     Patient Agreement with Plan of Care: unable to participate     Progress: no change  Outcome Summary: Patient has slept 7 hours and is currently asleep. No issues during the night.

## 2021-09-05 NOTE — PROGRESS NOTES
"Psychiatry Progress Note   9/5/2021      HD: #1  Legal: Vol per family    Chief Complaint: Dementia Related Behavioral Issues      Subjective --  Ms. Mary Gutierrez is a 80 y.o. female who was seen on the Geriatric unit.      Patient is pleasantly confused today.  She cannot tell me her location or situation or time.  She is unable to have a meaningfully sustained conversation.    Per nursing staff at time she talks about her  having multiple children as well as dogs, only but then later state that she has none of those.    No as needed's.  No significant behavioral disturbances.        Review of Systems:  --Unable to meaningfully obtain given the degree of her confusion.  ROS      Objective   Objective --    Vital Signs:  Temp:  [95.1 °F (35.1 °C)-96.6 °F (35.9 °C)] 96.6 °F (35.9 °C)  Heart Rate:  [87-93] 93  Resp:  [16-17] 16  BP: (151-164)/(74-79) 151/74    Patient Vitals for the past 24 hrs:   BP Temp Temp src Pulse Resp SpO2   09/05/21 0800 151/74 96.6 °F (35.9 °C) Tympanic 93 16 97 %   09/04/21 1915 164/79 95.1 °F (35.1 °C) Tympanic 87 17 93 %               Physical Exam:   -General Appearance:  normal general appearance, in no apparent distress and active; right arm bandage  -Hygiene:  Unkempt   -Gait & Station:  Deferred, sitting  -Musculoskeletal:  No tremors or abnormal involuntary movements and No Cog [place] or Rigidity and No atrophy noted  -Pulm: Unlaboured     Mental Status Exam:   --Cooperation:  Minimally cooperative  --Eye Contact:  Non-sustained  --Psychomotor Behavior:  Slow  --Mood:  \"OK\"  --Affect:  mood-incongruent and confused  --Speech:  Minimal, Slow and Soft  --Thought Process:  Incoherent, Dyscognitive, Sluggish and Disorganized  --Associations: Loose Associations  --Themes:  None overt  --Thought Content:                --Mood congruent              --Suicidal:  Denies               --Homicidal:  Denies              --Hallucinations:  Cannot rule out              " --Delusion:  Cannot rule out Paranoia given behaviors  --Cognitive Functioning:  -Consciousness: Awake and alert  -Orientation:  Person and Not orientated to Place, Time and Situation  -Attention:  Distractible             -Concentration:  Distractible  -Language:  Below Average based on interaction; Word Finding Difficulties  -Vocabulary:  Below Average based on interaction; Word Finding Difficulties and Paraphasic Errors  -Short Term Memory: Deficits  -Long Term Memory: Deficits  -Fund of Knowledge:  Below Average based on interaction  -Abstraction:  Poor and Patient unable to perform  --Reliability:  limited  --Insight:  None  --Judgment:  Impaired  --Impulse Control:  Impaired      Lab Results (last 24 hours)     Procedure Component Value Units Date/Time    Folate [[id number]]  (Normal) Collected: 09/05/21 0627    Specimen: Blood Updated: 09/05/21 1222     Folate >20.00 ng/mL     Narrative:      Results may be falsely increased if patient taking Biotin.      Vitamin D 25 Hydroxy [[id number]]  (Normal) Collected: 09/05/21 0627    Specimen: Blood Updated: 09/05/21 1222     25 Hydroxy, Vitamin D 36.9 ng/ml     Narrative:      Reference Range for Total Vitamin D 25(OH)     Deficiency <20.0 ng/mL   Insufficiency 21-29 ng/mL   Sufficiency  ng/mL  Toxicity >100 ng/ml    Results may be falsely increased if patient taking Biotin.      Vitamin B12 [[id number]]  (Normal) Collected: 09/05/21 0627    Specimen: Blood Updated: 09/05/21 1222     Vitamin B-12 897 pg/mL     Narrative:      Results may be falsely increased if patient taking Biotin.      POC Glucose Once [[id number]]  (Abnormal) Collected: 09/05/21 1201    Specimen: Blood Updated: 09/05/21 1214     Glucose 211 mg/dL      Comment: RN NotifiedOperator: [id number] [facility] [place] KRISTAMeter ID: [id number]       Lipid Panel [[id number]]  (Abnormal) Collected: 09/05/21 0627    Specimen: Blood Updated: 09/05/21 0724     Total Cholesterol 234 mg/dL      Triglycerides 262 mg/dL       HDL Cholesterol 93 mg/dL      LDL Cholesterol  98 mg/dL      VLDL Cholesterol 43 mg/dL      LDL/HDL Ratio 0.95    Narrative:      Cholesterol Reference Ranges  (U.S. Department of Health and Human Services ATP III Classifications)    Desirable          <200 mg/dL  Borderline High    200-239 mg/dL  High Risk          >240 mg/dL      Triglyceride Reference Ranges  (U.S. Department of Health and Human Services ATP III Classifications)    Normal           <150 mg/dL  Borderline High  150-199 mg/dL  High             200-499 mg/dL  Very High        >500 mg/dL    HDL Reference Ranges  (U.S. Department of Health and Human Services ATP III Classifcations)    Low     <40 mg/dl (major risk factor for CHD)  High    >60 mg/dl ('negative' risk factor for CHD)        LDL Reference Ranges  (U.S. Department of Health and Human Services ATP III Classifcations)    Optimal          <100 mg/dL  Near Optimal     100-129 mg/dL  Borderline High  130-159 mg/dL  High             160-189 mg/dL  Very High        >189 mg/dL    HIV-1 & HIV-2 Antibodies [[id number]]  (Normal) Collected: 09/05/21 0627    Specimen: Blood Updated: 09/05/21 0723    Narrative:      The following orders were created for panel order HIV-1 & HIV-2 Antibodies.  Procedure                               Abnormality         Status                     ---------                               -----------         ------                     HIV-1 / O / 2 Ag / Antib...[[id number]]  Normal              Final result                 Please view results for these tests on the individual orders.    HIV-1 / O / 2 Ag / Antibody 4th Generation [[id number]]  (Normal) Collected: 09/05/21 0627    Specimen: Blood Updated: 09/05/21 0723     HIV-1/ HIV-2 Non-Reactive    Narrative:      The HIV antibody/antigen combo assay is a qualitative assay for HIV that includes the p24 antigen as well as antibodies to HIV types 1 and 2. This test is intended to be used as a screening assay in the diagnosis  of HIV infection in patients over the age of 2.  Results may be falsely decreased if patient taking Biotin.      Hemoglobin A1c [[id number]]  (Abnormal) Collected: 09/05/21 0627    Specimen: Blood Updated: 09/05/21 0708     Hemoglobin A1C 10.30 %     Narrative:      Hemoglobin A1C Ranges:    Increased Risk for Diabetes  5.7% to 6.4%  Diabetes                     >= 6.5%  Diabetic Goal                < 7.0%    Glucose, Random [[id number]]  (Normal) Collected: 09/05/21 0627    Specimen: Blood Updated: 09/05/21 0703     Glucose 93 mg/dL     RPR [[id number]] Collected: 09/05/21 0627    Specimen: Blood Updated: 09/05/21 0639        Results source: EMR    Imaging Results (Last 24 Hours)     ** No results found for the last 24 hours. **      Study Result    Narrative & Impression      CT Head Without Contrast     History: Worsening memory problems and increasing confusion.  Recent fall. Patient on blood thinner.     Axial scans of the brain were obtained without intravenous  contrast.  Coronal and sagital reconstructions were preformed.     This exam was performed according to our departmental  dose-optimization program, which includes automated exposure  control, adjustment of the mA and/or kV according to patient size  and/or use of iterative reconstruction technique.     DLP: 845.30     Comparison: None     Findings:  Bone windows are unremarkable.  The visualized paranasal sinuses are unremarkable.     No acute process.  Cerebral and cerebellar atrophy.  Minimal small vessel disease.  No hemorrhage.  No mass.  No abnormal areas of increased attenuation.  No midline shift.  No abnormal extra-axial fluid collections.     IMPRESSION:  CONCLUSION:  No acute process.  Cerebral and cerebellar atrophy.  Minimal small vessel disease.     [id number]     Electronically signed by:  Noel Cohen MD  9/4/2021 12:28 PM CDT  Workstation: [id number]     -Reviewed significant temporal lobe as well as frontal and parietal atrophy.  Minimal  periventricular microvascular changes.  No acute changes.      Results source: EMR    Medications:   Scheduled Meds:acetaminophen, 500 mg, Oral, TID  Canagliflozin, 100 mg, Oral, Daily  dorzolamide-timolol, 1 drop, Both Eyes, BID  insulin aspart, 0-14 Units, Subcutaneous, TID AC  insulin detemir, 10 Units, Subcutaneous, Nightly  lisinopril, 20 mg, Oral, Daily  metFORMIN, 500 mg, Oral, BID With Meals  pantoprazole, 40 mg, Oral, QAM      Continuous Infusions:   PRN Meds:.acetaminophen  •  aluminum-magnesium hydroxide-simethicone  •  cloNIDine  •  dextrose  •  dextrose  •  glucagon (human recombinant)  •  influenza vaccine  •  loperamide  •  LORazepam **OR** LORazepam  •  magnesium hydroxide      Assessment:     Major neurocognitive disorder (CMS/HCC)    Dementia with behavioral disturbance (CMS/HCC)    Type 2 diabetes mellitus without complication, with long-term current use of insulin (CMS/HCC)    Dyslipidemia    Benign essential HTN    -Cannot fully rule out a superimposed delirium on dementia picture given recent urinary tract infection/cystitis.  Etiology looks to be primary Alzheimer's process from review of neuro imaging.         Treatment Plan:  1) Will continue care for the patient on the behavioral health unit at [facility].    2) Will continue to provide treatment with the unit milieu, activities, therapies and psychopharmacological management.    3) Patient to be placed on or continued on  Q15 minute checks  and Fall precautions.    4) Treatment Planning:  --HTN: Cont home Lisinopril; Catapres PRN  --t2DM: Cont Invokana (formulary), titrate Levemir to 14 mg qHS (home dose, now that pt is eating well); Metformin 500mg BID  --GERD: Cont PPI  --Occular: cont Cosopt BID      --MNCD: Start Exelon patch 4.6mg daily; Consider Namenda augmentation given severity of NCD   --B12, folate, HIV within normal limits   --RPR pending    --Therapy for < 5 min      --> Dispostion Planning: To transition to  skilled nursing facility.    Treatment plan and medication risks and benefits discussed with: Treatment Team.  Previously spoke with patient's  regarding medication discussion as noted yesterday.  He provides consent for treatment.    Shaquille Huggins II, MD  09/05/21 @ 15:37 CDT  Dictated using Dragon.

## 2021-09-05 NOTE — NURSING NOTE
Patient was sleeping, woke up easily for assessment but had difficulty staying awake for interaction. Patient took medications. Will continue to monitor.

## 2021-09-06 LAB
GLUCOSE BLDC GLUCOMTR-MCNC: 101 MG/DL (ref 70–130)
GLUCOSE BLDC GLUCOMTR-MCNC: 104 MG/DL (ref 70–130)
GLUCOSE BLDC GLUCOMTR-MCNC: 105 MG/DL (ref 70–130)
GLUCOSE BLDC GLUCOMTR-MCNC: 109 MG/DL (ref 70–130)
RPR SER QL: NORMAL

## 2021-09-06 PROCEDURE — 97110 THERAPEUTIC EXERCISES: CPT | Performed by: PHYSICAL THERAPIST

## 2021-09-06 PROCEDURE — 99232 SBSQ HOSP IP/OBS MODERATE 35: CPT | Performed by: PSYCHIATRY & NEUROLOGY

## 2021-09-06 PROCEDURE — 63710000001 INSULIN DETEMIR PER 5 UNITS: Performed by: PSYCHIATRY & NEUROLOGY

## 2021-09-06 PROCEDURE — 82962 GLUCOSE BLOOD TEST: CPT

## 2021-09-06 PROCEDURE — 97116 GAIT TRAINING THERAPY: CPT | Performed by: PHYSICAL THERAPIST

## 2021-09-06 RX ORDER — RIVASTIGMINE 4.6 MG/24H
1 PATCH, EXTENDED RELEASE TRANSDERMAL DAILY
Status: DISCONTINUED | OUTPATIENT
Start: 2021-09-06 | End: 2021-09-07

## 2021-09-06 RX ADMIN — ACETAMINOPHEN 500 MG: 500 TABLET, FILM COATED ORAL at 21:03

## 2021-09-06 RX ADMIN — ACETAMINOPHEN 500 MG: 500 TABLET, FILM COATED ORAL at 08:46

## 2021-09-06 RX ADMIN — RIVASTIGMINE TRANSDERMAL SYSTEM 1 PATCH: 4.6 PATCH, EXTENDED RELEASE TRANSDERMAL at 17:16

## 2021-09-06 RX ADMIN — METFORMIN HYDROCHLORIDE 500 MG: 500 TABLET ORAL at 08:47

## 2021-09-06 RX ADMIN — PANTOPRAZOLE SODIUM 40 MG: 40 TABLET, DELAYED RELEASE ORAL at 08:47

## 2021-09-06 RX ADMIN — LISINOPRIL 20 MG: 20 TABLET ORAL at 08:47

## 2021-09-06 RX ADMIN — Medication 1000 UNITS: at 17:15

## 2021-09-06 RX ADMIN — DORZOLAMIDE HYDROCHLORIDE AND TIMOLOL MALEATE 1 DROP: 20; 5 SOLUTION/ DROPS OPHTHALMIC at 21:02

## 2021-09-06 RX ADMIN — DORZOLAMIDE HYDROCHLORIDE AND TIMOLOL MALEATE 1 DROP: 20; 5 SOLUTION/ DROPS OPHTHALMIC at 08:51

## 2021-09-06 RX ADMIN — ACETAMINOPHEN 500 MG: 500 TABLET, FILM COATED ORAL at 17:15

## 2021-09-06 RX ADMIN — INSULIN DETEMIR 10 UNITS: 100 INJECTION, SOLUTION SUBCUTANEOUS at 21:05

## 2021-09-06 RX ADMIN — METFORMIN HYDROCHLORIDE 500 MG: 500 TABLET ORAL at 17:15

## 2021-09-06 RX ADMIN — CANAGLIFLOZIN 100 MG: 100 TABLET, FILM COATED ORAL at 08:47

## 2021-09-06 RX ADMIN — Medication 1 TABLET: at 08:46

## 2021-09-06 NOTE — PROGRESS NOTES
"Psychiatry Progress Note   9/6/2021      HD: #2  Legal: Vol per family    Chief Complaint: Dementia Related Behavioral Issues      Subjective --  Ms. Mary Gutierrez is a 80 y.o. female who was seen on the Geriatric unit.      Patient is pleasantly confused today.  She cannot tell me her location or situation or time.  She is unable to have a meaningfully sustained conversation.    Per nursing staff at time she talks about her  having multiple children as well as dogs, only but then later state that she has none of those.    No as needed's.  No significant behavioral disturbances.      Objective   Objective --    Vital Signs:  Temp:  [96 °F (35.6 °C)-97.6 °F (36.4 °C)] 97.6 °F (36.4 °C)  Heart Rate:  [93-98] 93  Resp:  [16-18] 18  BP: (127-166)/(65-74) 127/65    Patient Vitals for the past 24 hrs:   BP Temp Temp src Pulse Resp SpO2   09/06/21 0734 127/65 97.6 °F (36.4 °C) Tympanic 93 18 94 %   09/05/21 1900 166/74 96 °F (35.6 °C) Tympanic 98 16 99 %               Physical Exam:   -General Appearance:  normal general appearance, in no apparent distress and active; right arm bandage  -Hygiene:  Unkempt   -Gait & Station:  Deferred, sitting  -Musculoskeletal:  No tremors or abnormal involuntary movements and No Cog [place] or Rigidity and No atrophy noted  -Pulm: Unlaboured     Mental Status Exam:   --Cooperation:  Minimally cooperative  --Eye Contact:  Non-sustained  --Psychomotor Behavior:  Slow  --Mood:  \"OK\"  --Affect:  mood-incongruent and confused  --Speech:  Minimal, Slow and Soft  --Thought Process:  Incoherent, Dyscognitive, Sluggish and Disorganized  --Associations: Loose Associations  --Themes:  None overt  --Thought Content:                --Mood congruent              --Suicidal:  Denies               --Homicidal:  Denies              --Hallucinations:  none overt              --Delusion:  none expressed  --Cognitive Functioning:  -Consciousness: Awake and alert  -Orientation:  Person and " Not orientated to Place, Time and Situation  -Attention:  Distractible             -Concentration:  Distractible  -Language:  Below Average based on interaction; Word Finding Difficulties  -Vocabulary:  Below Average based on interaction; Word Finding Difficulties and Paraphasic Errors  -Short Term Memory: Deficits  -Long Term Memory: Deficits  -Fund of Knowledge:  Below Average based on interaction  -Abstraction:  Poor and Patient unable to perform  --Reliability:  limited  --Insight:  None  --Judgment:  Impaired  --Impulse Control:  Impaired      Lab Results (last 24 hours)     Procedure Component Value Units Date/Time    RPR [[id number]]  (Normal) Collected: 09/05/21 0627    Specimen: Blood Updated: 09/06/21 1410     RPR Non-Reactive    POC Glucose Once [[id number]]  (Normal) Collected: 09/06/21 1130    Specimen: Blood Updated: 09/06/21 1146     Glucose 109 mg/dL      Comment: RN NotifiedOperator: [id number] HEIDI MILLSAMeter ID: [id number]       POC Glucose Once [[id number]]  (Normal) Collected: 09/06/21 0636    Specimen: Blood Updated: 09/06/21 0705     Glucose 105 mg/dL      Comment: : [id number] KAMARI SLATERMeter ID: [id number]       POC Glucose Once [[id number]]  (Normal) Collected: 09/05/21 2013    Specimen: Blood Updated: 09/05/21 2036     Glucose 124 mg/dL      Comment: RN NotifiedOperator: [id number] KAMARI SLATERMeter ID: [id number]           Results source: EMR    Imaging Results (Last 24 Hours)     ** No results found for the last 24 hours. **        Results source: EMR    Medications:   Scheduled Meds:acetaminophen, 500 mg, Oral, TID  Canagliflozin, 100 mg, Oral, Daily  cholecalciferol, 1,000 Units, Oral, Daily With Dinner  dorzolamide-timolol, 1 drop, Both Eyes, BID  insulin aspart, 0-14 Units, Subcutaneous, TID AC  insulin detemir, 10 Units, Subcutaneous, Nightly  lisinopril, 20 mg, Oral, Daily  metFORMIN, 500 mg, Oral, BID With Meals  multivitamin with minerals, 1 tablet, Oral,  Daily  pantoprazole, 40 mg, Oral, QAM  rivastigmine, 1 patch, Transdermal, Daily      Continuous Infusions:   PRN Meds:.acetaminophen  •  aluminum-magnesium hydroxide-simethicone  •  cloNIDine  •  dextrose  •  dextrose  •  glucagon (human recombinant)  •  influenza vaccine  •  loperamide  •  LORazepam **OR** LORazepam  •  magnesium hydroxide      Assessment:     Major neurocognitive disorder due to Alzheimer's disease (CMS/HCC)    Dementia with behavioral disturbance (CMS/HCC)    Type 2 diabetes mellitus without complication, with long-term current use of insulin (CMS/HCC)    Dyslipidemia    Benign essential HTN    -Cannot fully rule out a superimposed delirium on dementia picture given recent urinary tract infection/cystitis.  Etiology looks to be primary Alzheimer's process from review of neuro imaging.    Treatment Plan:  1) Will continue care for the patient on the behavioral health unit at [facility].    2) Will continue to provide treatment with the unit milieu, activities, therapies and psychopharmacological management.    3) Patient to be placed on or continued on  Q15 minute checks  and Fall precautions.    4) Treatment Planning:  --HTN: Cont home Lisinopril; Catapres PRN  --t2DM: Cont Invokana (formulary), titrate Levemir to 14 mg qHS (home dose, now that pt is eating well); Metformin 500mg BID  --GERD: Cont PPI  --Occular: cont Cosopt BID      --MNCD: Cont Exelon patch 4.6mg daily; Consider Namenda augmentation given severity of NCD   --B12, folate, HIV within normal limits   --RPR non-reactive    --Therapy for < 5 min      --> Dispostion Planning: To transition to skilled nursing facility.    Treatment plan and medication risks and benefits discussed with: Treatment Team.    Renzo Sheppard MD  09/06/21 @ 14:43 CDT  Dictated using Dragon.

## 2021-09-06 NOTE — THERAPY TREATMENT NOTE
Patient Name: Mary Gutierrez  : 1940    MRN: [id number]                              Today's Date: 2021       Admit Date: 2021    Visit Dx:     ICD-10-CM ICD-9-CM   1. Benign essential HTN  I10 401.1   2. Dyslipidemia  E78.5 272.4   3. Type 2 diabetes mellitus without complication, with long-term current use of insulin (CMS/HCC)  E11.9 250.00    Z79.4 V58.67   4. Major neurocognitive disorder (CMS/HCC)  F01.50 294.20   5. Dementia with behavioral disturbance, unspecified dementia type (CMS/HCC)  F03.91 294.21   6. B12 deficiency  E53.8 266.2   7. Cyst of pancreas  K86.2 577.2   8. Encounter for care related to Port-a-Cath  Z45.2 V58.81   9. Malabsorption of iron  K90.9 579.8   10. Flu vaccine need  Z23 V04.81   11. Other iron deficiency anemia  D50.8 280.8   12. Encounter for venous access device care  Z45.2 V58.81   13. Malignant neoplasm of ascending colon (CMS/HCC)  C18.2 153.6   14. Impaired functional mobility, balance, gait, and endurance  Z74.09 V49.89     Patient Active Problem List   Diagnosis   • Malignant neoplasm of ascending colon (CMS/HCC)   • Encounter for venous access device care   • Anemia   • Flu vaccine need   • Malabsorption of iron   • Encounter for care related to Port-a-Cath   • Cyst of pancreas   • B12 deficiency   • Dementia with behavioral disturbance (CMS/HCC)   • Major neurocognitive disorder due to Alzheimer's disease (CMS/HCC)   • Type 2 diabetes mellitus without complication, with long-term current use of insulin (CMS/HCC)   • Dyslipidemia   • Benign essential HTN     Past Medical History:   Diagnosis Date   • Acid reflux    • Diabetes mellitus (CMS/HCC)    • Hypertension    • Malignant neoplasm of ascending colon (CMS/HCC) 2016   • Malignant tumor of cecum (CMS/HCC)      Past Surgical History:   Procedure Laterality Date   • CAPSULE ENDOSCOPY N/A 2017    Procedure: CAPSULE ENDOSCOPY M2A;  Surgeon: Stuatr Rico DO;  Location: [facility]  ENDOSCOPY;  Service:    • CAPSULE ENDOSCOPY N/A 1/23/2018    Procedure: CAPSULE ENDOSCOPY M2A;  Surgeon: Stuart Rico DO;  Location: [facility] ENDOSCOPY;  Service:    • CENTRAL VENOUS LINE INSERTION  10/30/2015    Ultrasound localization, left basilic vein.Placement of left upper extremity PICC line   • COLONOSCOPY N/A 1/23/2018    Procedure: COLONOSCOPY;  Surgeon: Stuart Rico DO;  Location: [facility] ENDOSCOPY;  Service:    • ENDOSCOPY N/A 6/22/2017    Procedure: ESOPHAGOGASTRODUODENOSCOPY;  Surgeon: Stuart Rico DO;  Location: [facility] ENDOSCOPY;  Service:    • ENDOSCOPY N/A 1/23/2018    Procedure: ESOPHAGOGASTRODUODENOSCOPY;  Surgeon: Stuart Rico DO;  Location: [facility] ENDOSCOPY;  Service:    • FRACTURE SURGERY      right femur    • HYSTERECTOMY     • LAPAROSCOPIC ASSISSTED TOTAL COLECTOMY W/ J-POUCH  10/15/2015    Laparoscopic right hemicolectomy with ileotransverse colostomy   • VENOUS ACCESS DEVICE (PORT) INSERTION  01/12/2016    Right internal jugular Mediport     General Information     Row Name 09/06/21 1430          Physical Therapy Time and Intention    Document Type  therapy note (daily note)  -BS     Mode of Treatment  individual therapy;physical therapy  -BS     Row Name 09/06/21 1430          General Information    Patient Profile Reviewed  yes  -BS     Existing Precautions/Restrictions  fall  -BS     Barriers to Rehab  medically complex;cognitive status;hearing deficit  -BS     Row Name 09/06/21 1430          Living Environment    Lives With  spouse  -BS     Row Name 09/06/21 1430          Cognition    Orientation Status (Cognition)  oriented to;person  -BS       User Key  (r) = Recorded By, (t) = Taken By, (c) = Cosigned By    Initials Name Provider Type    BS Fili Young PT Physical Therapist        Mobility     Row Name 09/06/21 1430          Bed Mobility    Supine-Sit [place] (Bed Mobility)  not tested sitting up in chair in dining area  -BS     Row Name 09/06/21 1430           Sit-Stand Transfer    Sit-Stand [place] (Transfers)  minimum assist (75% patient effort)  -BS     Assistive Device (Sit-Stand Transfers)  other (see comments) L HHA  -BS     Row Name 09/06/21 1430          Gait/Stairs (Locomotion)    [place] Level (Gait)  contact guard  -BS     Assistive Device (Gait)  other (see comments) no AD  -BS     Distance in Feet (Gait)  120'  -BS     Deviations/Abnormal Patterns (Gait)  feliberto decreased;base of support, narrow non-consecutive steps with turning in the hallway  -BS     [place] Level (Stairs)  not tested  -BS       User Key  (r) = Recorded By, (t) = Taken By, (c) = Cosigned By    Initials Name Provider Type    Fili Obrien PT Physical Therapist        Obj/Interventions     Row Name 09/06/21 1430          Range of Motion Comprehensive    General Range of Motion  bilateral lower extremity ROM WFL  -BS     Row Name 09/06/21 1430          Strength Comprehensive (MMT)    Comment, General Manual Muscle Testing (MMT) Assessment  B LE grossly 4/5  -BS       User Key  (r) = Recorded By, (t) = Taken By, (c) = Cosigned By    Initials Name Provider Type    Fili Obrien, PT Physical Therapist        Goals/Plan     Row Name 09/06/21 1430          Bed Mobility Goal 1 (PT)    Activity/Assistive Device (Bed Mobility Goal 1, PT)  sit to supine;supine to sit  -BS     [place] Level/Cues Needed (Bed Mobility Goal 1, PT)  contact guard assist not tested  -BS     Time Frame (Bed Mobility Goal 1, PT)  by discharge  -BS     Row Name 09/06/21 1430          Transfer Goal 1 (PT)    Activity/Assistive Device (Transfer Goal 1, PT)  sit-to-stand/stand-to-sit;bed-to-chair/chair-to-bed  -BS     [place] Level/Cues Needed (Transfer Goal 1, PT)  minimum assist (75% or more patient effort)  -BS     Time Frame (Transfer Goal 1, PT)  by discharge  -BS     Strategies/Barriers (Transfers Goal 1, PT)  cognition  -BS     Progress/Outcome (Transfer Goal 1, PT)  goal not met  -BS      Row Name 09/06/21 1430          Gait Training Goal 1 (PT)    Activity/Assistive Device (Gait Training Goal 1, PT)  gait (walking locomotion);assistive device use  -BS     [place] Level (Gait Training Goal 1, PT)  standby assist;verbal cues required  -BS     Distance (Gait Training Goal 1, PT)  60' x 2  -BS     Time Frame (Gait Training Goal 1, PT)  by discharge  -BS     Strategies/Barriers (Gait Training Goal 1, PT)  R arm in sling  -BS     Progress/Outcome (Gait Training Goal 1, PT)  goal not met  -BS       User Key  (r) = Recorded By, (t) = Taken By, (c) = Cosigned By    Initials Name Provider Type    BS Fili Young, PT Physical Therapist        Clinical Impression     Row Name 09/06/21 1430          Pain    Additional Documentation  Pain Scale: Numbers Pre/Post-Treatment (Group)  -BS     Row Name 09/06/21 1430          Pain Scale: Numbers Pre/Post-Treatment    Pretreatment Pain Rating  0/10 - no pain  -BS     Posttreatment Pain Rating  0/10 - no pain  -BS     Row Name 09/06/21 1430          Plan of Care Review    Plan of Care Reviewed With  patient  -BS     Progress  improving  -BS     Outcome Summary  PT treatment performed. Patient presented improving with exercise tolerance, able to ambulate 60' x 2 with left HHA of the PT student, without a noticeable festinating gait pattern and without dyspnea. Pt was responsive and required vc's and tc's Kameron for sit to stand transfers. Received sitting in a chair in the dining area, so bed mobility not assessed. Good participation and able to follow commands. Adjusted and positioned correctly in donning her sling being s/p R humeral fx. Pt ed on precautions to avoid use of R UE with functional tasks at this time. Improved gait distance today versus prior sessions.  -BS     Row Name 09/06/21 1430          Therapy Assessment/Plan (PT)    Patient/Family Therapy Goals Statement (PT)  unable to state due to confusion  -BS     Row Name 09/06/21 1430          Vital  Signs    Pre Systolic BP Rehab  138  -BS     Pre Treatment Diastolic BP  75  -BS     Pretreatment Heart Rate (beats/min)  83  -BS     Pre SpO2 (%)  93  -BS     O2 Delivery Pre Treatment  room air  -BS     Pre Patient Position  Sitting  -BS     Intra Patient Position  Standing  -BS     Post Patient Position  Sitting  -BS     Row Name 09/06/21 1430          Positioning and Restraints    Pre-Treatment Position  sitting in chair/recliner  -BS     Post Treatment Position  chair  -BS     In Chair  sitting  -BS       User Key  (r) = Recorded By, (t) = Taken By, (c) = Cosigned By    Initials Name Provider Type    Fili Obrien, PT Physical Therapist        Outcome Measures     Row Name 09/06/21 1430          How much help from another person do you currently need...    Turning from your back to your side while in flat bed without using bedrails?  3  -BS     Moving from lying on back to sitting on the side of a flat bed without bedrails?  3  -BS     Moving to and from a bed to a chair (including a wheelchair)?  3  -BS     Standing up from a chair using your arms (e.g., wheelchair, bedside chair)?  3  -BS     Climbing 3-5 steps with a railing?  3  -BS     To walk in hospital room?  3  -BS     AM-PAC 6 Clicks Score (PT)  18  -BS     Row Name 09/06/21 1430          Functional Assessment    Outcome Measure Options  AM-PAC 6 Clicks Basic Mobility (PT)  -BS       User Key  (r) = Recorded By, (t) = Taken By, (c) = Cosigned By    Initials Name Provider Type    Fili Obrien PT Physical Therapist                       Physical Therapy Education                 Title: PT OT SLP Therapies (Done)     Topic: Physical Therapy (Done)     Point: Mobility training (Done)     Learning Progress Summary           Patient Acceptance, E,TB, VU by LR at 9/4/2021 1646    Comment: Educate on PT POC and goals.                   Point: Home exercise program (Done)     Learning Progress Summary           Patient Acceptance, E,TB, VU by LR at  9/4/2021 1646    Comment: Educate on PT POC and goals.                   Point: Body mechanics (Done)     Learning Progress Summary           Patient Acceptance, E,TB, VU by LR at 9/4/2021 1646    Comment: Educate on PT POC and goals.                   Point: Precautions (Done)     Learning Progress Summary           Patient Acceptance, E,TB, VU by LR at 9/4/2021 1646    Comment: Educate on PT POC and goals.                               User Key     Initials Effective Dates Name Provider Type Discipline    LR 06/16/21 -  Sang Lyons Physical Therapist PT              PT Recommendation and Plan     Plan of Care Reviewed With: patient  Progress: improving  Outcome Summary: PT treatment performed. Patient presented improving with exercise tolerance, able to ambulate 60' x 2 with left HHA of the PT student, without a noticeable festinating gait pattern and without dyspnea. Pt was responsive and required vc's and tc's Kameron for sit to stand transfers. Received sitting in a chair in the dining area, so bed mobility not assessed. Good participation and able to follow commands. Adjusted and positioned correctly in donning her sling being s/p R humeral fx. Pt ed on precautions to avoid use of R UE with functional tasks at this time. Improved gait distance today versus prior sessions.     Time Calculation:   PT Charges     Row Name 09/06/21 1537             Time Calculation    Start Time  1430  -BS      Stop Time  1457  -BS      Time Calculation (min)  27 min  -BS      PT Received On  09/06/21  -BS      PT Goal Re-Cert Due Date  09/17/21  -BS        User Key  (r) = Recorded By, (t) = Taken By, (c) = Cosigned By    Initials Name Provider Type    BS Fili Young, PT Physical Therapist        Therapy Charges for Today     Code Description Service Date Service Provider Modifiers Qty    72527513256 HC GAIT TRAINING EA 15 MIN 9/6/2021 Fili Young, PT GP 1    97512810363 HC PT THER PROC EA 15 MIN 9/6/2021 Fili Young, PT  GP 1          PT G-Codes  Outcome Measure Options: AM-PAC 6 Clicks Basic Mobility (PT)  AM-PAC 6 Clicks Score (PT): 18    Fili Young, PT  9/6/2021

## 2021-09-06 NOTE — CONSULTS
Adult Nutrition  Assessment    Patient Name:  Mary Gutierrez  YOB: 1940  MRN: [id number]  Admit Date:  9/4/2021    Assessment Date:  9/6/2021    Comments:  Pt seen 9/5/21.  Pt with dx major neurocognitive disorder, dementia with behavorial disturbance, Type 2 DM, dyslipidemia, HTN, GERD.  Pt reports poor appetite.  BMI 18 with pt at 88% IBW,  Pt willing to receive milk and supplement Boost with meals.  Blood glucsoe is usually elevated.  Glucophage, Invokana, levemir insulin, sliding scale novolog prescribed. Pt denied GI distress.  Will add ADA diet order, add supplement to meals, and make recommendations as appropriate.    Reason for Assessment     Row Name 09/06/21 1644          Reason for Assessment    Reason For Assessment  per organizational policy underweight     Identified At Risk by Screening Criteria  BMI             Labs/Tests/Procedures/Meds     Row Name 09/06/21 1644          Labs/Procedures/Meds    Lab Results Reviewed  reviewed, pertinent        Medications    Pertinent Medications Reviewed  reviewed, pertinent         Physical Findings     Row Name 09/06/21 1645          Physical Findings    Overall Physical Appearance  underweight         Estimated/Assessed Needs     Row Name 09/06/21 1645          Calculation Measurements    Weight Used For Calculations  56.8 kg (125 lb 3.5 oz)        Estimated/Assessed Needs    Additional Documentation  Calorie Requirements (Group);Fluid Requirements (Group);Harvey-St. Jeor Equation (Group);Protein Requirements (Group)        Calorie Requirements    Estimated Calorie Requirement (kcal/day)  1357     Estimated Calorie Need Method  Harvey-St Jeor        Harvey-St. Jeor Equation    RMR (Harvey-St. Jeor Equation)  1043.875        Protein Requirements    Weight Used For Protein Calculations  56.8 kg (125 lb 3.5 oz)     Est Protein Requirement Amount (gms/kg)  1.2 gm protein     Estimated Protein Requirements (gms/day)  68.16        Fluid  Requirements    Fluid Requirements (mL/day)  1420     RDA Method (mL)  1420         Nutrition Prescription Ordered     Row Name 09/06/21 1646          Nutrition Prescription PO    Current PO Diet  Regular                 Electronically signed by:  Rochelle Desai RD  09/06/21 16:47 CDT

## 2021-09-06 NOTE — PLAN OF CARE
Goal Outcome Evaluation:  Plan of Care Reviewed With: patient  Patient Agreement with Plan of Care: agrees     Progress: no change  Outcome Summary: Pt is alert et pleasantly confused. No aggresive behaviors noted.

## 2021-09-06 NOTE — PLAN OF CARE
Goal Outcome Evaluation:  Plan of Care Reviewed With: patient        Progress: improving  Outcome Summary: PT treatment performed. Patient presented improving with exercise tolerance, able to ambulate 60' x 2 with left HHA of the PT student, without a noticeable festinating gait pattern and without dyspnea. Pt was responsive and required vc's and tc's Kameron for sit to stand transfers. Received sitting in a chair in the dining area, so bed mobility not assessed. Good participation and able to follow commands. Adjusted and positioned correctly in donning her sling being s/p R humeral fx. Pt ed on precautions to avoid use of R UE with functional tasks at this time. Improved gait distance today versus prior sessions.

## 2021-09-06 NOTE — NURSING NOTE
Patient was in bed sleeping but woke easily for assessment. Patient did not want to wake up to take Tylenol but agreed to eye drops and insulin. Patient was confused at arrival of shift, needed redirection when attempting to stand alone. Patient was pleasant and cooperative, easily redirectable. Will continue to monitor.

## 2021-09-06 NOTE — PLAN OF CARE
Goal Outcome Evaluation:     Patient Agreement with Plan of Care: unable to participate     Progress: no change  Outcome Summary: Patient has slept for 6 hours and is currently sleeping. No issues during the night.

## 2021-09-06 NOTE — NURSING NOTE
"Behavior   Note any precipitants to event or behavior   Describe level and action of any aggressive behavior or speech and associated interventions.     Anxiety: Excess Worry, Restless/Edgy and Decreased concentration  Depression: depressed mood, difficulty concentrating and impaired memory  Pain  0  AVH   no  S/I   no  Plan  no  H/I   no  Plan  no    Affect   euthymic/normal      Note: Pt is pleasant et cooperative this am with no c/o pain or discomfort . She continues to have right arm in sling due to previous Dx of Fx. Pt denies hallucinations but says that her mind \"goes\". 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 She denies HI, SI et hallucinations. Pt was sitting across from this nurse in common area, et asked 3 times if she could sit in the recliner on opposite side of the room. She was told yes each time et was noted to be staring at the chair. Pt then asked how to get out of the one that she was sitting in. She was instructed to stand up et walk over to recliner. Pt said that she didn't no if she could et asked for help. Once in recliner, pt asked multiple times if she could sit in recliner et if she was now living in the room next to it, 669. When told no, she then asked to elevate feet et to be covered with blanket. Pt currently remains in recliner with no complaints. Will monitor.       Intervention    PRN medication utilized:  no    Instructed in medication usage and effects  Medications administered as ordered  Encouraged to verbalize needs      Response    Verbalized understanding   Did patient take medications as ordered yes   Did patient interact with assessment?  no    Plan    Will monitor for safety  Will monitor every 15 minutes as ordered  Will evaluate and promote the plan of care    Last BM:  unknown date  (Please chart in I/O as well)  "

## 2021-09-06 NOTE — PLAN OF CARE
Goal Outcome Evaluation:  Plan of Care Reviewed With: patient        Progress: no change  Outcome Summary: Initiatl assessment.  Encourage intake of meals and supplements.

## 2021-09-07 LAB
GLUCOSE BLDC GLUCOMTR-MCNC: 110 MG/DL (ref 70–130)
GLUCOSE BLDC GLUCOMTR-MCNC: 114 MG/DL (ref 70–130)
GLUCOSE BLDC GLUCOMTR-MCNC: 149 MG/DL (ref 70–130)
GLUCOSE BLDC GLUCOMTR-MCNC: 80 MG/DL (ref 70–130)

## 2021-09-07 PROCEDURE — 99232 SBSQ HOSP IP/OBS MODERATE 35: CPT | Performed by: PSYCHIATRY & NEUROLOGY

## 2021-09-07 PROCEDURE — 63710000001 INSULIN DETEMIR PER 5 UNITS: Performed by: PSYCHIATRY & NEUROLOGY

## 2021-09-07 PROCEDURE — 82962 GLUCOSE BLOOD TEST: CPT

## 2021-09-07 PROCEDURE — 97530 THERAPEUTIC ACTIVITIES: CPT

## 2021-09-07 PROCEDURE — 97116 GAIT TRAINING THERAPY: CPT

## 2021-09-07 RX ORDER — RIVASTIGMINE 4.6 MG/24H
1 PATCH, EXTENDED RELEASE TRANSDERMAL DAILY
Status: DISCONTINUED | OUTPATIENT
Start: 2021-09-08 | End: 2021-09-09 | Stop reason: HOSPADM

## 2021-09-07 RX ADMIN — RIVASTIGMINE TRANSDERMAL SYSTEM 1 PATCH: 4.6 PATCH, EXTENDED RELEASE TRANSDERMAL at 08:45

## 2021-09-07 RX ADMIN — DORZOLAMIDE HYDROCHLORIDE AND TIMOLOL MALEATE 1 DROP: 20; 5 SOLUTION/ DROPS OPHTHALMIC at 08:49

## 2021-09-07 RX ADMIN — PANTOPRAZOLE SODIUM 40 MG: 40 TABLET, DELAYED RELEASE ORAL at 08:44

## 2021-09-07 RX ADMIN — INSULIN DETEMIR 10 UNITS: 100 INJECTION, SOLUTION SUBCUTANEOUS at 21:10

## 2021-09-07 RX ADMIN — ACETAMINOPHEN 500 MG: 500 TABLET, FILM COATED ORAL at 08:44

## 2021-09-07 RX ADMIN — ACETAMINOPHEN 500 MG: 500 TABLET, FILM COATED ORAL at 16:02

## 2021-09-07 RX ADMIN — DORZOLAMIDE HYDROCHLORIDE AND TIMOLOL MALEATE 1 DROP: 20; 5 SOLUTION/ DROPS OPHTHALMIC at 20:27

## 2021-09-07 RX ADMIN — Medication 1 TABLET: at 08:43

## 2021-09-07 RX ADMIN — ACETAMINOPHEN 500 MG: 500 TABLET, FILM COATED ORAL at 20:26

## 2021-09-07 RX ADMIN — METFORMIN HYDROCHLORIDE 500 MG: 500 TABLET ORAL at 08:44

## 2021-09-07 RX ADMIN — LISINOPRIL 20 MG: 20 TABLET ORAL at 08:43

## 2021-09-07 RX ADMIN — Medication 1000 UNITS: at 16:03

## 2021-09-07 RX ADMIN — METFORMIN HYDROCHLORIDE 500 MG: 500 TABLET ORAL at 16:02

## 2021-09-07 RX ADMIN — CANAGLIFLOZIN 100 MG: 100 TABLET, FILM COATED ORAL at 08:43

## 2021-09-07 NOTE — NURSING NOTE
Pt seen in the common area this am. Pt is pleasantly confused. Pt doesn't interact much with others this am. Meds taken whole as ordered. Pt states she would like to go her room but doesn't know where it is and states she feel silly for asking.

## 2021-09-07 NOTE — THERAPY TREATMENT NOTE
Patient Name: Mary Gutierrez  : 1940    MRN: [id number]                              Today's Date: 2021       Admit Date: 2021    Visit Dx:     ICD-10-CM ICD-9-CM   1. Benign essential HTN  I10 401.1   2. Dyslipidemia  E78.5 272.4   3. Type 2 diabetes mellitus without complication, with long-term current use of insulin (CMS/HCC)  E11.9 250.00    Z79.4 V58.67   4. Major neurocognitive disorder (CMS/HCC)  F01.50 294.20   5. Dementia with behavioral disturbance, unspecified dementia type (CMS/HCC)  F03.91 294.21   6. B12 deficiency  E53.8 266.2   7. Cyst of pancreas  K86.2 577.2   8. Encounter for care related to Port-a-Cath  Z45.2 V58.81   9. Malabsorption of iron  K90.9 579.8   10. Flu vaccine need  Z23 V04.81   11. Other iron deficiency anemia  D50.8 280.8   12. Encounter for venous access device care  Z45.2 V58.81   13. Malignant neoplasm of ascending colon (CMS/HCC)  C18.2 153.6   14. Impaired functional mobility, balance, gait, and endurance  Z74.09 V49.89     Patient Active Problem List   Diagnosis   • Malignant neoplasm of ascending colon (CMS/HCC)   • Encounter for venous access device care   • Anemia   • Flu vaccine need   • Malabsorption of iron   • Encounter for care related to Port-a-Cath   • Cyst of pancreas   • B12 deficiency   • Dementia with behavioral disturbance (CMS/HCC)   • Major neurocognitive disorder due to Alzheimer's disease (CMS/HCC)   • Type 2 diabetes mellitus without complication, with long-term current use of insulin (CMS/HCC)   • Dyslipidemia   • Benign essential HTN     Past Medical History:   Diagnosis Date   • Acid reflux    • Diabetes mellitus (CMS/HCC)    • Hypertension    • Malignant neoplasm of ascending colon (CMS/HCC) 2016   • Malignant tumor of cecum (CMS/HCC)      Past Surgical History:   Procedure Laterality Date   • CAPSULE ENDOSCOPY N/A 2017    Procedure: CAPSULE ENDOSCOPY M2A;  Surgeon: Stuart Rico DO;  Location: [facility]  ENDOSCOPY;  Service:    • CAPSULE ENDOSCOPY N/A 1/23/2018    Procedure: CAPSULE ENDOSCOPY M2A;  Surgeon: Stuart Rico DO;  Location: [facility] ENDOSCOPY;  Service:    • CENTRAL VENOUS LINE INSERTION  10/30/2015    Ultrasound localization, left basilic vein.Placement of left upper extremity PICC line   • COLONOSCOPY N/A 1/23/2018    Procedure: COLONOSCOPY;  Surgeon: Stuart Rico DO;  Location: [facility] ENDOSCOPY;  Service:    • ENDOSCOPY N/A 6/22/2017    Procedure: ESOPHAGOGASTRODUODENOSCOPY;  Surgeon: Stuart Rico DO;  Location: [facility] ENDOSCOPY;  Service:    • ENDOSCOPY N/A 1/23/2018    Procedure: ESOPHAGOGASTRODUODENOSCOPY;  Surgeon: Stuart Rico DO;  Location: [facility] ENDOSCOPY;  Service:    • FRACTURE SURGERY      right femur    • HYSTERECTOMY     • LAPAROSCOPIC ASSISSTED TOTAL COLECTOMY W/ J-POUCH  10/15/2015    Laparoscopic right hemicolectomy with ileotransverse colostomy   • VENOUS ACCESS DEVICE (PORT) INSERTION  01/12/2016    Right internal jugular Mediport     General Information     Row Name 09/07/21 1020          Physical Therapy Time and Intention    Document Type  therapy note (daily note)  -     Mode of Treatment  individual therapy;physical therapy  -     Row Name 09/07/21 1020          General Information    Patient Profile Reviewed  yes  -     Row Name 09/07/21 1020          Cognition    Orientation Status (Cognition)  oriented to;person  -     Row Name 09/07/21 1020          Safety Issues, Functional Mobility    Safety Issues Affecting Function (Mobility)  insight into deficits/self-awareness;safety precaution awareness  -     Impairments Affecting Function (Mobility)  balance;endurance/activity tolerance  -       User Key  (r) = Recorded By, (t) = Taken By, (c) = Cosigned By    Initials Name Provider Type    Lori Gar PT Physical Therapist        Mobility     Row Name 09/07/21 1020          Bed Mobility    Bed Mobility  supine-sit;sit-supine  -DERIAN     Supine-Sit  [place] (Bed Mobility)  minimum assist (75% patient effort)  -     Sit-Supine [place] (Bed Mobility)  standby assist  -     Assistive Device (Bed Mobility)  head of bed elevated  -[facility] Name 09/07/21 1020          Bed-Chair Transfer    Bed-Chair [place] (Transfers)  contact guard  -[facility] Name 09/07/21 1020          Sit-Stand Transfer    Sit-Stand [place] (Transfers)  minimum assist (75% patient effort);verbal cues;nonverbal cues (demo/gesture);contact guard min assistance from bed;CGA from chair  -[facility] Name 09/07/21 1020          Gait/Stairs (Locomotion)    Crawford Level (Gait)  contact guard;minimum assist (75% patient effort)  -     Assistive Device (Gait)  other (see comments) HHA of 1  -DERIAN     Distance in Feet (Gait)  [id number]  -       User Key  (r) = Recorded By, (t) = Taken By, (c) = Cosigned By    Initials Name Provider Type    DERIAN Lori Caicedo PT Physical Therapist        Obj/Interventions     [facility] Name 09/07/21 1020          Balance    Balance Assessment  sitting static balance;sitting dynamic balance;standing static balance;standing dynamic balance  -     Static Sitting Balance  WFL  -     Dynamic Sitting Balance  WFL  -     Static Standing Balance  mild impairment  -     Dynamic Standing Balance  mild impairment  -     Balance Interventions  sit to stand  -       User Key  (r) = Recorded By, (t) = Taken By, (c) = Cosigned By    Initials Name Provider Type    DERIAN Lori Caicedo, PT Physical Therapist        Goals/Plan     [facility] Name 09/07/21 1020          Bed Mobility Goal 1 (PT)    Activity/Assistive Device (Bed Mobility Goal 1, PT)  sit to supine;supine to sit  -     [place] Level/Cues Needed (Bed Mobility Goal 1, PT)  contact guard assist  -     Time Frame (Bed Mobility Goal 1, PT)  by discharge  -     Progress/Outcomes (Bed Mobility Goal 1, PT)  goal partially met;goal ongoing pt has met goal with sit to supine  -[facility] Name  09/07/21 1020          Transfer Goal 1 (PT)    Activity/Assistive Device (Transfer Goal 1, PT)  sit-to-stand/stand-to-sit;bed-to-chair/chair-to-bed  -DERIAN     [place] Level/Cues Needed (Transfer Goal 1, PT)  minimum assist (75% or more patient effort)  -DERIAN     Time Frame (Transfer Goal 1, PT)  by discharge  -DERIAN     Strategies/Barriers (Transfers Goal 1, PT)  cognition  -DERIAN     Progress/Outcome (Transfer Goal 1, PT)  (S) goal met  -     Row Name 09/07/21 1020          Gait Training Goal 1 (PT)    Activity/Assistive Device (Gait Training Goal 1, PT)  gait (walking locomotion);assistive device use  -DERIAN     [place] Level (Gait Training Goal 1, PT)  standby assist;verbal cues required  -DERIAN     Distance (Gait Training Goal 1, PT)  60' x 2  -DERIAN     Time Frame (Gait Training Goal 1, PT)  by discharge  -DERIAN     Strategies/Barriers (Gait Training Goal 1, PT)  R arm in sling  -DERIAN     Progress/Outcome (Gait Training Goal 1, PT)  goal not met  -DERIAN       User Key  (r) = Recorded By, (t) = Taken By, (c) = Cosigned By    Initials Name Provider Type    DERIAN Lori Caicedo PT Physical Therapist        Clinical Impression     Row Name 09/07/21 1020          Pain    Additional Documentation  Pain Scale: Numbers Pre/Post-Treatment (Group)  -[facility] Name 09/07/21 1020          Pain Scale: Numbers Pre/Post-Treatment    Pretreatment Pain Rating  0/10 - no pain  -     Posttreatment Pain Rating  0/10 - no pain  -     Pre/Posttreatment Pain Comment  Pt denies pain; pt did whince when PT adjusted RUE sling  -     Pain Intervention(s)  Repositioned  -     Row Name 09/07/21 1020          Plan of Care Review    Plan of Care Reviewed With  patient  -     Outcome Summary  PT treatment completed. Pt more amenable to therapy as a rapport was developed. Pt transferred supine to sit with min assistance and sit to supine with SBA. Pt transferred sit to stand to sit with min assistance. Pt ambulated 100ft x2 with HHA of 1 with verbal  cues to keep feet further apart and increase knee flexion to clear feet during swing phase.Pt has partially met bed mobility goal with sit to supine and pt has met transfer goal. Pt will benefit from continued PT to regain lost function and decrease fall risk. Anticipate transfer to SNF at discharge with continued therapy services  -     Row Name 09/07/21 1020          Vital Signs    Pre Systolic BP Rehab  125  -DEIRAN     Pre Treatment Diastolic BP  63  -DERIAN     Post Systolic BP Rehab  142  -DERIAN     Post Treatment Diastolic BP  61  -DERIAN     Pretreatment Heart Rate (beats/min)  77  -DERIAN     Posttreatment Heart Rate (beats/min)  76  -DERIAN     Pre SpO2 (%)  95  -DERIAN     O2 Delivery Pre Treatment  room air  -DERIAN     Post SpO2 (%)  98  -DERIAN     O2 Delivery Post Treatment  room air  -DERIAN     Pre Patient Position  Sitting  -DERIAN     Post Patient Position  Sitting  -DERIAN     Row Name 09/07/21 1020          Positioning and Restraints    Pre-Treatment Position  sitting in chair/recliner  -DERIAN     Post Treatment Position  chair  -DERIAN     In Bed  -- returned to common area in recliner  -DERIAN     In Chair  notified nsg;sitting;with nsg returned to common area in recliner  -DERIAN       User Key  (r) = Recorded By, (t) = Taken By, (c) = Cosigned By    Initials Name Provider Type    Lori Gar, PT Physical Therapist        Outcome Measures     Row Name 09/07/21 1020          How much help from another person do you currently need...    Turning from your back to your side while in flat bed without using bedrails?  2  -DERIAN     Moving from lying on back to sitting on the side of a flat bed without bedrails?  3  -DERIAN     Moving to and from a bed to a chair (including a wheelchair)?  3  -DERIAN     Standing up from a chair using your arms (e.g., wheelchair, bedside chair)?  3  -DERIAN     Climbing 3-5 steps with a railing?  3  -DERIAN     To walk in hospital room?  3  -DERIAN     AM-PAC 6 Clicks Score (PT)  17  -DERIAN     Row Name 09/07/21 1020          Functional  Assessment    Outcome Measure Options  AM-PAC 6 Clicks Basic Mobility (PT)  -DERIAN       User Key  (r) = Recorded By, (t) = Taken By, (c) = Cosigned By    Initials Name Provider Type    Lori Gar PT Physical Therapist                       Physical Therapy Education                 Title: PT OT SLP Therapies (Done)     Topic: Physical Therapy (Done)     Point: Mobility training (Done)     Learning Progress Summary           Patient Acceptance, E,TB, VU by LR at 9/4/2021 1646    Comment: Educate on PT POC and goals.                   Point: Home exercise program (Done)     Learning Progress Summary           Patient Acceptance, E,TB, VU by LR at 9/4/2021 1646    Comment: Educate on PT POC and goals.                   Point: Body mechanics (Done)     Learning Progress Summary           Patient Acceptance, E,TB, VU by LR at 9/4/2021 1646    Comment: Educate on PT POC and goals.                   Point: Precautions (Done)     Learning Progress Summary           Patient Acceptance, E,TB, VU by LR at 9/4/2021 1646    Comment: Educate on PT POC and goals.                               User Key     Initials Effective Dates Name Provider Type Discipline    LR 06/16/21 -  Sang Lyons Physical Therapist PT              PT Recommendation and Plan     Plan of Care Reviewed With: patient  Outcome Summary: PT treatment completed. Pt more amenable to therapy as a rapport was developed. Pt transferred supine to sit with min assistance and sit to supine with SBA. Pt transferred sit to stand to sit with min assistance. Pt ambulated 100ft x2 with HHA of 1 with verbal cues to keep feet further apart and increase knee flexion to clear feet during swing phase.Pt has partially met bed mobility goal with sit to supine and pt has met transfer goal. Pt will benefit from continued PT to regain lost function and decrease fall risk. Anticipate transfer to SNF at discharge with continued therapy services     Time Calculation:   PT  Charges     Row Name 09/07/21 1238             Time Calculation    Start Time  1020  -DERIAN      Stop Time  1105  -DERIAN      Time Calculation (min)  45 min  -DERIAN      PT Received On  09/07/21  -      PT Goal Re-Cert Due Date  09/20/21  -DERIAN         Time Calculation- PT    Total Timed Code Minutes- PT  45 minute(s)  -DERIAN         Timed Charges    30026 - Gait Training Minutes   15  -DERIAN      88308 - PT Therapeutic Activity Minutes  30  -DERIAN         Total Minutes    Timed Charges Total Minutes  45  -DERIAN       Total Minutes  45  -DERIAN        User Key  (r) = Recorded By, (t) = Taken By, (c) = Cosigned By    Initials Name Provider Type    DERIAN Lori Caicedo, PT Physical Therapist        Therapy Charges for Today     Code Description Service Date Service Provider Modifiers Qty    63947718652 HC PT THERAPEUTIC ACT EA 15 MIN 9/7/2021 Lori Caicedo, PT GP 2    37711603240 HC GAIT TRAINING EA 15 MIN 9/7/2021 Lori Caicedo, PT GP 1          PT G-Codes  Outcome Measure Options: AM-PAC 6 Clicks Basic Mobility (PT)  AM-PAC 6 Clicks Score (PT): 17    Lori Caicedo PT  9/7/2021

## 2021-09-07 NOTE — PLAN OF CARE
Goal Outcome Evaluation:  Plan of Care Reviewed With: patient  Patient Agreement with Plan of Care: agrees     Progress: improving  Outcome Summary: Pt slept 6 hours this evening. No behavioral problems.  Problem: Adult Behavioral Health Plan of Care  Goal: Plan of Care Review  Outcome: Ongoing, Progressing  Flowsheets  Taken 9/7/2021 0445  Progress: improving  Outcome Summary: Pt slept 6 hours this evening. No behavioral problems.  Taken 9/6/2021 2200  Plan of Care Reviewed With: patient  Patient Agreement with Plan of Care: agrees

## 2021-09-07 NOTE — PLAN OF CARE
Problem: Adult Behavioral Health Plan of Care  Goal: Plan of Care Review  Recent Flowsheet Documentation  Taken 9/7/2021 1020 by Lori Caicedo PT  Plan of Care Reviewed With: patient  Outcome Summary: PT treatment completed. Pt more amenable to therapy as a rapport was developed. Pt transferred supine to sit with min assistance and sit to supine with SBA. Pt transferred sit to stand to sit with min assistance. Pt ambulated 100ft x2 with HHA of 1 with verbal cues to keep feet further apart and increase knee flexion to clear feet during swing phase.Pt has partially met bed mobility goal with sit to supine and pt has met transfer goal. Pt will benefit from continued PT to regain lost function and decrease fall risk. Anticipate transfer to SNF at discharge with continued therapy services   Goal Outcome Evaluation:  Plan of Care Reviewed With: patient           Outcome Summary: PT treatment completed. Pt more amenable to therapy as a rapport was developed. Pt transferred supine to sit with min assistance and sit to supine with SBA. Pt transferred sit to stand to sit with min assistance. Pt ambulated 100ft x2 with HHA of 1 with verbal cues to keep feet further apart and increase knee flexion to clear feet during swing phase.Pt has partially met bed mobility goal with sit to supine and pt has met transfer goal. Pt will benefit from continued PT to regain lost function and decrease fall risk. Anticipate transfer to SNF at discharge with continued therapy services

## 2021-09-07 NOTE — NURSING NOTE
Behavior   Note any precipitants to event or behavior   Describe level and action of any aggressive behavior or speech and associated interventions.     Anxiety: Excess Worry  Depression: difficulty concentrating  Pain  0  AVH   no  S/I   no  Plan  no  H/I   no  Plan  no    Affect   flat      Note: Patient in own room resting, appears to be calm but has difficulty concentrating during conversation. Pt sits in common area at times and will attempt to socialize with other patients. No behavioral problems at this time.       Intervention    PRN medication utilized:  no    Instructed in medication usage and effects  Medications administered as ordered  Encouraged to verbalize needs      Response    Verbalized understanding   Did patient take medications as ordered yes   Did patient interact with assessment?  yes     Plan    Will monitor for safety  Will monitor every 15 minutes as ordered  Will evaluate and promote the plan of care    Last BM:  unknown date  (Please chart in I/O as well)

## 2021-09-07 NOTE — PLAN OF CARE
Goal Outcome Evaluation:  Plan of Care Reviewed With: patient  Patient Agreement with Plan of Care: agrees     Progress: improving

## 2021-09-07 NOTE — PLAN OF CARE
Problem: Cognitive Impairment (Dementia Signs/Symptoms)  Goal: Optimized Cognitive Function (Dementia Signs/Symptoms)  Outcome: Ongoing, Progressing  Flowsheets (Taken 9/7/2021 1520)  Mutually Determined Action Steps (Optimized Cognitive Function):   engages in cognitive-oriented therapies   engages in social cognitive training   Goal Outcome Evaluation:      Met with patient and completed Recreation Therapy Assessment.  Patient is  alert and oriented to self only.  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  Patient identifies few leisure interest.  States that she liked working and she had dogs that she loved.  She reports that she does not do anything now.    Patient is somewhat reserved but interacts appropriately.  Will continue to engage patient in leisure activity and promote social interaction.
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
## 2021-09-07 NOTE — DISCHARGE PLACEMENT REQUEST
"Mary Day (80 y.o. Female)     Date of Birth Social Security Number Address Home Phone MRN    1940  [id number]   [facility] [address] [phone] [id number]    P[employer]alism Marital Status          Islam        Admission Date Admission Type Admitting Provider Attending Provider Department, Room/Bed    9/4/21 Urgent Shaquille Huggins II, MD [place], Ronald Reagan II, MD [facility] SENIOR PSYCH, 670/1    Discharge Date Discharge Disposition Discharge Destination                       Attending Provider: hSaquille Huggins II, MD    Allergies: Other    Isolation: None   Infection: None   Code Status: CPR    Ht: 165.1 cm (65\")   Wt: 50.5 kg (111 lb 6.4 oz)    Admission Cmt: None   Principal Problem: Major neurocognitive disorder due to Alzheimer's disease (CMS/[facility]) [G30.9,F02.80]                 Active Insurance as of 9/4/2021     Primary Coverage     Payor Plan Insurance Group Employer/Plan Group    MEDICARE MEDICARE A & B      Payor Plan Address Payor Plan Phone Number Payor Plan Fax Number Effective Dates    [address] [phone]  12/1/2006 - None Entered    [facility] [id number]       Subscriber Name Subscriber Birth Date Member ID       MARY DAY 1940 [id number]           Secondary Coverage     Payor Plan Insurance Group Employer/Plan Group    AMERICAN CONTINENTAL INS CO AMERICAN [employer]ENTAL [employer] CO PLAN F     Payor Plan Address Payor Plan Phone Number Payor Plan Fax Number Effective Dates    [address] [phone]  11/1/2010 - None Entered    [facility] [id number]       Subscriber Name Subscriber Birth Date Member ID       MARY DAY 1940 [id number]                 Emergency Contacts      (Rel.) Home Phone Work Phone Mobile Phone    Chava Day (Spouse) [phone] -- [phone]            Emergency Contact Information     Name Relation Home Work Mobile    Chava Day " "Spouse [phone]          Insurance Information                MEDICARE/MEDICARE A & B Phone: [phone]    Subscriber: Mary Gutierrez Subscriber#: [id number]    Group#:  Precert#:         [employer]ER[employer] CO/AMERICAN [employer] Phone: [phone]    Subscriber: Mary Gutierrez Subscriber#: [id number]    Group#: PLAN [id number] Precert#:              History & Physical      Shaquille Huggins II, MD at 09/04/21 0918          Psychiatric & Behavioral Health History & Physical  9/4/2021    --> Source of History: chart review and the patient; staff    --> Chief Complaint: Dementia Related Behavioral Issues and Physical Aggression   --> \"I need to get up...\"    History of Present Illness:  Ms. Mary Gutierrez is a 80 y.o. female with a concurrent neuropsychiatric history notable for Alzheimer's disease.      Presents with dementia related behavioral issues. Onset of symptoms was gradual starting unknown months ago.  Symptoms have been present on an increasingly more frequent basis. Symptoms are associated with agitation and irritability.  Symptoms are aggravated by problems with health.   Symptoms improve with supportive care.  Patient's symptom severity is severe.  Patient's symptoms occur in the context of ongoing illness.    Patient is seen in her room with RN staff today.  She is unable to tell me her age but she does respond to her name.  She cannot tell me her location or time including year, or situation.  She denies feeling depressed or anxious.  She cannot tell me whether she is .  She is very focused on getting Out of bed but appears too weak to be able to lift herself up.  With help of nursing staff she is able to do so.  Attempt review history of falls but has no recall of these events.    She was seen twice yesterday in the emergency room.  First was for a fall hitting her shoulder seen by Dr. Garner.  Secondly seen by Dr. Mehta; per his " note:  Patient presents to the emergency department today with increased confusion for the past month.  Much of the patient's history was obtained from her daughter, who is present at bedside.  She states that patient has been diagnosed with Alzheimer's dementia.  She had a fall this morning sustaining a fracture to her right humerus and a skin tear.  The patient's daughter states that patient has not been taking her home medications, she is not taking care of herself and refuses care from her , and has been having a decline in her ADLs for the past 4 months.  Family is seeking nursing home care.  The patient's  has contacted [facility], and the nursing home states that patient needs a psychiatric evaluation prior to them admitting the patient to their nursing home.    The only contact information listed in chart is for the patient's , Chava at [phone] and mobile number [phone].  Per her :  --Concern for dementia; no formal dx  --Memory concerns for a year or more  --Slow and gradual  --Hygiene has went down; he cannot get her to bathe for a least the last 1-2 months; won't comb her hair.   --Refuses to take medication  --Will recognize ; does not recognize all her kids (one out of five)  --No neuro or psych hx  --hx of chemo for colon cancer, 6 months afterwards she became more forgetful  -- is 82 y/o, been together for 63 yrs  --verbally aggressive with   --paranoid with , distrusting, new onset  --concern for VH of persons outside  --no tremors  --falls at home; when walking; not when transferin  --had talked to [facility]  --needs help with ALDs      Psychiatric Review Of Systems:  --Confusion, agitation, as above.      Concurrent Psychiatric History:  --Past neuropsychiatric history:  • None formal    --Psychiatric Hospitalizations: none per family      --Suicide Attempts: none per family    --Prior Treatment:  --Outpatient:  none formal    --Prior Medications Trials:  • None noted/reported    --History of violence or legal issues:   Denies significant history of legal issues.    -Abuse/Trauma/Neglect/Exploitation: none reported      Substance Use:   --No Nicotine, A&D use.       Social History:  --> five kids;  63 yrs.  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  Social History     Socioeconomic History   • Marital status:      Spouse name: Not on file   • Number of children: Not on file   • Years of education: Not on file   • Highest education level: Not on file   Tobacco Use   • Smoking status: Never Smoker   • Smokeless tobacco: Never Used   Vaping Use   • Vaping Use: Never used   Substance and Sexual Activity   • Alcohol use: No   • Drug use: No   • Sexual activity: Defer         Family History:  Family History   Family history unknown: Yes     -->Further details: Dementia - none known      Past Medical and Surgical History:  Past Medical History:   Diagnosis Date   • Acid reflux    • Diabetes mellitus (CMS/HCC)    • Hypertension    • Malignant neoplasm of ascending colon (CMS/HCC) 11/1/2016   • Malignant tumor of cecum (CMS/HCC)      --> Seizure Hx: none known      Past Surgical History:   Procedure Laterality Date   • CAPSULE ENDOSCOPY N/A 6/22/2017    Procedure: CAPSULE ENDOSCOPY M2A;  Surgeon: Stuart Rico DO;  Location: [facility] ENDOSCOPY;  Service:    • CAPSULE ENDOSCOPY N/A 1/23/2018    Procedure: CAPSULE ENDOSCOPY M2A;  Surgeon: Stuart Rico DO;  Location: [facility] ENDOSCOPY;  Service:    • CENTRAL VENOUS LINE INSERTION  10/30/2015    Ultrasound localization, left basilic vein.Placement of left upper extremity PICC line   • COLONOSCOPY N/A 1/23/2018    Procedure: COLONOSCOPY;  Surgeon: Stuart Rico DO;  Location: [facility] ENDOSCOPY;  Service:    • ENDOSCOPY N/A 6/22/2017    Procedure: ESOPHAGOGASTRODUODENOSCOPY;  Surgeon: Stuart Rico DO;  Location: [facility] ENDOSCOPY;  Service:    • ENDOSCOPY  N/A 1/23/2018    Procedure: ESOPHAGOGASTRODUODENOSCOPY;  Surgeon: Stuart Rico DO;  Location: [facility] ENDOSCOPY;  Service:    • FRACTURE SURGERY      right femur    • HYSTERECTOMY     • LAPAROSCOPIC ASSISSTED TOTAL COLECTOMY W/ J-POUCH  10/15/2015    Laparoscopic right hemicolectomy with ileotransverse colostomy   • VENOUS ACCESS DEVICE (PORT) INSERTION  01/12/2016    Right internal jugular Mediport       Allergies:  Other    Medications Prior to Admission   Medication Sig Dispense Refill Last Dose   • Dorzolamide HCl-Timolol Mal PF 22.3-6.8 MG/ML solution Apply 1 drop to eye 2 (Two) Times a Day.   Unknown at Unknown time   • Empagliflozin (Jardiance) 25 MG tablet Take 25 mg by mouth Daily.   Unknown at Unknown time   • FREESTYLE LITE test strip USE TO TEST BLOOD SUGAR ONCE DAILY AS DIRECTED  2 Unknown at Unknown time   • gemfibrozil (Lopid) 600 MG tablet Take 600 mg by mouth 2 (Two) Times a Day.   Unknown at Unknown time   • HYDROcodone-acetaminophen (NORCO) 7.5-325 MG per tablet Take 1 tablet by mouth Every 6 (Six) Hours As Needed for Moderate Pain  for up to 12 days. 12 tablet 0 Unknown at Unknown time   • insulin detemir (LEVEMIR) 100 UNIT/ML injection Inject 14 Units under the skin into the appropriate area as directed Every Night.   Unknown at Unknown time   • lisinopril (PRINIVIL,ZESTRIL) 20 MG tablet Take 20 mg by mouth Daily.   Unknown at Unknown time   • metFORMIN (GLUCOPHAGE) 1000 MG tablet Take 1,000 mg by mouth 2 (Two) Times a Day With Meals.   Unknown at Unknown time   • omega-3 acid ethyl esters (LOVAZA) 1 G capsule Take 2 g by mouth Daily.   Unknown at Unknown time   • Omeprazole (PRILOSEC PO) Take 1 tablet by mouth As Needed.   Unknown at Unknown time   • Promethazine HCl (PHENERGAN PO) Take  by mouth As Needed.   Unknown at Unknown time   • vitamin B-12 (CYANOCOBALAMIN) 1000 MCG tablet Take 1 tablet by mouth on Monday, Wednesday and Friday 36 tablet 1 Unknown at Unknown time     --> Reviewed;  "trouble with compliance as noted above      Medical Review Of Systems:  --Unable to meaningfully obtain given confusion.  ROS        Objective   Objective --    Vital Signs:  Temp:  [96.1 °F (35.6 °C)-98.8 °F (37.1 °C)] 96.1 °F (35.6 °C)  Heart Rate:  [] 104  Resp:  [16-18] 18  BP: (145-190)/(60-96) 153/78    Physical Exam:   -General Appearance:  normal general appearance, in no apparent distress and active; right arm bandage  -Hygiene:  Unkempt   -Gait & Station:  Deferred, in bed  -Musculoskeletal:  No tremors or abnormal involuntary movements and No Cog [place] or Rigidity and No atrophy noted  -Pulm: Unlaboured     Mental Status Exam:   --Cooperation:  Minimally cooperative  --Eye Contact:  Non-sustained  --Psychomotor Behavior:  Slow  --Mood:  \"Fine\"  --Affect:  mood-incongruent and confused  --Speech:  Minimal, Slow and Soft  --Thought Process:  Incoherent, Dyscognitive, Sluggish and Disorganized  --Associations: Loose Associations  --Themes:  None overt  --Thought Content:     --Mood congruent   --Suicidal:  Denies    --Homicidal:  Denies   --Hallucinations:  Cannot rule out   --Delusion:  Cannot rule out Paranoia given behaviors  --Cognitive Functioning:  -Consciousness: Awake and alert  -Orientation:  Person and Not orientated to Place, Time and Situation  -Attention:  Distractible   -Concentration:  Distractible  -Language:  Below Average based on interaction; Word Finding Difficulties  -Vocabulary:  Below Average based on interaction; Word Finding Difficulties and Paraphasic Errors  -Short Term Memory: Deficits  -Long Term Memory: Deficits  -Fund of Knowledge:  Below Average based on interaction  -Abstraction:  Poor and Patient unable to perform  --Reliability:  limited  --Insight:  None  --Judgment:  Impaired  --Impulse Control:  Impaired      Diagnostic Data:  Results source: EMR      --> Lab Work  Results source: EMR    Recent Results (from the past 72 hour(s))   Basic Metabolic Panel    " Collection Time: 09/03/21 10:40 AM    Specimen: Blood   Result Value Ref Range    Glucose 306 (H) 65 - 99 mg/dL    BUN 18 8 - 23 mg/dL    Creatinine 0.77 0.57 - 1.00 mg/dL    Sodium 130 (L) 136 - 145 mmol/L    Potassium 3.9 3.5 - 5.2 mmol/L    Chloride 93 (L) 98 - 107 mmol/L    CO2 26.0 22.0 - 29.0 mmol/L    Calcium 9.2 8.6 - 10.5 mg/dL    eGFR Non African Amer 72 >60 mL/min/1.73    BUN/Creatinine Ratio 23.4 7.0 - 25.0    Anion Gap 11.0 5.0 - 15.0 mmol/L   aPTT    Collection Time: 09/03/21 10:40 AM    Specimen: Blood   Result Value Ref Range    PTT 26.7 20.0 - 40.3 seconds   Protime-INR    Collection Time: 09/03/21 10:40 AM    Specimen: Blood   Result Value Ref Range    Protime 12.0 11.1 - 15.3 Seconds    INR 0.89 0.80 - 1.20   CBC Auto Differential    Collection Time: 09/03/21 10:40 AM    Specimen: Blood   Result Value Ref Range    WBC 13.26 (H) 3.40 - 10.80 10*3/mm3    RBC 4.98 3.77 - 5.28 10*6/mm3    Hemoglobin 15.4 12.0 - 15.9 g/dL    Hematocrit 46.8 (H) 34.0 - 46.6 %    MCV 94.0 79.0 - 97.0 fL    MCH 30.9 26.6 - 33.0 pg    MCHC 32.9 31.5 - 35.7 g/dL    RDW 12.0 (L) 12.3 - 15.4 %    RDW-SD 41.8 37.0 - 54.0 fl    MPV 9.9 6.0 - 12.0 fL    Platelets 219 140 - 450 10*3/mm3    Neutrophil % 89.7 (H) 42.7 - 76.0 %    Lymphocyte % 5.7 (L) 19.6 - 45.3 %    Monocyte % 3.5 (L) 5.0 - 12.0 %    Eosinophil % 0.4 0.3 - 6.2 %    Basophil % 0.4 0.0 - 1.5 %    Immature Grans % 0.3 0.0 - 0.5 %    Neutrophils, Absolute 11.89 (H) 1.70 - 7.00 10*3/mm3    Lymphocytes, Absolute 0.76 0.70 - 3.10 10*3/mm3    Monocytes, Absolute 0.47 0.10 - 0.90 10*3/mm3    Eosinophils, Absolute 0.05 0.00 - 0.40 10*3/mm3    Basophils, Absolute 0.05 0.00 - 0.20 10*3/mm3    Immature Grans, Absolute 0.04 0.00 - 0.05 10*3/mm3    nRBC 0.0 0.0 - 0.2 /100 WBC   Gold Top - SST    Collection Time: 09/03/21 10:40 AM   Result Value Ref Range    Extra Tube Hold for add-ons.    COVID-19 and FLU A/B PCR - Swab, Nasopharynx    Collection Time: 09/03/21  8:18 PM     Specimen: Nasopharynx; Swab   Result Value Ref Range    COVID19 Not Detected Not Detected - Ref. Range    Influenza A PCR Not Detected Not Detected    Influenza B PCR Not Detected Not Detected   Urinalysis With Microscopic If Indicated (No Culture) - Urine, Clean Catch    Collection Time: 09/03/21  9:31 PM    Specimen: Urine, Clean Catch   Result Value Ref Range    Color, UA Yellow Yellow, Straw, Dark Yellow, Keira    Appearance, UA Clear Clear    pH, UA 6.5 5.0 - 9.0    Specific Gravity, UA 1.027 1.003 - 1.030    Glucose, UA >=1000 mg/dL (3+) (A) Negative    Ketones, UA Negative Negative    Bilirubin, UA Negative Negative    Blood, UA Trace (A) Negative    Protein, UA 30 mg/dL (1+) (A) Negative    Leuk Esterase, UA Negative Negative    Nitrite, UA Negative Negative    Urobilinogen, UA 0.2 E.U./dL 0.2 - 1.0 E.U./dL   Urine Drug Screen - Urine, Clean Catch    Collection Time: 09/03/21  9:31 PM    Specimen: Urine, Clean Catch   Result Value Ref Range    THC, Screen, Urine Negative Negative    Phencyclidine (PCP), Urine Negative Negative    Cocaine Screen, Urine Negative Negative    Methamphetamine, Ur Negative Negative    Opiate Screen Positive (A) Negative    Amphetamine Screen, Urine Negative Negative    Benzodiazepine Screen, Urine Negative Negative    Tricyclic Antidepressants Screen Negative Negative    Methadone Screen, Urine Negative Negative    Barbiturates Screen, Urine Negative Negative    Oxycodone Screen, Urine Negative Negative    Propoxyphene Screen Negative Negative    Buprenorphine, Screen, Urine Negative Negative   Urinalysis, Microscopic Only - Urine, Clean Catch    Collection Time: 09/03/21  9:31 PM    Specimen: Urine, Clean Catch   Result Value Ref Range    RBC, UA 6-12 (A) None Seen /HPF    WBC, UA 3-5 None Seen, 0-2, 3-5 /HPF    Bacteria, UA Trace (A) None Seen /HPF    Squamous Epithelial Cells, UA 0-2 None Seen, 0-2 /HPF    Hyaline Casts, UA None Seen None Seen /LPF    Methodology Manual Light  Microscopy    Acetaminophen Level    Collection Time: 09/03/21  9:41 PM    Specimen: Blood   Result Value Ref Range    Acetaminophen <5.0 0.0 - 30.0 mcg/mL   Salicylate Level    Collection Time: 09/03/21  9:41 PM    Specimen: Blood   Result Value Ref Range    Salicylate <0.3 <=30.0 mg/dL   Ethanol    Collection Time: 09/03/21  9:41 PM    Specimen: Blood   Result Value Ref Range    Ethanol <10 0 - 10 mg/dL    Ethanol % <0.010 %       XR Shoulder 2+ View Right    Result Date: 9/3/2021  Narrative: EXAM: XR SHOULDER 2 OR MORE VIEWS COMPARISON: None INDICATION: fall, pain FINDINGS: Review right shoulder. Minimally displaced fracture of the right humeral head greater tuberosity. No christa dislocation. Visualized lung is clear .     Impression: Minimally displaced fracture of the right humeral head greater tuberosity. Electronically signed by:  Luis Carlos Castillo MD  9/3/2021 10:30 AM CDT Workstation: [id number]    XR Humerus Right    Result Date: 9/3/2021  Narrative: EXAM: XR HUMERUS COMPARISON: None INDICATION: fall, pain FINDINGS: 2 view right humerus. Minimally displaced fracture of the right proximal humerus greater tuberosity. No appreciable dislocation. Joint spaces are preserved. Visualized lung is clear.     Impression: Fracture of the right humeral head greater tuberosity. Electronically signed by:  Luis Carlos Castillo MD  9/3/2021 10:25 AM [employer] Workstation: [id number]    XR Forearm 2 View Right    Result Date: 9/3/2021  Narrative: EXAM: XR FOREARM 2 VIEWS COMPARISON: None INDICATION: fall, pain FINDINGS: 2 view right forearm. No acute fracture or dislocation. No suspicious osseous lesion. Joint spaces are preserved. Sclerotic degenerative changes seen throughout the right wrist. Soft tissues are unremarkable.     Impression: No acute osseous abnormality. Right wrist osteoarthritis. Electronically signed by:  Luis Carlos Castillo MD  9/3/2021 10:27 AM [employer] Workstation: [id number]      No results found for:  GLUF     Lab Results   Component Value Date    HGBA1C 8.0 (H) 2021       Lab Results   Component Value Date    CHOL 236 (H) 2020    TRIG 281 (H) 2020    HDL 66 2020     (H) 2020        TSH   Date Value Ref Range Status   2019 4.02 (H) 0.36 - 3.74 u[iU]/mL Final     Comment:     ** Specimens that contain biotin at a concentration of      100 ng/mL demonstrate a less than or equal to 10% change     in results.  Biotin concentrations greater than this may     lead to falsely depressed results for patient samples.     Results from patients taking biotin supplements or     receiving high-dose biotin therapy should be interpreted     with caution due to possible interference with this      test. **  ** Patient samples may contain heterophilic antibodies that     could react in immunoassays to give falsely elevated or     depressed results.  This assay has been designed to     minimize interference from heterophilic antibodies.      Nevertheless, complete elimination of this interference     from all patient specimens cannot be guaranteed.  As with     any immuno-recognition measurement of a peptide,     extremely rare genetic variants may exhibit varying     degrees of detection.  A test result that is inconsistant     with the clinical picture and patient history should be     interpreted with caution.  Performance of this assay has     not been established with  specimens. **        Lab Results   Component Value Date    OYYCRZTP31 763 2021    FOLATE >20.00 2021       Lab Results   Component Value Date    IRON 64 2021    TIBC 305 2021    FERRITIN 687.60 (H) 2021       -RPR/HIV: none in chart      --> Neuroimaging: none in chart       Assessment/Plan   --Patient Strengths: supportive family/friends, supportive environment   --Patient Barriers: impaired cognition, resistance to treatment      --Diagnostic Impression: Ms. Gutierrez  is a 80 y.o.  female admitted for dementia related behaviors, constituting an imminent risk of harm to self and others (from her dementia) - necessitating inpatient psychiatric stabilization and treatment.     Diagnostically, Major Neurocognitive disorder, most likely Alzhiermer's etiology given age; high index of suspicion for comorbid vascular changes as well given her medical history.  High degree of suspicion for psychosis secondary to neurocognitive disorder with significant paranoia and Persecutory concerns at her family.          Assessment:  --  Major neurocognitive disorder (CMS/HCC)    Dementia with behavioral disturbance (CMS/HCC)    Type 2 diabetes mellitus without complication, with long-term current use of insulin (CMS/HCC)    Dyslipidemia    Benign essential HTN     Non-Psychiatric Medical Conditions Include:  --HTN: Cont home Lisinopril; Catapres PRN  --t2DM: Cont Invokana (formulary), Levemir 10 mg qHS; Metformin 500mg BID  --GERD: Cont PPI  --Occular: cont Cosopt BID         Treatment Plan:  1) Will admit patient to the behavioral health unit at [facility], adult behavioral health unit, as a voluntary admission per family/ to ensure patient safety given imminent risk status and need for emergent inpatient stabilization and treatment.    2) Patient will be provided treatment with the unit milieu, activities, therapies and psychopharmacological management.    3) Patient placed on  Q15 minute checks and Fall precautions.    4) Hospitalist consulted for assistance in management of medical comorbidities.    5) Will order following labs:   --RPR, HIV, TSH, Folate, B12, Vitamin D to evaluate for any contributing etiologies.   --A1c, Fasting lipids & glucose to establish baseline for any anti-d2 agent therapy    6) Will restart patient on the following psychiatric home meds:   --Not applicable; none at home.     7) Will make the following medication changes, and proceed with the following  treatment planning:   --Head CT for evaluation of any neuro anatomical etiologies  -Plan to consider acetylcholinesterase inhibitor, with particular consideration for Exelon given patch compliance concerns  -We will consider Namenda augmentation  -Monitor for paranoia/delusions or other symptoms of psychosis consider antidopaminergic therapy if needed  -EKG to determine QT/QTc  -We will move forward with transition to skilled nursing facility  - is going to further research guardianship/POA    8) Will begin discharge planning as appropriate for patient.    9) Psychotherapy provided: Less than 5 minutes for the patient.     All questions answered for the patient.  Treatment plan and medication risks and benefits discussed with: Family.  We discussed the benefits, risks alternatives and side effects of medications.  We discussed the rationale for treatment as well as the risk of nontreatment.  We discussed off label use of antipsychotics/anti-dopaminergic's, the focus of improving quality of life in the context of black box warning, lack of FDA approval and off label use, increased overall mortality risk.  All questions answered for him.  He provides consent for treatment for pharmacotherapy with goal of improving quality of life.      Estimated Length of Stay: 10-14 days  Prognosis: Fair      Shaquille Huggins II, MD  09/04/21 @ 09:18 CDT  Dictated using Dragon.      Electronically signed by Shaquille Huggins II, MD at 09/04/21 1119         Current Facility-Administered Medications   Medication Dose Route Frequency Provider Last Rate Last Admin   • acetaminophen (TYLENOL) tablet 500 mg  500 mg Oral TID Shaquille Huggins II, MD   500 mg at 09/07/21 0844   • acetaminophen (TYLENOL) tablet 650 mg  650 mg Oral Q4H PRN Shaquille Huggins II, MD       • aluminum-magnesium hydroxide-simethicone (MAALOX MAX) 400-400-40 MG/5ML suspension 15 mL  15 mL Oral Q6H PRN Shaquille Huggins II, MD       •  Canagliflozin (INVOKANA) tablet 100 mg  100 mg Oral Daily Shaquille Huggins II, MD   100 mg at 09/07/21 0843   • cholecalciferol (VITAMIN D3) tablet 1,000 Units  1,000 Units Oral Daily With Dinner Shaquille Huggins II, MD   1,000 Units at 09/06/21 1715   • cloNIDine (CATAPRES) tablet 0.1 mg  0.1 mg Oral Q4H PRN Shaquille Huggins II, MD       • dextrose (D50W) 25 g/ 50mL Intravenous Solution 25 g  25 g Intravenous Q15 Min PRN LevillIsatu G, APRN       • dextrose (GLUTOSE) oral gel 15 g  15 g Oral Q15 Min PRN Levill Isatu G, APRN       • dorzolamide-timolol (COSOPT) ophthalmic solution 1 drop  1 drop Both Eyes BID Shaquille Huggins II, MD   1 drop at 09/07/21 0849   • glucagon (human recombinant) (GLUCAGEN DIAGNOSTIC) injection 1 mg  1 mg Subcutaneous Q15 Min PRN Isatu Summers APRLATOSHA       • influenza vac split quad (FLUZONE,FLUARIX,AFLURIA,FLULAVAL) injection 0.5 mL  0.5 mL Intramuscular During Hospitalization Shaquille Huggins II, MD       • insulin aspart (novoLOG) injection 0-14 Units  0-14 Units Subcutaneous TID AC Isatu Summers APRN   5 Units at 09/05/21 1655   • insulin detemir (LEVEMIR) injection 10 Units  10 Units Subcutaneous Nightly Shaquille Huggins II, MD   10 Units at 09/06/21 2105   • lisinopril (PRINIVIL,ZESTRIL) tablet 20 mg  20 mg Oral Daily Shaquille Huggins II, MD   20 mg at 09/07/21 0843   • loperamide (IMODIUM) capsule 2 mg  2 mg Oral Q2H PRN Shaquille Huggins II, MD       • LORazepam (ATIVAN) tablet 1 mg  1 mg Oral Q6H PRN Shaquille Huggins II, MD        Or   • LORazepam (ATIVAN) injection 1 mg  1 mg Intramuscular Q6H PRN Shaquille Huggins II, MD   1 mg at 09/04/21 0414   • magnesium hydroxide (MILK OF MAGNESIA) suspension 2400 mg/10mL 10 mL  10 mL Oral Daily PRN Shaquille Huggins II, MD       • metFORMIN (GLUCOPHAGE) tablet 500 mg  500 mg Oral BID With Meals Shaquille Huggins II, MD   500 mg at 09/07/21 0844   • multivitamin with minerals 1  "tablet  1 tablet Oral Daily Shaquille Huggins II, MD   1 tablet at 09/07/21 0843   • pantoprazole (PROTONIX) EC tablet 40 mg  40 mg Oral QAM Shaquille Huggins II, MD   40 mg at 09/07/21 0844   • rivastigmine (EXELON) 4.6 MG/24HR patch 1 patch  1 patch Transdermal Daily Shaquille Huggins II, MD   1 patch at 09/06/21 1716        Physician Progress Notes (last 72 hours) (Notes from 09/04/21 1251 through 09/07/21 1251)      Renzo Sheppard MD at 09/06/21 1443          Psychiatry Progress Note   9/6/2021      HD: #2  Legal: Vol per family    Chief Complaint: Dementia Related Behavioral Issues      Subjective --  Ms. Mary Gutierrez is a 80 y.o. female who was seen on the Geriatric unit.      Patient is pleasantly confused today.  She cannot tell me her location or situation or time.  She is unable to have a meaningfully sustained conversation.    Per nursing staff at time she talks about her  having multiple children as well as dogs, only but then later state that she has none of those.    No as needed's.  No significant behavioral disturbances.      Objective   Objective --    Vital Signs:  Temp:  [96 °F (35.6 °C)-97.6 °F (36.4 °C)] 97.6 °F (36.4 °C)  Heart Rate:  [93-98] 93  Resp:  [16-18] 18  BP: (127-166)/(65-74) 127/65    Patient Vitals for the past 24 hrs:   BP Temp Temp src Pulse Resp SpO2   09/06/21 0734 127/65 97.6 °F (36.4 °C) Tympanic 93 18 94 %   09/05/21 1900 166/74 96 °F (35.6 °C) Tympanic 98 16 99 %               Physical Exam:   -General Appearance:  normal general appearance, in no apparent distress and active; right arm bandage  -Hygiene:  Unkempt   -Gait & Station:  Deferred, sitting  -Musculoskeletal:  No tremors or abnormal involuntary movements and No Cog [place] or Rigidity and No atrophy noted  -Pulm: Unlaboured     Mental Status Exam:   --Cooperation:  Minimally cooperative  --Eye Contact:  Non-sustained  --Psychomotor Behavior:  Slow  --Mood:  \"OK\"  --Affect:  " mood-incongruent and confused  --Speech:  Minimal, Slow and Soft  --Thought Process:  Incoherent, Dyscognitive, Sluggish and Disorganized  --Associations: Loose Associations  --Themes:  None overt  --Thought Content:                --Mood congruent              --Suicidal:  Denies               --Homicidal:  Denies              --Hallucinations:  none overt              --Delusion:  none expressed  --Cognitive Functioning:  -Consciousness: Awake and alert  -Orientation:  Person and Not orientated to Place, Time and Situation  -Attention:  Distractible             -Concentration:  Distractible  -Language:  Below Average based on interaction; Word Finding Difficulties  -Vocabulary:  Below Average based on interaction; Word Finding Difficulties and Paraphasic Errors  -Short Term Memory: Deficits  -Long Term Memory: Deficits  -Fund of Knowledge:  Below Average based on interaction  -Abstraction:  Poor and Patient unable to perform  --Reliability:  limited  --Insight:  None  --Judgment:  Impaired  --Impulse Control:  Impaired      Lab Results (last 24 hours)     Procedure Component Value Units Date/Time    RPR [[id number]]  (Normal) Collected: 09/05/21 0627    Specimen: Blood Updated: 09/06/21 1410     RPR Non-Reactive    POC Glucose Once [[id number]]  (Normal) Collected: 09/06/21 1130    Specimen: Blood Updated: 09/06/21 1146     Glucose 109 mg/dL      Comment: RN NotifiedOperator: [id number] HEIDI Addison ID: [id number]       POC Glucose Once [[id number]]  (Normal) Collected: 09/06/21 0636    Specimen: Blood Updated: 09/06/21 0705     Glucose 105 mg/dL      Comment: : [id number] KAMARI Majano ID: [id number]       POC Glucose Once [[id number]]  (Normal) Collected: 09/05/21 2013    Specimen: Blood Updated: 09/05/21 2036     Glucose 124 mg/dL      Comment: RN NotifiedOperator: [id number] KAMARI Majano ID: [id number]           Results source: EMR    Imaging Results (Last 24 Hours)     ** No  results found for the last 24 hours. **        Results source: EMR    Medications:   Scheduled Meds:acetaminophen, 500 mg, Oral, TID  Canagliflozin, 100 mg, Oral, Daily  cholecalciferol, 1,000 Units, Oral, Daily With Dinner  dorzolamide-timolol, 1 drop, Both Eyes, BID  insulin aspart, 0-14 Units, Subcutaneous, TID AC  insulin detemir, 10 Units, Subcutaneous, Nightly  lisinopril, 20 mg, Oral, Daily  metFORMIN, 500 mg, Oral, BID With Meals  multivitamin with minerals, 1 tablet, Oral, Daily  pantoprazole, 40 mg, Oral, QAM  rivastigmine, 1 patch, Transdermal, Daily      Continuous Infusions:   PRN Meds:.acetaminophen  •  aluminum-magnesium hydroxide-simethicone  •  cloNIDine  •  dextrose  •  dextrose  •  glucagon (human recombinant)  •  influenza vaccine  •  loperamide  •  LORazepam **OR** LORazepam  •  magnesium hydroxide      Assessment:     Major neurocognitive disorder due to Alzheimer's disease (CMS/HCC)    Dementia with behavioral disturbance (CMS/[facility])    Type 2 diabetes mellitus without complication, with long-term current use of insulin (CMS/[facility])    Dyslipidemia    Benign essential HTN    -Cannot fully rule out a superimposed delirium on dementia picture given recent urinary tract infection/cystitis.  Etiology looks to be primary Alzheimer's process from review of neuro imaging.    Treatment Plan:  1) Will continue care for the patient on the behavioral health unit at [facility].    2) Will continue to provide treatment with the unit milieu, activities, therapies and psychopharmacological management.    3) Patient to be placed on or continued on  Q15 minute checks  and Fall precautions.    4) Treatment Planning:  --HTN: Cont home Lisinopril; Catapres PRN  --t2DM: Cont Invokana (formulary), titrate Levemir to 14 mg qHS (home dose, now that pt is eating well); Metformin 500mg BID  --GERD: Cont PPI  --Occular: cont Cosopt BID      --MNCD: Cont Exelon patch 4.6mg daily; Consider Namenda augmentation  given severity of NCD   --B12, folate, HIV within normal limits   --RPR non-reactive    --Therapy for < 5 min      --> Dispostion Planning: To transition to skilled nursing facility.    Treatment plan and medication risks and benefits discussed with: Treatment Team.    Renzo Sheppard MD  09/06/21 @ 14:43 CDT  Dictated using Dragon.      Electronically signed by Renzo Sheppard MD at 09/06/21 1446     Shaquille Huggins II, MD at 09/05/21 1537          Psychiatry Progress Note   9/5/2021      HD: #1  Legal: Vol per family    Chief Complaint: Dementia Related Behavioral Issues      Subjective --  Ms. Mary Gutierrez is a 80 y.o. female who was seen on the Geriatric unit.      Patient is pleasantly confused today.  She cannot tell me her location or situation or time.  She is unable to have a meaningfully sustained conversation.    Per nursing staff at time she talks about her  having multiple children as well as dogs, only but then later state that she has none of those.    No as needed's.  No significant behavioral disturbances.        Review of Systems:  --Unable to meaningfully obtain given the degree of her confusion.  ROS      Objective   Objective --    Vital Signs:  Temp:  [95.1 °F (35.1 °C)-96.6 °F (35.9 °C)] 96.6 °F (35.9 °C)  Heart Rate:  [87-93] 93  Resp:  [16-17] 16  BP: (151-164)/(74-79) 151/74    Patient Vitals for the past 24 hrs:   BP Temp Temp src Pulse Resp SpO2   09/05/21 0800 151/74 96.6 °F (35.9 °C) Tympanic 93 16 97 %   09/04/21 1915 164/79 95.1 °F (35.1 °C) Tympanic 87 17 93 %               Physical Exam:   -General Appearance:  normal general appearance, in no apparent distress and active; right arm bandage  -Hygiene:  Unkempt   -Gait & Station:  Deferred, sitting  -Musculoskeletal:  No tremors or abnormal involuntary movements and No Cog [place] or Rigidity and No atrophy noted  -Pulm: Unlaboured     Mental Status Exam:   --Cooperation:  Minimally  "cooperative  --Eye Contact:  Non-sustained  --Psychomotor Behavior:  Slow  --Mood:  \"OK\"  --Affect:  mood-incongruent and confused  --Speech:  Minimal, Slow and Soft  --Thought Process:  Incoherent, Dyscognitive, Sluggish and Disorganized  --Associations: Loose Associations  --Themes:  None overt  --Thought Content:                --Mood congruent              --Suicidal:  Denies               --Homicidal:  Denies              --Hallucinations:  Cannot rule out              --Delusion:  Cannot rule out Paranoia given behaviors  --Cognitive Functioning:  -Consciousness: Awake and alert  -Orientation:  Person and Not orientated to Place, Time and Situation  -Attention:  Distractible             -Concentration:  Distractible  -Language:  Below Average based on interaction; Word Finding Difficulties  -Vocabulary:  Below Average based on interaction; Word Finding Difficulties and Paraphasic Errors  -Short Term Memory: Deficits  -Long Term Memory: Deficits  -Fund of Knowledge:  Below Average based on interaction  -Abstraction:  Poor and Patient unable to perform  --Reliability:  limited  --Insight:  None  --Judgment:  Impaired  --Impulse Control:  Impaired      Lab Results (last 24 hours)     Procedure Component Value Units Date/Time    Folate [[id number]]  (Normal) Collected: 09/05/21 0627    Specimen: Blood Updated: 09/05/21 1222     Folate >20.00 ng/mL     Narrative:      Results may be falsely increased if patient taking Biotin.      Vitamin D 25 Hydroxy [[id number]]  (Normal) Collected: 09/05/21 0627    Specimen: Blood Updated: 09/05/21 1222     25 Hydroxy, Vitamin D 36.9 ng/ml     Narrative:      Reference Range for Total Vitamin D 25(OH)     Deficiency <20.0 ng/mL   Insufficiency 21-29 ng/mL   Sufficiency  ng/mL  Toxicity >100 ng/ml    Results may be falsely increased if patient taking Biotin.      Vitamin B12 [[id number]]  (Normal) Collected: 09/05/21 0627    Specimen: Blood Updated: 09/05/21 1222     Vitamin " B-12 897 pg/mL     Narrative:      Results may be falsely increased if patient taking Biotin.      POC Glucose Once [[id number]]  (Abnormal) Collected: 09/05/21 1201    Specimen: Blood Updated: 09/05/21 1214     Glucose 211 mg/dL      Comment: RN NotifiedOperator: [id number] DORA BROUSSARDISTAMeter ID: [id number]       Lipid Panel [[id number]]  (Abnormal) Collected: 09/05/21 0627    Specimen: Blood Updated: 09/05/21 0724     Total Cholesterol 234 mg/dL      Triglycerides 262 mg/dL      HDL Cholesterol 93 mg/dL      LDL Cholesterol  98 mg/dL      VLDL Cholesterol 43 mg/dL      LDL/HDL Ratio 0.95    Narrative:      Cholesterol Reference Ranges  (U.S. Department of Health and Human Services ATP III Classifications)    Desirable          <200 mg/dL  Borderline High    200-239 mg/dL  High Risk          >240 mg/dL      Triglyceride Reference Ranges  (U.S. Department of Health and Human Services ATP III Classifications)    Normal           <150 mg/dL  Borderline High  150-199 mg/dL  High             200-499 mg/dL  Very High        >500 mg/dL    HDL Reference Ranges  (U.S. Department of Health and Human Services ATP III Classifcations)    Low     <40 mg/dl (major risk factor for CHD)  High    >60 mg/dl ('negative' risk factor for CHD)        LDL Reference Ranges  (U.S. Department of Health and Human Services ATP III Classifcations)    Optimal          <100 mg/dL  Near Optimal     100-129 mg/dL  Borderline High  130-159 mg/dL  High             160-189 mg/dL  Very High        >189 mg/dL    HIV-1 & HIV-2 Antibodies [[id number]]  (Normal) Collected: 09/05/21 0627    Specimen: Blood Updated: 09/05/21 0723    Narrative:      The following orders were created for panel order HIV-1 & HIV-2 Antibodies.  Procedure                               Abnormality         Status                     ---------                               -----------         ------                     HIV-1 / O / 2 Ag / Antib...[[id number]]  Normal              Final  result                 Please view results for these tests on the individual orders.    HIV-1 / O / 2 Ag / Antibody 4th Generation [[id number]]  (Normal) Collected: 09/05/21 0627    Specimen: Blood Updated: 09/05/21 0723     HIV-1/ HIV-2 Non-Reactive    Narrative:      The HIV antibody/antigen combo assay is a qualitative assay for HIV that includes the p24 antigen as well as antibodies to HIV types 1 and 2. This test is intended to be used as a screening assay in the diagnosis of HIV infection in patients over the age of 2.  Results may be falsely decreased if patient taking Biotin.      Hemoglobin A1c [[id number]]  (Abnormal) Collected: 09/05/21 0627    Specimen: Blood Updated: 09/05/21 0708     Hemoglobin A1C 10.30 %     Narrative:      Hemoglobin A1C Ranges:    Increased Risk for Diabetes  5.7% to 6.4%  Diabetes                     >= 6.5%  Diabetic Goal                < 7.0%    Glucose, Random [[id number]]  (Normal) Collected: 09/05/21 0627    Specimen: Blood Updated: 09/05/21 0703     Glucose 93 mg/dL     RPR [[id number]] Collected: 09/05/21 0627    Specimen: Blood Updated: 09/05/21 0639        Results source: EMR    Imaging Results (Last 24 Hours)     ** No results found for the last 24 hours. **      Study Result    Narrative & Impression      CT Head Without Contrast     History: Worsening memory problems and increasing confusion.  Recent fall. Patient on blood thinner.     Axial scans of the brain were obtained without intravenous  contrast.  Coronal and sagital reconstructions were preformed.     This exam was performed according to our departmental  dose-optimization program, which includes automated exposure  control, adjustment of the mA and/or kV according to patient size  and/or use of iterative reconstruction technique.     DLP: 845.30     Comparison: None     Findings:  Bone windows are unremarkable.  The visualized paranasal sinuses are unremarkable.     No acute process.  Cerebral and cerebellar  atrophy.  Minimal small vessel disease.  No hemorrhage.  No mass.  No abnormal areas of increased attenuation.  No midline shift.  No abnormal extra-axial fluid collections.     IMPRESSION:  CONCLUSION:  No acute process.  Cerebral and cerebellar atrophy.  Minimal small vessel disease.     [id number]     Electronically signed by:  Noel Cohen MD  9/4/2021 12:28 PM CDT  Workstation: [id number]     -Reviewed significant temporal lobe as well as frontal and parietal atrophy.  Minimal periventricular microvascular changes.  No acute changes.      Results source: EMR    Medications:   Scheduled Meds:acetaminophen, 500 mg, Oral, TID  Canagliflozin, 100 mg, Oral, Daily  dorzolamide-timolol, 1 drop, Both Eyes, BID  insulin aspart, 0-14 Units, Subcutaneous, TID AC  insulin detemir, 10 Units, Subcutaneous, Nightly  lisinopril, 20 mg, Oral, Daily  metFORMIN, 500 mg, Oral, BID With Meals  pantoprazole, 40 mg, Oral, QAM      Continuous Infusions:   PRN Meds:.acetaminophen  •  aluminum-magnesium hydroxide-simethicone  •  cloNIDine  •  dextrose  •  dextrose  •  glucagon (human recombinant)  •  influenza vaccine  •  loperamide  •  LORazepam **OR** LORazepam  •  magnesium hydroxide      Assessment:     Major neurocognitive disorder (CMS/HCC)    Dementia with behavioral disturbance (CMS/HCC)    Type 2 diabetes mellitus without complication, with long-term current use of insulin (CMS/HCC)    Dyslipidemia    Benign essential HTN    -Cannot fully rule out a superimposed delirium on dementia picture given recent urinary tract infection/cystitis.  Etiology looks to be primary Alzheimer's process from review of neuro imaging.         Treatment Plan:  1) Will continue care for the patient on the behavioral health unit at [facility].    2) Will continue to provide treatment with the unit milieu, activities, therapies and psychopharmacological management.    3) Patient to be placed on or continued on  Q15 minute checks  and Fall  precautions.    4) Treatment Planning:  --HTN: Cont home Lisinopril; Catapres PRN  --t2DM: Cont Invokana (formulary), titrate Levemir to 14 mg qHS (home dose, now that pt is eating well); Metformin 500mg BID  --GERD: Cont PPI  --Occular: cont Cosopt BID      --MNCD: Start Exelon patch 4.6mg daily; Consider Namenda augmentation given severity of NCD   --B12, folate, HIV within normal limits   --RPR pending    --Therapy for < 5 min      --> Dispostion Planning: To transition to skilled nursing facility.    Treatment plan and medication risks and benefits discussed with: Treatment Team.  Previously spoke with patient's  regarding medication discussion as noted yesterday.  He provides consent for treatment.    Shaquille Huggins II, MD  21 @ 15:37 CDT  Dictated using Dragon.      Electronically signed by Shaquille Huggins II, MD at 21 1547          Physical Therapy Notes (most recent note)      Lori Caicedo PT at 21 1241  Version 1 of 1         Patient Name: Mary Gutierrez  : 1940    MRN: [id number]                              Today's Date: 2021       Admit Date: 2021    Visit Dx:     ICD-10-CM ICD-9-CM   1. Benign essential HTN  I10 401.1   2. Dyslipidemia  E78.5 272.4   3. Type 2 diabetes mellitus without complication, with long-term current use of insulin (CMS/[facility])  E11.9 250.00    Z79.4 V58.67   4. Major neurocognitive disorder (CMS/[facility])  F01.50 294.20   5. Dementia with behavioral disturbance, unspecified dementia type (CMS/[facility])  F03.91 294.21   6. B12 deficiency  E53.8 266.2   7. Cyst of pancreas  K86.2 577.2   8. Encounter for care related to Port-a-Cath  Z45.2 V58.81   9. Malabsorption of iron  K90.9 579.8   10. Flu vaccine need  Z23 V04.81   11. Other iron deficiency anemia  D50.8 280.8   12. Encounter for venous access device care  Z45.2 V58.81   13. Malignant neoplasm of ascending colon (CMS/HCC)  C18.2 153.6   14. Impaired functional mobility,  balance, gait, and endurance  Z74.09 V49.89     Patient Active Problem List   Diagnosis   • Malignant neoplasm of ascending colon (CMS/HCC)   • Encounter for venous access device care   • Anemia   • Flu vaccine need   • Malabsorption of iron   • Encounter for care related to Port-a-Cath   • Cyst of pancreas   • B12 deficiency   • Dementia with behavioral disturbance (CMS/HCC)   • Major neurocognitive disorder due to Alzheimer's disease (CMS/HCC)   • Type 2 diabetes mellitus without complication, with long-term current use of insulin (CMS/HCC)   • Dyslipidemia   • Benign essential HTN     Past Medical History:   Diagnosis Date   • Acid reflux    • Diabetes mellitus (CMS/HCC)    • Hypertension    • Malignant neoplasm of ascending colon (CMS/HCC) 11/1/2016   • Malignant tumor of cecum (CMS/HCC)      Past Surgical History:   Procedure Laterality Date   • CAPSULE ENDOSCOPY N/A 6/22/2017    Procedure: CAPSULE ENDOSCOPY M2A;  Surgeon: Stuart Rico DO;  Location: [facility] ENDOSCOPY;  Service:    • CAPSULE ENDOSCOPY N/A 1/23/2018    Procedure: CAPSULE ENDOSCOPY M2A;  Surgeon: Stuart Rico DO;  Location: [facility] ENDOSCOPY;  Service:    • CENTRAL VENOUS LINE INSERTION  10/30/2015    Ultrasound localization, left basilic vein.Placement of left upper extremity PICC line   • COLONOSCOPY N/A 1/23/2018    Procedure: COLONOSCOPY;  Surgeon: Stuart Rico DO;  Location: [facility] ENDOSCOPY;  Service:    • ENDOSCOPY N/A 6/22/2017    Procedure: ESOPHAGOGASTRODUODENOSCOPY;  Surgeon: Stuart Rcio DO;  Location: [facility] ENDOSCOPY;  Service:    • ENDOSCOPY N/A 1/23/2018    Procedure: ESOPHAGOGASTRODUODENOSCOPY;  Surgeon: Stuart Rico DO;  Location: [facility] ENDOSCOPY;  Service:    • FRACTURE SURGERY      right femur    • HYSTERECTOMY     • LAPAROSCOPIC ASSISSTED TOTAL COLECTOMY W/ J-POUCH  10/15/2015    Laparoscopic right hemicolectomy with ileotransverse colostomy   • VENOUS ACCESS DEVICE (PORT) INSERTION  01/12/2016     Right internal jugular Mediport     General Information     [facility] Name 09/07/21 1020          Physical Therapy Time and Intention    Document Type  therapy note (daily note)  -     Mode of Treatment  individual therapy;physical therapy  -[facility] Name 09/07/21 1020          General Information    Patient Profile Reviewed  yes  -[facility] Name 09/07/21 1020          Cognition    Orientation Status (Cognition)  oriented to;person  -[facility] Name 09/07/21 1020          Safety Issues, Functional Mobility    Safety Issues Affecting Function (Mobility)  insight into deficits/self-awareness;safety precaution awareness  -     Impairments Affecting Function (Mobility)  balance;endurance/activity tolerance  -       User Key  (r) = Recorded By, (t) = Taken By, (c) = Cosigned By    Initials Name Provider Type    Lori Gar PT Physical Therapist        Mobility     Row Name 09/07/21 1020          Bed Mobility    Bed Mobility  supine-sit;sit-supine  -     Supine-Sit [place] (Bed Mobility)  minimum assist (75% patient effort)  -     Sit-Supine [place] (Bed Mobility)  standby assist  -     Assistive Device (Bed Mobility)  head of bed elevated  -[facility] Name 09/07/21 1020          Bed-Chair Transfer    Bed-Chair [place] (Transfers)  contact guard  -[facility] Name 09/07/21 1020          Sit-Stand Transfer    Sit-Stand [place] (Transfers)  minimum assist (75% patient effort);verbal cues;nonverbal cues (demo/gesture);contact guard min assistance from bed;CGA from chair  -[facility] Name 09/07/21 1020          Gait/Stairs (Locomotion)    [place] Level (Gait)  contact guard;minimum assist (75% patient effort)  -     Assistive Device (Gait)  other (see comments) HHA of 1  -DERIAN     Distance in Feet (Gait)  [id number]  -       User Key  (r) = Recorded By, (t) = Taken By, (c) = Cosigned By    Initials Name Provider Type    Loir Gar PT Physical Therapist        Obj/Interventions      Row Name 09/07/21 1020          Balance    Balance Assessment  sitting static balance;sitting dynamic balance;standing static balance;standing dynamic balance  -DERIAN     Static Sitting Balance  WFL  -DERIAN     Dynamic Sitting Balance  WFL  -DERIAN     Static Standing Balance  mild impairment  -DERIAN     Dynamic Standing Balance  mild impairment  -DERIAN     Balance Interventions  sit to stand  -DERIAN       User Key  (r) = Recorded By, (t) = Taken By, (c) = Cosigned By    Initials Name Provider Type    DERIAN Lori Caicedo, PT Physical Therapist        Goals/Plan     Row Name 09/07/21 1020          Bed Mobility Goal 1 (PT)    Activity/Assistive Device (Bed Mobility Goal 1, PT)  sit to supine;supine to sit  -DERIAN     [place] Level/Cues Needed (Bed Mobility Goal 1, PT)  contact guard assist  -DERIAN     Time Frame (Bed Mobility Goal 1, PT)  by discharge  -DERIAN     Progress/Outcomes (Bed Mobility Goal 1, PT)  goal partially met;goal ongoing pt has met goal with sit to supine  -DERIAN     Row Name 09/07/21 1020          Transfer Goal 1 (PT)    Activity/Assistive Device (Transfer Goal 1, PT)  sit-to-stand/stand-to-sit;bed-to-chair/chair-to-bed  -DERIAN     [place] Level/Cues Needed (Transfer Goal 1, PT)  minimum assist (75% or more patient effort)  -DERIAN     Time Frame (Transfer Goal 1, PT)  by discharge  -DERIAN     Strategies/Barriers (Transfers Goal 1, PT)  cognition  -DERIAN     Progress/Outcome (Transfer Goal 1, PT)  (S) goal met  -DERIAN     Row Name 09/07/21 1020          Gait Training Goal 1 (PT)    Activity/Assistive Device (Gait Training Goal 1, PT)  gait (walking locomotion);assistive device use  -DERIAN     [place] Level (Gait Training Goal 1, PT)  standby assist;verbal cues required  -DERIAN     Distance (Gait Training Goal 1, PT)  60' x 2  -DERIAN     Time Frame (Gait Training Goal 1, PT)  by discharge  -DERIAN     Strategies/Barriers (Gait Training Goal 1, PT)  R arm in sling  -DERIAN     Progress/Outcome (Gait Training Goal 1, PT)  goal not met  -DERIAN        User Key  (r) = Recorded By, (t) = Taken By, (c) = Cosigned By    Initials Name Provider Type    Lori Gar PT Physical Therapist        Clinical Impression     Row Name 09/07/21 1020          Pain    Additional Documentation  Pain Scale: Numbers Pre/Post-Treatment (Group)  -DERIAN     Row Name 09/07/21 1020          Pain Scale: Numbers Pre/Post-Treatment    Pretreatment Pain Rating  0/10 - no pain  -     Posttreatment Pain Rating  0/10 - no pain  -     Pre/Posttreatment Pain Comment  Pt denies pain; pt did whince when PT adjusted RUE sling  -     Pain Intervention(s)  Repositioned  -     Row Name 09/07/21 1020          Plan of Care Review    Plan of Care Reviewed With  patient  -     Outcome Summary  PT treatment completed. Pt more amenable to therapy as a rapport was developed. Pt transferred supine to sit with min assistance and sit to supine with SBA. Pt transferred sit to stand to sit with min assistance. Pt ambulated 100ft x2 with HHA of 1 with verbal cues to keep feet further apart and increase knee flexion to clear feet during swing phase.Pt has partially met bed mobility goal with sit to supine and pt has met transfer goal. Pt will benefit from continued PT to regain lost function and decrease fall risk. Anticipate transfer to SNF at discharge with continued therapy services  -     Row Name 09/07/21 1020          Vital Signs    Pre Systolic BP Rehab  125  -DERIAN     Pre Treatment Diastolic BP  63  -DERIAN     Post Systolic BP Rehab  142  -DERIAN     Post Treatment Diastolic BP  61  -DERIAN     Pretreatment Heart Rate (beats/min)  77  -DERIAN     Posttreatment Heart Rate (beats/min)  76  -DERIAN     Pre SpO2 (%)  95  -DERIAN     O2 Delivery Pre Treatment  room air  -DERIAN     Post SpO2 (%)  98  -DERIAN     O2 Delivery Post Treatment  room air  -DERIAN     Pre Patient Position  Sitting  -DERIAN     Post Patient Position  Sitting  -     Row Name 09/07/21 1020          Positioning and Restraints    Pre-Treatment Position  sitting in  chair/recliner  -DERIAN     Post Treatment Position  chair  -DERIAN     In Bed  -- returned to common area in recliner  -DERIAN     In Chair  notified nsg;sitting;with nsg returned to common area in recliner  -DERIAN       User Key  (r) = Recorded By, (t) = Taken By, (c) = Cosigned By    Initials Name Provider Type    Lori Gar PT Physical Therapist        Outcome Measures     Row Name 09/07/21 1020          How much help from another person do you currently need...    Turning from your back to your side while in flat bed without using bedrails?  2  -DERIAN     Moving from lying on back to sitting on the side of a flat bed without bedrails?  3  -DERIAN     Moving to and from a bed to a chair (including a wheelchair)?  3  -DERIAN     Standing up from a chair using your arms (e.g., wheelchair, bedside chair)?  3  -DERIAN     Climbing 3-5 steps with a railing?  3  -DERIAN     To walk in hospital room?  3  -DERIAN     AM-PAC 6 Clicks Score (PT)  17  -     Row Name 09/07/21 1020          Functional Assessment    Outcome Measure Options  AM-PAC 6 Clicks Basic Mobility (PT)  -       User Key  (r) = Recorded By, (t) = Taken By, (c) = Cosigned By    Initials Name Provider Type    Lori Gar PT Physical Therapist                       Physical Therapy Education                 Title: PT OT SLP Therapies (Done)     Topic: Physical Therapy (Done)     Point: Mobility training (Done)     Learning Progress Summary           Patient Acceptance, E,TB, VU by LR at 9/4/2021 1646    Comment: Educate on PT POC and goals.                   Point: Home exercise program (Done)     Learning Progress Summary           Patient Acceptance, E,TB, VU by LR at 9/4/2021 1646    Comment: Educate on PT POC and goals.                   Point: Body mechanics (Done)     Learning Progress Summary           Patient Acceptance, E,TB, VU by LR at 9/4/2021 1646    Comment: Educate on PT POC and goals.                   Point: Precautions (Done)     Learning Progress Summary            Patient Acceptance, E,TB, VU by LR at 9/4/2021 4765    Comment: Educate on PT POC and goals.                               User Key     Initials Effective Dates Name Provider Type Discipline    LR 06/16/21 -  Sang Lyons Physical Therapist PT              PT Recommendation and Plan     Plan of Care Reviewed With: patient  Outcome Summary: PT treatment completed. Pt more amenable to therapy as a rapport was developed. Pt transferred supine to sit with min assistance and sit to supine with SBA. Pt transferred sit to stand to sit with min assistance. Pt ambulated 100ft x2 with HHA of 1 with verbal cues to keep feet further apart and increase knee flexion to clear feet during swing phase.Pt has partially met bed mobility goal with sit to supine and pt has met transfer goal. Pt will benefit from continued PT to regain lost function and decrease fall risk. Anticipate transfer to SNF at discharge with continued therapy services     Time Calculation:   PT Charges     Row Name 09/07/21 1238             Time Calculation    Start Time  1020  -DERIAN      Stop Time  1105  -DERIAN      Time Calculation (min)  45 min  -DERIAN      PT Received On  09/07/21  -      PT Goal Re-Cert Due Date  09/20/21  -DERIAN         Time Calculation- PT    Total Timed Code Minutes- PT  45 minute(s)  -DERIAN         Timed Charges    27614 - Gait Training Minutes   15  -DERIAN      73879 - PT Therapeutic Activity Minutes  30  -DERIAN         Total Minutes    Timed Charges Total Minutes  45  -DERIAN       Total Minutes  45  -DERIAN        User Key  (r) = Recorded By, (t) = Taken By, (c) = Cosigned By    Initials Name Provider Type    DERIAN Lori Caicedo, PT Physical Therapist        Therapy Charges for Today     Code Description Service Date Service Provider Modifiers Qty    91331124072 HC PT THERAPEUTIC ACT EA 15 MIN 9/7/2021 Lori Caicedo, PT GP 2    98962027191 HC GAIT TRAINING EA 15 MIN 9/7/2021 Lori Caicedo, PT GP 1          PT G-Codes  Outcome Measure Options:  AM-PAC 6 Clicks Basic Mobility (PT)  AM-PAC 6 Clicks Score (PT): 17    Lori Caicedo, PT  9/7/2021      Electronically signed by Lori Caicedo, PT at 09/07/21 1241       Occupational Therapy Notes (most recent note)    No notes exist for this encounter.

## 2021-09-08 LAB
GLUCOSE BLDC GLUCOMTR-MCNC: 177 MG/DL (ref 70–130)
GLUCOSE BLDC GLUCOMTR-MCNC: 217 MG/DL (ref 70–130)
GLUCOSE BLDC GLUCOMTR-MCNC: 74 MG/DL (ref 70–130)
SARS-COV-2 RNA RESP QL NAA+PROBE: NOT DETECTED

## 2021-09-08 PROCEDURE — 82962 GLUCOSE BLOOD TEST: CPT

## 2021-09-08 PROCEDURE — 97116 GAIT TRAINING THERAPY: CPT

## 2021-09-08 PROCEDURE — 63710000001 INSULIN DETEMIR PER 5 UNITS: Performed by: PSYCHIATRY & NEUROLOGY

## 2021-09-08 PROCEDURE — 87635 SARS-COV-2 COVID-19 AMP PRB: CPT | Performed by: PSYCHIATRY & NEUROLOGY

## 2021-09-08 PROCEDURE — 99232 SBSQ HOSP IP/OBS MODERATE 35: CPT | Performed by: PSYCHIATRY & NEUROLOGY

## 2021-09-08 PROCEDURE — 97110 THERAPEUTIC EXERCISES: CPT

## 2021-09-08 PROCEDURE — 63710000001 INSULIN ASPART PER 5 UNITS: Performed by: NURSE PRACTITIONER

## 2021-09-08 RX ADMIN — PANTOPRAZOLE SODIUM 40 MG: 40 TABLET, DELAYED RELEASE ORAL at 06:21

## 2021-09-08 RX ADMIN — METFORMIN HYDROCHLORIDE 500 MG: 500 TABLET ORAL at 08:05

## 2021-09-08 RX ADMIN — INSULIN ASPART 3 UNITS: 100 INJECTION, SOLUTION INTRAVENOUS; SUBCUTANEOUS at 16:59

## 2021-09-08 RX ADMIN — INSULIN ASPART 5 UNITS: 100 INJECTION, SOLUTION INTRAVENOUS; SUBCUTANEOUS at 11:23

## 2021-09-08 RX ADMIN — DORZOLAMIDE HYDROCHLORIDE AND TIMOLOL MALEATE 1 DROP: 20; 5 SOLUTION/ DROPS OPHTHALMIC at 08:14

## 2021-09-08 RX ADMIN — DORZOLAMIDE HYDROCHLORIDE AND TIMOLOL MALEATE 1 DROP: 20; 5 SOLUTION/ DROPS OPHTHALMIC at 21:06

## 2021-09-08 RX ADMIN — ACETAMINOPHEN 500 MG: 500 TABLET, FILM COATED ORAL at 08:05

## 2021-09-08 RX ADMIN — ACETAMINOPHEN 500 MG: 500 TABLET, FILM COATED ORAL at 21:06

## 2021-09-08 RX ADMIN — RIVASTIGMINE TRANSDERMAL SYSTEM 1 PATCH: 4.6 PATCH, EXTENDED RELEASE TRANSDERMAL at 08:05

## 2021-09-08 RX ADMIN — ACETAMINOPHEN 500 MG: 500 TABLET, FILM COATED ORAL at 16:59

## 2021-09-08 RX ADMIN — Medication 1000 UNITS: at 17:16

## 2021-09-08 RX ADMIN — METFORMIN HYDROCHLORIDE 500 MG: 500 TABLET ORAL at 17:00

## 2021-09-08 RX ADMIN — CANAGLIFLOZIN 100 MG: 100 TABLET, FILM COATED ORAL at 08:05

## 2021-09-08 RX ADMIN — INSULIN DETEMIR 10 UNITS: 100 INJECTION, SOLUTION SUBCUTANEOUS at 21:06

## 2021-09-08 RX ADMIN — LISINOPRIL 20 MG: 20 TABLET ORAL at 08:05

## 2021-09-08 RX ADMIN — Medication 1 TABLET: at 08:04

## 2021-09-08 NOTE — PLAN OF CARE
Goal Outcome Evaluation:  Plan of Care Reviewed With: patient  Patient Agreement with Plan of Care: agrees     Progress: no change  Outcome Summary: patient has been asleep for 5 hours tonight with no issues. She is still asleep currently.

## 2021-09-08 NOTE — PLAN OF CARE
Goal Outcome Evaluation:  Plan of Care Reviewed With: patient  Patient Agreement with Plan of Care: unable to participate     Progress: improving  Outcome Summary: Pt has been calm and cooperative this shift. Pt is pleasantly confused but easily redirectable.

## 2021-09-08 NOTE — PROGRESS NOTES
9/8/2021    Chief Complaint: dementia related behavioral issues    Subjective:  Patient is a 80 y.o. female that is currently inpatient on the [facility]  Pt is confused, alert to self only.  She requests for names and where she is to be written on paper.    Pt eats small amounts, she does come out to common area with others at times but is quiet, she has had no behaviors or falls.   Pt has been compliant with treatment and medications.       Objective     Vital Signs    Temp:  [97.3 °F (36.3 °C)-97.7 °F (36.5 °C)] 97.7 °F (36.5 °C)  Heart Rate:  [70-75] 70  Resp:  [18] 18  BP: (137-141)/(64) 137/64    Physical Exam:   General Appearance: alert, appears stated age and cooperative,  Hygiene:   fair  Gait & Station: Normal  Musculoskeletal: No tremors or abnormal involuntary movements    Mental Status Exam:   Cooperation:  Cooperative  Eye Contact:  Fair  Psychomotor Behavior:  Slow  Mood: Apathetic  Affect:  flat  Speech:  Minimal  Thought Process:  Incoherent  Associations: Disorganized  Thought Content:     Unable to demonstrate   Suicidal:  None   Homicidal:  None   Hallucinations:  None   Delusion:  None  Cognitive Functioning:   Consciousness: awake, alert and confused  Reliability:  poor  Insight:  Poor  Judgement:  Impaired  Impulse Control:  Impaired    Lab Results (last 24 hours)     Procedure Component Value Units Date/Time    COVID PRE-OP / PRE-PROCEDURE SCREENING ORDER (NO ISOLATION) - Swab, Nasopharynx [[id number]]  (Normal) Collected: 09/08/21 1122    Specimen: Swab from Nasopharynx Updated: 09/08/21 1208    Narrative:      The following orders were created for panel order COVID PRE-OP / PRE-PROCEDURE SCREENING ORDER (NO ISOLATION) - Swab, Nasopharynx.  Procedure                               Abnormality         Status                     ---------                               -----------         ------                     COVID-19,  MAD/MILKA IN-...[[id number]]  Normal              Final result                  Please view results for these tests on the individual orders.    COVID-19, [facility] MAD/MILKA IN-HOUSE, NP SWAB IN TRANSPORT MEDIA 8-10 HR TAT - Swab, Nasopharynx [[id number]]  (Normal) Collected: 09/08/21 1122    Specimen: Swab from Nasopharynx Updated: 09/08/21 1208     COVID19 Not Detected    Narrative:      Testing performed by Real Time RT-PCR  This test has not been approved by the [facility] but is authorized under the Emergency Use Act (EUA)    Fact sheet for providers: https://www.fda.gov/media/098727/download    Fact sheet for patients: https://www.fda.gov/media/015655/download        POC Glucose Once [[id number]]  (Normal) Collected: 09/08/21 0625    Specimen: Blood Updated: 09/08/21 0657     Glucose 74 mg/dL      Comment: RN NotifiedOperator: [id number] WILFREDO CINDYMeter ID: [id number]       POC Glucose Once [[id number]]  (Abnormal) Collected: 09/07/21 2002    Specimen: Blood Updated: 09/07/21 2023     Glucose 149 mg/dL      Comment: RN NotifiedOperator: [id number] ANGUIANO ANGIEMeter ID: [id number]           Imaging Results (Last 24 Hours)     ** No results found for the last 24 hours. **          Medicine:   Current Facility-Administered Medications:   •  acetaminophen (TYLENOL) tablet 500 mg, 500 mg, Oral, TID, Shaquille Huggins II, MD, 500 mg at 09/08/21 0805  •  acetaminophen (TYLENOL) tablet 650 mg, 650 mg, Oral, Q4H PRN, Shaquille Huggins II, MD  •  aluminum-magnesium hydroxide-simethicone (MAALOX MAX) 400-400-40 MG/5ML suspension 15 mL, 15 mL, Oral, Q6H PRN, Shaquille Huggins II, MD  •  Canagliflozin (INVOKANA) tablet 100 mg, 100 mg, Oral, Daily, Shaquille Huggins II, MD, 100 mg at 09/08/21 0805  •  cholecalciferol (VITAMIN D3) tablet 1,000 Units, 1,000 Units, Oral, Daily With Dinner, Shaquille Huggins II, MD, 1,000 Units at 09/07/21 1603  •  cloNIDine (CATAPRES) tablet 0.1 mg, 0.1 mg, Oral, Q4H PRN, Shaquille Huggins II, MD  •  dextrose (D50W) 25 g/ 50mL Intravenous Solution 25 g, 25  g, Intravenous, Q15 Min PRN, Levill, Isatu G, APRN  •  dextrose (GLUTOSE) oral gel 15 g, 15 g, Oral, Q15 Min PRN, Levill, Isatu G, APRN  •  dorzolamide-timolol (COSOPT) ophthalmic solution 1 drop, 1 drop, Both Eyes, BID, Shaquille Huggins II, MD, 1 drop at 09/08/21 0814  •  glucagon (human recombinant) (GLUCAGEN DIAGNOSTIC) injection 1 mg, 1 mg, Subcutaneous, Q15 Min PRN, Levill Isatu G, APRN  •  influenza vac split quad (FLUZONE,FLUARIX,AFLURIA,FLULAVAL) injection 0.5 mL, 0.5 mL, Intramuscular, During Hospitalization, Shaquille Huggins II, MD  •  insulin aspart (novoLOG) injection 0-14 Units, 0-14 Units, Subcutaneous, TID AC, Isatu Summers, APRN, 5 Units at 09/08/21 1123  •  insulin detemir (LEVEMIR) injection 10 Units, 10 Units, Subcutaneous, Nightly, Shaquille Huggins II, MD, 10 Units at 09/07/21 2110  •  lisinopril (PRINIVIL,ZESTRIL) tablet 20 mg, 20 mg, Oral, Daily, Shaquille Huggins II, MD, 20 mg at 09/08/21 0805  •  loperamide (IMODIUM) capsule 2 mg, 2 mg, Oral, Q2H PRN, Shaquille Huggins II, MD  •  LORazepam (ATIVAN) tablet 1 mg, 1 mg, Oral, Q6H PRN **OR** LORazepam (ATIVAN) injection 1 mg, 1 mg, Intramuscular, Q6H PRN, Shaquille Huggins II, MD, 1 mg at 09/04/21 0414  •  magnesium hydroxide (MILK OF MAGNESIA) suspension 2400 mg/10mL 10 mL, 10 mL, Oral, Daily PRN, Shaquille Huggins II, MD  •  metFORMIN (GLUCOPHAGE) tablet 500 mg, 500 mg, Oral, BID With Meals, Shaquille Huggins II, MD, 500 mg at 09/08/21 0805  •  multivitamin with minerals 1 tablet, 1 tablet, Oral, Daily, Shaquille Huggins II, MD, 1 tablet at 09/08/21 0804  •  pantoprazole (PROTONIX) EC tablet 40 mg, 40 mg, Oral, QAMBhavna Ronald Reagan II, MD, 40 mg at 09/08/21 0621  •  rivastigmine (EXELON) 4.6 MG/24HR patch 1 patch, 1 patch, Transdermal, Daily, Renzo Sheppard MD, 1 patch at 09/08/21 0805    Diagnoses/Assessment:     Major neurocognitive disorder due to Alzheimer's disease (CMS/HCC)    Dementia  with behavioral disturbance (CMS/HCC)    Type 2 diabetes mellitus without complication, with long-term current use of insulin (CMS/formerly [facility])    Dyslipidemia    Benign essential HTN      Treatment Plan:    1) Will continue care for the patient on the behavioral health unit at [facility] to ensure patient safety.  2) Will continue to provide treatment with the unit milieu, activities, therapies and psychopharmacological management.  3) Patient to be placed on or continued on  Q15 minute checks  and Fall precautions.  4) Pertinent medical issues:   --HTN: Cont home Lisinopril; Catapres PRN  --t2DM: Cont Invokana (formulary), titrate Levemir to 14 mg qHS (home dose, now that pt is eating well); Metformin 500mg BID  --GERD: Cont PPI  --Occular: cont Cosopt BID  5) Will order following labs: none  6) Will make the following medication changes:   --MNCD: Cont Exelon patch 4.6 mg daily  7) Will continue discharge planning as appropriate for patient.  8) Psychotherapy provided: none    Treatment plan and medication risks and benefits discussed with: treatment team    FLY Mims  09/08/21  12:58 CDT

## 2021-09-08 NOTE — PLAN OF CARE
Goal Outcome Evaluation:  Plan of Care Reviewed With: patient        Progress: improving  Outcome Summary: pt sup<>sit with CGA-SBA, sit<>stand with CGA-min assist, pt ambulated 150` with HHA of 1. pt will require 24/7 care & continued PT services @ D/C

## 2021-09-08 NOTE — THERAPY TREATMENT NOTE
Acute Care - Physical Therapy Treatment Note  [facility]     Patient Name: Mary Gutierrez  : 1940  MRN: [id number]  Today's Date: 2021      Visit Dx:     ICD-10-CM ICD-9-CM   1. Benign essential HTN  I10 401.1   2. Dyslipidemia  E78.5 272.4   3. Type 2 diabetes mellitus without complication, with long-term current use of insulin (CMS/HCC)  E11.9 250.00    Z79.4 V58.67   4. Major neurocognitive disorder (CMS/HCC)  F01.50 294.20   5. Dementia with behavioral disturbance, unspecified dementia type (CMS/HCC)  F03.91 294.21   6. B12 deficiency  E53.8 266.2   7. Cyst of pancreas  K86.2 577.2   8. Encounter for care related to Port-a-Cath  Z45.2 V58.81   9. Malabsorption of iron  K90.9 579.8   10. Flu vaccine need  Z23 V04.81   11. Other iron deficiency anemia  D50.8 280.8   12. Encounter for venous access device care  Z45.2 V58.81   13. Malignant neoplasm of ascending colon (CMS/HCC)  C18.2 153.6   14. Impaired functional mobility, balance, gait, and endurance  Z74.09 V49.89     Patient Active Problem List   Diagnosis   • Malignant neoplasm of ascending colon (CMS/HCC)   • Encounter for venous access device care   • Anemia   • Flu vaccine need   • Malabsorption of iron   • Encounter for care related to Port-a-Cath   • Cyst of pancreas   • B12 deficiency   • Dementia with behavioral disturbance (CMS/HCC)   • Major neurocognitive disorder due to Alzheimer's disease (CMS/HCC)   • Type 2 diabetes mellitus without complication, with long-term current use of insulin (CMS/HCC)   • Dyslipidemia   • Benign essential HTN     Past Medical History:   Diagnosis Date   • Acid reflux    • Diabetes mellitus (CMS/HCC)    • Hypertension    • Malignant neoplasm of ascending colon (CMS/HCC) 2016   • Malignant tumor of cecum (CMS/HCC)      Past Surgical History:   Procedure Laterality Date   • CAPSULE ENDOSCOPY N/A 2017    Procedure: CAPSULE ENDOSCOPY M2A;  Surgeon: Stuart Rico DO;  Location:   [facility] ENDOSCOPY;  Service:    • CAPSULE ENDOSCOPY N/A 1/23/2018    Procedure: CAPSULE ENDOSCOPY M2A;  Surgeon: Stuart Rico DO;  Location: [facility] ENDOSCOPY;  Service:    • CENTRAL VENOUS LINE INSERTION  10/30/2015    Ultrasound localization, left basilic vein.Placement of left upper extremity PICC line   • COLONOSCOPY N/A 1/23/2018    Procedure: COLONOSCOPY;  Surgeon: Stuart Rico DO;  Location: [facility] ENDOSCOPY;  Service:    • ENDOSCOPY N/A 6/22/2017    Procedure: ESOPHAGOGASTRODUODENOSCOPY;  Surgeon: Stuart Rico DO;  Location: [facility] ENDOSCOPY;  Service:    • ENDOSCOPY N/A 1/23/2018    Procedure: ESOPHAGOGASTRODUODENOSCOPY;  Surgeon: Stuart Rico DO;  Location: [facility] ENDOSCOPY;  Service:    • FRACTURE SURGERY      right femur    • HYSTERECTOMY     • LAPAROSCOPIC ASSISSTED TOTAL COLECTOMY W/ J-POUCH  10/15/2015    Laparoscopic right hemicolectomy with ileotransverse colostomy   • VENOUS ACCESS DEVICE (PORT) INSERTION  01/12/2016    Right internal jugular Mediport        PT Assessment (last 12 hours)      PT Evaluation and Treatment     Row Name 09/08/21 1300          Physical Therapy Time and Intention    Subjective Information  complains of;pain  -TA     Document Type  therapy note (daily note)  -TA     Mode of Treatment  individual therapy;physical therapy  -TA     Row Name 09/08/21 1300          General Information    Patient Profile Reviewed  yes  -TA     Row Name 09/08/21 1300          Cognition    Orientation Status (Cognition)  oriented to;person  -TA     Personal Safety Interventions  fall prevention program maintained;gait belt;muscle strengthening facilitated;nonskid shoes/slippers when out of bed;supervised activity  -TA     Row Name 09/08/21 1300          Pain Scale: Numbers Pre/Post-Treatment    Pretreatment Pain Rating  0/10 - no pain  -TA     Posttreatment Pain Rating  0/10 - no pain  -TA     Row Name 09/08/21 1300          Bed Mobility    Bed Mobility  supine-sit;sit-supine  -TA      Supine-Sit [place] (Bed Mobility)  contact guard  -TA     Sit-Supine [place] (Bed Mobility)  standby assist  -TA     Assistive Device (Bed Mobility)  head of bed elevated  -TA     Row Name 09/08/21 1300          Transfers    Transfers  sit-stand transfer;stand-sit transfer  -TA     Sit-Stand [place] (Transfers)  contact guard;minimum assist (75% patient effort)  -TA     Stand-Sit [place] (Transfers)  contact guard;minimum assist (75% patient effort) HHA  -TA     Row Name 09/08/21 1300          Sit-Stand Transfer    Assistive Device (Sit-Stand Transfers)  -- HHA  -TA     Row Name 09/08/21 1300          Gait/Stairs (Locomotion)    [place] Level (Gait)  contact guard  -TA     Assistive Device (Gait)  other (see comments) HHA of 1  -TA     Distance in Feet (Gait)  150` x 2  -TA     Row Name 09/08/21 1300          Safety Issues, Functional Mobility    Impairments Affecting Function (Mobility)  balance;endurance/activity tolerance  -TA     Row Name 09/08/21 1300          Hip (Therapeutic Exercise)    Hip (Therapeutic Exercise)  AROM (active range of motion)  -TA     Hip AROM (Therapeutic Exercise)  bilateral;aBduction;aDduction;flexion;sitting;10 repetitions;5 repetitions  -TA     Row Name 09/08/21 1300          Knee (Therapeutic Exercise)    Knee (Therapeutic Exercise)  AROM (active range of motion)  -TA     Knee AROM (Therapeutic Exercise)  bilateral;LAQ (long arc quad);sitting;10 repetitions;5 repetitions  -TA     Row Name 09/08/21 1300          Ankle (Therapeutic Exercise)    Ankle (Therapeutic Exercise)  AROM (active range of motion)  -TA     Ankle AROM (Therapeutic Exercise)  bilateral;dorsiflexion;plantarflexion;sitting;10 repetitions;5 repetitions  -     Row Name             Wound 09/07/21 2348 Right lower arm    Wound - Properties Group Placement Date: 09/07/21  - Placement Time: 2348 - Side: Right  - Orientation: lower  - Location: arm  -    Retired Wound - Properties  Group Date first assessed: 09/07/21  [facility] Time first assessed: 2348 - Side: Right  - Location: arm  -    Row Name 09/08/21 1300          Plan of Care Review    Plan of Care Reviewed With  patient  -TA     Progress  improving  -TA     Outcome Summary  pt sup<>sit with CGA-SBA, sit<>stand with CGA-min assist, pt ambulated 150` with HHA of 1. pt will require 24/7 care & continued PT services @ D/C  -TA     Row Name 09/08/21 1300          Vital Signs    Pre Systolic BP Rehab  144  -TA     Pre Treatment Diastolic BP  66  -TA     Post Systolic BP Rehab  136  -TA     Post Treatment Diastolic BP  63  -TA     Pretreatment Heart Rate (beats/min)  80  -TA     Intratreatment Heart Rate (beats/min)  83  -TA     Posttreatment Heart Rate (beats/min)  76  -TA     Pre SpO2 (%)  98  -TA     O2 Delivery Pre Treatment  room air  -TA     Intra SpO2 (%)  98  -TA     O2 Delivery Intra Treatment  room air  -TA     Post SpO2 (%)  96  -TA     O2 Delivery Post Treatment  room air  -TA     Pre Patient Position  Supine  -TA     Post Patient Position  Supine  -TA     Row Name 09/08/21 1300          Bed Mobility Goal 1 (PT)    Activity/Assistive Device (Bed Mobility Goal 1, PT)  sit to supine;supine to sit  -TA     [place] Level/Cues Needed (Bed Mobility Goal 1, PT)  contact guard assist  -TA     Time Frame (Bed Mobility Goal 1, PT)  by discharge  -TA     Progress/Outcomes (Bed Mobility Goal 1, PT)  goal partially met;goal ongoing pt has met goal with sit to supine  -TA     Row Name 09/08/21 1300          Transfer Goal 1 (PT)    Activity/Assistive Device (Transfer Goal 1, PT)  sit-to-stand/stand-to-sit;bed-to-chair/chair-to-bed  -TA     [place] Level/Cues Needed (Transfer Goal 1, PT)  minimum assist (75% or more patient effort)  -TA     Time Frame (Transfer Goal 1, PT)  by discharge  -TA     Strategies/Barriers (Transfers Goal 1, PT)  cognition  -TA     Progress/Outcome (Transfer Goal 1, PT)  goal met  -TA     Row Name 09/08/21  1300          Gait Training Goal 1 (PT)    Activity/Assistive Device (Gait Training Goal 1, PT)  gait (walking locomotion);assistive device use  -TA     [place] Level (Gait Training Goal 1, PT)  standby assist;verbal cues required  -TA     Distance (Gait Training Goal 1, PT)  60' x 2  -TA     Time Frame (Gait Training Goal 1, PT)  by discharge  -TA     Strategies/Barriers (Gait Training Goal 1, PT)  R arm in sling  -TA     Progress/Outcome (Gait Training Goal 1, PT)  goal not met  -TA     Row Name 09/08/21 1300          Positioning and Restraints    Pre-Treatment Position  in bed  -TA     Post Treatment Position  bed  -TA     In Bed  supine;call light within reach;exit alarm on  -TA     Row Name 09/08/21 1300          Therapy Assessment/Plan (PT)    Comment, Therapy Assessment/Plan (PT)  continue  -TA       User Key  (r) = Recorded By, (t) = Taken By, (c) = Cosigned By    Initials Name Provider Type    Fabiola Mcdermott, RN Registered Nurse    Elisha Sullivan, PTA Physical Therapy Assistant        Physical Therapy Education                 Title: PT OT SLP Therapies (Done)     Topic: Physical Therapy (Done)     Point: Mobility training (Done)     Learning Progress Summary           Patient Acceptance, E,TB, VU by LR at 9/4/2021 1646    Comment: Educate on PT POC and goals.                   Point: Home exercise program (Done)     Learning Progress Summary           Patient Acceptance, E,TB, VU by LR at 9/4/2021 1646    Comment: Educate on PT POC and goals.                   Point: Body mechanics (Done)     Learning Progress Summary           Patient Acceptance, E,TB, VU by LR at 9/4/2021 1646    Comment: Educate on PT POC and goals.                   Point: Precautions (Done)     Learning Progress Summary           Patient Acceptance, E,TB, VU by LR at 9/4/2021 1646    Comment: Educate on PT POC and goals.                               User Key     Initials Effective Dates Name Provider Type Discipline     LR 06/16/21 -  Sang Lyons Physical Therapist PT              PT Recommendation and Plan  Anticipated Discharge Disposition (PT): home with 24/7 care, home with home health, skilled nursing facility  Plan of Care Reviewed With: patient  Progress: improving  Outcome Summary: pt sup<>sit with CGA-SBA, sit<>stand with CGA-min assist, pt ambulated 150` with HHA of 1. pt will require 24/7 care & continued PT services @ D/C  Outcome Measures     Row Name 09/08/21 1600             How much help from another person do you currently need...    Turning from your back to your side while in flat bed without using bedrails?  3  -TA      Moving from lying on back to sitting on the side of a flat bed without bedrails?  3  -TA      Moving to and from a bed to a chair (including a wheelchair)?  3  -TA      Standing up from a chair using your arms (e.g., wheelchair, bedside chair)?  3  -TA      Climbing 3-5 steps with a railing?  3  -TA      To walk in hospital room?  3  -TA      AM-PAC 6 Clicks Score (PT)  18  -TA         Functional Assessment    Outcome Measure Options  AM-PAC 6 Clicks Basic Mobility (PT)  -TA        User Key  (r) = Recorded By, (t) = Taken By, (c) = Cosigned By    Initials Name Provider Type    Elisha Sullivan PTA Physical Therapy Assistant           Time Calculation:   PT Charges     Row Name 09/08/21 1633             Time Calculation    Start Time  1300  -TA      Stop Time  1327  -TA      Time Calculation (min)  27 min  -TA      PT Received On  09/08/21  -TA         Time Calculation- PT    Total Timed Code Minutes- PT  27 minute(s)  -TA         Timed Charges    28307 - Gait Training Minutes   15  -TA      44637 - PT Therapeutic Activity Minutes  12  -TA         Total Minutes    Timed Charges Total Minutes  27  -TA       Total Minutes  27  -TA        User Key  (r) = Recorded By, (t) = Taken By, (c) = Cosigned By    Initials Name Provider Type    Elisha Sullivan PTA Physical Therapy Assistant         Therapy Charges for Today     Code Description Service Date Service Provider Modifiers Qty    45289595968 HC GAIT TRAINING EA 15 MIN 9/8/2021 Elisha Lindsey, PTA GP 1    22077398117 HC PT THER PROC EA 15 MIN 9/8/2021 Elisha Lindsey, JAYDEN GP 1          PT G-Codes  Outcome Measure Options: AM-PAC 6 Clicks Basic Mobility (PT)  AM-PAC 6 Clicks Score (PT): 18    lEisha Lindsey PTA  9/8/2021

## 2021-09-08 NOTE — NURSING NOTE
Behavior   Note any precipitants to event or behavior   Describe level and action of any aggressive behavior or speech and associated interventions.     Anxiety: Patient denies at this time  Depression: Patient denies at this time  Pain  0  AVH   no  S/I   no  Plan  no  H/I   no  Plan  no    Affect   euthymic/normal      Note: Pt seen in the common area this am eating breakfast. Pt only eating a few bites of sausage. Alternative meal offered but declined. Pt is drinking boost and 2 oj. Meds taken as ordered.       Intervention    PRN medication utilized:  no    Instructed in medication usage and effects  Medications administered as ordered  Encouraged to verbalize needs      Response    Verbalized understanding   Did patient take medications as ordered yes   Did patient interact with assessment?  yes     Plan    Will monitor for safety  Will monitor every 15 minutes as ordered  Will evaluate and promote the plan of care    Last BM:  unknown date  (Please chart in I/O as well)

## 2021-09-08 NOTE — NURSING NOTE
"Behavior   Note any precipitants to event or behavior   Describe level and action of any aggressive behavior or speech and associated interventions.     Anxiety: Patient denies at this time  Depression: difficulty concentrating  Pain  0  AVH   no  S/I   no  Plan  no  H/I   no  Plan  no    Affect   flat      Note: Patient was resting in bed during assessment. She was somewhat agitated in her responses when asking questions. I was asking her about the date and she said she wasn't sure and I asked her if she was suicidal or having hallucinations and she said no but angrily said \"why do you care and why are you asking these questions.\" She got irritated when I gave her levemir shot and said \"you just come in here and poking on me, I don't know who you are but I want to knock your head off. Get out of here and don't come back.\"   She also talked about and animal outside of room. She asked what kind of animal it was out there. But she was redirected once I told her that there wasn't one. She said she didn't see an animal but just thought she heard one or something. Will continue to monitor patient behavior.       Intervention    PRN medication utilized:  no    Instructed in medication usage and effects  Medications administered as ordered  Encouraged to verbalize needs      Response    Verbalized understanding   Did patient take medications as ordered yes   Did patient interact with assessment?  yes     Plan    Will monitor for safety  Will monitor every 15 minutes as ordered  Will evaluate and promote the plan of care    Last BM:  unknown date  (Please chart in I/O as well)    "

## 2021-09-08 NOTE — PROGRESS NOTES
LCSW spoke with Eliana at [facility] and confirmed they have accepted pt for placement. Spoke with spouse and confirmed he would like to proceed with placement. Spoke with MD, who is agreeable to transfer pt to SNF tomorrow, 9/9/21. Will follow up and facilitate dc tomorrow.

## 2021-09-08 NOTE — NURSING NOTE
Patient saw by wound care for skin tear to right arm.  Granulation to wound bed but some skin absent.  Measuring 7cm x 4.5cm.  Polymem to cover wound bed and wrap in kerlix.

## 2021-09-09 VITALS
RESPIRATION RATE: 20 BRPM | SYSTOLIC BLOOD PRESSURE: 120 MMHG | HEIGHT: 65 IN | DIASTOLIC BLOOD PRESSURE: 66 MMHG | BODY MASS INDEX: 18.56 KG/M2 | HEART RATE: 75 BPM | TEMPERATURE: 97.1 F | WEIGHT: 111.4 LBS | OXYGEN SATURATION: 97 %

## 2021-09-09 LAB
GLUCOSE BLDC GLUCOMTR-MCNC: 197 MG/DL (ref 70–130)
GLUCOSE BLDC GLUCOMTR-MCNC: 71 MG/DL (ref 70–130)

## 2021-09-09 PROCEDURE — 63710000001 INSULIN ASPART PER 5 UNITS: Performed by: NURSE PRACTITIONER

## 2021-09-09 PROCEDURE — 82962 GLUCOSE BLOOD TEST: CPT

## 2021-09-09 PROCEDURE — 99238 HOSP IP/OBS DSCHRG MGMT 30/<: CPT | Performed by: PSYCHIATRY & NEUROLOGY

## 2021-09-09 RX ORDER — MULTIPLE VITAMINS W/ MINERALS TAB 9MG-400MCG
1 TAB ORAL DAILY
Start: 2021-09-10

## 2021-09-09 RX ORDER — MELATONIN
1000
Start: 2021-09-09

## 2021-09-09 RX ORDER — RIVASTIGMINE 4.6 MG/24H
1 PATCH, EXTENDED RELEASE TRANSDERMAL DAILY
Start: 2021-09-10

## 2021-09-09 RX ORDER — PANTOPRAZOLE SODIUM 40 MG/1
40 TABLET, DELAYED RELEASE ORAL EVERY MORNING
Start: 2021-09-10

## 2021-09-09 RX ADMIN — Medication 1 TABLET: at 08:32

## 2021-09-09 RX ADMIN — DORZOLAMIDE HYDROCHLORIDE AND TIMOLOL MALEATE 1 DROP: 20; 5 SOLUTION/ DROPS OPHTHALMIC at 08:35

## 2021-09-09 RX ADMIN — CANAGLIFLOZIN 100 MG: 100 TABLET, FILM COATED ORAL at 08:32

## 2021-09-09 RX ADMIN — RIVASTIGMINE TRANSDERMAL SYSTEM 1 PATCH: 4.6 PATCH, EXTENDED RELEASE TRANSDERMAL at 08:35

## 2021-09-09 RX ADMIN — PANTOPRAZOLE SODIUM 40 MG: 40 TABLET, DELAYED RELEASE ORAL at 08:32

## 2021-09-09 RX ADMIN — ACETAMINOPHEN 500 MG: 500 TABLET, FILM COATED ORAL at 08:32

## 2021-09-09 RX ADMIN — LISINOPRIL 20 MG: 20 TABLET ORAL at 08:32

## 2021-09-09 RX ADMIN — METFORMIN HYDROCHLORIDE 500 MG: 500 TABLET ORAL at 08:32

## 2021-09-09 RX ADMIN — INSULIN ASPART 3 UNITS: 100 INJECTION, SOLUTION INTRAVENOUS; SUBCUTANEOUS at 12:28

## 2021-09-09 NOTE — NURSING NOTE
Behavior   Note any precipitants to event or behavior   Describe level and action of any aggressive behavior or speech and associated interventions.     Anxiety: Patient denies at this time  Depression: Patient denies at this time  Pain  0  AVH   no  S/I   no  Plan  no  H/I   no  Plan  no    Affect   euthymic/normal      Note: Patient resting in bed at time of assessment. Pleasant and cooperative with staff. Denies pain at this time. Took scheduled HS medications without difficulty. Assisted with repositioning in bed. No needs voiced at this time. Did not eat snack, had drink. Dressing on forearm is dry and intact. Will continue to provide safe environment.       Intervention    PRN medication utilized:  no    Instructed in medication usage and effects  Medications administered as ordered  Encouraged to verbalize needs      Response    Verbalized understanding   Did patient take medications as ordered yes   Did patient interact with assessment?  yes     Plan    Will monitor for safety  Will monitor every 15 minutes as ordered  Will evaluate and promote the plan of care    Last BM:  unknown date  (Please chart in I/O as well)

## 2021-09-09 NOTE — NURSING NOTE
"Behavior   Note any precipitants to event or behavior   Describe level and action of any aggressive behavior or speech and associated interventions.     Anxiety: Easily fatigued and Decreased concentration  Depression: Patient unable to participate  Pain  denies  AVH   unable to participate  S/I   unable to participate  Plan  unable to participate  H/I   unable to participate  Plan  unable to participate    Affect   flat      Note: Patient interviewed in room. Patient is alert to person and place. Patient states she is here because \"I guess I'm sick.\" Compliant with medication administration. No behaviors noted at this time.      Intervention    PRN medication utilized:  no    Instructed in medication usage and effects  Medications administered as ordered  Encouraged to verbalize needs      Response    Verbalized understanding   Did patient take medications as ordered yes   Did patient interact with assessment?  minimally    Plan    Will monitor for safety  Will monitor every 15 minutes as ordered  Will evaluate and promote the plan of care    Last BM:  unknown date  (Please chart in I/O as well)    "

## 2021-09-09 NOTE — DISCHARGE SUMMARY
Admission Date: 9/4/2021    Discharge Date: 09/09/21    Psychiatric History: Ms. Mary Gutierrez is a 80 y.o. female with a concurrent neuropsychiatric history notable for Alzheimer's disease.       Presents with dementia related behavioral issues. Onset of symptoms was gradual starting unknown months ago.  Symptoms have been present on an increasingly more frequent basis. Symptoms are associated with agitation and irritability.  Symptoms are aggravated by problems with health.   Symptoms improve with supportive care.  Patient's symptom severity is severe.  Patient's symptoms occur in the context of ongoing illness.     Patient is seen in her room with RN staff today.  She is unable to tell me her age but she does respond to her name.  She cannot tell me her location or time including year, or situation.  She denies feeling depressed or anxious.  She cannot tell me whether she is .  She is very focused on getting Out of bed but appears too weak to be able to lift herself up.  With help of nursing staff she is able to do so.  Attempt review history of falls but has no recall of these events.     She was seen twice yesterday in the emergency room.  First was for a fall hitting her shoulder seen by Dr. Garner.  Secondly seen by Dr. Mehta; per his note:  Patient presents to the emergency department today with increased confusion for the past month.  Much of the patient's history was obtained from her daughter, who is present at bedside.  She states that patient has been diagnosed with Alzheimer's dementia.  She had a fall this morning sustaining a fracture to her right humerus and a skin tear.  The patient's daughter states that patient has not been taking her home medications, she is not taking care of herself and refuses care from her , and has been having a decline in her ADLs for the past 4 months.  Family is seeking nursing home care.  The patient's  has contacted [facility] nursing  home, and the nursing home states that patient needs a psychiatric evaluation prior to them admitting the patient to their nursing home.     The only contact information listed in chart is for the patient's , Chava at [phone] and mobile number [phone].  Per her :  --Concern for dementia; no formal dx  --Memory concerns for a year or more  --Slow and gradual  --Hygiene has went down; he cannot get her to bathe for a least the last 1-2 months; won't comb her hair.   --Refuses to take medication  --Will recognize ; does not recognize all her kids (one out of five)  --No neuro or psych hx  --hx of chemo for colon cancer, 6 months afterwards she became more forgetful  -- is 84 y/o, been together for 63 yrs  --verbally aggressive with   --paranoid with , distrusting, new onset  --concern for [facility] of persons outside  --no tremors  --falls at home; when walking; not when transferin  --had talked to [facility]  --needs help with ALDs        Psychiatric Review Of Systems:  --Confusion, agitation, as above.        Concurrent Psychiatric History:  --Past neuropsychiatric history:  · None formal     --Psychiatric Hospitalizations: none per family        --Suicide Attempts: none per family     --Prior Treatment:  --Outpatient: none formal     --Prior Medications Trials:  · None noted/reported     --History of violence or legal issues:   Denies significant history of legal issues.     -Abuse/Trauma/Neglect/Exploitation: none reported        Substance Use:   --No Nicotine, A&D use.         Social History:  --> five kids;  63 yrs.  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  Social History   Social History            Socioeconomic History   • Marital status:        Spouse name: Not on file   • Number of children: Not on file   • Years of education: Not on file   • Highest education level: Not on file   Tobacco Use   • Smoking status: Never Smoker   •  Smokeless tobacco: Never Used   Vaping Use   • Vaping Use: Never used   Substance and Sexual Activity   • Alcohol use: No   • Drug use: No   • Sexual activity: Defer               Family History:  Family History   Family history unknown: Yes      -->Further details: Dementia - none known        Past Medical and Surgical History:  Medical History        Past Medical History:   Diagnosis Date   • Acid reflux     • Diabetes mellitus (CMS/HCC)     • Hypertension     • Malignant neoplasm of ascending colon (CMS/HCC) 11/1/2016   • Malignant tumor of cecum (CMS/HCC)           --> Seizure Hx: none known        Surgical History         Past Surgical History:   Procedure Laterality Date   • CAPSULE ENDOSCOPY N/A 6/22/2017     Procedure: CAPSULE ENDOSCOPY M2A;  Surgeon: Stuart Rico DO;  Location: [facility] ENDOSCOPY;  Service:    • CAPSULE ENDOSCOPY N/A 1/23/2018     Procedure: CAPSULE ENDOSCOPY M2A;  Surgeon: Stuart Rico DO;  Location: [facility] ENDOSCOPY;  Service:    • CENTRAL VENOUS LINE INSERTION   10/30/2015     Ultrasound localization, left basilic vein.Placement of left upper extremity PICC line   • COLONOSCOPY N/A 1/23/2018     Procedure: COLONOSCOPY;  Surgeon: Stuart Rico DO;  Location: [facility] ENDOSCOPY;  Service:    • ENDOSCOPY N/A 6/22/2017     Procedure: ESOPHAGOGASTRODUODENOSCOPY;  Surgeon: Stuart Rico DO;  Location: [facility] ENDOSCOPY;  Service:    • ENDOSCOPY N/A 1/23/2018     Procedure: ESOPHAGOGASTRODUODENOSCOPY;  Surgeon: Stuart Rico DO;  Location: [facility] ENDOSCOPY;  Service:    • FRACTURE SURGERY         right femur    • HYSTERECTOMY       • LAPAROSCOPIC ASSISSTED TOTAL COLECTOMY W/ J-POUCH   10/15/2015     Laparoscopic right hemicolectomy with ileotransverse colostomy   • VENOUS ACCESS DEVICE (PORT) INSERTION   01/12/2016     Right internal jugular Mediport            Allergies:  Other     Prescriptions Prior to Admission           Medications Prior to Admission   Medication Sig  Dispense Refill Last Dose   • Dorzolamide HCl-Timolol Mal PF 22.3-6.8 MG/ML solution Apply 1 drop to eye 2 (Two) Times a Day.     Unknown at Unknown time   • Empagliflozin (Jardiance) 25 MG tablet Take 25 mg by mouth Daily.     Unknown at Unknown time   • FREESTYLE LITE test strip USE TO TEST BLOOD SUGAR ONCE DAILY AS DIRECTED   2 Unknown at Unknown time   • gemfibrozil (Lopid) 600 MG tablet Take 600 mg by mouth 2 (Two) Times a Day.     Unknown at Unknown time   • HYDROcodone-acetaminophen (NORCO) 7.5-325 MG per tablet Take 1 tablet by mouth Every 6 (Six) Hours As Needed for Moderate Pain  for up to 12 days. 12 tablet 0 Unknown at Unknown time   • insulin detemir (LEVEMIR) 100 UNIT/ML injection Inject 14 Units under the skin into the appropriate area as directed Every Night.     Unknown at Unknown time   • lisinopril (PRINIVIL,ZESTRIL) 20 MG tablet Take 20 mg by mouth Daily.     Unknown at Unknown time   • metFORMIN (GLUCOPHAGE) 1000 MG tablet Take 1,000 mg by mouth 2 (Two) Times a Day With Meals.     Unknown at Unknown time   • omega-3 acid ethyl esters (LOVAZA) 1 G capsule Take 2 g by mouth Daily.     Unknown at Unknown time   • Omeprazole (PRILOSEC PO) Take 1 tablet by mouth As Needed.     Unknown at Unknown time   • Promethazine HCl (PHENERGAN PO) Take  by mouth As Needed.     Unknown at Unknown time   • vitamin B-12 (CYANOCOBALAMIN) 1000 MCG tablet Take 1 tablet by mouth on Monday, Wednesday and Friday 36 tablet 1 Unknown at Unknown time         --> Reviewed; trouble with compliance as noted above     Diagnostic Data:    Lab Results: Results source: EMR   Recent Results (from the past 168 hour(s))   Basic Metabolic Panel    Collection Time: 09/03/21 10:40 AM    Specimen: Blood   Result Value Ref Range    Glucose 306 (H) 65 - 99 mg/dL    BUN 18 8 - 23 mg/dL    Creatinine 0.77 0.57 - 1.00 mg/dL    Sodium 130 (L) 136 - 145 mmol/L    Potassium 3.9 3.5 - 5.2 mmol/L    Chloride 93 (L) 98 - 107 mmol/L    CO2 26.0 22.0  - 29.0 mmol/L    Calcium 9.2 8.6 - 10.5 mg/dL    eGFR Non African Amer 72 >60 mL/min/1.73    BUN/Creatinine Ratio 23.4 7.0 - 25.0    Anion Gap 11.0 5.0 - 15.0 mmol/L   aPTT    Collection Time: 09/03/21 10:40 AM    Specimen: Blood   Result Value Ref Range    PTT 26.7 20.0 - 40.3 seconds   Protime-INR    Collection Time: 09/03/21 10:40 AM    Specimen: Blood   Result Value Ref Range    Protime 12.0 11.1 - 15.3 Seconds    INR 0.89 0.80 - 1.20   CBC Auto Differential    Collection Time: 09/03/21 10:40 AM    Specimen: Blood   Result Value Ref Range    WBC 13.26 (H) 3.40 - 10.80 10*3/mm3    RBC 4.98 3.77 - 5.28 10*6/mm3    Hemoglobin 15.4 12.0 - 15.9 g/dL    Hematocrit 46.8 (H) 34.0 - 46.6 %    MCV 94.0 79.0 - 97.0 fL    MCH 30.9 26.6 - 33.0 pg    MCHC 32.9 31.5 - 35.7 g/dL    RDW 12.0 (L) 12.3 - 15.4 %    RDW-SD 41.8 37.0 - 54.0 fl    MPV 9.9 6.0 - 12.0 fL    Platelets 219 140 - 450 10*3/mm3    Neutrophil % 89.7 (H) 42.7 - 76.0 %    Lymphocyte % 5.7 (L) 19.6 - 45.3 %    Monocyte % 3.5 (L) 5.0 - 12.0 %    Eosinophil % 0.4 0.3 - 6.2 %    Basophil % 0.4 0.0 - 1.5 %    Immature Grans % 0.3 0.0 - 0.5 %    Neutrophils, Absolute 11.89 (H) 1.70 - 7.00 10*3/mm3    Lymphocytes, Absolute 0.76 0.70 - 3.10 10*3/mm3    Monocytes, Absolute 0.47 0.10 - 0.90 10*3/mm3    Eosinophils, Absolute 0.05 0.00 - 0.40 10*3/mm3    Basophils, Absolute 0.05 0.00 - 0.20 10*3/mm3    Immature Grans, Absolute 0.04 0.00 - 0.05 10*3/mm3    nRBC 0.0 0.0 - 0.2 /100 WBC   Gold Top - SST    Collection Time: 09/03/21 10:40 AM   Result Value Ref Range    Extra Tube Hold for add-ons.    COVID-19 and FLU A/B PCR - Swab, Nasopharynx    Collection Time: 09/03/21  8:18 PM    Specimen: Nasopharynx; Swab   Result Value Ref Range    COVID19 Not Detected Not Detected - Ref. Range    Influenza A PCR Not Detected Not Detected    Influenza B PCR Not Detected Not Detected   Urinalysis With Microscopic If Indicated (No Culture) - Urine, Clean Catch    Collection Time: 09/03/21   9:31 PM    Specimen: Urine, Clean Catch   Result Value Ref Range    Color, UA Yellow Yellow, Straw, Dark Yellow, Keira    Appearance, UA Clear Clear    pH, UA 6.5 5.0 - 9.0    Specific Gravity, UA 1.027 1.003 - 1.030    Glucose, UA >=1000 mg/dL (3+) (A) Negative    Ketones, UA Negative Negative    Bilirubin, UA Negative Negative    Blood, UA Trace (A) Negative    Protein, UA 30 mg/dL (1+) (A) Negative    Leuk Esterase, UA Negative Negative    Nitrite, UA Negative Negative    Urobilinogen, UA 0.2 E.U./dL 0.2 - 1.0 E.U./dL   Urine Drug Screen - Urine, Clean Catch    Collection Time: 09/03/21  9:31 PM    Specimen: Urine, Clean Catch   Result Value Ref Range    THC, Screen, Urine Negative Negative    Phencyclidine (PCP), Urine Negative Negative    Cocaine Screen, Urine Negative Negative    Methamphetamine, Ur Negative Negative    Opiate Screen Positive (A) Negative    Amphetamine Screen, Urine Negative Negative    Benzodiazepine Screen, Urine Negative Negative    Tricyclic Antidepressants Screen Negative Negative    Methadone Screen, Urine Negative Negative    Barbiturates Screen, Urine Negative Negative    Oxycodone Screen, Urine Negative Negative    Propoxyphene Screen Negative Negative    Buprenorphine, Screen, Urine Negative Negative   Urinalysis, Microscopic Only - Urine, Clean Catch    Collection Time: 09/03/21  9:31 PM    Specimen: Urine, Clean Catch   Result Value Ref Range    RBC, UA 6-12 (A) None Seen /HPF    WBC, UA 3-5 None Seen, 0-2, 3-5 /HPF    Bacteria, UA Trace (A) None Seen /HPF    Squamous Epithelial Cells, UA 0-2 None Seen, 0-2 /HPF    Hyaline Casts, UA None Seen None Seen /LPF    Methodology Manual Light Microscopy    Acetaminophen Level    Collection Time: 09/03/21  9:41 PM    Specimen: Blood   Result Value Ref Range    Acetaminophen <5.0 0.0 - 30.0 mcg/mL   Salicylate Level    Collection Time: 09/03/21  9:41 PM    Specimen: Blood   Result Value Ref Range    Salicylate <0.3 <=30.0 mg/dL   Ethanol     Collection Time: 09/03/21  9:41 PM    Specimen: Blood   Result Value Ref Range    Ethanol <10 0 - 10 mg/dL    Ethanol % <0.010 %   POC Glucose Once    Collection Time: 09/04/21  4:39 PM    Specimen: Blood   Result Value Ref Range    Glucose 254 (H) 70 - 130 mg/dL   Hemoglobin A1c    Collection Time: 09/05/21  6:27 AM    Specimen: Blood   Result Value Ref Range    Hemoglobin A1C 10.30 (H) 4.80 - 5.60 %   Vitamin B12    Collection Time: 09/05/21  6:27 AM    Specimen: Blood   Result Value Ref Range    Vitamin B-12 897 211 - 946 pg/mL   Folate    Collection Time: 09/05/21  6:27 AM    Specimen: Blood   Result Value Ref Range    Folate >20.00 4.78 - 24.20 ng/mL   Lipid Panel    Collection Time: 09/05/21  6:27 AM    Specimen: Blood   Result Value Ref Range    Total Cholesterol 234 (H) 0 - 200 mg/dL    Triglycerides 262 (H) 0 - 150 mg/dL    HDL Cholesterol 93 (H) 40 - 60 mg/dL    LDL Cholesterol  98 0 - 100 mg/dL    VLDL Cholesterol 43 (H) 5 - 40 mg/dL    LDL/HDL Ratio 0.95    Glucose, Random    Collection Time: 09/05/21  6:27 AM    Specimen: Blood   Result Value Ref Range    Glucose 93 65 - 99 mg/dL   RPR    Collection Time: 09/05/21  6:27 AM    Specimen: Blood   Result Value Ref Range    RPR Non-Reactive Non-Reactive   Vitamin D 25 Hydroxy    Collection Time: 09/05/21  6:27 AM    Specimen: Blood   Result Value Ref Range    25 Hydroxy, Vitamin D 36.9 30.0 - 100.0 ng/ml   HIV-1 / O / 2 Ag / Antibody 4th Generation    Collection Time: 09/05/21  6:27 AM    Specimen: Blood   Result Value Ref Range    HIV-1/ HIV-2 Non-Reactive Non-Reactive   POC Glucose Once    Collection Time: 09/05/21 12:01 PM    Specimen: Blood   Result Value Ref Range    Glucose 211 (H) 70 - 130 mg/dL   POC Glucose Once    Collection Time: 09/05/21  4:49 PM    Specimen: Blood   Result Value Ref Range    Glucose 209 (H) 70 - 130 mg/dL   POC Glucose Once    Collection Time: 09/05/21  8:13 PM    Specimen: Blood   Result Value Ref Range    Glucose 124 70 -  130 mg/dL   POC Glucose Once    Collection Time: 09/06/21  6:36 AM    Specimen: Blood   Result Value Ref Range    Glucose 105 70 - 130 mg/dL   POC Glucose Once    Collection Time: 09/06/21 11:30 AM    Specimen: Blood   Result Value Ref Range    Glucose 109 70 - 130 mg/dL   POC Glucose Once    Collection Time: 09/06/21  4:19 PM    Specimen: Blood   Result Value Ref Range    Glucose 101 70 - 130 mg/dL   POC Glucose Once    Collection Time: 09/06/21  8:00 PM    Specimen: Blood   Result Value Ref Range    Glucose 104 70 - 130 mg/dL   POC Glucose Once    Collection Time: 09/07/21  6:20 AM    Specimen: Blood   Result Value Ref Range    Glucose 80 70 - 130 mg/dL   POC Glucose Once    Collection Time: 09/07/21 11:46 AM    Specimen: Blood   Result Value Ref Range    Glucose 110 70 - 130 mg/dL   POC Glucose Once    Collection Time: 09/07/21  4:43 PM    Specimen: Blood   Result Value Ref Range    Glucose 114 70 - 130 mg/dL   POC Glucose Once    Collection Time: 09/07/21  8:02 PM    Specimen: Blood   Result Value Ref Range    Glucose 149 (H) 70 - 130 mg/dL   POC Glucose Once    Collection Time: 09/08/21  6:25 AM    Specimen: Blood   Result Value Ref Range    Glucose 74 70 - 130 mg/dL   POC Glucose Once    Collection Time: 09/08/21 11:08 AM    Specimen: Blood   Result Value Ref Range    Glucose 217 (H) 70 - 130 mg/dL   COVID-19, [facility] IN-HOUSE, NP SWAB IN TRANSPORT MEDIA 8-10 HR TAT - Swab, Nasopharynx    Collection Time: 09/08/21 11:22 AM    Specimen: Nasopharynx; Swab   Result Value Ref Range    COVID19 Not Detected Not Detected - Ref. Range   POC Glucose Once    Collection Time: 09/08/21  4:20 PM    Specimen: Blood   Result Value Ref Range    Glucose 177 (H) 70 - 130 mg/dL   POC Glucose Once    Collection Time: 09/08/21  7:42 PM    Specimen: Blood   Result Value Ref Range    Glucose 197 (H) 70 - 130 mg/dL   POC Glucose Once    Collection Time: 09/09/21  5:47 AM    Specimen: Blood   Result Value Ref Range    Glucose 71 70  - 130 mg/dL       Radiology Results:  XR Shoulder 2+ View Right    Result Date: 9/3/2021  Narrative: EXAM: XR SHOULDER 2 OR MORE VIEWS COMPARISON: None INDICATION: fall, pain FINDINGS: Review right shoulder. Minimally displaced fracture of the right humeral head greater tuberosity. No christa dislocation. Visualized lung is clear .     Impression: Minimally displaced fracture of the right humeral head greater tuberosity. Electronically signed by:  Luis Carlos Castillo MD  9/3/2021 10:30 AM CDT Workstation: [id number]    XR Humerus Right    Result Date: 9/3/2021  Narrative: EXAM: XR HUMERUS COMPARISON: None INDICATION: fall, pain FINDINGS: 2 view right humerus. Minimally displaced fracture of the right proximal humerus greater tuberosity. No appreciable dislocation. Joint spaces are preserved. Visualized lung is clear.     Impression: Fracture of the right humeral head greater tuberosity. Electronically signed by:  Luis Carlos Castillo MD  9/3/2021 10:25 AM CDT Workstation: [id number]    XR Forearm 2 View Right    Result Date: 9/3/2021  Narrative: EXAM: XR FOREARM 2 VIEWS COMPARISON: None INDICATION: fall, pain FINDINGS: 2 view right forearm. No acute fracture or dislocation. No suspicious osseous lesion. Joint spaces are preserved. Sclerotic degenerative changes seen throughout the right wrist. Soft tissues are unremarkable.     Impression: No acute osseous abnormality. Right wrist osteoarthritis. Electronically signed by:  Luis Carlos Castillo MD  9/3/2021 10:27 AM CDT Workstation: [id number]    CT Head Without Contrast    Result Date: 9/4/2021  Narrative: CT Head Without Contrast History: Worsening memory problems and increasing confusion. Recent fall. Patient on blood thinner. Axial scans of the brain were obtained without intravenous contrast.  Coronal and sagital reconstructions were preformed. This exam was performed according to our departmental dose-optimization program, which includes automated exposure  control, adjustment of the mA and/or kV according to patient size and/or use of iterative reconstruction technique. DLP: 845.30 Comparison: None Findings: Bone windows are unremarkable. The visualized paranasal sinuses are unremarkable. No acute process. Cerebral and cerebellar atrophy. Minimal small vessel disease. No hemorrhage. No mass. No abnormal areas of increased attenuation. No midline shift. No abnormal extra-axial fluid collections.     Impression: CONCLUSION: No acute process. Cerebral and cerebellar atrophy. Minimal small vessel disease. [id number] Electronically signed by:  Noel Cohen MD  9/4/2021 12:28 PM CDT Workstation: [id number]      Summary of Hospital Course:  Patient was admitted to the behavioral health unit at [facility] to ensure patient safety.  Patient was provided treatment with the unit milieu, activities, therapies and psychopharmacological management.  Patient was placed on Q15 minute checks and Fall precautions.    Hospitalist was consulted for management of medical co-morbidities.  Given creatinine clearance decreased metformin to 500mg bid and stopped Lopid.  Patient was maintained in sling due to fracture of right humeral head.  She will need repeat x-ray next week and follow up with ortho in 3-4wks.  UTI treated with fosfomycin.    Patient was restarted on the following psychiatric medications: none at home.    The following medication changes were made during the hospital stay: Started exelon 4.6mg/day patch for dementia.  Patient had improvement over the course of the hospital stay and tolerated his medications.  Patient had improvement in mood and affect.  Patient was calm and pleasant and had no behavioral issues.    Social work and nursing communicated with family and SNF as needed.    Substance abuse issues were not present.    Patients Condition at Discharge:  Patient is stable for discharge and is not an imminent threat to self or others.  The patient's behavior  was Appropriate.  Patient reported that mood was Euthymic.  Patient's affect was normal.  Patient's thought content was as follows:   Suicidal:  Patient denies any suicidal thoughts, plans or intentions.   Homicidal:  Patient denies any homicidal thoughts, plans or intentions.   Hallucinations:  None   Delusion:  None    Discharge Diagnosis:    Major neurocognitive disorder due to Alzheimer's disease (CMS/[facility])    Dementia with behavioral disturbance (CMS/[facility])    Type 2 diabetes mellitus without complication, with long-term current use of insulin (CMS/[facility])    Dyslipidemia    Benign essential HTN      Discharge Medications:      Your medication list      START taking these medications      Instructions Last Dose Given Next Dose Due   cholecalciferol 25 MCG (1000 UT) tablet  Commonly known as: VITAMIN D3      Take 1 tablet by mouth Daily With Dinner. Indications: Vitamin D Deficiency       multivitamin with minerals tablet tablet  Start taking on: September 10, 2021      Take 1 tablet by mouth Daily. Indications: nutritional supplementation       pantoprazole 40 MG EC tablet  Commonly known as: PROTONIX  Start taking on: September 10, 2021  Replaces: PRILOSEC PO      Take 1 tablet by mouth Every Morning. Indications: Gastroesophageal Reflux Disease       rivastigmine 4.6 MG/24HR patch  Commonly known as: EXELON  Start taking on: September 10, 2021      Place 1 patch on the skin as directed by provider Daily. Indications: Alzheimer's Disease          CHANGE how you take these medications      Instructions Last Dose Given Next Dose Due   insulin detemir 100 UNIT/ML injection  Commonly known as: LEVEMIR  What changed: how much to take      Inject 10 Units under the skin into the appropriate area as directed Every Night. Indications: Type 2 Diabetes       metFORMIN 500 MG tablet  Commonly known as: GLUCOPHAGE  What changed:   · medication strength  · how much to take      Take 1 tablet by mouth 2 (Two) Times a Day With  Meals. Indications: Type 2 Diabetes          CONTINUE taking these medications      Instructions Last Dose Given Next Dose Due   Dorzolamide HCl-Timolol Mal PF 22.3-6.8 MG/ML ophthalmic solution  Commonly known as: COSOPT PF      Apply 1 drop to eye 2 (Two) Times a Day.       Jardiance 25 MG tablet tablet  Generic drug: empagliflozin      Take 25 mg by mouth Daily.       lisinopril 20 MG tablet  Commonly known as: PRINIVIL,ZESTRIL      Take 20 mg by mouth Daily.       omega-3 acid ethyl esters 1 g capsule  Commonly known as: LOVAZA      Take 2 g by mouth Daily.       vitamin B-12 1000 MCG tablet  Commonly known as: CYANOCOBALAMIN      Take 1 tablet by mouth on Monday, Wednesday and Friday          STOP taking these medications    FREESTYLE LITE test strip  Generic drug: glucose blood        HYDROcodone-acetaminophen 7.5-325 MG per tablet  Commonly known as: NORCO        Lopid 600 MG tablet  Generic drug: gemfibrozil        PHENERGAN PO        PRILOSEC PO  Replaced by: pantoprazole 40 MG EC tablet              Where to Get Your Medications      Information about where to get these medications is not yet available    Ask your nurse or doctor about these medications  · cholecalciferol 25 MCG (1000 UT) tablet  · insulin detemir 100 UNIT/ML injection  · metFORMIN 500 MG tablet  · multivitamin with minerals tablet tablet  · pantoprazole 40 MG EC tablet  · rivastigmine 4.6 MG/24HR patch         Discharge Diet:   Diet Order   Procedures   • Diet Regular; Consistent Carbohydrate       Activity at Discharge: As tolerated.    Justification for multiple antipsychotic medications at discharge:  Not Applicable.    Medication for smoking cessation: Patient does not smoke and medication is not indicated.    Medication for substance abuse: Patient does not have a substance use diagnosis and medication is not indicated.    Disposition: Patient was discharged to nursing home.    Additional Instructions for the Follow-ups that You Need  to Schedule     XR shoulder 2+ vw right   Sep 10, 2021      Exam reason: Recheck.  Send results to Dr. Escalera.    Release to patient: Immediate           Follow-up Information     Eusebio Escalera MD Follow up in 3 week(s).    Specialty: Orthopedic Surgery  Why: Follow up for minimally displaced fx of right humeral head.  Repeat xray scheduled on 9/10  Contact information:  [address] [place] [address] [place], [place] [address] [phone]             Gurvinder Todd MD .    Specialty: Family Medicine  Contact information:  [address] [id number] [phone]                   Psychiatric and medical follow up will be with medical director at the SNF.    Time Spent: Less than 30 minutes.  I spent  25  minutes on this discharge activity which included: face-to-face encounter with the patient, reviewing the data in the system, coordination of the care with the nursing staff as well as consultants, documentation, and entering orders.  Time was also spent speaking with family if noted above.    Renzo Sheppard MD  09/09/21  10:43 CDT

## 2021-09-09 NOTE — NURSING NOTE
Patient escorted by [facility] staff via wheelchair for discharge to [facility]. Transportation provided by [facility] staff. Patient is stable with no signs and symptoms of distress. All d/c paperwork and follow up appointments discussed with ARLETTE Pardo at [facility] and she verbalized understanding. Patient did not have any personal belongings to return.

## 2021-09-09 NOTE — DISCHARGE PLACEMENT REQUEST
"Mary Day (80 y.o. Female)     Date of Birth Social Security Number Address Home Phone MRN    1940  [id number]   [facility] [address] [phone] [id number]    Yazdanism Marital Status          Pent[employer]alism        Admission Date Admission Type Admitting Provider Attending Provider Department, Room/Bed    9/4/21 Urgent Shaquille Huggins II, MD [place], Ronald Reagan II, MD [facility] SENIOR PSYCH, 670/1    Discharge Date Discharge Disposition Discharge Destination                       Attending Provider: Shaquille Huggins II, MD    Allergies: Other    Isolation: None   Infection: None   Code Status: CPR    Ht: 165.1 cm (65\")   Wt: 50.5 kg (111 lb 6.4 oz)    Admission Cmt: None   Principal Problem: Major neurocognitive disorder due to Alzheimer's disease (CMS/[facility]) [G30.9,F02.80]                 Active Insurance as of 9/4/2021     Primary Coverage     Payor Plan Insurance Group Employer/Plan Group    MEDICARE MEDICARE A & B      Payor Plan Address Payor Plan Phone Number Payor Plan Fax Number Effective Dates    [address] [phone]  12/1/2006 - None Entered    [facility] [id number]       Subscriber Name Subscriber Birth Date Member ID       MARY DAY 1940 [id number]           Secondary Coverage     Payor Plan Insurance Group Employer/Plan Group    [employer] AMER[employer] PLAN F     Payor Plan Address Payor Plan Phone Number Payor Plan Fax Number Effective Dates    [address] [phone]  11/1/2010 - None Entered    [facility] [address]       Subscriber Name Subscriber Birth Date Member ID       MARY DAY 1940 [id number]                 Emergency Contacts      (Rel.) Home Phone Work Phone Mobile Phone    Chava Day (Spouse) [phone] -- [phone]            Emergency Contact Information     Name Relation Home Work Mobile    Chava Day " "Spouse [phone]          Insurance Information                MEDICARE/MEDICARE A & B Phone: [phone]    Subscriber: Mary Gutierrez Subscriber#: [id number]    Group#:  Precert#:         [employer] Phone: [phone]    Subscriber: Mary Gutierrez Subscriber#: [id number]    Group#: PLAN [id number] Precert#:              History & Physical      Shaquille Huggins II, MD at 09/04/21 0918          Psychiatric & Behavioral Health History & Physical  9/4/2021    --> Source of History: chart review and the patient; staff    --> Chief Complaint: Dementia Related Behavioral Issues and Physical Aggression   --> \"I need to get up...\"    History of Present Illness:  Ms. Mary Gutierrez is a 80 y.o. female with a concurrent neuropsychiatric history notable for Alzheimer's disease.      Presents with dementia related behavioral issues. Onset of symptoms was gradual starting unknown months ago.  Symptoms have been present on an increasingly more frequent basis. Symptoms are associated with agitation and irritability.  Symptoms are aggravated by problems with health.   Symptoms improve with supportive care.  Patient's symptom severity is severe.  Patient's symptoms occur in the context of ongoing illness.    Patient is seen in her room with RN staff today.  She is unable to tell me her age but she does respond to her name.  She cannot tell me her location or time including year, or situation.  She denies feeling depressed or anxious.  She cannot tell me whether she is .  She is very focused on getting Out of bed but appears too weak to be able to lift herself up.  With help of nursing staff she is able to do so.  Attempt review history of falls but has no recall of these events.    She was seen twice yesterday in the emergency room.  First was for a fall hitting her shoulder seen by Dr. Garner.  Secondly seen by Dr. Mehta; per his " note:  Patient presents to the emergency department today with increased confusion for the past month.  Much of the patient's history was obtained from her daughter, who is present at bedside.  She states that patient has been diagnosed with Alzheimer's dementia.  She had a fall this morning sustaining a fracture to her right humerus and a skin tear.  The patient's daughter states that patient has not been taking her home medications, she is not taking care of herself and refuses care from her , and has been having a decline in her ADLs for the past 4 months.  Family is seeking nursing home care.  The patient's  has contacted [facility], and the nursing home states that patient needs a psychiatric evaluation prior to them admitting the patient to their nursing home.    The only contact information listed in chart is for the patient's , Chava at [phone] and mobile number [phone].  Per her :  --Concern for dementia; no formal dx  --Memory concerns for a year or more  --Slow and gradual  --Hygiene has went down; he cannot get her to bathe for a least the last 1-2 months; won't comb her hair.   --Refuses to take medication  --Will recognize ; does not recognize all her kids (one out of five)  --No neuro or psych hx  --hx of chemo for colon cancer, 6 months afterwards she became more forgetful  -- is 84 y/o, been together for 63 yrs  --verbally aggressive with   --paranoid with , distrusting, new onset  --concern for VH of persons outside  --no tremors  --falls at home; when walking; not when transferin  --had talked to [facility]  --needs help with ALDs      Psychiatric Review Of Systems:  --Confusion, agitation, as above.      Concurrent Psychiatric History:  --Past neuropsychiatric history:  • None formal    --Psychiatric Hospitalizations: none per family      --Suicide Attempts: none per family    --Prior Treatment:  --Outpatient:  none formal    --Prior Medications Trials:  • None noted/reported    --History of violence or legal issues:   Denies significant history of legal issues.    -Abuse/Trauma/Neglect/Exploitation: none reported      Substance Use:   --No Nicotine, A&D use.       Social History:  --> five kids;  63 yrs.  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  Social History     Socioeconomic History   • Marital status:      Spouse name: Not on file   • Number of children: Not on file   • Years of education: Not on file   • Highest education level: Not on file   Tobacco Use   • Smoking status: Never Smoker   • Smokeless tobacco: Never Used   Vaping Use   • Vaping Use: Never used   Substance and Sexual Activity   • Alcohol use: No   • Drug use: No   • Sexual activity: Defer         Family History:  Family History   Family history unknown: Yes     -->Further details: Dementia - none known      Past Medical and Surgical History:  Past Medical History:   Diagnosis Date   • Acid reflux    • Diabetes mellitus (CMS/HCC)    • Hypertension    • Malignant neoplasm of ascending colon (CMS/HCC) 11/1/2016   • Malignant tumor of cecum (CMS/HCC)      --> Seizure Hx: none known      Past Surgical History:   Procedure Laterality Date   • CAPSULE ENDOSCOPY N/A 6/22/2017    Procedure: CAPSULE ENDOSCOPY M2A;  Surgeon: Stuart Rico DO;  Location: [facility] ENDOSCOPY;  Service:    • CAPSULE ENDOSCOPY N/A 1/23/2018    Procedure: CAPSULE ENDOSCOPY M2A;  Surgeon: Stuart Rico DO;  Location: [facility] ENDOSCOPY;  Service:    • CENTRAL VENOUS LINE INSERTION  10/30/2015    Ultrasound localization, left basilic vein.Placement of left upper extremity PICC line   • COLONOSCOPY N/A 1/23/2018    Procedure: COLONOSCOPY;  Surgeon: Stuart Rico DO;  Location: [facility] ENDOSCOPY;  Service:    • ENDOSCOPY N/A 6/22/2017    Procedure: ESOPHAGOGASTRODUODENOSCOPY;  Surgeon: Stuart Rico DO;  Location: [facility] ENDOSCOPY;  Service:    • ENDOSCOPY  N/A 1/23/2018    Procedure: ESOPHAGOGASTRODUODENOSCOPY;  Surgeon: Stuart Rico DO;  Location: [facility] ENDOSCOPY;  Service:    • FRACTURE SURGERY      right femur    • HYSTERECTOMY     • LAPAROSCOPIC ASSISSTED TOTAL COLECTOMY W/ J-POUCH  10/15/2015    Laparoscopic right hemicolectomy with ileotransverse colostomy   • VENOUS ACCESS DEVICE (PORT) INSERTION  01/12/2016    Right internal jugular Mediport       Allergies:  Other    Medications Prior to Admission   Medication Sig Dispense Refill Last Dose   • Dorzolamide HCl-Timolol Mal PF 22.3-6.8 MG/ML solution Apply 1 drop to eye 2 (Two) Times a Day.   Unknown at Unknown time   • Empagliflozin (Jardiance) 25 MG tablet Take 25 mg by mouth Daily.   Unknown at Unknown time   • FREESTYLE LITE test strip USE TO TEST BLOOD SUGAR ONCE DAILY AS DIRECTED  2 Unknown at Unknown time   • gemfibrozil (Lopid) 600 MG tablet Take 600 mg by mouth 2 (Two) Times a Day.   Unknown at Unknown time   • HYDROcodone-acetaminophen (NORCO) 7.5-325 MG per tablet Take 1 tablet by mouth Every 6 (Six) Hours As Needed for Moderate Pain  for up to 12 days. 12 tablet 0 Unknown at Unknown time   • insulin detemir (LEVEMIR) 100 UNIT/ML injection Inject 14 Units under the skin into the appropriate area as directed Every Night.   Unknown at Unknown time   • lisinopril (PRINIVIL,ZESTRIL) 20 MG tablet Take 20 mg by mouth Daily.   Unknown at Unknown time   • metFORMIN (GLUCOPHAGE) 1000 MG tablet Take 1,000 mg by mouth 2 (Two) Times a Day With Meals.   Unknown at Unknown time   • omega-3 acid ethyl esters (LOVAZA) 1 G capsule Take 2 g by mouth Daily.   Unknown at Unknown time   • Omeprazole (PRILOSEC PO) Take 1 tablet by mouth As Needed.   Unknown at Unknown time   • Promethazine HCl (PHENERGAN PO) Take  by mouth As Needed.   Unknown at Unknown time   • vitamin B-12 (CYANOCOBALAMIN) 1000 MCG tablet Take 1 tablet by mouth on Monday, Wednesday and Friday 36 tablet 1 Unknown at Unknown time     --> Reviewed;  "trouble with compliance as noted above      Medical Review Of Systems:  --Unable to meaningfully obtain given confusion.  ROS        Objective   Objective --    Vital Signs:  Temp:  [96.1 °F (35.6 °C)-98.8 °F (37.1 °C)] 96.1 °F (35.6 °C)  Heart Rate:  [] 104  Resp:  [16-18] 18  BP: (145-190)/(60-96) 153/78    Physical Exam:   -General Appearance:  normal general appearance, in no apparent distress and active; right arm bandage  -Hygiene:  Unkempt   -Gait & Station:  Deferred, in bed  -Musculoskeletal:  No tremors or abnormal involuntary movements and No Cog Woodward or Rigidity and No atrophy noted  -Pulm: Unlaboured     Mental Status Exam:   --Cooperation:  Minimally cooperative  --Eye Contact:  Non-sustained  --Psychomotor Behavior:  Slow  --Mood:  \"Fine\"  --Affect:  mood-incongruent and confused  --Speech:  Minimal, Slow and Soft  --Thought Process:  Incoherent, Dyscognitive, Sluggish and Disorganized  --Associations: Loose Associations  --Themes:  None overt  --Thought Content:     --Mood congruent   --Suicidal:  Denies    --Homicidal:  Denies   --Hallucinations:  Cannot rule out   --Delusion:  Cannot rule out Paranoia given behaviors  --Cognitive Functioning:  -Consciousness: Awake and alert  -Orientation:  Person and Not orientated to Place, Time and Situation  -Attention:  Distractible   -Concentration:  Distractible  -Language:  Below Average based on interaction; Word Finding Difficulties  -Vocabulary:  Below Average based on interaction; Word Finding Difficulties and Paraphasic Errors  -Short Term Memory: Deficits  -Long Term Memory: Deficits  -Fund of Knowledge:  Below Average based on interaction  -Abstraction:  Poor and Patient unable to perform  --Reliability:  limited  --Insight:  None  --Judgment:  Impaired  --Impulse Control:  Impaired      Diagnostic Data:  Results source: EMR      --> Lab Work  Results source: EMR    Recent Results (from the past 72 hour(s))   Basic Metabolic Panel    " Collection Time: 09/03/21 10:40 AM    Specimen: Blood   Result Value Ref Range    Glucose 306 (H) 65 - 99 mg/dL    BUN 18 8 - 23 mg/dL    Creatinine 0.77 0.57 - 1.00 mg/dL    Sodium 130 (L) 136 - 145 mmol/L    Potassium 3.9 3.5 - 5.2 mmol/L    Chloride 93 (L) 98 - 107 mmol/L    CO2 26.0 22.0 - 29.0 mmol/L    Calcium 9.2 8.6 - 10.5 mg/dL    eGFR Non African Amer 72 >60 mL/min/1.73    BUN/Creatinine Ratio 23.4 7.0 - 25.0    Anion Gap 11.0 5.0 - 15.0 mmol/L   aPTT    Collection Time: 09/03/21 10:40 AM    Specimen: Blood   Result Value Ref Range    PTT 26.7 20.0 - 40.3 seconds   Protime-INR    Collection Time: 09/03/21 10:40 AM    Specimen: Blood   Result Value Ref Range    Protime 12.0 11.1 - 15.3 Seconds    INR 0.89 0.80 - 1.20   CBC Auto Differential    Collection Time: 09/03/21 10:40 AM    Specimen: Blood   Result Value Ref Range    WBC 13.26 (H) 3.40 - 10.80 10*3/mm3    RBC 4.98 3.77 - 5.28 10*6/mm3    Hemoglobin 15.4 12.0 - 15.9 g/dL    Hematocrit 46.8 (H) 34.0 - 46.6 %    MCV 94.0 79.0 - 97.0 fL    MCH 30.9 26.6 - 33.0 pg    MCHC 32.9 31.5 - 35.7 g/dL    RDW 12.0 (L) 12.3 - 15.4 %    RDW-SD 41.8 37.0 - 54.0 fl    MPV 9.9 6.0 - 12.0 fL    Platelets 219 140 - 450 10*3/mm3    Neutrophil % 89.7 (H) 42.7 - 76.0 %    Lymphocyte % 5.7 (L) 19.6 - 45.3 %    Monocyte % 3.5 (L) 5.0 - 12.0 %    Eosinophil % 0.4 0.3 - 6.2 %    Basophil % 0.4 0.0 - 1.5 %    Immature Grans % 0.3 0.0 - 0.5 %    Neutrophils, Absolute 11.89 (H) 1.70 - 7.00 10*3/mm3    Lymphocytes, Absolute 0.76 0.70 - 3.10 10*3/mm3    Monocytes, Absolute 0.47 0.10 - 0.90 10*3/mm3    Eosinophils, Absolute 0.05 0.00 - 0.40 10*3/mm3    Basophils, Absolute 0.05 0.00 - 0.20 10*3/mm3    Immature Grans, Absolute 0.04 0.00 - 0.05 10*3/mm3    nRBC 0.0 0.0 - 0.2 /100 WBC   Gold Top - SST    Collection Time: 09/03/21 10:40 AM   Result Value Ref Range    Extra Tube Hold for add-ons.    COVID-19 and FLU A/B PCR - Swab, Nasopharynx    Collection Time: 09/03/21  8:18 PM     Specimen: Nasopharynx; Swab   Result Value Ref Range    COVID19 Not Detected Not Detected - Ref. Range    Influenza A PCR Not Detected Not Detected    Influenza B PCR Not Detected Not Detected   Urinalysis With Microscopic If Indicated (No Culture) - Urine, Clean Catch    Collection Time: 09/03/21  9:31 PM    Specimen: Urine, Clean Catch   Result Value Ref Range    Color, UA Yellow Yellow, Straw, Dark Yellow, Keira    Appearance, UA Clear Clear    pH, UA 6.5 5.0 - 9.0    Specific Gravity, UA 1.027 1.003 - 1.030    Glucose, UA >=1000 mg/dL (3+) (A) Negative    Ketones, UA Negative Negative    Bilirubin, UA Negative Negative    Blood, UA Trace (A) Negative    Protein, UA 30 mg/dL (1+) (A) Negative    Leuk Esterase, UA Negative Negative    Nitrite, UA Negative Negative    Urobilinogen, UA 0.2 E.U./dL 0.2 - 1.0 E.U./dL   Urine Drug Screen - Urine, Clean Catch    Collection Time: 09/03/21  9:31 PM    Specimen: Urine, Clean Catch   Result Value Ref Range    THC, Screen, Urine Negative Negative    Phencyclidine (PCP), Urine Negative Negative    Cocaine Screen, Urine Negative Negative    Methamphetamine, Ur Negative Negative    Opiate Screen Positive (A) Negative    Amphetamine Screen, Urine Negative Negative    Benzodiazepine Screen, Urine Negative Negative    Tricyclic Antidepressants Screen Negative Negative    Methadone Screen, Urine Negative Negative    Barbiturates Screen, Urine Negative Negative    Oxycodone Screen, Urine Negative Negative    Propoxyphene Screen Negative Negative    Buprenorphine, Screen, Urine Negative Negative   Urinalysis, Microscopic Only - Urine, Clean Catch    Collection Time: 09/03/21  9:31 PM    Specimen: Urine, Clean Catch   Result Value Ref Range    RBC, UA 6-12 (A) None Seen /HPF    WBC, UA 3-5 None Seen, 0-2, 3-5 /HPF    Bacteria, UA Trace (A) None Seen /HPF    Squamous Epithelial Cells, UA 0-2 None Seen, 0-2 /HPF    Hyaline Casts, UA None Seen None Seen /LPF    Methodology Manual Light  Microscopy    Acetaminophen Level    Collection Time: 09/03/21  9:41 PM    Specimen: Blood   Result Value Ref Range    Acetaminophen <5.0 0.0 - 30.0 mcg/mL   Salicylate Level    Collection Time: 09/03/21  9:41 PM    Specimen: Blood   Result Value Ref Range    Salicylate <0.3 <=30.0 mg/dL   Ethanol    Collection Time: 09/03/21  9:41 PM    Specimen: Blood   Result Value Ref Range    Ethanol <10 0 - 10 mg/dL    Ethanol % <0.010 %       XR Shoulder 2+ View Right    Result Date: 9/3/2021  Narrative: EXAM: XR SHOULDER 2 OR MORE VIEWS COMPARISON: None INDICATION: fall, pain FINDINGS: Review right shoulder. Minimally displaced fracture of the right humeral head greater tuberosity. No christa dislocation. Visualized lung is clear .     Impression: Minimally displaced fracture of the right humeral head greater tuberosity. Electronically signed by:  Luis Carlos Castillo MD  9/3/2021 10:30 AM CDT Workstation: [id number]    XR Humerus Right    Result Date: 9/3/2021  Narrative: EXAM: XR HUMERUS COMPARISON: None INDICATION: fall, pain FINDINGS: 2 view right humerus. Minimally displaced fracture of the right proximal humerus greater tuberosity. No appreciable dislocation. Joint spaces are preserved. Visualized lung is clear.     Impression: Fracture of the right humeral head greater tuberosity. Electronically signed by:  Luis Carlos Castillo MD  9/3/2021 10:25 AM Lee SilberT Workstation: [id number]    XR Forearm 2 View Right    Result Date: 9/3/2021  Narrative: EXAM: XR FOREARM 2 VIEWS COMPARISON: None INDICATION: fall, pain FINDINGS: 2 view right forearm. No acute fracture or dislocation. No suspicious osseous lesion. Joint spaces are preserved. Sclerotic degenerative changes seen throughout the right wrist. Soft tissues are unremarkable.     Impression: No acute osseous abnormality. Right wrist osteoarthritis. Electronically signed by:  Luis Carlos Castillo MD  9/3/2021 10:27 AM Lee SilberT Workstation: [id number]      No results found for:  GLUF     Lab Results   Component Value Date    HGBA1C 8.0 (H) 2021       Lab Results   Component Value Date    CHOL 236 (H) 2020    TRIG 281 (H) 2020    HDL 66 2020     (H) 2020        TSH   Date Value Ref Range Status   2019 4.02 (H) 0.36 - 3.74 u[iU]/mL Final     Comment:     ** Specimens that contain biotin at a concentration of      100 ng/mL demonstrate a less than or equal to 10% change     in results.  Biotin concentrations greater than this may     lead to falsely depressed results for patient samples.     Results from patients taking biotin supplements or     receiving high-dose biotin therapy should be interpreted     with caution due to possible interference with this      test. **  ** Patient samples may contain heterophilic antibodies that     could react in immunoassays to give falsely elevated or     depressed results.  This assay has been designed to     minimize interference from heterophilic antibodies.      Nevertheless, complete elimination of this interference     from all patient specimens cannot be guaranteed.  As with     any immuno-recognition measurement of a peptide,     extremely rare genetic variants may exhibit varying     degrees of detection.  A test result that is inconsistant     with the clinical picture and patient history should be     interpreted with caution.  Performance of this assay has     not been established with  specimens. **        Lab Results   Component Value Date    HROXOTFZ66 763 2021    FOLATE >20.00 2021       Lab Results   Component Value Date    IRON 64 2021    TIBC 305 2021    FERRITIN 687.60 (H) 2021       -RPR/HIV: none in chart      --> Neuroimaging: none in chart       Assessment/Plan   --Patient Strengths: supportive family/friends, supportive environment   --Patient Barriers: impaired cognition, resistance to treatment      --Diagnostic Impression: Ms. Gutierrez  is a 80 y.o.  female admitted for dementia related behaviors, constituting an imminent risk of harm to self and others (from her dementia) - necessitating inpatient psychiatric stabilization and treatment.     Diagnostically, Major Neurocognitive disorder, most likely Alzhiermer's etiology given age; high index of suspicion for comorbid vascular changes as well given her medical history.  High degree of suspicion for psychosis secondary to neurocognitive disorder with significant paranoia and Persecutory concerns at her family.          Assessment:  --  Major neurocognitive disorder (CMS/HCC)    Dementia with behavioral disturbance (CMS/HCC)    Type 2 diabetes mellitus without complication, with long-term current use of insulin (CMS/HCC)    Dyslipidemia    Benign essential HTN     Non-Psychiatric Medical Conditions Include:  --HTN: Cont home Lisinopril; Catapres PRN  --t2DM: Cont Invokana (formulary), Levemir 10 mg qHS; Metformin 500mg BID  --GERD: Cont PPI  --Occular: cont Cosopt BID         Treatment Plan:  1) Will admit patient to the behavioral health unit at [facility], adult behavioral health unit, as a voluntary admission per family/ to ensure patient safety given imminent risk status and need for emergent inpatient stabilization and treatment.    2) Patient will be provided treatment with the unit milieu, activities, therapies and psychopharmacological management.    3) Patient placed on  Q15 minute checks and Fall precautions.    4) Hospitalist consulted for assistance in management of medical comorbidities.    5) Will order following labs:   --RPR, HIV, TSH, Folate, B12, Vitamin D to evaluate for any contributing etiologies.   --A1c, Fasting lipids & glucose to establish baseline for any anti-d2 agent therapy    6) Will restart patient on the following psychiatric home meds:   --Not applicable; none at home.     7) Will make the following medication changes, and proceed with the following  treatment planning:   --Head CT for evaluation of any neuro anatomical etiologies  -Plan to consider acetylcholinesterase inhibitor, with particular consideration for Exelon given patch compliance concerns  -We will consider Namenda augmentation  -Monitor for paranoia/delusions or other symptoms of psychosis consider antidopaminergic therapy if needed  -EKG to determine QT/QTc  -We will move forward with transition to skilled nursing facility  - is going to further research guardianship/POA    8) Will begin discharge planning as appropriate for patient.    9) Psychotherapy provided: Less than 5 minutes for the patient.     All questions answered for the patient.  Treatment plan and medication risks and benefits discussed with: Family.  We discussed the benefits, risks alternatives and side effects of medications.  We discussed the rationale for treatment as well as the risk of nontreatment.  We discussed off label use of antipsychotics/anti-dopaminergic's, the focus of improving quality of life in the context of black box warning, lack of FDA approval and off label use, increased overall mortality risk.  All questions answered for him.  He provides consent for treatment for pharmacotherapy with goal of improving quality of life.      Estimated Length of Stay: 10-14 days  Prognosis: Fair      Shaquille Huggins II, MD  09/04/21 @ 09:18 CDT  Dictated using Dragon.      Electronically signed by Shaquille Huggins II, MD at 09/04/21 1119         Current Facility-Administered Medications   Medication Dose Route Frequency Provider Last Rate Last Admin   • acetaminophen (TYLENOL) tablet 500 mg  500 mg Oral TID Shaquille Huggins II, MD   500 mg at 09/09/21 0832   • acetaminophen (TYLENOL) tablet 650 mg  650 mg Oral Q4H PRN Shaquille Huggins II, MD       • aluminum-magnesium hydroxide-simethicone (MAALOX MAX) 400-400-40 MG/5ML suspension 15 mL  15 mL Oral Q6H PRN Shaquille Huggins II, MD       •  Canagliflozin (INVOKANA) tablet 100 mg  100 mg Oral Daily Shaquille Huggins II, MD   100 mg at 09/09/21 0832   • cholecalciferol (VITAMIN D3) tablet 1,000 Units  1,000 Units Oral Daily With Dinner Shaquille Huggins II, MD   1,000 Units at 09/08/21 1716   • cloNIDine (CATAPRES) tablet 0.1 mg  0.1 mg Oral Q4H PRN Shaquille Huggins II, MD       • dextrose (D50W) 25 g/ 50mL Intravenous Solution 25 g  25 g Intravenous Q15 Min PRN LevillIsatu G, APRN       • dextrose (GLUTOSE) oral gel 15 g  15 g Oral Q15 Min PRN Levill Isatu G, APRN       • dorzolamide-timolol (COSOPT) ophthalmic solution 1 drop  1 drop Both Eyes BID Shaquille Huggins II, MD   1 drop at 09/09/21 0835   • glucagon (human recombinant) (GLUCAGEN DIAGNOSTIC) injection 1 mg  1 mg Subcutaneous Q15 Min PRN Isatu Summers APRLATOSHA       • influenza vac split quad (FLUZONE,FLUARIX,AFLURIA,FLULAVAL) injection 0.5 mL  0.5 mL Intramuscular During Hospitalization Shaquille Huggins II, MD       • insulin aspart (novoLOG) injection 0-14 Units  0-14 Units Subcutaneous TID AC Isatu Summers APRN   3 Units at 09/08/21 1659   • insulin detemir (LEVEMIR) injection 10 Units  10 Units Subcutaneous Nightly Shaquille Huggins II, MD   10 Units at 09/08/21 2106   • lisinopril (PRINIVIL,ZESTRIL) tablet 20 mg  20 mg Oral Daily Shaquille Huggins II, MD   20 mg at 09/09/21 0832   • loperamide (IMODIUM) capsule 2 mg  2 mg Oral Q2H PRN Shaquille Huggins II, MD       • LORazepam (ATIVAN) tablet 1 mg  1 mg Oral Q6H PRN Shaquille Huggins II, MD        Or   • LORazepam (ATIVAN) injection 1 mg  1 mg Intramuscular Q6H PRN Shaquille Huggins II, MD   1 mg at 09/04/21 0414   • magnesium hydroxide (MILK OF MAGNESIA) suspension 2400 mg/10mL 10 mL  10 mL Oral Daily PRN Shaquille Huggins II, MD       • metFORMIN (GLUCOPHAGE) tablet 500 mg  500 mg Oral BID With Meals Shaquille Huggins II, MD   500 mg at 09/09/21 0832   • multivitamin with minerals 1  tablet  1 tablet Oral Daily Shaquille Huggins II, MD   1 tablet at 09/09/21 0832   • pantoprazole (PROTONIX) EC tablet 40 mg  40 mg Oral QAM Shaquille Huggins II, MD   40 mg at 09/09/21 0832   • rivastigmine (EXELON) 4.6 MG/24HR patch 1 patch  1 patch Transdermal Daily Renzo Sheppard MD   1 patch at 09/09/21 0835     Lab Results (last 48 hours)     Procedure Component Value Units Date/Time    POC Glucose Once [[id number]]  (Normal) Collected: 09/09/21 0547    Specimen: Blood Updated: 09/09/21 0606     Glucose 71 mg/dL      Comment: RN NotifiedOperator: [id number] MINALD[employer]Share ID: [id number]       POC Glucose Once [[id number]]  (Abnormal) Collected: 09/08/21 1942    Specimen: Blood Updated: 09/09/21 0605     Glucose 197 mg/dL      Comment: RN NotifiedOperator: [id number] USDS ID: [id number]       POC Glucose Once [[id number]]  (Abnormal) Collected: 09/08/21 1620    Specimen: Blood Updated: 09/08/21 1641     Glucose 177 mg/dL      Comment: RN NotifiedOperator: [id number] HEIDI [employer] ID: [id number]       POC Glucose Once [[id number]]  (Abnormal) Collected: 09/08/21 1108    Specimen: Blood Updated: 09/08/21 1641     Glucose 217 mg/dL      Comment: RN NotifiedOperator: [id number] [employer] ID: [id number]       COVID PRE-OP / PRE-PROCEDURE SCREENING ORDER (NO ISOLATION) - Swab, Nasopharynx [[id number]]  (Normal) Collected: 09/08/21 1122    Specimen: Swab from Nasopharynx Updated: 09/08/21 1208    Narrative:      The following orders were created for panel order COVID PRE-OP / PRE-PROCEDURE SCREENING ORDER (NO ISOLATION) - Swab, Nasopharynx.  Procedure                               Abnormality         Status                     ---------                               -----------         ------                     COVID-19, BH MAD/MILKA IN-...[[id number]]  Normal              Final result                 Please view results for these tests on the individual  "orders.    COVID-19, [facility] MAD/MILKA IN-HOUSE, NP SWAB IN TRANSPORT MEDIA 8-10 HR TAT - Swab, Nasopharynx [[id number]]  (Normal) Collected: 09/08/21 1122    Specimen: Swab from Nasopharynx Updated: 09/08/21 1208     COVID19 Not Detected    Narrative:      Testing performed by Real Time RT-PCR  This test has not been approved by the [facility] but is authorized under the Emergency Use Act (EUA)    Fact sheet for providers: https://www.fda.gov/media/836558/download    Fact sheet for patients: https://www.fda.gov/media/243280/download        POC Glucose Once [[id number]]  (Normal) Collected: 09/08/21 0625    Specimen: Blood Updated: 09/08/21 0657     Glucose 74 mg/dL      Comment: RN NotifiedOperator: [id number] WILFREDO CANASDYMeter ID: [id number]       POC Glucose Once [[id number]]  (Abnormal) Collected: 09/07/21 2002    Specimen: Blood Updated: 09/07/21 2023     Glucose 149 mg/dL      Comment: RN NotifiedOperator: [id number] ANNMARIE Paizter ID: [id number]       POC Glucose Once [[id number]]  (Normal) Collected: 09/07/21 1643    Specimen: Blood Updated: 09/07/21 1706     Glucose 114 mg/dL      Comment: RN NotifiedOperator: [id number] HEIDI MILLSAMeter ID: [id number]                Physician Progress Notes (last 72 hours) (Notes from 09/06/21 0901 through 09/09/21 0901)      Cecille Cabral APRN at 09/08/21 1257     Attestation signed by Renzo hSeppard MD at 09/08/21 2633    Date of Service: 09/08/21    I saw and examined the patient face to face and was present during the key portion of the E/M service.    Subjective: Patient is pleasantly confused today.  She is unable to have a meaningfully sustained conversation.     No as needed's.  No significant behavioral disturbances.    Mental Status Exam:    Mood: \"Fine\"    Affect:  normal  Thought Process:   dyscognitive   Associations: Disorganized    Thought Content:  Suicidal:  None Homicidal:  None   Hallucinations:  None Delusion:  None    Cognitive " Functioning: Consciousness: awake and alert    Assessment:    Major neurocognitive disorder due to Alzheimer's disease (CMS/[facility])    Dementia with behavioral disturbance (CMS/[facility])    Type 2 diabetes mellitus without complication, with long-term current use of insulin (CMS/[facility])    Dyslipidemia    Benign essential HTN      Plan:    --MNCD: Cont Exelon patch 4.6mg daily  --Urinary tract infection: treated with Fosfomycin at admission    --SNF placement tomorrow    Renzo Sheppard MD  09/08/21  21:44 CDT                   9/8/2021    Chief Complaint: dementia related behavioral issues    Subjective:  Patient is a 80 y.o. female that is currently inpatient on the [facility]  Pt is confused, alert to self only.  She requests for names and where she is to be written on paper.    Pt eats small amounts, she does come out to common area with others at times but is quiet, she has had no behaviors or falls.   Pt has been compliant with treatment and medications.       Objective     Vital Signs    Temp:  [97.3 °F (36.3 °C)-97.7 °F (36.5 °C)] 97.7 °F (36.5 °C)  Heart Rate:  [70-75] 70  Resp:  [18] 18  BP: (137-141)/(64) 137/64    Physical Exam:   General Appearance: alert, appears stated age and cooperative,  Hygiene:   fair  Gait & Station: Normal  Musculoskeletal: No tremors or abnormal involuntary movements    Mental Status Exam:   Cooperation:  Cooperative  Eye Contact:  Fair  Psychomotor Behavior:  Slow  Mood: Apathetic  Affect:  flat  Speech:  Minimal  Thought Process:  Incoherent  Associations: Disorganized  Thought Content:     Unable to demonstrate   Suicidal:  None   Homicidal:  None   Hallucinations:  None   Delusion:  None  Cognitive Functioning:   Consciousness: awake, alert and confused  Reliability:  poor  Insight:  Poor  Judgement:  Impaired  Impulse Control:  Impaired    Lab Results (last 24 hours)     Procedure Component Value Units Date/Time    COVID PRE-OP / PRE-PROCEDURE SCREENING ORDER (NO ISOLATION) - Swab,  Nasopharynx [[id number]]  (Normal) Collected: 09/08/21 1122    Specimen: Swab from Nasopharynx Updated: 09/08/21 1208    Narrative:      The following orders were created for panel order COVID PRE-OP / PRE-PROCEDURE SCREENING ORDER (NO ISOLATION) - Swab, Nasopharynx.  Procedure                               Abnormality         Status                     ---------                               -----------         ------                     COVID-19, BH MAD/MILKA IN-...[[id number]]  Normal              Final result                 Please view results for these tests on the individual orders.    COVID-19, BH MAD/MILKA IN-HOUSE, NP SWAB IN TRANSPORT MEDIA 8-10 HR TAT - Swab, Nasopharynx [[id number]]  (Normal) Collected: 09/08/21 1122    Specimen: Swab from Nasopharynx Updated: 09/08/21 1208     COVID19 Not Detected    Narrative:      Testing performed by Real Time RT-PCR  This test has not been approved by the [facility] but is authorized under the Emergency Use Act (EUA)    Fact sheet for providers: https://www.fda.gov/media/686483/download    Fact sheet for patients: https://www.fda.gov/media/062695/download        POC Glucose Once [[id number]]  (Normal) Collected: 09/08/21 0625    Specimen: Blood Updated: 09/08/21 0657     Glucose 74 mg/dL      Comment: RN NotifiedOperator: [id number] WILFREDO CANASDYMeter ID: [id number]       POC Glucose Once [[id number]]  (Abnormal) Collected: 09/07/21 2002    Specimen: Blood Updated: 09/07/21 2023     Glucose 149 mg/dL      Comment: RN NotifiedOperator: [id number] ANGUIANO ANGIEMeter ID: [id number]           Imaging Results (Last 24 Hours)     ** No results found for the last 24 hours. **          Medicine:   Current Facility-Administered Medications:   •  acetaminophen (TYLENOL) tablet 500 mg, 500 mg, Oral, TID, Shaquille Huggins II, MD, 500 mg at 09/08/21 0805  •  acetaminophen (TYLENOL) tablet 650 mg, 650 mg, Oral, Q4H PRN, Shaquille Huggins II, MD  •  aluminum-magnesium  hydroxide-simethicone (MAALOX MAX) 400-400-40 MG/5ML suspension 15 mL, 15 mL, Oral, Q6H PRN, Shaquille Huggins II, MD  •  Canagliflozin (INVOKANA) tablet 100 mg, 100 mg, Oral, Daily, Shaquille Huggins II, MD, 100 mg at 09/08/21 0805  •  cholecalciferol (VITAMIN D3) tablet 1,000 Units, 1,000 Units, Oral, Daily With Dinner, Shaquille Huggins II, MD, 1,000 Units at 09/07/21 1603  •  cloNIDine (CATAPRES) tablet 0.1 mg, 0.1 mg, Oral, Q4H PRN, Shaquille Huggins II, MD  •  dextrose (D50W) 25 g/ 50mL Intravenous Solution 25 g, 25 g, Intravenous, Q15 Min PRN, Levill, Isatu G, APRN  •  dextrose (GLUTOSE) oral gel 15 g, 15 g, Oral, Q15 Min PRN, Levill, Isatu G, APRN  •  dorzolamide-timolol (COSOPT) ophthalmic solution 1 drop, 1 drop, Both Eyes, BID, Shaquille Huggins II, MD, 1 drop at 09/08/21 0814  •  glucagon (human recombinant) (GLUCAGEN DIAGNOSTIC) injection 1 mg, 1 mg, Subcutaneous, Q15 Min PRN, Levill, Isatu G, APRN  •  influenza vac split quad (FLUZONE,FLUARIX,AFLURIA,FLULAVAL) injection 0.5 mL, 0.5 mL, Intramuscular, During Hospitalization, Shaquille Huggins II, MD  •  insulin aspart (novoLOG) injection 0-14 Units, 0-14 Units, Subcutaneous, TID AC, Levill, Isatu G, APRN, 5 Units at 09/08/21 1123  •  insulin detemir (LEVEMIR) injection 10 Units, 10 Units, Subcutaneous, Nightly, Shaquille Huggins II, MD, 10 Units at 09/07/21 2110  •  lisinopril (PRINIVIL,ZESTRIL) tablet 20 mg, 20 mg, Oral, Daily, Shaquille Huggins II, MD, 20 mg at 09/08/21 0805  •  loperamide (IMODIUM) capsule 2 mg, 2 mg, Oral, Q2H PRN, Shaquille Huggins II, MD  •  LORazepam (ATIVAN) tablet 1 mg, 1 mg, Oral, Q6H PRN **OR** LORazepam (ATIVAN) injection 1 mg, 1 mg, Intramuscular, Q6H PRN, Shaquille Huggins II, MD, 1 mg at 09/04/21 0414  •  magnesium hydroxide (MILK OF MAGNESIA) suspension 2400 mg/10mL 10 mL, 10 mL, Oral, Daily PRN, Shaquille Huggins II, MD  •  metFORMIN (GLUCOPHAGE) tablet 500 mg, 500 mg, Oral,  BID With Meals, Shaquille Huggins II, MD, 500 mg at 09/08/21 0805  •  multivitamin with minerals 1 tablet, 1 tablet, Oral, Daily, Shaquille Huggins II, MD, 1 tablet at 09/08/21 0804  •  pantoprazole (PROTONIX) EC tablet 40 mg, 40 mg, Oral, QAM, Shaquille Huggins II, MD, 40 mg at 09/08/21 0621  •  rivastigmine (EXELON) 4.6 MG/24HR patch 1 patch, 1 patch, Transdermal, Daily, Renzo Sheppard MD, 1 patch at 09/08/21 0805    Diagnoses/Assessment:     Major neurocognitive disorder due to Alzheimer's disease (CMS/HCC)    Dementia with behavioral disturbance (CMS/HCC)    Type 2 diabetes mellitus without complication, with long-term current use of insulin (CMS/HCC)    Dyslipidemia    Benign essential HTN      Treatment Plan:    1) Will continue care for the patient on the behavioral health unit at [facility] to ensure patient safety.  2) Will continue to provide treatment with the unit milieu, activities, therapies and psychopharmacological management.  3) Patient to be placed on or continued on  Q15 minute checks  and Fall precautions.  4) Pertinent medical issues:   --HTN: Cont home Lisinopril; Catapres PRN  --t2DM: Cont Invokana (formulary), titrate Levemir to 14 mg qHS (home dose, now that pt is eating well); Metformin 500mg BID  --GERD: Cont PPI  --Occular: cont Cosopt BID  5) Will order following labs: none  6) Will make the following medication changes:   --MNCD: Cont Exelon patch 4.6 mg daily  7) Will continue discharge planning as appropriate for patient.  8) Psychotherapy provided: none    Treatment plan and medication risks and benefits discussed with: treatment team    FLY Mims  09/08/21  12:58 CDT      Electronically signed by Renzo Sheppard MD at 09/08/21 2794     Cecille Cabral APRN at 09/07/21 1619     Attestation signed by Renzo Sheppard MD at 09/07/21 1918 (Updated)    Date of Service: 09/07/21    I saw and examined the patient face to face and was  "present during the key portion of the E/M service.    Subjective: Patient is pleasantly confused today.  She is unable to have a meaningfully sustained conversation.     No as needed's.  No significant behavioral disturbances.    Mental Status Exam:    Mood: \"Fine\"    Affect:  normal  Thought Process:   dyscognitive   Associations: Disorganized    Thought Content:  Suicidal:  None Homicidal:  None   Hallucinations:  None Delusion:  None    Cognitive Functioning: Consciousness: awake and alert    Assessment:    Major neurocognitive disorder due to Alzheimer's disease (CMS/[facility])    Dementia with behavioral disturbance (CMS/[facility])    Type 2 diabetes mellitus without complication, with long-term current use of insulin (CMS/[facility])    Dyslipidemia    Benign essential HTN      Plan:    --MNCD: Cont Exelon patch 4.6mg daily  --Urinary tract infection: treated with Fosfomycin at admission    --Work on SNF placement    Renzo Sheppard MD  09/07/21  17:14 CDT                   9/7/2021    Chief Complaint: dementia related behavioral issues    Subjective:  Patient is a 80 y.o. female that is currently inpatient on the [facility] today she is seen in the [facility] area where she quietly sits.  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   Pt asks if we know her  that comes by to read things.  Pt is unable to tell what has brought her to the hospital.    Pt is compliant with care.        Objective     Vital Signs    Temp:  [98.2 °F (36.8 °C)-99.1 °F (37.3 °C)] 98.2 °F (36.8 °C)  Heart Rate:  [73-83] 83  Resp:  [18-20] 20  BP: (131-140)/(61-63) 131/61    Physical Exam:   General Appearance: alert, appears stated age and cooperative,  Hygiene:   fair  Gait & Station: Normal  Musculoskeletal: No tremors or abnormal involuntary movements    Mental Status Exam:   Cooperation:  Evasive  Eye Contact:  Fair  Psychomotor Behavior:  Slow  Mood: \"Fine\"  Affect:  mood-congruent  Speech:  " Minimal  Thought Process:  Poverty of thought  Associations: Disorganized  Thought Content:     Mood congruent   Suicidal:  None   Homicidal:  None   Hallucinations:  None   Delusion:  None  Cognitive Functioning:   Consciousness: awake, alert and confused  Reliability:  poor  Insight:  Poor  Judgement:  Poor  Impulse Control:  Fair    Lab Results (last 24 hours)     Procedure Component Value Units Date/Time    POC Glucose Once [[id number]]  (Normal) Collected: 09/07/21 1146    Specimen: Blood Updated: 09/07/21 1205     Glucose 110 mg/dL      Comment: : [id number] RICKEY MCGOWANMeter ID: [id number]       POC Glucose Once [[id number]]  (Normal) Collected: 09/07/21 0620    Specimen: Blood Updated: 09/07/21 1204     Glucose 80 mg/dL      Comment: : [id number] ADA Lay ID: [id number]           Imaging Results (Last 24 Hours)     ** No results found for the last 24 hours. **          Medicine:   Current Facility-Administered Medications:   •  acetaminophen (TYLENOL) tablet 500 mg, 500 mg, Oral, TID, Shaquille Huggins II, MD, 500 mg at 09/07/21 1602  •  acetaminophen (TYLENOL) tablet 650 mg, 650 mg, Oral, Q4H PRN, Shaquille Huggins II, MD  •  aluminum-magnesium hydroxide-simethicone (MAALOX MAX) 400-400-40 MG/5ML suspension 15 mL, 15 mL, Oral, Q6H PRN, Shaquille Huggins II, MD  •  Canagliflozin (INVOKANA) tablet 100 mg, 100 mg, Oral, Daily, Shaquille Huggins II, MD, 100 mg at 09/07/21 0843  •  cholecalciferol (VITAMIN D3) tablet 1,000 Units, 1,000 Units, Oral, Daily With Dinner, Shaquille Huggins II, MD, 1,000 Units at 09/07/21 1603  •  cloNIDine (CATAPRES) tablet 0.1 mg, 0.1 mg, Oral, Q4H PRN, Shaquille Huggins II, MD  •  dextrose (D50W) 25 g/ 50mL Intravenous Solution 25 g, 25 g, Intravenous, Q15 Min PRN, Isatu Summers APRN  •  dextrose (GLUTOSE) oral gel 15 g, 15 g, Oral, Q15 Min PRN, Isatu Summers G, APRN  •  dorzolamide-timolol (COSOPT) ophthalmic solution 1  drop, 1 drop, Both Eyes, BID, Shaquille Huggins II, MD, 1 drop at 09/07/21 0849  •  glucagon (human recombinant) (GLUCAGEN DIAGNOSTIC) injection 1 mg, 1 mg, Subcutaneous, Q15 Min PRN, Isatu Summers APRN  •  influenza vac split quad (FLUZONE,FLUARIX,AFLURIA,FLULAVAL) injection 0.5 mL, 0.5 mL, Intramuscular, During Hospitalization, Shaquille Huggins II, MD  •  insulin aspart (novoLOG) injection 0-14 Units, 0-14 Units, Subcutaneous, TID AC, Isatu Summers, APRN, 5 Units at 09/05/21 1655  •  insulin detemir (LEVEMIR) injection 10 Units, 10 Units, Subcutaneous, Nightly, Shaquille Huggins II, MD, 10 Units at 09/06/21 2105  •  lisinopril (PRINIVIL,ZESTRIL) tablet 20 mg, 20 mg, Oral, Daily, Shaquille Huggins II, MD, 20 mg at 09/07/21 0843  •  loperamide (IMODIUM) capsule 2 mg, 2 mg, Oral, Q2H PRN, Shaquille Huggins II, MD  •  LORazepam (ATIVAN) tablet 1 mg, 1 mg, Oral, Q6H PRN **OR** LORazepam (ATIVAN) injection 1 mg, 1 mg, Intramuscular, Q6H PRN, Shaquille Huggins II, MD, 1 mg at 09/04/21 0414  •  magnesium hydroxide (MILK OF MAGNESIA) suspension 2400 mg/10mL 10 mL, 10 mL, Oral, Daily PRN, Shaquille Huggins II, MD  •  metFORMIN (GLUCOPHAGE) tablet 500 mg, 500 mg, Oral, BID With Meals, Shaquille Huggins II, MD, 500 mg at 09/07/21 1602  •  multivitamin with minerals 1 tablet, 1 tablet, Oral, Daily, Shaquille Huggins II, MD, 1 tablet at 09/07/21 0843  •  pantoprazole (PROTONIX) EC tablet 40 mg, 40 mg, Oral, QAM, Shaquille Huggins II, MD, 40 mg at 09/07/21 0844  •  [START ON 9/8/2021] rivastigmine (EXELON) 4.6 MG/24HR patch 1 patch, 1 patch, Transdermal, Daily, Renzo Sheppard MD    Diagnoses/Assessment:     Major neurocognitive disorder due to Alzheimer's disease (CMS/HCC)    Dementia with behavioral disturbance (CMS/HCC)    Type 2 diabetes mellitus without complication, with long-term current use of insulin (CMS/HCC)    Dyslipidemia    Benign essential HTN      Treatment  Plan:    1) Will continue care for the patient on the behavioral health unit at [facility] to ensure patient safety.  2) Will continue to provide treatment with the unit milieu, activities, therapies and psychopharmacological management.  3) Patient to be placed on or continued on  Q15 minute checks  and Fall precautions.  4) Pertinent medical issues:   --HTN: Cont home Lisinopril; Catapres PRN  --t2DM: Cont Invokana (formulary), titrate Levemir to 14 mg qHS (home dose, now that pt is eating well); Metformin 500mg BID  --GERD: Cont PPI  --Occular: cont Cosopt BID  5) Will order following labs: none  6) Will make the following medication changes:   --MNCD: Cont Exelon patch 4.6mg daily; Consider Namenda augmentation given severity of NCD                7) Will continue discharge planning as appropriate for patient.  8) Psychotherapy provided for less than 16 minutes.    Treatment plan and medication risks and benefits discussed with: Patient    Cecille Cabral, FLY  09/07/21  16:20 CDT      Electronically signed by Renzo Sheppard MD at 09/07/21 1717     Renzo Sheppard MD at 09/06/21 1443          Psychiatry Progress Note   9/6/2021      HD: #2  Legal: Vol per family    Chief Complaint: Dementia Related Behavioral Issues      Subjective --  Ms. Mary Gutierrez is a 80 y.o. female who was seen on the Geriatric unit.      Patient is pleasantly confused today.  She cannot tell me her location or situation or time.  She is unable to have a meaningfully sustained conversation.    Per nursing staff at time she talks about her  having multiple children as well as dogs, only but then later state that she has none of those.    No as needed's.  No significant behavioral disturbances.      Objective   Objective --    Vital Signs:  Temp:  [96 °F (35.6 °C)-97.6 °F (36.4 °C)] 97.6 °F (36.4 °C)  Heart Rate:  [93-98] 93  Resp:  [16-18] 18  BP: (127-166)/(65-74) 127/65    Patient Vitals for  "the past 24 hrs:   BP Temp Temp src Pulse Resp SpO2   09/06/21 0734 127/65 97.6 °F (36.4 °C) Tympanic 93 18 94 %   09/05/21 1900 166/74 96 °F (35.6 °C) Tympanic 98 16 99 %               Physical Exam:   -General Appearance:  normal general appearance, in no apparent distress and active; right arm bandage  -Hygiene:  Unkempt   -Gait & Station:  Deferred, sitting  -Musculoskeletal:  No tremors or abnormal involuntary movements and No Cog [place] or Rigidity and No atrophy noted  -Pulm: Unlaboured     Mental Status Exam:   --Cooperation:  Minimally cooperative  --Eye Contact:  Non-sustained  --Psychomotor Behavior:  Slow  --Mood:  \"OK\"  --Affect:  mood-incongruent and confused  --Speech:  Minimal, Slow and Soft  --Thought Process:  Incoherent, Dyscognitive, Sluggish and Disorganized  --Associations: Loose Associations  --Themes:  None overt  --Thought Content:                --Mood congruent              --Suicidal:  Denies               --Homicidal:  Denies              --Hallucinations:  none overt              --Delusion:  none expressed  --Cognitive Functioning:  -Consciousness: Awake and alert  -Orientation:  Person and Not orientated to Place, Time and Situation  -Attention:  Distractible             -Concentration:  Distractible  -Language:  Below Average based on interaction; Word Finding Difficulties  -Vocabulary:  Below Average based on interaction; Word Finding Difficulties and Paraphasic Errors  -Short Term Memory: Deficits  -Long Term Memory: Deficits  -Fund of Knowledge:  Below Average based on interaction  -Abstraction:  Poor and Patient unable to perform  --Reliability:  limited  --Insight:  None  --Judgment:  Impaired  --Impulse Control:  Impaired      Lab Results (last 24 hours)     Procedure Component Value Units Date/Time    RPR [[id number]]  (Normal) Collected: 09/05/21 0627    Specimen: Blood Updated: 09/06/21 1410     RPR Non-Reactive    POC Glucose Once [[id number]]  (Normal) Collected: 09/06/21 " 1130    Specimen: Blood Updated: 09/06/21 1146     Glucose 109 mg/dL      Comment: RN NotifiedOperator: [id number] HEIDI Addison ID: [id number]       POC Glucose Once [[id number]]  (Normal) Collected: 09/06/21 0636    Specimen: Blood Updated: 09/06/21 0705     Glucose 105 mg/dL      Comment: : [id number] KAMARI Majano ID: [id number]       POC Glucose Once [[id number]]  (Normal) Collected: 09/05/21 2013    Specimen: Blood Updated: 09/05/21 2036     Glucose 124 mg/dL      Comment: RN NotifiedOperator: [id number] KAMARI Hwanger ID: [id number]           Results source: EMR    Imaging Results (Last 24 Hours)     ** No results found for the last 24 hours. **        Results source: EMR    Medications:   Scheduled Meds:acetaminophen, 500 mg, Oral, TID  Canagliflozin, 100 mg, Oral, Daily  cholecalciferol, 1,000 Units, Oral, Daily With Dinner  dorzolamide-timolol, 1 drop, Both Eyes, BID  insulin aspart, 0-14 Units, Subcutaneous, TID AC  insulin detemir, 10 Units, Subcutaneous, Nightly  lisinopril, 20 mg, Oral, Daily  metFORMIN, 500 mg, Oral, BID With Meals  multivitamin with minerals, 1 tablet, Oral, Daily  pantoprazole, 40 mg, Oral, QAM  rivastigmine, 1 patch, Transdermal, Daily      Continuous Infusions:   PRN Meds:.acetaminophen  •  aluminum-magnesium hydroxide-simethicone  •  cloNIDine  •  dextrose  •  dextrose  •  glucagon (human recombinant)  •  influenza vaccine  •  loperamide  •  LORazepam **OR** LORazepam  •  magnesium hydroxide      Assessment:     Major neurocognitive disorder due to Alzheimer's disease (CMS/HCC)    Dementia with behavioral disturbance (CMS/HCC)    Type 2 diabetes mellitus without complication, with long-term current use of insulin (CMS/HCC)    Dyslipidemia    Benign essential HTN    -Cannot fully rule out a superimposed delirium on dementia picture given recent urinary tract infection/cystitis.  Etiology looks to be primary Alzheimer's process from review of neuro  imaging.    Treatment Plan:  1) Will continue care for the patient on the behavioral health unit at [facility].    2) Will continue to provide treatment with the unit milieu, activities, therapies and psychopharmacological management.    3) Patient to be placed on or continued on  Q15 minute checks  and Fall precautions.    4) Treatment Planning:  --HTN: Cont home Lisinopril; Catapres PRN  --t2DM: Cont Invokana (formulary), titrate Levemir to 14 mg qHS (home dose, now that pt is eating well); Metformin 500mg BID  --GERD: Cont PPI  --Occular: cont Cosopt BID      --MNCD: Cont Exelon patch 4.6mg daily; Consider Namenda augmentation given severity of NCD   --B12, folate, HIV within normal limits   --RPR non-reactive    --Therapy for < 5 min      --> Dispostion Planning: To transition to skilled nursing facility.    Treatment plan and medication risks and benefits discussed with: Treatment Team.    Renzo Sheppard MD  21 @ 14:43 CDT  Dictated using Dragon.      Electronically signed by Renzo Sheppard MD at 21 1446          Physical Therapy Notes (most recent note)      Elisha Lindsey, JAYDEN at 21 1635  Version 1 of 1         Acute Care - Physical Therapy Treatment Note  [facility]     Patient Name: Mary Gutierrez  : 1940  MRN: [id number]  Today's Date: 2021      Visit Dx:     ICD-10-CM ICD-9-CM   1. Benign essential HTN  I10 401.1   2. Dyslipidemia  E78.5 272.4   3. Type 2 diabetes mellitus without complication, with long-term current use of insulin (CMS/HCC)  E11.9 250.00    Z79.4 V58.67   4. Major neurocognitive disorder (CMS/HCC)  F01.50 294.20   5. Dementia with behavioral disturbance, unspecified dementia type (CMS/HCC)  F03.91 294.21   6. B12 deficiency  E53.8 266.2   7. Cyst of pancreas  K86.2 577.2   8. Encounter for care related to Port-a-Cath  Z45.2 V58.81   9. Malabsorption of iron  K90.9 579.8   10. Flu vaccine need  Z23 V04.81   11.  Other iron deficiency anemia  D50.8 280.8   12. Encounter for venous access device care  Z45.2 V58.81   13. Malignant neoplasm of ascending colon (CMS/HCC)  C18.2 153.6   14. Impaired functional mobility, balance, gait, and endurance  Z74.09 V49.89     Patient Active Problem List   Diagnosis   • Malignant neoplasm of ascending colon (CMS/HCC)   • Encounter for venous access device care   • Anemia   • Flu vaccine need   • Malabsorption of iron   • Encounter for care related to Port-a-Cath   • Cyst of pancreas   • B12 deficiency   • Dementia with behavioral disturbance (CMS/HCC)   • Major neurocognitive disorder due to Alzheimer's disease (CMS/HCC)   • Type 2 diabetes mellitus without complication, with long-term current use of insulin (CMS/HCC)   • Dyslipidemia   • Benign essential HTN     Past Medical History:   Diagnosis Date   • Acid reflux    • Diabetes mellitus (CMS/HCC)    • Hypertension    • Malignant neoplasm of ascending colon (CMS/HCC) 11/1/2016   • Malignant tumor of cecum (CMS/HCC)      Past Surgical History:   Procedure Laterality Date   • CAPSULE ENDOSCOPY N/A 6/22/2017    Procedure: CAPSULE ENDOSCOPY M2A;  Surgeon: Stuart Rico DO;  Location: [facility] ENDOSCOPY;  Service:    • CAPSULE ENDOSCOPY N/A 1/23/2018    Procedure: CAPSULE ENDOSCOPY M2A;  Surgeon: Stuart Rico DO;  Location: [facility] ENDOSCOPY;  Service:    • CENTRAL VENOUS LINE INSERTION  10/30/2015    Ultrasound localization, left basilic vein.Placement of left upper extremity PICC line   • COLONOSCOPY N/A 1/23/2018    Procedure: COLONOSCOPY;  Surgeon: Stuart Rico DO;  Location: [facility] ENDOSCOPY;  Service:    • ENDOSCOPY N/A 6/22/2017    Procedure: ESOPHAGOGASTRODUODENOSCOPY;  Surgeon: Stuart Rico DO;  Location: [facility] ENDOSCOPY;  Service:    • ENDOSCOPY N/A 1/23/2018    Procedure: ESOPHAGOGASTRODUODENOSCOPY;  Surgeon: Stuart Rico DO;  Location: [facility] ENDOSCOPY;  Service:    • FRACTURE SURGERY      right femur    •  HYSTERECTOMY     • LAPAROSCOPIC ASSISSTED TOTAL COLECTOMY W/ J-POUCH  10/15/2015    Laparoscopic right hemicolectomy with ileotransverse colostomy   • VENOUS ACCESS DEVICE (PORT) INSERTION  01/12/2016    Right internal jugular Mediport        PT Assessment (last 12 hours)      PT Evaluation and Treatment     Row Name 09/08/21 1300          Physical Therapy Time and Intention    Subjective Information  complains of;pain  -TA     Document Type  therapy note (daily note)  -TA     Mode of Treatment  individual therapy;physical therapy  -TA     Row Name 09/08/21 1300          General Information    Patient Profile Reviewed  yes  -TA     [facility] Name 09/08/21 1300          Cognition    Orientation Status (Cognition)  oriented to;person  -TA     Personal Safety Interventions  fall prevention program maintained;gait belt;muscle strengthening facilitated;nonskid shoes/slippers when out of bed;supervised activity  -TA     Row Name 09/08/21 1300          Pain Scale: Numbers Pre/Post-Treatment    Pretreatment Pain Rating  0/10 - no pain  -TA     Posttreatment Pain Rating  0/10 - no pain  -TA     Row Name 09/08/21 1300          Bed Mobility    Bed Mobility  supine-sit;sit-supine  -TA     Supine-Sit [place] (Bed Mobility)  contact guard  -TA     Sit-Supine [place] (Bed Mobility)  standby assist  -TA     Assistive Device (Bed Mobility)  head of bed elevated  -TA     Row Name 09/08/21 1300          Transfers    Transfers  sit-stand transfer;stand-sit transfer  -TA     Sit-Stand [place] (Transfers)  contact guard;minimum assist (75% patient effort)  -TA     Stand-Sit [place] (Transfers)  contact guard;minimum assist (75% patient effort) HHA  -TA     Row Name 09/08/21 1300          Sit-Stand Transfer    Assistive Device (Sit-Stand Transfers)  -- HHA  -TA     [facility] Name 09/08/21 1300          Gait/Stairs (Locomotion)    [place] Level (Gait)  contact guard  -TA     Assistive Device (Gait)  other (see comments) HHA of  1  -TA     Distance in Feet (Gait)  150` x 2  -TA     Row Name 09/08/21 1300          Safety Issues, Functional Mobility    Impairments Affecting Function (Mobility)  balance;endurance/activity tolerance  -TA     Row Name 09/08/21 1300          Hip (Therapeutic Exercise)    Hip (Therapeutic Exercise)  AROM (active range of motion)  -TA     Hip AROM (Therapeutic Exercise)  bilateral;aBduction;aDduction;flexion;sitting;10 repetitions;5 repetitions  -TA     Row Name 09/08/21 1300          Knee (Therapeutic Exercise)    Knee (Therapeutic Exercise)  AROM (active range of motion)  -TA     Knee AROM (Therapeutic Exercise)  bilateral;LAQ (long arc quad);sitting;10 repetitions;5 repetitions  -TA     Row Name 09/08/21 1300          Ankle (Therapeutic Exercise)    Ankle (Therapeutic Exercise)  AROM (active range of motion)  -TA     Ankle AROM (Therapeutic Exercise)  bilateral;dorsiflexion;plantarflexion;sitting;10 repetitions;5 repetitions  -TA     Row Name             Wound 09/07/21 2348 Right lower arm    Wound - Properties Group Placement Date: 09/07/21  - Placement Time: 2348  - Side: Right  - Orientation: lower  - Location: arm  -    Retired Wound - Properties Group Date first assessed: 09/07/21  - Time first assessed: 2348  - Side: Right  - Location: arm  -    Row Name 09/08/21 1300          Plan of Care Review    Plan of Care Reviewed With  patient  -TA     Progress  improving  -TA     Outcome Summary  pt sup<>sit with CGA-SBA, sit<>stand with CGA-min assist, pt ambulated 150` with HHA of 1. pt will require 24/7 care & continued PT services @ D/C  -TA     Row Name 09/08/21 1300          Vital Signs    Pre Systolic BP Rehab  144  -TA     Pre Treatment Diastolic BP  66  -TA     Post Systolic BP Rehab  136  -TA     Post Treatment Diastolic BP  63  -TA     Pretreatment Heart Rate (beats/min)  80  -TA     Intratreatment Heart Rate (beats/min)  83  -TA     Posttreatment Heart Rate (beats/min)  76  -TA      Pre SpO2 (%)  98  -TA     O2 Delivery Pre Treatment  room air  -TA     Intra SpO2 (%)  98  -TA     O2 Delivery Intra Treatment  room air  -TA     Post SpO2 (%)  96  -TA     O2 Delivery Post Treatment  room air  -TA     Pre Patient Position  Supine  -TA     Post Patient Position  Supine  -TA     Row Name 09/08/21 1300          Bed Mobility Goal 1 (PT)    Activity/Assistive Device (Bed Mobility Goal 1, PT)  sit to supine;supine to sit  -TA     [place] Level/Cues Needed (Bed Mobility Goal 1, PT)  contact guard assist  -TA     Time Frame (Bed Mobility Goal 1, PT)  by discharge  -TA     Progress/Outcomes (Bed Mobility Goal 1, PT)  goal partially met;goal ongoing pt has met goal with sit to supine  -TA     Row Name 09/08/21 1300          Transfer Goal 1 (PT)    Activity/Assistive Device (Transfer Goal 1, PT)  sit-to-stand/stand-to-sit;bed-to-chair/chair-to-bed  -TA     [place] Level/Cues Needed (Transfer Goal 1, PT)  minimum assist (75% or more patient effort)  -TA     Time Frame (Transfer Goal 1, PT)  by discharge  -TA     Strategies/Barriers (Transfers Goal 1, PT)  cognition  -TA     Progress/Outcome (Transfer Goal 1, PT)  goal met  -TA     Row Name 09/08/21 1300          Gait Training Goal 1 (PT)    Activity/Assistive Device (Gait Training Goal 1, PT)  gait (walking locomotion);assistive device use  -TA     [place] Level (Gait Training Goal 1, PT)  standby assist;verbal cues required  -TA     Distance (Gait Training Goal 1, PT)  60' x 2  -TA     Time Frame (Gait Training Goal 1, PT)  by discharge  -TA     Strategies/Barriers (Gait Training Goal 1, PT)  R arm in sling  -TA     Progress/Outcome (Gait Training Goal 1, PT)  goal not met  -TA     Row Name 09/08/21 1300          Positioning and Restraints    Pre-Treatment Position  in bed  -TA     Post Treatment Position  bed  -TA     In Bed  supine;call light within reach;exit alarm on  -TA     Row Name 09/08/21 1300          Therapy Assessment/Plan (PT)     Comment, Therapy Assessment/Plan (PT)  continue  -TA       User Key  (r) = Recorded By, (t) = Taken By, (c) = Cosigned By    Initials Name Provider Type    Fabiola Mcdermott, RN Registered Nurse    Elisha Sullivan, PTA Physical Therapy Assistant        Physical Therapy Education                 Title: PT OT SLP Therapies (Done)     Topic: Physical Therapy (Done)     Point: Mobility training (Done)     Learning Progress Summary           Patient Acceptance, E,TB, VU by LR at 9/4/2021 1646    Comment: Educate on PT POC and goals.                   Point: Home exercise program (Done)     Learning Progress Summary           Patient Acceptance, E,TB, VU by LR at 9/4/2021 1646    Comment: Educate on PT POC and goals.                   Point: Body mechanics (Done)     Learning Progress Summary           Patient Acceptance, E,TB, VU by LR at 9/4/2021 1646    Comment: Educate on PT POC and goals.                   Point: Precautions (Done)     Learning Progress Summary           Patient Acceptance, E,TB, VU by LR at 9/4/2021 1646    Comment: Educate on PT POC and goals.                               User Key     Initials Effective Dates Name Provider Type Discipline     06/16/21 -  Sang Lyons Physical Therapist PT              PT Recommendation and Plan  Anticipated Discharge Disposition (PT): home with 24/7 care, home with home health, skilled nursing facility  Plan of Care Reviewed With: patient  Progress: improving  Outcome Summary: pt sup<>sit with CGA-SBA, sit<>stand with CGA-min assist, pt ambulated 150` with HHA of 1. pt will require 24/7 care & continued PT services @ D/C  Outcome Measures     Row Name 09/08/21 1600             How much help from another person do you currently need...    Turning from your back to your side while in flat bed without using bedrails?  3  -TA      Moving from lying on back to sitting on the side of a flat bed without bedrails?  3  -TA      Moving to and from a bed to a  chair (including a wheelchair)?  3  -TA      Standing up from a chair using your arms (e.g., wheelchair, bedside chair)?  3  -TA      Climbing 3-5 steps with a railing?  3  -TA      To walk in hospital room?  3  -TA      AM-PAC 6 Clicks Score (PT)  18  -TA         Functional Assessment    Outcome Measure Options  AM-PAC 6 Clicks Basic Mobility (PT)  -TA        User Key  (r) = Recorded By, (t) = Taken By, (c) = Cosigned By    Initials Name Provider Type    Elisha Sullivan PTA Physical Therapy Assistant           Time Calculation:   PT Charges     Row Name 09/08/21 1633             Time Calculation    Start Time  1300  -TA      Stop Time  1327  -TA      Time Calculation (min)  27 min  -TA      PT Received On  09/08/21  -TA         Time Calculation- PT    Total Timed Code Minutes- PT  27 minute(s)  -TA         Timed Charges    04181 - Gait Training Minutes   15  -TA      79399 - PT Therapeutic Activity Minutes  12  -TA         Total Minutes    Timed Charges Total Minutes  27  -TA       Total Minutes  27  -TA        User Key  (r) = Recorded By, (t) = Taken By, (c) = Cosigned By    Initials Name Provider Type    Elisha Sullivan PTA Physical Therapy Assistant        Therapy Charges for Today     Code Description Service Date Service Provider Modifiers Qty    90100836992 HC GAIT TRAINING EA 15 MIN 9/8/2021 Elisha Lindsey PTA GP 1    52099677982 HC PT THER PROC EA 15 MIN 9/8/2021 Elisha Lindsey PTA GP 1          PT G-Codes  Outcome Measure Options: AM-PAC 6 Clicks Basic Mobility (PT)  AM-PAC 6 Clicks Score (PT): 18    Elisha Lindsey PTA  9/8/2021      Electronically signed by Elisha Lindsey PTA at 09/08/21 1635       Occupational Therapy Notes (most recent note)    No notes exist for this encounter.         Speech Language Pathology Notes (most recent note)    No notes exist for this encounter.

## 2021-09-10 LAB — GLUCOSE BLDC GLUCOMTR-MCNC: 199 MG/DL (ref 70–130)

## 2021-09-16 ENCOUNTER — APPOINTMENT (OUTPATIENT)
Dept: ONCOLOGY | Facility: HOSPITAL | Age: 81
End: 2021-09-16

## 2021-10-04 DIAGNOSIS — M25.511 ACUTE PAIN OF RIGHT SHOULDER: Primary | ICD-10-CM

## 2021-10-05 ENCOUNTER — OFFICE VISIT (OUTPATIENT)
Dept: ORTHOPEDIC SURGERY | Facility: CLINIC | Age: 81
End: 2021-10-05

## 2021-10-05 VITALS
HEART RATE: 97 BPM | HEIGHT: 65 IN | DIASTOLIC BLOOD PRESSURE: 60 MMHG | SYSTOLIC BLOOD PRESSURE: 126 MMHG | BODY MASS INDEX: 19.16 KG/M2 | WEIGHT: 115 LBS | OXYGEN SATURATION: 100 %

## 2021-10-05 DIAGNOSIS — E11.9 TYPE 2 DIABETES MELLITUS WITHOUT COMPLICATION, WITH LONG-TERM CURRENT USE OF INSULIN (HCC): ICD-10-CM

## 2021-10-05 DIAGNOSIS — M89.8X2 PAIN OF RIGHT HUMERUS: ICD-10-CM

## 2021-10-05 DIAGNOSIS — I10 ESSENTIAL HYPERTENSION: ICD-10-CM

## 2021-10-05 DIAGNOSIS — Z79.4 TYPE 2 DIABETES MELLITUS WITHOUT COMPLICATION, WITH LONG-TERM CURRENT USE OF INSULIN (HCC): ICD-10-CM

## 2021-10-05 DIAGNOSIS — S42.201A CLOSED TRAUMATIC NONDISPLACED FRACTURE OF PROXIMAL END OF RIGHT HUMERUS, INITIAL ENCOUNTER: Primary | ICD-10-CM

## 2021-10-05 PROCEDURE — 23600 CLTX PROX HUMRL FX W/O MNPJ: CPT | Performed by: ORTHOPAEDIC SURGERY

## 2021-10-05 PROCEDURE — 99203 OFFICE O/P NEW LOW 30 MIN: CPT | Performed by: ORTHOPAEDIC SURGERY

## 2021-10-05 NOTE — PROGRESS NOTES
Mary Gutierrez is a 80 y.o. female   Primary provider:  Gurvinder Todd MD       Chief Complaint   Patient presents with   • Right Upper Arm - Pain       HISTORY OF PRESENT ILLNESS:   Patient is here today for right humerus fracture. She was sent to [facility] upon arrival. She fell on 9/3/21.  Patient states that she was at home when she fell.  She had severe pain in her right shoulder at the time.  She reports that her pain is somewhat better but she continues having extreme difficulty with any use of her right arm.  No fevers or chills.  No numbness or tingling.  No report of loss of consciousness.        Pain  This is a new problem. The current episode started 1 to 4 weeks ago. The problem occurs daily. Associated symptoms include arthralgias and joint swelling. The symptoms are aggravated by exertion. She has tried NSAIDs and rest for the symptoms.        CONCURRENT MEDICAL HISTORY:    Past Medical History:   Diagnosis Date   • Acid reflux    • Diabetes mellitus (HCC)    • Hypertension    • Malignant neoplasm of ascending colon (HCC) 11/1/2016   • Malignant tumor of cecum (HCC)        Allergies   Allergen Reactions   • Other Rash     Oral Chemo Meds         Current Outpatient Medications:   •  cholecalciferol (VITAMIN D3) 25 MCG (1000 UT) tablet, Take 1 tablet by mouth Daily With Dinner. Indications: Vitamin D Deficiency, Disp: , Rfl:   •  Dorzolamide HCl-Timolol Mal PF 22.3-6.8 MG/ML solution, Apply 1 drop to eye 2 (Two) Times a Day., Disp: , Rfl:   •  Empagliflozin (Jardiance) 25 MG tablet, Take 25 mg by mouth Daily., Disp: , Rfl:   •  insulin detemir (LEVEMIR) 100 UNIT/ML injection, Inject 10 Units under the skin into the appropriate area as directed Every Night. Indications: Type 2 Diabetes, Disp: , Rfl: 0  •  lisinopril (PRINIVIL,ZESTRIL) 20 MG tablet, Take 20 mg by mouth Daily., Disp: , Rfl:   •  metFORMIN (GLUCOPHAGE) 500 MG tablet, Take 1 tablet by mouth 2 (Two) Times a Day With Meals.  Indications: Type 2 Diabetes, Disp: , Rfl:   •  multivitamin with minerals tablet tablet, Take 1 tablet by mouth Daily. Indications: nutritional supplementation, Disp: , Rfl:   •  omega-3 acid ethyl esters (LOVAZA) 1 G capsule, Take 2 g by mouth Daily., Disp: , Rfl:   •  pantoprazole (PROTONIX) 40 MG EC tablet, Take 1 tablet by mouth Every Morning. Indications: Gastroesophageal Reflux Disease, Disp: , Rfl:   •  rivastigmine (EXELON) 4.6 MG/24HR patch, Place 1 patch on the skin as directed by provider Daily. Indications: Alzheimer's Disease, Disp: , Rfl:   •  vitamin B-12 (CYANOCOBALAMIN) 1000 MCG tablet, Take 1 tablet by mouth on Monday, Wednesday and Friday, Disp: 36 tablet, Rfl: 1    Past Surgical History:   Procedure Laterality Date   • CAPSULE ENDOSCOPY N/A 6/22/2017    Procedure: CAPSULE ENDOSCOPY M2A;  Surgeon: Stuart Rico DO;  Location: [facility] ENDOSCOPY;  Service:    • CAPSULE ENDOSCOPY N/A 1/23/2018    Procedure: CAPSULE ENDOSCOPY M2A;  Surgeon: Stuart Rico DO;  Location: [facility] ENDOSCOPY;  Service:    • CENTRAL VENOUS LINE INSERTION  10/30/2015    Ultrasound localization, left basilic vein.Placement of left upper extremity PICC line   • COLONOSCOPY N/A 1/23/2018    Procedure: COLONOSCOPY;  Surgeon: Stuart Rico DO;  Location: [facility] ENDOSCOPY;  Service:    • ENDOSCOPY N/A 6/22/2017    Procedure: ESOPHAGOGASTRODUODENOSCOPY;  Surgeon: Stuart Rico DO;  Location: [facility] ENDOSCOPY;  Service:    • ENDOSCOPY N/A 1/23/2018    Procedure: ESOPHAGOGASTRODUODENOSCOPY;  Surgeon: Stuart Rico DO;  Location: [facility] ENDOSCOPY;  Service:    • FRACTURE SURGERY      right femur    • HYSTERECTOMY     • LAPAROSCOPIC ASSISSTED TOTAL COLECTOMY W/ J-POUCH  10/15/2015    Laparoscopic right hemicolectomy with ileotransverse colostomy   • VENOUS ACCESS DEVICE (PORT) INSERTION  01/12/2016    Right internal jugular Mediport       Family History   Family history unknown: Yes        Social History  "    Socioeconomic History   • Marital status:    Tobacco Use   • Smoking status: Never Smoker   • Smokeless tobacco: Never Used   Vaping Use   • Vaping Use: Never used   Substance and Sexual Activity   • Alcohol use: No   • Drug use: No   • Sexual activity: Defer        Review of Systems   Constitutional: Negative.    HENT: Negative.    Eyes: Negative.    Respiratory: Negative.    Cardiovascular: Negative.    Gastrointestinal: Negative.    Endocrine: Negative.    Genitourinary: Negative.    Musculoskeletal: Positive for arthralgias and joint swelling.   Skin: Negative.    Allergic/Immunologic: Negative.    Neurological: Negative.    Hematological: Negative.    Psychiatric/Behavioral: Negative.        PHYSICAL EXAMINATION:       /60   Pulse 97   Ht 165.1 cm (65\")   Wt 52.2 kg (115 lb)   SpO2 100%   BMI 19.14 kg/m²     Physical Exam  Constitutional:       General: She is not in acute distress.     Appearance: Normal appearance.   Pulmonary:      Effort: Pulmonary effort is normal. No respiratory distress.   Neurological:      Mental Status: She is alert and oriented to person, place, and time.           Right Shoulder Exam     Comments:  Tenderness and bruising in the right shoulder.  The bruising extends down the anterior and medial aspects of her arm.  Good distal pulses and sensation.  Tenderness with any attempted motion of her shoulder.  She is able to wiggle her fingers well.              XR Shoulder 2+ View Right    Result Date: 10/6/2021  Narrative: Ordering Provider:  Eusebio Escalera MD Ordering Diagnosis/Indication:  Acute pain of right shoulder Procedure:  XR SHOULDER 2+ VW RIGHT Exam Date:  10/5/21 COMPARISON:  Todays X-rays were compared to previous images dated September 3, 2021.     Impression:  3 views of the right shoulder show acceptable position and alignment of a proximal humerus fracture.  Progressive healing noted on each view.  No other acute bony abnormality noted.  " Diffuse osteopenia noted throughout the x-rays. Eusebio Escalera MD 10/5/21           ASSESSMENT:    Diagnoses and all orders for this visit:    Closed traumatic nondisplaced fracture of proximal end of right humerus, initial encounter    Pain of right humerus  -     Ambulatory Referral to Physical Therapy Evaluate and treat    Type 2 diabetes mellitus without complication, with long-term current use of insulin ([facility])    Essential hypertension          PLAN    Has non-displaced fracture of proximal humerus.  No surgical indication at this time.  She is now 5 weeks into the healing process.  We discussed beginning physical therapy and continuing passive range of motion as tolerated.  We will slowly add active assisted and then active range of motion.  After another couple of weeks we discussed beginning some resistance and some strengthening.  Slowly progress as tolerated but let pain guide how much she advances.    PT:   Progress PROM as tolerated   Begin AROM as pain allows   Slowly add resistance but no significant force for another 3   weeks.   Recheck in 6 weeks with repeat.        Return in about 6 weeks (around 11/16/2021) for Recheck with repeat xrays.    Eusebio Escalera MD

## 2021-11-18 DIAGNOSIS — S42.201A CLOSED TRAUMATIC NONDISPLACED FRACTURE OF PROXIMAL END OF RIGHT HUMERUS, INITIAL ENCOUNTER: ICD-10-CM

## 2021-11-18 DIAGNOSIS — M89.8X2 PAIN OF RIGHT HUMERUS: Primary | ICD-10-CM

## 2021-11-19 ENCOUNTER — OFFICE VISIT (OUTPATIENT)
Dept: ORTHOPEDIC SURGERY | Facility: CLINIC | Age: 81
End: 2021-11-19

## 2021-11-19 VITALS — HEIGHT: 65 IN | WEIGHT: 119.6 LBS | BODY MASS INDEX: 19.93 KG/M2

## 2021-11-19 DIAGNOSIS — S42.201D CLOSED TRAUMATIC NONDISPLACED FRACTURE OF PROXIMAL END OF RIGHT HUMERUS WITH ROUTINE HEALING, SUBSEQUENT ENCOUNTER: ICD-10-CM

## 2021-11-19 DIAGNOSIS — M25.511 ACUTE PAIN OF RIGHT SHOULDER: ICD-10-CM

## 2021-11-19 DIAGNOSIS — M89.8X2 PAIN OF RIGHT HUMERUS: Primary | ICD-10-CM

## 2021-11-19 PROCEDURE — 99024 POSTOP FOLLOW-UP VISIT: CPT | Performed by: ORTHOPAEDIC SURGERY

## 2021-11-19 NOTE — PROGRESS NOTES
"Mary Gutierrez is a 80 y.o. female returns for     Chief Complaint   Patient presents with   • Right Upper Arm - Follow-up       HISTORY OF PRESENT ILLNESS: Patient being seen for right humerus fracture follow up. X-rays done today.   She is accompanied by a staff member from the nursing home.  No reports of problems.  No fevers or chills.       CONCURRENT MEDICAL HISTORY:    The following portions of the patient's history were reviewed and updated as appropriate: allergies, current medications, past family history, past medical history, past social history, past surgical history and problem list.     ROS  No fevers or chills.  No chest pain or shortness of air.  No GI or  disturbances.    PHYSICAL EXAMINATION:       Ht 165.1 cm (65\")   Wt 54.3 kg (119 lb 9.6 oz)   BMI 19.90 kg/m²     Physical Exam  Constitutional:       General: She is not in acute distress.     Appearance: Normal appearance.   Pulmonary:      Effort: Pulmonary effort is normal. No respiratory distress.   Neurological:      Mental Status: She is alert.      Comments: Baseline confusion         GAIT:     [x]  Normal  []  Antalgic    Assistive device: [x]  None  []  Walker     []  Crutches  []  Cane     []  Wheelchair  []  Stretcher    Right Shoulder Exam     Tenderness   The patient is experiencing no tenderness.    Range of Motion   Active abduction: 130   Forward flexion: 140     Muscle Strength   Abduction: 4/5   Supraspinatus: 4/5     Tests   Gasca test: negative  Impingement: negative    Other   Erythema: absent  Sensation: normal  Pulse: present              XR Shoulder 2+ View Right    Result Date: 11/19/2021  Narrative: Ordering Provider:  Eusebio Escalera MD Ordering Diagnosis/Indication:  Pain of right humerus, Closed traumatic nondisplaced fracture of proximal end of right humerus, initial encounter Procedure:  XR SHOULDER 2+ VW RIGHT Exam Date:  11/19/21 COMPARISON:  Todays X-rays were compared to previous images " dated October 5, 2021.     Impression:  4 views of the right shoulder show acceptable position alignment of a proximal humerus fracture.  Progressive healing is noted in comparison to prior x-ray and essentially complete bony consolidation is now noted.  No other acute bony abnormality is noted.  Presence of a medi-port noted in the right side of the chest unchanged from prior x-ray. Eusebio Escalera MD 11/19/21             ASSESSMENT:    Diagnoses and all orders for this visit:    Pain of right humerus    Closed traumatic nondisplaced fracture of proximal end of right humerus with routine healing, subsequent encounter    Acute pain of right shoulder          PLAN    Slowly progress activity as tolerated.  No true restrictions.  Continue general strength and conditioning exercises.  Follow-up as needed.      Return if symptoms worsen or fail to improve, for recheck.    Eusebio Escalera MD

## 2021-12-10 ENCOUNTER — OFFICE VISIT (OUTPATIENT)
Dept: ONCOLOGY | Facility: CLINIC | Age: 81
End: 2021-12-10

## 2021-12-10 ENCOUNTER — INFUSION (OUTPATIENT)
Dept: ONCOLOGY | Facility: HOSPITAL | Age: 81
End: 2021-12-10

## 2021-12-10 VITALS
BODY MASS INDEX: 19.39 KG/M2 | WEIGHT: 116.5 LBS | TEMPERATURE: 97.5 F | DIASTOLIC BLOOD PRESSURE: 78 MMHG | OXYGEN SATURATION: 99 % | HEART RATE: 85 BPM | SYSTOLIC BLOOD PRESSURE: 194 MMHG

## 2021-12-10 DIAGNOSIS — E53.8 B12 DEFICIENCY: ICD-10-CM

## 2021-12-10 DIAGNOSIS — C18.2 MALIGNANT NEOPLASM OF ASCENDING COLON (HCC): Primary | ICD-10-CM

## 2021-12-10 DIAGNOSIS — K90.9 MALABSORPTION OF IRON: ICD-10-CM

## 2021-12-10 DIAGNOSIS — K90.9 MALABSORPTION OF IRON: Chronic | ICD-10-CM

## 2021-12-10 DIAGNOSIS — D50.8 OTHER IRON DEFICIENCY ANEMIA: Chronic | ICD-10-CM

## 2021-12-10 DIAGNOSIS — Z45.2 ENCOUNTER FOR VENOUS ACCESS DEVICE CARE: ICD-10-CM

## 2021-12-10 DIAGNOSIS — D50.0 IRON DEFICIENCY ANEMIA DUE TO CHRONIC BLOOD LOSS: ICD-10-CM

## 2021-12-10 DIAGNOSIS — E53.8 B12 DEFICIENCY: Chronic | ICD-10-CM

## 2021-12-10 DIAGNOSIS — D50.8 OTHER IRON DEFICIENCY ANEMIA: ICD-10-CM

## 2021-12-10 DIAGNOSIS — Z45.2 ENCOUNTER FOR CARE RELATED TO PORT-A-CATH: ICD-10-CM

## 2021-12-10 DIAGNOSIS — C18.2 MALIGNANT NEOPLASM OF ASCENDING COLON (HCC): Primary | Chronic | ICD-10-CM

## 2021-12-10 LAB
ALBUMIN SERPL-MCNC: 3.8 G/DL (ref 3.5–5.2)
ALBUMIN/GLOB SERPL: 1.2 G/DL
ALP SERPL-CCNC: 86 U/L (ref 39–117)
ALT SERPL W P-5'-P-CCNC: 28 U/L (ref 1–33)
ANION GAP SERPL CALCULATED.3IONS-SCNC: 12 MMOL/L (ref 5–15)
AST SERPL-CCNC: 30 U/L (ref 1–32)
BASOPHILS # BLD AUTO: 0.06 10*3/MM3 (ref 0–0.2)
BASOPHILS NFR BLD AUTO: 0.8 % (ref 0–1.5)
BILIRUB SERPL-MCNC: 0.3 MG/DL (ref 0–1.2)
BUN SERPL-MCNC: 20 MG/DL (ref 8–23)
BUN/CREAT SERPL: 25.6 (ref 7–25)
CALCIUM SPEC-SCNC: 8.9 MG/DL (ref 8.6–10.5)
CEA SERPL-MCNC: 3.83 NG/ML
CHLORIDE SERPL-SCNC: 98 MMOL/L (ref 98–107)
CO2 SERPL-SCNC: 23 MMOL/L (ref 22–29)
CREAT SERPL-MCNC: 0.78 MG/DL (ref 0.57–1)
DEPRECATED RDW RBC AUTO: 42.5 FL (ref 37–54)
EOSINOPHIL # BLD AUTO: 0.59 10*3/MM3 (ref 0–0.4)
EOSINOPHIL NFR BLD AUTO: 7.6 % (ref 0.3–6.2)
ERYTHROCYTE [DISTWIDTH] IN BLOOD BY AUTOMATED COUNT: 13.1 % (ref 12.3–15.4)
FERRITIN SERPL-MCNC: 168.5 NG/ML (ref 13–150)
FOLATE SERPL-MCNC: >20 NG/ML (ref 4.78–24.2)
GFR SERPL CREATININE-BSD FRML MDRD: 71 ML/MIN/1.73
GLOBULIN UR ELPH-MCNC: 3.2 GM/DL
GLUCOSE SERPL-MCNC: 160 MG/DL (ref 65–99)
HCT VFR BLD AUTO: 37.8 % (ref 34–46.6)
HGB BLD-MCNC: 12.4 G/DL (ref 12–15.9)
IMM GRANULOCYTES # BLD AUTO: 0.08 10*3/MM3 (ref 0–0.05)
IMM GRANULOCYTES NFR BLD AUTO: 1 % (ref 0–0.5)
IRON 24H UR-MRATE: 42 MCG/DL (ref 37–145)
IRON SATN MFR SERPL: 14 % (ref 20–50)
LYMPHOCYTES # BLD AUTO: 0.92 10*3/MM3 (ref 0.7–3.1)
LYMPHOCYTES NFR BLD AUTO: 11.8 % (ref 19.6–45.3)
MCH RBC QN AUTO: 29.2 PG (ref 26.6–33)
MCHC RBC AUTO-ENTMCNC: 32.8 G/DL (ref 31.5–35.7)
MCV RBC AUTO: 88.9 FL (ref 79–97)
MONOCYTES # BLD AUTO: 0.55 10*3/MM3 (ref 0.1–0.9)
MONOCYTES NFR BLD AUTO: 7.1 % (ref 5–12)
NEUTROPHILS NFR BLD AUTO: 5.57 10*3/MM3 (ref 1.7–7)
NEUTROPHILS NFR BLD AUTO: 71.7 % (ref 42.7–76)
NRBC BLD AUTO-RTO: 0 /100 WBC (ref 0–0.2)
PLATELET # BLD AUTO: 374 10*3/MM3 (ref 140–450)
PMV BLD AUTO: 8.9 FL (ref 6–12)
POTASSIUM SERPL-SCNC: 4.6 MMOL/L (ref 3.5–5.2)
PROT SERPL-MCNC: 7 G/DL (ref 6–8.5)
RBC # BLD AUTO: 4.25 10*6/MM3 (ref 3.77–5.28)
SODIUM SERPL-SCNC: 133 MMOL/L (ref 136–145)
TIBC SERPL-MCNC: 301 MCG/DL (ref 298–536)
TRANSFERRIN SERPL-MCNC: 202 MG/DL (ref 200–360)
VIT B12 BLD-MCNC: 672 PG/ML (ref 211–946)
WBC NRBC COR # BLD: 7.77 10*3/MM3 (ref 3.4–10.8)

## 2021-12-10 PROCEDURE — 83540 ASSAY OF IRON: CPT

## 2021-12-10 PROCEDURE — G0463 HOSPITAL OUTPT CLINIC VISIT: HCPCS | Performed by: INTERNAL MEDICINE

## 2021-12-10 PROCEDURE — 82728 ASSAY OF FERRITIN: CPT

## 2021-12-10 PROCEDURE — 82746 ASSAY OF FOLIC ACID SERUM: CPT

## 2021-12-10 PROCEDURE — 82607 VITAMIN B-12: CPT

## 2021-12-10 PROCEDURE — 82378 CARCINOEMBRYONIC ANTIGEN: CPT

## 2021-12-10 PROCEDURE — 80053 COMPREHEN METABOLIC PANEL: CPT

## 2021-12-10 PROCEDURE — 36591 DRAW BLOOD OFF VENOUS DEVICE: CPT | Performed by: INTERNAL MEDICINE

## 2021-12-10 PROCEDURE — 85025 COMPLETE CBC W/AUTO DIFF WBC: CPT

## 2021-12-10 PROCEDURE — 99214 OFFICE O/P EST MOD 30 MIN: CPT | Performed by: INTERNAL MEDICINE

## 2021-12-10 PROCEDURE — 84466 ASSAY OF TRANSFERRIN: CPT

## 2021-12-10 PROCEDURE — 1123F ACP DISCUSS/DSCN MKR DOCD: CPT | Performed by: INTERNAL MEDICINE

## 2021-12-10 PROCEDURE — 1126F AMNT PAIN NOTED NONE PRSNT: CPT | Performed by: INTERNAL MEDICINE

## 2021-12-10 PROCEDURE — 25010000002 HEPARIN LOCK FLUSH PER 10 UNITS: Performed by: INTERNAL MEDICINE

## 2021-12-10 RX ORDER — SODIUM CHLORIDE 0.9 % (FLUSH) 0.9 %
10 SYRINGE (ML) INJECTION AS NEEDED
OUTPATIENT
Start: 2022-04-29

## 2021-12-10 RX ORDER — HEPARIN SODIUM (PORCINE) LOCK FLUSH IV SOLN 100 UNIT/ML 100 UNIT/ML
500 SOLUTION INTRAVENOUS AS NEEDED
OUTPATIENT
Start: 2022-04-08

## 2021-12-10 RX ORDER — SODIUM CHLORIDE 0.9 % (FLUSH) 0.9 %
10 SYRINGE (ML) INJECTION AS NEEDED
Status: DISCONTINUED | OUTPATIENT
Start: 2021-12-10 | End: 2021-12-10 | Stop reason: HOSPADM

## 2021-12-10 RX ORDER — HEPARIN SODIUM (PORCINE) LOCK FLUSH IV SOLN 100 UNIT/ML 100 UNIT/ML
500 SOLUTION INTRAVENOUS AS NEEDED
Status: DISCONTINUED | OUTPATIENT
Start: 2021-12-10 | End: 2021-12-10 | Stop reason: HOSPADM

## 2021-12-10 RX ADMIN — SODIUM CHLORIDE, PRESERVATIVE FREE 10 ML: 5 INJECTION INTRAVENOUS at 07:48

## 2021-12-10 RX ADMIN — HEPARIN 500 UNITS: 100 SYRINGE at 07:48

## 2021-12-10 NOTE — PROGRESS NOTES
DATE OF VISIT: 12/10/2021      REASON FOR VISIT: Colon cancer, recurrent iron deficiency, B12 deficiency,       HISTORY OF PRESENT ILLNESS:   80-year-old female with medical problem consisting of diabetes mellitus, hypertension, colon cancer s/p surgery and chemotherapy currently on surveillance, B12 deficiency, recurrent iron deficiency, uncontrolled diabetes mellitus is here for follow-up appointment today.  Denies any bleeding.  Denies any new lymph node enlargement.  Complains of arthralgia.  Complains of chronic neuropathy.         Oncology history:    1.  Colon cancer stage III diagnosed in October 2015  -Patient had a high colectomy followed by chemotherapy  -Patient was initially started on XELOX but could not tolerate Xeloda secondary to skin rash  -Subsequently patient was changed to FOLFOX but could not tolerate more than three cycles due to neuropathy  -Patient was changed over to 5-FU and leucovorin which she could not tolerate either and subsequently chemotherapy was discontinued after total eight cycles which was discontinued in July 2016      Past Medical History, Past Surgical History, Social History, Family History have been reviewed and are without significant changes except as mentioned.    Review of Systems   Unable to perform ROS: Dementia      A comprehensive 14 point review of systems was performed and was negative except as mentioned in HPI.    Medications:  The current medication list was reviewed in the EMR    ALLERGIES:    Allergies   Allergen Reactions   • Other Rash     Oral Chemo Meds       Objective      Vitals:    12/10/21 0757   BP: (!) 194/78   Pulse: 85   Temp: 97.5 °F (36.4 °C)   TempSrc: Temporal   SpO2: 99%   Weight: 52.8 kg (116 lb 8 oz)   PainSc: 0-No pain     Current Status 8/5/2021   ECOG score 1       Physical Exam  Pulmonary:      Breath sounds: Normal breath sounds.   Neurological:      Mental Status: She is alert and oriented to person, place, and time.           RECENT  LABS:  Glucose   Date Value Ref Range Status   12/10/2021 160 (H) 65 - 99 mg/dL Final     Sodium   Date Value Ref Range Status   12/10/2021 133 (L) 136 - 145 mmol/L Final   02/12/2021 134 (L) 136 - 145 mmol/L Final     Potassium   Date Value Ref Range Status   12/10/2021 4.6 3.5 - 5.2 mmol/L Final   02/12/2021 4.3 3.5 - 5.1 mmol/L Final     CO2   Date Value Ref Range Status   12/10/2021 23.0 22.0 - 29.0 mmol/L Final     Total CO2   Date Value Ref Range Status   02/12/2021 30 21 - 31 mmol/L Final     Chloride   Date Value Ref Range Status   12/10/2021 98 98 - 107 mmol/L Final   02/12/2021 97 (L) 98 - 107 mmol/L Final     Anion Gap   Date Value Ref Range Status   12/10/2021 12.0 5.0 - 15.0 mmol/L Final     Creatinine   Date Value Ref Range Status   12/10/2021 0.78 0.57 - 1.00 mg/dL Final   02/12/2021 0.8 0.7 - 1.3 mg/dL Final     BUN   Date Value Ref Range Status   12/10/2021 20 8 - 23 mg/dL Final   02/12/2021 26 (H) 7 - 25 mg/dL Final     BUN/Creatinine Ratio   Date Value Ref Range Status   12/10/2021 25.6 (H) 7.0 - 25.0 Final     Calcium   Date Value Ref Range Status   12/10/2021 8.9 8.6 - 10.5 mg/dL Final   02/12/2021 9.8 8.6 - 10.3 mg/dL Final     eGFR Non  Amer   Date Value Ref Range Status   12/10/2021 71 >60 mL/min/1.73 Final     Alkaline Phosphatase   Date Value Ref Range Status   12/10/2021 86 39 - 117 U/L Final   02/12/2021 77 34 - 104 U/L Final   10/25/2019 101 46 - 116 U/L Final     Total Protein   Date Value Ref Range Status   12/10/2021 7.0 6.0 - 8.5 g/dL Final   10/25/2019 6.9 6.4 - 8.2 g/dL Final     ALT (SGPT)   Date Value Ref Range Status   12/10/2021 28 1 - 33 U/L Final   02/12/2021 16 7 - 52 U/L Final   10/25/2019 23 14 - 59 U/L Final     AST (SGOT)   Date Value Ref Range Status   12/10/2021 30 1 - 32 U/L Final   02/12/2021 20 13 - 39 U/L Final   10/25/2019 20 15 - 37 U/L Final     Total Bilirubin   Date Value Ref Range Status   12/10/2021 0.3 0.0 - 1.2 mg/dL Final   02/12/2021 0.55 0.3 -  1.0 mg/dL Final     Albumin   Date Value Ref Range Status   12/10/2021 3.80 3.50 - 5.20 g/dL Final   02/12/2021 4.8 3.5 - 5.7 g/dL Final     Globulin   Date Value Ref Range Status   12/10/2021 3.2 gm/dL Final     Lab Results   Component Value Date    WBC 7.77 12/10/2021    HGB 12.4 12/10/2021    HCT 37.8 12/10/2021    MCV 88.9 12/10/2021     12/10/2021     Lab Results   Component Value Date    NEUTROABS 5.57 12/10/2021    IRON 42 12/10/2021    IRON 64 08/03/2021    IRON 43 04/06/2021    TIBC 301 12/10/2021    TIBC 305 08/03/2021    TIBC 416 04/06/2021    LABIRON 14 (L) 12/10/2021    LABIRON 21 08/03/2021    LABIRON 10 (L) 04/06/2021    FERRITIN 168.50 (H) 12/10/2021    FERRITIN 687.60 (H) 08/03/2021    FERRITIN 239.00 (H) 04/06/2021    LKLYOKHZ71 672 12/10/2021    NZWNTPDO33 897 09/05/2021    ZPIYZCLX76 763 08/03/2021    FOLATE >20.00 12/10/2021    FOLATE >20.00 09/05/2021    FOLATE >20.00 08/03/2021     Lab Results   Component Value Date    CEA 3.83 12/10/2021    REFLABREPO SEE NOTE: 12/22/2015         PATHOLOGY:  * Cannot find OR log *         RADIOLOGY DATA :  No radiology results for the last 7 days        Assessment/Plan     1.  Recurrent iron deficiency anemia  -Patient has been having recurrent iron deficiency since October 2016  -Most recent dose of intravenous Venofer was in April 2021  -Patient had a GI work-up in January 2018 which was negative for GI bleeding.  Capsule endoscopy was not able to done secondary to capsule staying in stomach and not moving forward.  -Anemia work-up done today on December 10, 2021 shows hemoglobin is 12.4. Ferritin is still elevated at 168 with iron saturation of 14%. No need to start any intravenous Venofer at present.  -We will have patient return to clinic in 4 months with repeat CBC, CMP, iron studies, ferritin, B12, folate and CEA to be done on that day    2.  Colon cancer stage III diagnosed in October 2015  -Review oncology history for prior treatment  details  -Colonoscopy in January 2018 was negative for malignancy.  Patient is overdue for colonoscopy but patient has been refusing for colonoscopy as per family.  -CEA level today is 3.83  -Since patient is more than 5 years out of diagnosis no routine CT scan unless patient has rising CEA or any symptoms suggestive of recurrence    3.  B12 deficiency  -Recommend continue with B12 3 times a week      4.  Health maintenance: Patient does not smoke.  Had a colonoscopy in January 2018    5. Advance Care Planning: For now patient remains full code and is able to make decisions.  Patient has health care surrogate mentioned on chart.                 PHQ-9 Total Score: 0   -Patient is not homicidal or suicidal.  No acute intervention required.    Mary Gutierrez reports a pain score of 0.  Given her pain assessment as noted, treatment options were discussed and the following options were decided upon as a follow-up plan to address the patient's pain: continuation of current treatment plan for pain.         Duong Hanley MD  12/10/2021  18:25 CST        Part of this note may be an electronic transcription/translation of spoken language to printed text using the Dragon Dictation System.          CC:

## 2021-12-13 ENCOUNTER — TELEPHONE (OUTPATIENT)
Dept: ONCOLOGY | Facility: HOSPITAL | Age: 81
End: 2021-12-13

## 2021-12-13 NOTE — TELEPHONE ENCOUNTER
Spoke with pt's  regarding lab results. States pt is in NH. NH informed pt of lab results.  denies any further questions.

## 2021-12-13 NOTE — TELEPHONE ENCOUNTER
----- Message from Duong Hanley MD sent at 12/10/2021  6:28 PM CST -----  Please let patient know, iron level is adequate. No need for any intravenous Venofer at present. Recommend continue taking B12 3 times a week on Monday Wednesday and Friday. Colon cancer  CEA is normal at 3.83. Thank you

## 2021-12-29 ENCOUNTER — APPOINTMENT (OUTPATIENT)
Dept: CT IMAGING | Facility: HOSPITAL | Age: 81
End: 2021-12-29

## 2021-12-29 ENCOUNTER — APPOINTMENT (OUTPATIENT)
Dept: GENERAL RADIOLOGY | Facility: HOSPITAL | Age: 81
End: 2021-12-29

## 2021-12-29 ENCOUNTER — HOSPITAL ENCOUNTER (OUTPATIENT)
Facility: HOSPITAL | Age: 81
Setting detail: OBSERVATION
Discharge: NURSING, REHAB OR LONG-TERM CARE | End: 2021-12-30
Attending: STUDENT IN AN ORGANIZED HEALTH CARE EDUCATION/TRAINING PROGRAM | Admitting: INTERNAL MEDICINE

## 2021-12-29 DIAGNOSIS — R29.90 STROKE-LIKE SYMPTOM: Primary | ICD-10-CM

## 2021-12-29 DIAGNOSIS — Z78.9 IMPAIRED MOBILITY AND ADLS: ICD-10-CM

## 2021-12-29 DIAGNOSIS — Z74.09 IMPAIRED MOBILITY AND ADLS: ICD-10-CM

## 2021-12-29 DIAGNOSIS — Z74.09 IMPAIRED FUNCTIONAL MOBILITY, BALANCE, GAIT, AND ENDURANCE: ICD-10-CM

## 2021-12-29 LAB
ABO GROUP BLD: NORMAL
ALBUMIN SERPL-MCNC: 4.3 G/DL (ref 3.5–5.2)
ALBUMIN/GLOB SERPL: 1.9 G/DL
ALP SERPL-CCNC: 89 U/L (ref 39–117)
ALT SERPL W P-5'-P-CCNC: 14 U/L (ref 1–33)
AMORPH URATE CRY URNS QL MICRO: ABNORMAL /HPF
ANION GAP SERPL CALCULATED.3IONS-SCNC: 15 MMOL/L (ref 5–15)
APTT PPP: 28.1 SECONDS (ref 20–40.3)
AST SERPL-CCNC: 19 U/L (ref 1–32)
BACTERIA UR QL AUTO: ABNORMAL /HPF
BASOPHILS # BLD AUTO: 0.07 10*3/MM3 (ref 0–0.2)
BASOPHILS NFR BLD AUTO: 0.7 % (ref 0–1.5)
BILIRUB SERPL-MCNC: <0.2 MG/DL (ref 0–1.2)
BILIRUB UR QL STRIP: NEGATIVE
BLD GP AB SCN SERPL QL: NEGATIVE
BUN SERPL-MCNC: 28 MG/DL (ref 8–23)
BUN/CREAT SERPL: 28 (ref 7–25)
CALCIUM SPEC-SCNC: 9.1 MG/DL (ref 8.6–10.5)
CHLORIDE SERPL-SCNC: 100 MMOL/L (ref 98–107)
CLARITY UR: CLEAR
CO2 SERPL-SCNC: 15 MMOL/L (ref 22–29)
COLOR UR: YELLOW
CREAT SERPL-MCNC: 1 MG/DL (ref 0.57–1)
DEPRECATED RDW RBC AUTO: 42.5 FL (ref 37–54)
EOSINOPHIL # BLD AUTO: 0.61 10*3/MM3 (ref 0–0.4)
EOSINOPHIL NFR BLD AUTO: 5.7 % (ref 0.3–6.2)
ERYTHROCYTE [DISTWIDTH] IN BLOOD BY AUTOMATED COUNT: 13.5 % (ref 12.3–15.4)
FLUAV SUBTYP SPEC NAA+PROBE: NOT DETECTED
FLUBV RNA ISLT QL NAA+PROBE: NOT DETECTED
GFR SERPL CREATININE-BSD FRML MDRD: 53 ML/MIN/1.73
GLOBULIN UR ELPH-MCNC: 2.3 GM/DL
GLUCOSE SERPL-MCNC: 239 MG/DL (ref 65–99)
GLUCOSE UR STRIP-MCNC: ABNORMAL MG/DL
HCT VFR BLD AUTO: 39.1 % (ref 34–46.6)
HGB BLD-MCNC: 13.2 G/DL (ref 12–15.9)
HGB UR QL STRIP.AUTO: ABNORMAL
HOLD SPECIMEN: NORMAL
HOLD SPECIMEN: NORMAL
HYALINE CASTS UR QL AUTO: ABNORMAL /LPF
IMM GRANULOCYTES # BLD AUTO: 0.06 10*3/MM3 (ref 0–0.05)
IMM GRANULOCYTES NFR BLD AUTO: 0.6 % (ref 0–0.5)
INR PPP: 1.05 (ref 0.8–1.2)
KETONES UR QL STRIP: NEGATIVE
LEUKOCYTE ESTERASE UR QL STRIP.AUTO: ABNORMAL
LYMPHOCYTES # BLD AUTO: 1.11 10*3/MM3 (ref 0.7–3.1)
LYMPHOCYTES NFR BLD AUTO: 10.3 % (ref 19.6–45.3)
Lab: NORMAL
MCH RBC QN AUTO: 29.4 PG (ref 26.6–33)
MCHC RBC AUTO-ENTMCNC: 33.8 G/DL (ref 31.5–35.7)
MCV RBC AUTO: 87.1 FL (ref 79–97)
MONOCYTES # BLD AUTO: 0.66 10*3/MM3 (ref 0.1–0.9)
MONOCYTES NFR BLD AUTO: 6.1 % (ref 5–12)
NEUTROPHILS NFR BLD AUTO: 76.6 % (ref 42.7–76)
NEUTROPHILS NFR BLD AUTO: 8.24 10*3/MM3 (ref 1.7–7)
NITRITE UR QL STRIP: NEGATIVE
NRBC BLD AUTO-RTO: 0 /100 WBC (ref 0–0.2)
PH UR STRIP.AUTO: 8.5 [PH] (ref 5–9)
PLATELET # BLD AUTO: 318 10*3/MM3 (ref 140–450)
PMV BLD AUTO: 9.3 FL (ref 6–12)
POTASSIUM SERPL-SCNC: 3.7 MMOL/L (ref 3.5–5.2)
PROT SERPL-MCNC: 6.6 G/DL (ref 6–8.5)
PROT UR QL STRIP: NEGATIVE
PROTHROMBIN TIME: 13.6 SECONDS (ref 11.1–15.3)
RBC # BLD AUTO: 4.49 10*6/MM3 (ref 3.77–5.28)
RBC # UR STRIP: ABNORMAL /HPF
REF LAB TEST METHOD: ABNORMAL
RH BLD: POSITIVE
SARS-COV-2 RNA PNL SPEC NAA+PROBE: NOT DETECTED
SODIUM SERPL-SCNC: 130 MMOL/L (ref 136–145)
SP GR UR STRIP: 1.01 (ref 1–1.03)
SQUAMOUS #/AREA URNS HPF: ABNORMAL /HPF
T&S EXPIRATION DATE: NORMAL
TROPONIN T SERPL-MCNC: <0.01 NG/ML (ref 0–0.03)
UROBILINOGEN UR QL STRIP: ABNORMAL
WBC # UR STRIP: ABNORMAL /HPF
WBC NRBC COR # BLD: 10.75 10*3/MM3 (ref 3.4–10.8)
WHOLE BLOOD HOLD SPECIMEN: NORMAL
WHOLE BLOOD HOLD SPECIMEN: NORMAL

## 2021-12-29 PROCEDURE — G0378 HOSPITAL OBSERVATION PER HR: HCPCS

## 2021-12-29 PROCEDURE — 25010000002 HALOPERIDOL LACTATE PER 5 MG

## 2021-12-29 PROCEDURE — 71045 X-RAY EXAM CHEST 1 VIEW: CPT

## 2021-12-29 PROCEDURE — 87636 SARSCOV2 & INF A&B AMP PRB: CPT | Performed by: STUDENT IN AN ORGANIZED HEALTH CARE EDUCATION/TRAINING PROGRAM

## 2021-12-29 PROCEDURE — 99284 EMERGENCY DEPT VISIT MOD MDM: CPT

## 2021-12-29 PROCEDURE — 70498 CT ANGIOGRAPHY NECK: CPT

## 2021-12-29 PROCEDURE — 0 IOPAMIDOL PER 1 ML: Performed by: STUDENT IN AN ORGANIZED HEALTH CARE EDUCATION/TRAINING PROGRAM

## 2021-12-29 PROCEDURE — 85610 PROTHROMBIN TIME: CPT | Performed by: STUDENT IN AN ORGANIZED HEALTH CARE EDUCATION/TRAINING PROGRAM

## 2021-12-29 PROCEDURE — 84484 ASSAY OF TROPONIN QUANT: CPT | Performed by: STUDENT IN AN ORGANIZED HEALTH CARE EDUCATION/TRAINING PROGRAM

## 2021-12-29 PROCEDURE — 25010000002 HALOPERIDOL LACTATE PER 5 MG: Performed by: STUDENT IN AN ORGANIZED HEALTH CARE EDUCATION/TRAINING PROGRAM

## 2021-12-29 PROCEDURE — 83036 HEMOGLOBIN GLYCOSYLATED A1C: CPT | Performed by: NURSE PRACTITIONER

## 2021-12-29 PROCEDURE — 81001 URINALYSIS AUTO W/SCOPE: CPT | Performed by: STUDENT IN AN ORGANIZED HEALTH CARE EDUCATION/TRAINING PROGRAM

## 2021-12-29 PROCEDURE — 86901 BLOOD TYPING SEROLOGIC RH(D): CPT | Performed by: STUDENT IN AN ORGANIZED HEALTH CARE EDUCATION/TRAINING PROGRAM

## 2021-12-29 PROCEDURE — 70496 CT ANGIOGRAPHY HEAD: CPT

## 2021-12-29 PROCEDURE — 0042T HC CT CEREBRAL PERFUSION W/WO CONTRAST: CPT

## 2021-12-29 PROCEDURE — 70450 CT HEAD/BRAIN W/O DYE: CPT

## 2021-12-29 PROCEDURE — 86900 BLOOD TYPING SEROLOGIC ABO: CPT | Performed by: STUDENT IN AN ORGANIZED HEALTH CARE EDUCATION/TRAINING PROGRAM

## 2021-12-29 PROCEDURE — 85025 COMPLETE CBC W/AUTO DIFF WBC: CPT | Performed by: STUDENT IN AN ORGANIZED HEALTH CARE EDUCATION/TRAINING PROGRAM

## 2021-12-29 PROCEDURE — 96376 TX/PRO/DX INJ SAME DRUG ADON: CPT

## 2021-12-29 PROCEDURE — 86850 RBC ANTIBODY SCREEN: CPT | Performed by: STUDENT IN AN ORGANIZED HEALTH CARE EDUCATION/TRAINING PROGRAM

## 2021-12-29 PROCEDURE — C9803 HOPD COVID-19 SPEC COLLECT: HCPCS

## 2021-12-29 PROCEDURE — 96374 THER/PROPH/DIAG INJ IV PUSH: CPT

## 2021-12-29 PROCEDURE — 85730 THROMBOPLASTIN TIME PARTIAL: CPT | Performed by: STUDENT IN AN ORGANIZED HEALTH CARE EDUCATION/TRAINING PROGRAM

## 2021-12-29 PROCEDURE — 93010 ELECTROCARDIOGRAM REPORT: CPT | Performed by: INTERNAL MEDICINE

## 2021-12-29 PROCEDURE — 80053 COMPREHEN METABOLIC PANEL: CPT | Performed by: STUDENT IN AN ORGANIZED HEALTH CARE EDUCATION/TRAINING PROGRAM

## 2021-12-29 PROCEDURE — 93005 ELECTROCARDIOGRAM TRACING: CPT | Performed by: STUDENT IN AN ORGANIZED HEALTH CARE EDUCATION/TRAINING PROGRAM

## 2021-12-29 RX ORDER — HALOPERIDOL 5 MG/ML
0.5 INJECTION INTRAMUSCULAR ONCE
Status: COMPLETED | OUTPATIENT
Start: 2021-12-29 | End: 2021-12-29

## 2021-12-29 RX ORDER — HALOPERIDOL 5 MG/ML
1 INJECTION INTRAMUSCULAR EVERY 6 HOURS PRN
Status: DISCONTINUED | OUTPATIENT
Start: 2021-12-29 | End: 2021-12-30 | Stop reason: HOSPADM

## 2021-12-29 RX ORDER — BISACODYL 10 MG
10 SUPPOSITORY, RECTAL RECTAL DAILY PRN
Status: DISCONTINUED | OUTPATIENT
Start: 2021-12-29 | End: 2021-12-30 | Stop reason: HOSPADM

## 2021-12-29 RX ORDER — SODIUM CHLORIDE 0.9 % (FLUSH) 0.9 %
10 SYRINGE (ML) INJECTION EVERY 12 HOURS SCHEDULED
Status: DISCONTINUED | OUTPATIENT
Start: 2021-12-29 | End: 2021-12-30 | Stop reason: HOSPADM

## 2021-12-29 RX ORDER — HALOPERIDOL 5 MG/ML
1 INJECTION INTRAMUSCULAR ONCE
Status: COMPLETED | OUTPATIENT
Start: 2021-12-29 | End: 2021-12-29

## 2021-12-29 RX ORDER — SODIUM CHLORIDE 0.9 % (FLUSH) 0.9 %
10 SYRINGE (ML) INJECTION AS NEEDED
Status: DISCONTINUED | OUTPATIENT
Start: 2021-12-29 | End: 2021-12-30 | Stop reason: HOSPADM

## 2021-12-29 RX ORDER — ATORVASTATIN CALCIUM 40 MG/1
80 TABLET, FILM COATED ORAL NIGHTLY
Status: DISCONTINUED | OUTPATIENT
Start: 2021-12-30 | End: 2021-12-30 | Stop reason: HOSPADM

## 2021-12-29 RX ORDER — PANTOPRAZOLE SODIUM 40 MG/1
40 TABLET, DELAYED RELEASE ORAL EVERY MORNING
Status: DISCONTINUED | OUTPATIENT
Start: 2021-12-30 | End: 2021-12-30 | Stop reason: HOSPADM

## 2021-12-29 RX ORDER — RIVASTIGMINE 4.6 MG/24H
1 PATCH, EXTENDED RELEASE TRANSDERMAL DAILY
Status: DISCONTINUED | OUTPATIENT
Start: 2021-12-30 | End: 2021-12-30 | Stop reason: HOSPADM

## 2021-12-29 RX ORDER — ONDANSETRON 2 MG/ML
4 INJECTION INTRAMUSCULAR; INTRAVENOUS EVERY 6 HOURS PRN
Status: DISCONTINUED | OUTPATIENT
Start: 2021-12-29 | End: 2021-12-30 | Stop reason: HOSPADM

## 2021-12-29 RX ORDER — LISINOPRIL 20 MG/1
20 TABLET ORAL DAILY
Status: DISCONTINUED | OUTPATIENT
Start: 2021-12-30 | End: 2021-12-30 | Stop reason: HOSPADM

## 2021-12-29 RX ORDER — MULTIPLE VITAMINS W/ MINERALS TAB 9MG-400MCG
1 TAB ORAL DAILY
Status: DISCONTINUED | OUTPATIENT
Start: 2021-12-30 | End: 2021-12-30 | Stop reason: HOSPADM

## 2021-12-29 RX ORDER — HALOPERIDOL 5 MG/ML
INJECTION INTRAMUSCULAR
Status: COMPLETED
Start: 2021-12-29 | End: 2021-12-29

## 2021-12-29 RX ORDER — ENALAPRILAT 2.5 MG/2ML
0.62 INJECTION INTRAVENOUS EVERY 6 HOURS PRN
Status: DISCONTINUED | OUTPATIENT
Start: 2021-12-29 | End: 2021-12-30 | Stop reason: HOSPADM

## 2021-12-29 RX ADMIN — HALOPERIDOL LACTATE 1 MG: 5 INJECTION, SOLUTION INTRAMUSCULAR at 22:03

## 2021-12-29 RX ADMIN — IOPAMIDOL 90 ML: 755 INJECTION, SOLUTION INTRAVENOUS at 18:51

## 2021-12-29 RX ADMIN — IOPAMIDOL 50 ML: 755 INJECTION, SOLUTION INTRAVENOUS at 18:58

## 2021-12-29 RX ADMIN — HALOPERIDOL LACTATE 0.5 MG: 5 INJECTION, SOLUTION INTRAMUSCULAR at 21:19

## 2021-12-29 RX ADMIN — HALOPERIDOL LACTATE 1 MG: 5 INJECTION, SOLUTION INTRAMUSCULAR at 22:56

## 2021-12-29 RX ADMIN — HALOPERIDOL 1 MG: 5 INJECTION INTRAMUSCULAR at 22:56

## 2021-12-29 RX ADMIN — SODIUM CHLORIDE, POTASSIUM CHLORIDE, SODIUM LACTATE AND CALCIUM CHLORIDE 1000 ML: 600; 310; 30; 20 INJECTION, SOLUTION INTRAVENOUS at 22:11

## 2021-12-30 ENCOUNTER — APPOINTMENT (OUTPATIENT)
Dept: MRI IMAGING | Facility: HOSPITAL | Age: 81
End: 2021-12-30

## 2021-12-30 ENCOUNTER — APPOINTMENT (OUTPATIENT)
Dept: CT IMAGING | Facility: HOSPITAL | Age: 81
End: 2021-12-30

## 2021-12-30 ENCOUNTER — APPOINTMENT (OUTPATIENT)
Dept: CARDIOLOGY | Facility: HOSPITAL | Age: 81
End: 2021-12-30

## 2021-12-30 VITALS
TEMPERATURE: 97.4 F | SYSTOLIC BLOOD PRESSURE: 162 MMHG | WEIGHT: 116.84 LBS | DIASTOLIC BLOOD PRESSURE: 67 MMHG | HEIGHT: 65 IN | BODY MASS INDEX: 19.47 KG/M2 | OXYGEN SATURATION: 96 % | HEART RATE: 52 BPM | RESPIRATION RATE: 27 BRPM

## 2021-12-30 LAB
ANION GAP SERPL CALCULATED.3IONS-SCNC: 11 MMOL/L (ref 5–15)
BASOPHILS # BLD AUTO: 0.06 10*3/MM3 (ref 0–0.2)
BASOPHILS NFR BLD AUTO: 0.8 % (ref 0–1.5)
BH CV ECHO MEAS - ACS: 1.6 CM
BH CV ECHO MEAS - AO MAX PG (FULL): 2.4 MMHG
BH CV ECHO MEAS - AO MAX PG: 7.2 MMHG
BH CV ECHO MEAS - AO MEAN PG (FULL): 1 MMHG
BH CV ECHO MEAS - AO MEAN PG: 4 MMHG
BH CV ECHO MEAS - AO ROOT AREA (BSA CORRECTED): 1.5
BH CV ECHO MEAS - AO ROOT AREA: 4.2 CM^2
BH CV ECHO MEAS - AO ROOT DIAM: 2.3 CM
BH CV ECHO MEAS - AO V2 MAX: 134 CM/SEC
BH CV ECHO MEAS - AO V2 MEAN: 94.4 CM/SEC
BH CV ECHO MEAS - AO V2 VTI: 31.2 CM
BH CV ECHO MEAS - AVA(I,A): 2.6 CM^2
BH CV ECHO MEAS - AVA(I,D): 2.6 CM^2
BH CV ECHO MEAS - AVA(V,A): 2.6 CM^2
BH CV ECHO MEAS - AVA(V,D): 2.6 CM^2
BH CV ECHO MEAS - BSA(HAYCOCK): 1.5 M^2
BH CV ECHO MEAS - BSA: 1.6 M^2
BH CV ECHO MEAS - BZI_BMI: 19.3 KILOGRAMS/M^2
BH CV ECHO MEAS - BZI_METRIC_HEIGHT: 165.1 CM
BH CV ECHO MEAS - BZI_METRIC_WEIGHT: 52.6 KG
BH CV ECHO MEAS - EDV(CUBED): 33.1 ML
BH CV ECHO MEAS - EDV(MOD-SP2): 34.7 ML
BH CV ECHO MEAS - EDV(MOD-SP4): 55 ML
BH CV ECHO MEAS - EDV(TEICH): 41.3 ML
BH CV ECHO MEAS - EF(CUBED): 62.2 %
BH CV ECHO MEAS - EF(MOD-SP2): 69.2 %
BH CV ECHO MEAS - EF(MOD-SP4): 65.6 %
BH CV ECHO MEAS - EF(TEICH): 55.1 %
BH CV ECHO MEAS - ESV(CUBED): 12.5 ML
BH CV ECHO MEAS - ESV(MOD-SP2): 10.7 ML
BH CV ECHO MEAS - ESV(MOD-SP4): 18.9 ML
BH CV ECHO MEAS - ESV(TEICH): 18.5 ML
BH CV ECHO MEAS - FS: 27.7 %
BH CV ECHO MEAS - IVS/LVPW: 1
BH CV ECHO MEAS - IVSD: 0.99 CM
BH CV ECHO MEAS - LA DIMENSION: 3.2 CM
BH CV ECHO MEAS - LA/AO: 1.4
BH CV ECHO MEAS - LV DIASTOLIC VOL/BSA (35-75): 35 ML/M^2
BH CV ECHO MEAS - LV MASS(C)D: 86.7 GRAMS
BH CV ECHO MEAS - LV MASS(C)DI: 55.2 GRAMS/M^2
BH CV ECHO MEAS - LV MAX PG: 4.8 MMHG
BH CV ECHO MEAS - LV MEAN PG: 3 MMHG
BH CV ECHO MEAS - LV SYSTOLIC VOL/BSA (12-30): 12 ML/M^2
BH CV ECHO MEAS - LV V1 MAX: 109 CM/SEC
BH CV ECHO MEAS - LV V1 MEAN: 78.8 CM/SEC
BH CV ECHO MEAS - LV V1 VTI: 26.1 CM
BH CV ECHO MEAS - LVIDD: 3.2 CM
BH CV ECHO MEAS - LVIDS: 2.3 CM
BH CV ECHO MEAS - LVLD AP2: 6.6 CM
BH CV ECHO MEAS - LVLD AP4: 7.3 CM
BH CV ECHO MEAS - LVLS AP2: 5.2 CM
BH CV ECHO MEAS - LVLS AP4: 6.8 CM
BH CV ECHO MEAS - LVOT AREA (M): 3.1 CM^2
BH CV ECHO MEAS - LVOT AREA: 3.1 CM^2
BH CV ECHO MEAS - LVOT DIAM: 2 CM
BH CV ECHO MEAS - LVPWD: 0.95 CM
BH CV ECHO MEAS - MR MAX PG: 116.2 MMHG
BH CV ECHO MEAS - MR MAX VEL: 539 CM/SEC
BH CV ECHO MEAS - MV A MAX VEL: 132 CM/SEC
BH CV ECHO MEAS - MV DEC SLOPE: 646 CM/SEC^2
BH CV ECHO MEAS - MV E MAX VEL: 119 CM/SEC
BH CV ECHO MEAS - MV E/A: 0.9
BH CV ECHO MEAS - MV P1/2T MAX VEL: 132 CM/SEC
BH CV ECHO MEAS - MV P1/2T: 59.8 MSEC
BH CV ECHO MEAS - MVA P1/2T LCG: 1.7 CM^2
BH CV ECHO MEAS - MVA(P1/2T): 3.7 CM^2
BH CV ECHO MEAS - PA MAX PG (FULL): 2.2 MMHG
BH CV ECHO MEAS - PA MAX PG: 4.5 MMHG
BH CV ECHO MEAS - PA V2 MAX: 106 CM/SEC
BH CV ECHO MEAS - RAP SYSTOLE: 5 MMHG
BH CV ECHO MEAS - RV MAX PG: 2.3 MMHG
BH CV ECHO MEAS - RV MEAN PG: 1 MMHG
BH CV ECHO MEAS - RV V1 MAX: 75.1 CM/SEC
BH CV ECHO MEAS - RV V1 MEAN: 51.9 CM/SEC
BH CV ECHO MEAS - RV V1 VTI: 16.7 CM
BH CV ECHO MEAS - RVDD: 3 CM
BH CV ECHO MEAS - RVSP: 36.6 MMHG
BH CV ECHO MEAS - SI(AO): 82.6 ML/M^2
BH CV ECHO MEAS - SI(CUBED): 13.1 ML/M^2
BH CV ECHO MEAS - SI(LVOT): 52.2 ML/M^2
BH CV ECHO MEAS - SI(MOD-SP2): 15.3 ML/M^2
BH CV ECHO MEAS - SI(MOD-SP4): 23 ML/M^2
BH CV ECHO MEAS - SI(TEICH): 14.5 ML/M^2
BH CV ECHO MEAS - SV(AO): 129.6 ML
BH CV ECHO MEAS - SV(CUBED): 20.6 ML
BH CV ECHO MEAS - SV(LVOT): 82 ML
BH CV ECHO MEAS - SV(MOD-SP2): 24 ML
BH CV ECHO MEAS - SV(MOD-SP4): 36.1 ML
BH CV ECHO MEAS - SV(TEICH): 22.8 ML
BH CV ECHO MEAS - TR MAX VEL: 281 CM/SEC
BUN SERPL-MCNC: 25 MG/DL (ref 8–23)
BUN/CREAT SERPL: 26.3 (ref 7–25)
CALCIUM SPEC-SCNC: 8.9 MG/DL (ref 8.6–10.5)
CHLORIDE SERPL-SCNC: 108 MMOL/L (ref 98–107)
CHOLEST SERPL-MCNC: 188 MG/DL (ref 0–200)
CO2 SERPL-SCNC: 15 MMOL/L (ref 22–29)
CREAT SERPL-MCNC: 0.95 MG/DL (ref 0.57–1)
DEPRECATED RDW RBC AUTO: 46.2 FL (ref 37–54)
EOSINOPHIL # BLD AUTO: 0.62 10*3/MM3 (ref 0–0.4)
EOSINOPHIL NFR BLD AUTO: 8.2 % (ref 0.3–6.2)
ERYTHROCYTE [DISTWIDTH] IN BLOOD BY AUTOMATED COUNT: 13.8 % (ref 12.3–15.4)
GFR SERPL CREATININE-BSD FRML MDRD: 57 ML/MIN/1.73
GLUCOSE BLDC GLUCOMTR-MCNC: 126 MG/DL (ref 70–130)
GLUCOSE BLDC GLUCOMTR-MCNC: 99 MG/DL (ref 70–130)
GLUCOSE SERPL-MCNC: 133 MG/DL (ref 65–99)
HBA1C MFR BLD: 7.4 % (ref 4.8–5.6)
HCT VFR BLD AUTO: 41.4 % (ref 34–46.6)
HDLC SERPL-MCNC: 43 MG/DL (ref 40–60)
HGB BLD-MCNC: 13.2 G/DL (ref 12–15.9)
IMM GRANULOCYTES # BLD AUTO: 0.04 10*3/MM3 (ref 0–0.05)
IMM GRANULOCYTES NFR BLD AUTO: 0.5 % (ref 0–0.5)
LDLC SERPL CALC-MCNC: 99 MG/DL (ref 0–100)
LDLC/HDLC SERPL: 2.11 {RATIO}
LYMPHOCYTES # BLD AUTO: 1.25 10*3/MM3 (ref 0.7–3.1)
LYMPHOCYTES NFR BLD AUTO: 16.5 % (ref 19.6–45.3)
MAXIMAL PREDICTED HEART RATE: 140 BPM
MCH RBC QN AUTO: 29.2 PG (ref 26.6–33)
MCHC RBC AUTO-ENTMCNC: 31.9 G/DL (ref 31.5–35.7)
MCV RBC AUTO: 91.6 FL (ref 79–97)
MONOCYTES # BLD AUTO: 0.63 10*3/MM3 (ref 0.1–0.9)
MONOCYTES NFR BLD AUTO: 8.3 % (ref 5–12)
NEUTROPHILS NFR BLD AUTO: 4.97 10*3/MM3 (ref 1.7–7)
NEUTROPHILS NFR BLD AUTO: 65.7 % (ref 42.7–76)
NRBC BLD AUTO-RTO: 0 /100 WBC (ref 0–0.2)
PLATELET # BLD AUTO: 304 10*3/MM3 (ref 140–450)
PMV BLD AUTO: 9.9 FL (ref 6–12)
POTASSIUM SERPL-SCNC: 3.7 MMOL/L (ref 3.5–5.2)
QT INTERVAL: 396 MS
QTC INTERVAL: 396 MS
RBC # BLD AUTO: 4.52 10*6/MM3 (ref 3.77–5.28)
SODIUM SERPL-SCNC: 134 MMOL/L (ref 136–145)
STRESS TARGET HR: 119 BPM
TRIGL SERPL-MCNC: 271 MG/DL (ref 0–150)
VLDLC SERPL-MCNC: 46 MG/DL (ref 5–40)
WBC NRBC COR # BLD: 7.57 10*3/MM3 (ref 3.4–10.8)

## 2021-12-30 PROCEDURE — 80048 BASIC METABOLIC PNL TOTAL CA: CPT | Performed by: STUDENT IN AN ORGANIZED HEALTH CARE EDUCATION/TRAINING PROGRAM

## 2021-12-30 PROCEDURE — 93306 TTE W/DOPPLER COMPLETE: CPT

## 2021-12-30 PROCEDURE — 63710000001 INSULIN DETEMIR PER 5 UNITS: Performed by: STUDENT IN AN ORGANIZED HEALTH CARE EDUCATION/TRAINING PROGRAM

## 2021-12-30 PROCEDURE — 92610 EVALUATE SWALLOWING FUNCTION: CPT | Performed by: SPEECH-LANGUAGE PATHOLOGIST

## 2021-12-30 PROCEDURE — 97166 OT EVAL MOD COMPLEX 45 MIN: CPT

## 2021-12-30 PROCEDURE — G0378 HOSPITAL OBSERVATION PER HR: HCPCS

## 2021-12-30 PROCEDURE — 93306 TTE W/DOPPLER COMPLETE: CPT | Performed by: INTERNAL MEDICINE

## 2021-12-30 PROCEDURE — 82962 GLUCOSE BLOOD TEST: CPT

## 2021-12-30 PROCEDURE — 36415 COLL VENOUS BLD VENIPUNCTURE: CPT | Performed by: STUDENT IN AN ORGANIZED HEALTH CARE EDUCATION/TRAINING PROGRAM

## 2021-12-30 PROCEDURE — 70450 CT HEAD/BRAIN W/O DYE: CPT

## 2021-12-30 PROCEDURE — 99213 OFFICE O/P EST LOW 20 MIN: CPT | Performed by: NURSE PRACTITIONER

## 2021-12-30 PROCEDURE — 85025 COMPLETE CBC W/AUTO DIFF WBC: CPT | Performed by: STUDENT IN AN ORGANIZED HEALTH CARE EDUCATION/TRAINING PROGRAM

## 2021-12-30 PROCEDURE — 80061 LIPID PANEL: CPT | Performed by: NURSE PRACTITIONER

## 2021-12-30 PROCEDURE — 97162 PT EVAL MOD COMPLEX 30 MIN: CPT

## 2021-12-30 RX ORDER — DEXTROSE MONOHYDRATE 25 G/50ML
25 INJECTION, SOLUTION INTRAVENOUS
Status: DISCONTINUED | OUTPATIENT
Start: 2021-12-30 | End: 2021-12-30 | Stop reason: HOSPADM

## 2021-12-30 RX ORDER — ASPIRIN 81 MG/1
81 TABLET, CHEWABLE ORAL DAILY
Qty: 30 TABLET | Refills: 0
Start: 2021-12-31 | End: 2022-01-30

## 2021-12-30 RX ORDER — ASPIRIN 81 MG/1
81 TABLET, CHEWABLE ORAL DAILY
Status: DISCONTINUED | OUTPATIENT
Start: 2021-12-30 | End: 2021-12-30 | Stop reason: HOSPADM

## 2021-12-30 RX ORDER — ATORVASTATIN CALCIUM 80 MG/1
80 TABLET, FILM COATED ORAL NIGHTLY
Qty: 30 TABLET | Refills: 0
Start: 2021-12-30 | End: 2022-01-29

## 2021-12-30 RX ORDER — NICOTINE POLACRILEX 4 MG
15 LOZENGE BUCCAL
Status: DISCONTINUED | OUTPATIENT
Start: 2021-12-30 | End: 2021-12-30 | Stop reason: HOSPADM

## 2021-12-30 RX ADMIN — Medication 10 ML: at 00:51

## 2021-12-30 RX ADMIN — ASPIRIN 81 MG: 81 TABLET, CHEWABLE ORAL at 13:23

## 2021-12-30 RX ADMIN — RIVASTIGMINE TRANSDERMAL SYSTEM 1 PATCH: 4.6 PATCH, EXTENDED RELEASE TRANSDERMAL at 10:53

## 2021-12-30 RX ADMIN — Medication 1 TABLET: at 10:53

## 2021-12-30 RX ADMIN — PANTOPRAZOLE SODIUM 40 MG: 40 TABLET, DELAYED RELEASE ORAL at 10:52

## 2021-12-30 RX ADMIN — INSULIN DETEMIR 10 UNITS: 100 INJECTION, SOLUTION SUBCUTANEOUS at 00:50

## 2021-12-30 RX ADMIN — LISINOPRIL 20 MG: 20 TABLET ORAL at 10:52

## 2022-04-08 ENCOUNTER — APPOINTMENT (OUTPATIENT)
Dept: ONCOLOGY | Facility: HOSPITAL | Age: 82
End: 2022-04-08

## 2022-04-29 ENCOUNTER — TELEPHONE (OUTPATIENT)
Dept: ONCOLOGY | Facility: CLINIC | Age: 82
End: 2022-04-29

## 2022-05-02 NOTE — TELEPHONE ENCOUNTER
Called and spoke with nursing home regarding dr. Hanley advice about visits and blood work. V/u obtained.

## 2022-12-04 ENCOUNTER — LAB REQUISITION (OUTPATIENT)
Dept: LAB | Facility: HOSPITAL | Age: 82
End: 2022-12-04

## 2022-12-04 DIAGNOSIS — J11.1 INFLUENZA DUE TO UNIDENTIFIED INFLUENZA VIRUS WITH OTHER RESPIRATORY MANIFESTATIONS: ICD-10-CM

## 2022-12-04 LAB
FLUAV AG NPH QL: NEGATIVE
FLUBV AG NPH QL IA: NEGATIVE

## 2022-12-04 PROCEDURE — 87804 INFLUENZA ASSAY W/OPTIC: CPT | Performed by: FAMILY MEDICINE

## 2023-01-01 NOTE — PLAN OF CARE
Treatment team met and discussed and reviewed treatment plan. Pt was unable to participate due to dementia.Team discussed anticipated interventions and treatment modalities that will be used to address goals.Pt/family will be given opportunity to discuss any concerns re: treatment plan.    Team Members present during meeting:    MD: Dr. Sheppard  APRN: Cecille Cabral  RN: Ofelia De La Garza  SW: Manuel Simms  RT: Becky Rose  Other: DOMINIK Clifford RN Autumn Johnson, RN      negative - no fever

## 2023-03-17 ENCOUNTER — TELEPHONE (OUTPATIENT)
Dept: ONCOLOGY | Facility: CLINIC | Age: 83
End: 2023-03-17
Payer: MEDICARE

## 2023-03-17 NOTE — TELEPHONE ENCOUNTER
Called and spoke with facility regarding pt. Shavonne wanting to let us know pt is still refusing to come here for port flush.

## 2025-02-05 NOTE — PROGRESS NOTES
Orders:    Ambulatory Referral to Neurosurgery    CBC (No Diff); Future     "9/7/2021    Chief Complaint: dementia related behavioral issues    Subjective:  Patient is a 80 y.o. female that is currently inpatient on the [facility] today she is seen in the common area where she quietly sits.  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   Pt asks if we know her  that comes by to read things.  Pt is unable to tell what has brought her to the hospital.    Pt is compliant with care.        Objective     Vital Signs    Temp:  [98.2 °F (36.8 °C)-99.1 °F (37.3 °C)] 98.2 °F (36.8 °C)  Heart Rate:  [73-83] 83  Resp:  [18-20] 20  BP: (131-140)/(61-63) 131/61    Physical Exam:   General Appearance: alert, appears stated age and cooperative,  Hygiene:   fair  Gait & Station: Normal  Musculoskeletal: No tremors or abnormal involuntary movements    Mental Status Exam:   Cooperation:  Evasive  Eye Contact:  Fair  Psychomotor Behavior:  Slow  Mood: \"Fine\"  Affect:  mood-congruent  Speech:  Minimal  Thought Process:  Poverty of thought  Associations: Disorganized  Thought Content:     Mood congruent   Suicidal:  None   Homicidal:  None   Hallucinations:  None   Delusion:  None  Cognitive Functioning:   Consciousness: awake, alert and confused  Reliability:  poor  Insight:  Poor  Judgement:  Poor  Impulse Control:  Fair    Lab Results (last 24 hours)     Procedure Component Value Units Date/Time    POC Glucose Once [[id number]]  (Normal) Collected: 09/07/21 1146    Specimen: Blood Updated: 09/07/21 1205     Glucose 110 mg/dL      Comment: : [id number] RICKEY GRACEMeter ID: [id number]       POC Glucose Once [[id number]]  (Normal) Collected: 09/07/21 0620    Specimen: Blood Updated: 09/07/21 1204     Glucose 80 mg/dL      Comment: : [id number] ADA CHELAAMeter ID: [id number]           Imaging Results (Last 24 Hours)     ** No results found for the last 24 hours. **          Medicine:   Current Facility-Administered Medications:   •  " acetaminophen (TYLENOL) tablet 500 mg, 500 mg, Oral, TID, Shaquille Huggins II, MD, 500 mg at 09/07/21 1602  •  acetaminophen (TYLENOL) tablet 650 mg, 650 mg, Oral, Q4H PRN, Shaquille Huggins II, MD  •  aluminum-magnesium hydroxide-simethicone (MAALOX MAX) 400-400-40 MG/5ML suspension 15 mL, 15 mL, Oral, Q6H PRN, Shaquille Huggins II, MD  •  Canagliflozin (INVOKANA) tablet 100 mg, 100 mg, Oral, Daily, Shaquille Huggins II, MD, 100 mg at 09/07/21 0843  •  cholecalciferol (VITAMIN D3) tablet 1,000 Units, 1,000 Units, Oral, Daily With Dinner, Shaquille Huggins II, MD, 1,000 Units at 09/07/21 1603  •  cloNIDine (CATAPRES) tablet 0.1 mg, 0.1 mg, Oral, Q4H PRN, Shaquille Huggins II, MD  •  dextrose (D50W) 25 g/ 50mL Intravenous Solution 25 g, 25 g, Intravenous, Q15 Min PRN, Levill, Isatu G, APRN  •  dextrose (GLUTOSE) oral gel 15 g, 15 g, Oral, Q15 Min PRN, Levill, Isatu G, APRN  •  dorzolamide-timolol (COSOPT) ophthalmic solution 1 drop, 1 drop, Both Eyes, BID, Shaquille Huggins II, MD, 1 drop at 09/07/21 0849  •  glucagon (human recombinant) (GLUCAGEN DIAGNOSTIC) injection 1 mg, 1 mg, Subcutaneous, Q15 Min PRN, Levill, Isatu G, APRN  •  influenza vac split quad (FLUZONE,FLUARIX,AFLURIA,FLULAVAL) injection 0.5 mL, 0.5 mL, Intramuscular, During Hospitalization, Shaquille Huggins II, MD  •  insulin aspart (novoLOG) injection 0-14 Units, 0-14 Units, Subcutaneous, TID AC, Levill, Isatu G, APRN, 5 Units at 09/05/21 1655  •  insulin detemir (LEVEMIR) injection 10 Units, 10 Units, Subcutaneous, Nightly, Shaquille Huggins II, MD, 10 Units at 09/06/21 2105  •  lisinopril (PRINIVIL,ZESTRIL) tablet 20 mg, 20 mg, Oral, Daily, Shaquille Huggins II, MD, 20 mg at 09/07/21 0843  •  loperamide (IMODIUM) capsule 2 mg, 2 mg, Oral, Q2H PRN, Shaquille Huggins II, MD  •  LORazepam (ATIVAN) tablet 1 mg, 1 mg, Oral, Q6H PRN **OR** LORazepam (ATIVAN) injection 1 mg, 1 mg, Intramuscular, Q6H PRN,  Shaquille Huggins II, MD, 1 mg at 09/04/21 0414  •  magnesium hydroxide (MILK OF MAGNESIA) suspension 2400 mg/10mL 10 mL, 10 mL, Oral, Daily PRN, Shaquille Huggins II, MD  •  metFORMIN (GLUCOPHAGE) tablet 500 mg, 500 mg, Oral, BID With Meals, Shaquille Huggins II, MD, 500 mg at 09/07/21 1602  •  multivitamin with minerals 1 tablet, 1 tablet, Oral, Daily, Shaquille Huggins II, MD, 1 tablet at 09/07/21 0843  •  pantoprazole (PROTONIX) EC tablet 40 mg, 40 mg, Oral, QAM, Shaquille Huggins II, MD, 40 mg at 09/07/21 0844  •  [START ON 9/8/2021] rivastigmine (EXELON) 4.6 MG/24HR patch 1 patch, 1 patch, Transdermal, Daily, Renzo Sheppard MD    Diagnoses/Assessment:     Major neurocognitive disorder due to Alzheimer's disease (CMS/HCC)    Dementia with behavioral disturbance (CMS/HCC)    Type 2 diabetes mellitus without complication, with long-term current use of insulin (CMS/HCC)    Dyslipidemia    Benign essential HTN      Treatment Plan:    1) Will continue care for the patient on the behavioral health unit at [facility] to ensure patient safety.  2) Will continue to provide treatment with the unit milieu, activities, therapies and psychopharmacological management.  3) Patient to be placed on or continued on  Q15 minute checks  and Fall precautions.  4) Pertinent medical issues:   --HTN: Cont home Lisinopril; Catapres PRN  --t2DM: Cont Invokana (formulary), titrate Levemir to 14 mg qHS (home dose, now that pt is eating well); Metformin 500mg BID  --GERD: Cont PPI  --Occular: cont Cosopt BID  5) Will order following labs: none  6) Will make the following medication changes:   --MNCD: Cont Exelon patch 4.6mg daily; Consider Namenda augmentation given severity of NCD                7) Will continue discharge planning as appropriate for patient.  8) Psychotherapy provided for less than 16 minutes.    Treatment plan and medication risks and benefits discussed with: Patient    Cecille  ZEE Cabral, FLY  09/07/21  16:20 CDT

## (undated) DEVICE — SINGLE-USE BIOPSY FORCEPS: Brand: RADIAL JAW 4

## (undated) DEVICE — DEV INFL BALN BIG60 W/GAUGE 60ML

## (undated) DEVICE — THE DISPOSABLE ROTH NET FOREIGN BODY STANDARD RETRIEVAL DEVICE IS USED IN THE ENDOSCOPIC RETRIEVAL OF FOREIGN BODY, FOOD BOLUS AND EXCISED TISSUE SUCH AS POLYPS.: Brand: ROTH NET

## (undated) DEVICE — Device: Brand: DISPOSABLE ELECTROSURGICAL SNARE

## (undated) DEVICE — DILATOR CONTRL RADL 12/15MM 8CM BX5

## (undated) DEVICE — BITEBLOCK ENDO W/STRAP 60F A/ LF DISP

## (undated) DEVICE — CANN SMPL SOFTECH BIFLO ETCO2 A/M 7FT

## (undated) DEVICE — CAPS PILLCAM ENDO SB3EX